# Patient Record
Sex: FEMALE | Race: WHITE | NOT HISPANIC OR LATINO | Employment: OTHER | URBAN - METROPOLITAN AREA
[De-identification: names, ages, dates, MRNs, and addresses within clinical notes are randomized per-mention and may not be internally consistent; named-entity substitution may affect disease eponyms.]

---

## 2017-02-06 ENCOUNTER — GENERIC CONVERSION - ENCOUNTER (OUTPATIENT)
Dept: OTHER | Facility: OTHER | Age: 72
End: 2017-02-06

## 2017-02-08 ENCOUNTER — GENERIC CONVERSION - ENCOUNTER (OUTPATIENT)
Dept: OTHER | Facility: OTHER | Age: 72
End: 2017-02-08

## 2017-02-09 ENCOUNTER — LAB CONVERSION - ENCOUNTER (OUTPATIENT)
Dept: OTHER | Facility: OTHER | Age: 72
End: 2017-02-09

## 2017-02-09 LAB
25(OH)D3 SERPL-MCNC: 38 NG/ML (ref 30–100)
CREATININE, RANDOM URINE (HISTORICAL): 117 MG/DL (ref 20–320)
HBA1C MFR BLD HPLC: 6.3 % OF TOTAL HGB
MAGNESIUM, UR (HISTORICAL): 0.5 MG/DL
MICROALBUMIN/CREATININE RATIO (HISTORICAL): 4 MCG/MG CREAT

## 2017-02-15 ENCOUNTER — ALLSCRIPTS OFFICE VISIT (OUTPATIENT)
Dept: OTHER | Facility: OTHER | Age: 72
End: 2017-02-15

## 2017-02-24 ENCOUNTER — LAB CONVERSION - ENCOUNTER (OUTPATIENT)
Dept: OTHER | Facility: OTHER | Age: 72
End: 2017-02-24

## 2017-02-24 LAB
ALKALINE PHOSPHATASE BONE FRACTION (HISTORICAL): 56 % (ref 28–66)
ALKALINE PHOSPHATASE INTESTINE FRACTION (HISTORICAL): 0 % (ref 1–24)
ALKALINE PHOSPHATASE LIVER FRACTION (HISTORICAL): 44 % (ref 25–69)
ALP SERPL-CCNC: 145 U/L (ref 33–130)
CK SERPL-CCNC: 45 U/L (ref 29–143)
ERYTHROCYTE SEDIMENTATION RATE (HISTORICAL): 14 MM/H
MACROHEPATIC ISOENZYME (HISTORICAL): 0 %
PLACENTAL ISOENZYME (HISTORICAL): 0 %

## 2017-02-28 ENCOUNTER — GENERIC CONVERSION - ENCOUNTER (OUTPATIENT)
Dept: OTHER | Facility: OTHER | Age: 72
End: 2017-02-28

## 2017-03-16 ENCOUNTER — GENERIC CONVERSION - ENCOUNTER (OUTPATIENT)
Dept: OTHER | Facility: OTHER | Age: 72
End: 2017-03-16

## 2017-04-11 ENCOUNTER — GENERIC CONVERSION - ENCOUNTER (OUTPATIENT)
Dept: OTHER | Facility: OTHER | Age: 72
End: 2017-04-11

## 2017-05-10 ENCOUNTER — ALLSCRIPTS OFFICE VISIT (OUTPATIENT)
Dept: OTHER | Facility: OTHER | Age: 72
End: 2017-05-10

## 2017-05-17 ENCOUNTER — ALLSCRIPTS OFFICE VISIT (OUTPATIENT)
Dept: OTHER | Facility: OTHER | Age: 72
End: 2017-05-17

## 2017-05-17 DIAGNOSIS — E11.9 TYPE 2 DIABETES MELLITUS WITHOUT COMPLICATIONS (HCC): ICD-10-CM

## 2017-05-17 DIAGNOSIS — E78.5 HYPERLIPIDEMIA: ICD-10-CM

## 2017-05-30 ENCOUNTER — GENERIC CONVERSION - ENCOUNTER (OUTPATIENT)
Dept: OTHER | Facility: OTHER | Age: 72
End: 2017-05-30

## 2017-06-07 ENCOUNTER — GENERIC CONVERSION - ENCOUNTER (OUTPATIENT)
Dept: OTHER | Facility: OTHER | Age: 72
End: 2017-06-07

## 2017-07-18 ENCOUNTER — GENERIC CONVERSION - ENCOUNTER (OUTPATIENT)
Dept: OTHER | Facility: OTHER | Age: 72
End: 2017-07-18

## 2017-08-02 ENCOUNTER — GENERIC CONVERSION - ENCOUNTER (OUTPATIENT)
Dept: OTHER | Facility: OTHER | Age: 72
End: 2017-08-02

## 2017-09-08 ENCOUNTER — GENERIC CONVERSION - ENCOUNTER (OUTPATIENT)
Dept: OTHER | Facility: OTHER | Age: 72
End: 2017-09-08

## 2017-09-19 ENCOUNTER — ALLSCRIPTS OFFICE VISIT (OUTPATIENT)
Dept: OTHER | Facility: OTHER | Age: 72
End: 2017-09-19

## 2017-09-19 ENCOUNTER — TRANSCRIBE ORDERS (OUTPATIENT)
Dept: ADMINISTRATIVE | Facility: HOSPITAL | Age: 72
End: 2017-09-19

## 2017-09-19 DIAGNOSIS — J44.9 OBSTRUCTIVE CHRONIC BRONCHITIS WITHOUT EXACERBATION (HCC): Primary | ICD-10-CM

## 2017-09-19 DIAGNOSIS — Z12.31 VISIT FOR SCREENING MAMMOGRAM: ICD-10-CM

## 2017-09-20 ENCOUNTER — LAB CONVERSION - ENCOUNTER (OUTPATIENT)
Dept: OTHER | Facility: OTHER | Age: 72
End: 2017-09-20

## 2017-09-20 LAB
CHOLEST SERPL-MCNC: 204 MG/DL
CHOLEST/HDLC SERPL: 4.3 (CALC)
HBA1C MFR BLD HPLC: 5.7 % OF TOTAL HGB
HDLC SERPL-MCNC: 48 MG/DL
LDL CHOLESTEROL (HISTORICAL): 130 MG/DL (CALC)
NON-HDL-CHOL (CHOL-HDL) (HISTORICAL): 156 MG/DL (CALC)
TRIGL SERPL-MCNC: 145 MG/DL
TSH SERPL DL<=0.05 MIU/L-ACNC: 1.95 MIU/L (ref 0.4–4.5)

## 2017-10-11 ENCOUNTER — GENERIC CONVERSION - ENCOUNTER (OUTPATIENT)
Dept: OTHER | Facility: OTHER | Age: 72
End: 2017-10-11

## 2017-10-17 ENCOUNTER — GENERIC CONVERSION - ENCOUNTER (OUTPATIENT)
Dept: OTHER | Facility: OTHER | Age: 72
End: 2017-10-17

## 2017-10-19 ENCOUNTER — HOSPITAL ENCOUNTER (OUTPATIENT)
Dept: NON INVASIVE DIAGNOSTICS | Facility: HOSPITAL | Age: 72
Discharge: HOME | End: 2017-10-19
Attending: INTERNAL MEDICINE
Payer: COMMERCIAL

## 2017-10-19 ENCOUNTER — GENERIC CONVERSION - ENCOUNTER (OUTPATIENT)
Dept: OTHER | Facility: OTHER | Age: 72
End: 2017-10-19

## 2017-10-19 VITALS
SYSTOLIC BLOOD PRESSURE: 133 MMHG | HEIGHT: 70 IN | DIASTOLIC BLOOD PRESSURE: 69 MMHG | BODY MASS INDEX: 23.91 KG/M2 | WEIGHT: 167 LBS

## 2017-10-19 DIAGNOSIS — I25.10 ATHEROSCLEROTIC HEART DISEASE OF NATIVE CORONARY ARTERY WITHOUT ANGINA PECTORIS: ICD-10-CM

## 2017-10-19 DIAGNOSIS — R06.00 DYSPNEA: ICD-10-CM

## 2017-10-19 LAB
CHEST PAIN STATEMENT: NORMAL
MAX DIASTOLIC BP: 81 MMHG
MAX HEART RATE: 121 BPM
MAX PREDICTED HEART RATE: 149 BPM
MAX. SYSTOLIC BP: 156 MMHG
PROTOCOL NAME: NORMAL
REASON FOR TERMINATION: NORMAL
TARGET HR FORMULA: NORMAL
TIME IN EXERCISE PHASE: 720 S

## 2017-10-19 PROCEDURE — 93350 STRESS TTE ONLY: CPT

## 2017-10-19 RX ORDER — DOBUTAMINE HYDROCHLORIDE 200 MG/100ML
10 INJECTION INTRAVENOUS CONTINUOUS
Status: DISCONTINUED | OUTPATIENT
Start: 2017-10-19 | End: 2017-10-20 | Stop reason: HOSPADM

## 2017-10-19 RX ADMIN — DOBUTAMINE HYDROCHLORIDE 10 MCG/KG/MIN: 200 INJECTION INTRAVENOUS at 13:29

## 2017-10-30 ENCOUNTER — HOSPITAL ENCOUNTER (OUTPATIENT)
Dept: MAMMOGRAPHY | Facility: HOSPITAL | Age: 72
Discharge: HOME | End: 2017-10-30
Payer: COMMERCIAL

## 2017-10-30 DIAGNOSIS — Z12.31 VISIT FOR SCREENING MAMMOGRAM: ICD-10-CM

## 2017-10-31 ENCOUNTER — HOSPITAL ENCOUNTER (OUTPATIENT)
Dept: MAMMOGRAPHY | Facility: MEDICAL CENTER | Age: 72
Discharge: HOME | End: 2017-10-31
Payer: COMMERCIAL

## 2017-10-31 ENCOUNTER — TRANSCRIBE ORDERS (OUTPATIENT)
Dept: ADMINISTRATIVE | Facility: HOSPITAL | Age: 72
End: 2017-10-31

## 2017-10-31 ENCOUNTER — GENERIC CONVERSION - ENCOUNTER (OUTPATIENT)
Dept: FAMILY MEDICINE CLINIC | Facility: CLINIC | Age: 72
End: 2017-10-31

## 2017-10-31 ENCOUNTER — GENERIC CONVERSION - ENCOUNTER (OUTPATIENT)
Dept: OTHER | Facility: OTHER | Age: 72
End: 2017-10-31

## 2017-10-31 DIAGNOSIS — N64.4 BREAST PAIN, RIGHT: Primary | ICD-10-CM

## 2017-10-31 DIAGNOSIS — Z12.31 VISIT FOR SCREENING MAMMOGRAM: ICD-10-CM

## 2017-10-31 DIAGNOSIS — Z00.00 ENCOUNTER FOR GENERAL ADULT MEDICAL EXAMINATION WITHOUT ABNORMAL FINDINGS: ICD-10-CM

## 2017-10-31 LAB
LEFT EYE DIABETIC RETINOPATHY: NORMAL
RIGHT EYE DIABETIC RETINOPATHY: NORMAL

## 2017-11-03 ENCOUNTER — GENERIC CONVERSION - ENCOUNTER (OUTPATIENT)
Dept: OTHER | Facility: OTHER | Age: 72
End: 2017-11-03

## 2017-11-03 ENCOUNTER — HOSPITAL ENCOUNTER (OUTPATIENT)
Dept: MAMMOGRAPHY | Facility: CLINIC | Age: 72
Discharge: HOME | End: 2017-11-03
Payer: COMMERCIAL

## 2017-11-03 ENCOUNTER — HOSPITAL ENCOUNTER (OUTPATIENT)
Dept: ULTRASOUND IMAGING | Facility: CLINIC | Age: 72
Discharge: HOME | End: 2017-11-03
Payer: COMMERCIAL

## 2017-11-03 DIAGNOSIS — N64.4 MASTODYNIA: ICD-10-CM

## 2017-11-03 PROCEDURE — G0204 DX MAMMO INCL CAD BI: HCPCS

## 2017-11-03 PROCEDURE — G0279 TOMOSYNTHESIS, MAMMO: HCPCS

## 2017-11-03 PROCEDURE — 76642 ULTRASOUND BREAST LIMITED: CPT

## 2017-11-07 ENCOUNTER — HOSPITAL ENCOUNTER (OUTPATIENT)
Dept: PULMONOLOGY | Facility: HOSPITAL | Age: 72
Discharge: HOME | End: 2017-11-07
Payer: COMMERCIAL

## 2017-11-07 ENCOUNTER — GENERIC CONVERSION - ENCOUNTER (OUTPATIENT)
Dept: OTHER | Facility: OTHER | Age: 72
End: 2017-11-07

## 2017-11-07 DIAGNOSIS — J44.9 OBSTRUCTIVE CHRONIC BRONCHITIS WITHOUT EXACERBATION (HCC): ICD-10-CM

## 2017-11-07 PROCEDURE — 94729 DIFFUSING CAPACITY: CPT

## 2017-11-07 PROCEDURE — 94726 PLETHYSMOGRAPHY LUNG VOLUMES: CPT

## 2017-11-07 PROCEDURE — 94760 N-INVAS EAR/PLS OXIMETRY 1: CPT

## 2017-11-07 PROCEDURE — 94060 EVALUATION OF WHEEZING: CPT

## 2017-11-07 RX ORDER — ALBUTEROL SULFATE 2.5 MG/3ML
2.5 SOLUTION RESPIRATORY (INHALATION) ONCE AS NEEDED
Status: COMPLETED | OUTPATIENT
Start: 2017-11-07 | End: 2017-11-07

## 2017-11-07 RX ADMIN — ALBUTEROL SULFATE 2.5 MG: 2.5 SOLUTION RESPIRATORY (INHALATION) at 08:30

## 2017-12-20 ENCOUNTER — GENERIC CONVERSION - ENCOUNTER (OUTPATIENT)
Dept: FAMILY MEDICINE CLINIC | Facility: CLINIC | Age: 72
End: 2017-12-20

## 2017-12-30 LAB
AST SERPL W P-5'-P-CCNC: 13 U/L (ref 10–35)
LDL CHOLESTEROL DIRECT (HISTORICAL): 128 MG/DL

## 2018-01-02 ENCOUNTER — GENERIC CONVERSION - ENCOUNTER (OUTPATIENT)
Dept: FAMILY MEDICINE CLINIC | Facility: CLINIC | Age: 73
End: 2018-01-02

## 2018-01-04 ENCOUNTER — GENERIC CONVERSION - ENCOUNTER (OUTPATIENT)
Dept: OTHER | Facility: OTHER | Age: 73
End: 2018-01-04

## 2018-01-11 NOTE — MISCELLANEOUS
Message  Patient states she is still having the episodes but they are much briefer  She states they have not occurred during "office hours" so she has not called to come in  At this point, I recommend we do a 48 hour Holter monitor        Plan  Fatigue, Palpitations    · HOLTER MONITOR - 48 HOUR; Status:Hold For - Scheduling; Requested  for:10Aug2016;     Signatures   Electronically signed by : DANNY Farrell ; Aug 10 2016  8:56AM EST                       (Author)

## 2018-01-11 NOTE — RESULT NOTES
Message   waiting for the western blot since positive IgG     Verified Results  (1) LYME ANTIBODY PROFILE W/REFLEX TO WESTERN BLOT [id number] 11:02AM Enos Gottron     Test Name Result Flag Reference   LYME IGG 1 99 H 0 00-0 79   POSITIVE(> or = 1 20)-Presence of Borrelia IgG antibodies  Current testing guidelines recommend that all positive samples be supplemented by further testing  Sample forwarded to reference lab for Western blot assay  LYME IGM 0 35  0 00-0 79   NEGATIVE (0 00-0 79)-Absence of detectable Borrelia IgM antibodies  A negative result does not exclude the possibility of Borrelia infection  If early lyme disease is suspected, a second sample should be collected & tested 4 weeks after initial testing

## 2018-01-11 NOTE — MISCELLANEOUS
Message   Recorded as Task   Date: 02/26/2017 07:25 PM, Created By: Rima Negrete   Task Name: Follow Up   Assigned To: [facility],Clinical Team   Regarding Patient: Maria Luz Trujillo, Status: In Progress   Comment:    Zayra Jessica - 26 Feb 2017 7:25 PM     TASK CREATED  Please notify patient that her labs are all fine  Her alkaline phosphatase remains elevated, but that may be from her hepatitis C  Please confirm that she is seeing GI in the next few months, and also send her a copy of ALL labs done in Feb 2017, and tell her to take these along with her to GI  (Alternately, we can send a copy to GI  Reyna Espinoza please find out who she will be seeing )    Thank you,    Carmen Hansen - 27 Feb 2017 10:08 AM     TASK REPLIED TO: Previously Assigned To [facility] and [address] [place] [address] with patient and gave her results  She asked for Sed rate results  I told her it was wnl  She said you ran that test due to her legs being achy and wanted to increase her Pravastatin  She prefers not to increase the med due to this  Please advise  Zayra Jessica - 27 Feb 2017 4:13 PM     TASK REPLIED TO: Previously Assigned To Zayra Jessica  Yes, thank you  SED rate is normal   She may continue on the same dose of Pravastatin at the present time  Thank you,    Radha Ramirez - 28 Feb 2017 8:14 AM     TASK IN PROGRESS   Jamie Lechuga - 28 Feb 2017 8:16 AM     TASK EDITED  Pt aware no change at this point        Active Problems    1  Allergic rhinitis (477 9) (J30 9)   2  Holly's esophagus (530 85) (K22 70)   3  Controlled diabetes mellitus type II without complication (098 94) (G19 5)   4  COPD (chronic obstructive pulmonary disease) (496) (J44 9)   5  Coronary artery calcification (414 00,414 4) (I25 10,I25 84)   6  Elevated alkaline phosphatase level (790 5) (R74 8)   7  Hyperlipidemia (272 4) (E78 5)   8  Myalgia (729 1) (M79 1)   9  Osteoporosis (733 00) (M81 0)   10  Palpitations (785 1) (R00 2)   11  Polymyalgia rheumatica (725) (M35 3)   12  Reactive airway disease (493 90) (J45 909)   13  Tobacco abuse (305 1) (Z72 0)   14  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 (070 70) (B19 20)   15  Vitamin D deficiency (268 9) (E55 9)    Current Meds   1  Azelastine HCl - 0 1 % Nasal Solution; USE 2 SPRAYS IN EACH       NOSTRIL TWICE   DAILY; Therapy: 83HBY8735 to (Last Rx:24Zwc8225)  Requested for: 57QAM5875 Ordered   2  Biotin 1000 MCG Oral Tablet; Therapy: 03RZV5901 to Recorded   3  Calcium 1250 MG TABS; Therapy: 11TDY5465 to Recorded   4  Digestive Enzyme Oral Capsule; Take as directed Recorded   5  Lansoprazole 30 MG Oral Capsule Delayed Release; Therapy: 72EYG6102 to Recorded   6  MetFORMIN HCl - 500 MG Oral Tablet; TAKE 1 TABLET ONCE DAILY WITH A MEAL; Therapy: 57RMY6666 to (Aurelio Avila)  Requested for: 04ZNE7710; Last   Rx:26Jan2017 Ordered   7  MiraLax Oral Powder (Polyethylene Glycol 3350) Recorded   8  Montelukast Sodium 10 MG Oral Tablet (Singulair); Take 1 tablet daily; Therapy: 24Ftq1536 to (Evaluate:25Jun2017)  Requested for: 02Mby2930; Last   Rx:02Bmh8247 Ordered   9  Pravastatin Sodium 10 MG Oral Tablet; TAKE 1 TABLET DAILY AS DIRECTED; Therapy: 82ZRS7670 to (Evaluate:62Pyd5337)  Requested for: 44PQI3015; Last   Rx:82Lie7899 Ordered   10  Ventolin  (90 Base) MCG/ACT Inhalation Aerosol Solution; INHALE 1 TO 2    PUFFS EVERY 4 TO 6 HOURS AS NEEDED; Therapy: 52QIF1964 to (Evaluate:07Oct2016); Last Rx:09Jun2016 Ordered   11  Vitamin D 2000 UNIT Oral Capsule; take 1 capsule daily; Therapy: 16BBJ8002 to Recorded    Allergies    1  Aspirin TABS   2   NSAIDs    Signatures   Electronically signed by : Yara Foley, ; Feb 28 2017  8:17AM EST                       (Author)

## 2018-01-12 NOTE — RESULT NOTES
Verified Results  * MAMMO SCREENING BILATERAL W CAD 42Efe38 11:08AM [facility] Order Number: [id number]    - Patient Instructions: To schedule this appointment, please contact Central Scheduling at [phone]  Do not wear any perfume, powder, lotion or deodorant on breast or underarm area  Please bring your doctors order, referral (if needed) and insurance information with you on the day of the test  Failure to bring this information may result in this test being rescheduled  Arrive 15 minutes prior to your appointment time to register  On the day of your test, please bring any prior mammogram or breast studies with you that were not performed at a [facility]  Failure to bring prior exams may result in your test needing to be rescheduled  TW Order Number: [id number]    - Patient Instructions: To schedule this appointment, please contact Central Scheduling at [phone]  Do not wear any perfume, powder, lotion or deodorant on breast or underarm area  Please bring your doctors order, referral (if needed) and insurance information with you on the day of the test  Failure to bring this information may result in this test being rescheduled  Arrive 15 minutes prior to your appointment time to register  On the day of your test, please bring any prior mammogram or breast studies with you that were not performed at a [facility]  Failure to bring prior exams may result in your test needing to be rescheduled  Test Name Result Flag Reference   MAMMO SCREENING BILATERAL W CAD (Report)     Patient History:   Patient is postmenopausal and is nulliparous  Family history of breast cancer in maternal aunt at age 48 or    over  Took estrogen for 10 years  Patient is an every day smoker  Patient's BMI is 32 4  Reason for exam: screening (asymptomatic)       Mammo Screening Bilateral W CAD: October 29, 2016 - Check In #:    [de-identified]   Bilateral CC and MLO view(s) were taken  Technologist: MELIDA Roche (R)(M)   Prior study comparison: July 30, 2015, bilateral digital    screening mammogram performed at [address]  May 7, 2014, bilateral digital screening    mammogram performed at [address]  November 18, 2011, bilateral digital screening mammogram   performed at [address]  August 23, 2010, bilateral digital screening mammogram performed at [address]  There are scattered fibroglandular densities  No new dominant    soft tissue mass, architectural distortion or suspicious    calcifications are noted  The skin and nipple structures are    within normal limits  Benign appearing calcifications are noted  No mammographic evidence of malignancy  No    significant changes when compared with prior studies  ASSESSMENT: BiRad:2 - Benign     Recommendation:   Routine screening mammogram of both breasts in 1 year  Analyzed by CAD     8-10% of cancers will be missed on mammography  Management of a    palpable abnormality must be based on clinical grounds  Patients   will be notified of their results via letter from our facility  Accredited by [employer] of [employer] and FDA  Transcription Location: [facility] 98: [id number]     Risk Value(s):   Tyrer-Cuzick 10 Year: 3 596%, Tyrer-Cuzick Lifetime: 5 678%,    Myriad Table: 1 5%, CARLOS 5 Year: 1 9%, NCI Lifetime: 5 6%   Signed by:   Yadi Castro MD   10/31/16       Discussion/Summary   Please notify that mammo is WNL  Repeat in 1 year     CB

## 2018-01-13 VITALS
SYSTOLIC BLOOD PRESSURE: 104 MMHG | HEART RATE: 80 BPM | TEMPERATURE: 98 F | BODY MASS INDEX: 33.84 KG/M2 | HEIGHT: 60 IN | WEIGHT: 172.38 LBS | RESPIRATION RATE: 16 BRPM | DIASTOLIC BLOOD PRESSURE: 66 MMHG

## 2018-01-13 VITALS
SYSTOLIC BLOOD PRESSURE: 130 MMHG | WEIGHT: 175 LBS | DIASTOLIC BLOOD PRESSURE: 72 MMHG | HEART RATE: 72 BPM | BODY MASS INDEX: 34.18 KG/M2

## 2018-01-14 VITALS
HEART RATE: 84 BPM | DIASTOLIC BLOOD PRESSURE: 70 MMHG | SYSTOLIC BLOOD PRESSURE: 110 MMHG | WEIGHT: 173 LBS | BODY MASS INDEX: 33.96 KG/M2 | HEIGHT: 60 IN

## 2018-01-15 VITALS
BODY MASS INDEX: 34.47 KG/M2 | HEART RATE: 76 BPM | HEIGHT: 60 IN | WEIGHT: 175.6 LBS | DIASTOLIC BLOOD PRESSURE: 80 MMHG | SYSTOLIC BLOOD PRESSURE: 130 MMHG

## 2018-01-15 NOTE — MISCELLANEOUS
Message   Recorded as Task   Date: 08/03/2016 04:54 PM, Created By: Racheal Rios   Task Name: Follow Up   Assigned To: Hilary Tucker   Regarding Patient: Jude Cash, Status: Active   CommentKay Jarvis - 03 Aug 2016 4:54 PM     TASK CREATED  pt called stating she had palpitation episode last night and also approx 2 hours prior to calling  pt also very distressed at the passing of a very close friend yesterday  declined appt to come in at time of call due to difficulty with construction taking place on her street  she will call back tomorrow if sx recur and ask for me - at which point i will try to arrange nursing visit for EKG  Zayra Jessica - 38 Aug 2016 10:34 PM     TASK REPLIED TO: Previously Assigned To Zayra Jessica  Thank you  CB        Active Problems    1  Allergic rhinitis (477 9) (J30 9)   2  Backache with radiation (724 5) (M54 9)   3  Holly's esophagus (530 85) (K22 70)   4  Colon cancer screening (V76 51) (Z12 11)   5  Controlled diabetes mellitus type II without complication (881 18) (X56 3)   6  COPD (chronic obstructive pulmonary disease) (496) (J44 9)   7  Encounter for screening colonoscopy (V76 51) (Z12 11)   8  Encounter for screening for malignant neoplasm of colon (V76 51) (Z12 11)   9  Encounter for screening mammogram for breast cancer (V76 12) (Z12 31)   10  Erythema migrans (Lyme disease) (088 81) (A69 20)   11  Fatigue (780 79) (R53 83)   12  Glaucoma screening (V80 1) (Z13 5)   13  Headache (784 0) (R51)   14  Hyperlipidemia (272 4) (E78 5)   15  Lumbosacral spondylosis without myelopathy (721 3) (M47 817)   16  Neck pain (723 1) (M54 2)   17  Need for immunization against influenza (V04 81) (Z23)   18  Neuralgia (729 2) (M79 2)   19  Osteoporosis (733 00) (M81 0)   20  Ovarian Failure (256 39)   21  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)   22  Palpitations (785 1) (R00 2)   23  Polymyalgia rheumatica (725) (M35 3)   24  Reactive airway disease (840 10) (J68 805)   25  Screening for diabetic retinopathy (V80 2) (Z13 5)   26  Skin rash (782 1) (R21)   27  Tobacco abuse (305 1) (Z72 0)   28  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 (070 70) (B19 20)   29  Vitamin D deficiency (268 9) (E55 9)   30  Wheezing (786 07) (R06 2)    Current Meds   1  Biotin 1000 MCG Oral Tablet; Therapy: 27HFR1672 to Recorded   2  Calcium 1250 MG TABS; Therapy: 62WEN4935 to Recorded   3  Lansoprazole 30 MG Oral Capsule Delayed Release; Therapy: 19SYU3035 to Recorded   4  MetFORMIN HCl - 500 MG Oral Tablet; TAKE 1 TABLET ONCE DAILY WITH A MEAL; Therapy: 46YDI2607 to (51-30-20-57)  Requested for: 444 14 907; Last   Rx:26Oct2015 Ordered   5  MiraLax Oral Powder (Polyethylene Glycol 3350) Recorded   6  Montelukast Sodium 10 MG Oral Tablet (Singulair); Take 1 tablet daily; Therapy: 29Apr2015 to (Evaluate:67Pmw4372)  Requested for: 20Jun2016; Last   Rx:20Jun2016 Ordered   7  Pravastatin Sodium 10 MG Oral Tablet; take 1 tablet daily at bedtime; Therapy: 94TAC9969 to (Evaluate:02Jan2017)  Requested for: 65OOV2887; Last   Rx:25Fuh3428 Ordered   8  Probiotic Oral Capsule; Therapy: 90SSG4724 to Recorded   9  Ventolin  (90 Base) MCG/ACT Inhalation Aerosol Solution; INHALE 1 TO 2   PUFFS EVERY 4 TO 6 HOURS AS NEEDED; Therapy: 16UIG8931 to (Evaluate:07Oct2016); Last Rx:09Jun2016 Ordered   10  Vitamin D 2000 UNIT Oral Capsule; take 1 capsule daily; Therapy: 56VFF5151 to Recorded    Allergies    1  Aspirin TABS   2   NSAIDs    Signatures   Electronically signed by : Thad Currie, ; Aug  4 2016  8:22AM EST                       (Author)

## 2018-01-15 NOTE — RESULT NOTES
Verified Results  ECHO STRESS TEST W CONTRAST IF INDICATED [id number] 12:48PM Justo Pendleton Order Number: [id number]   Performing Comments: Dobutamine   - Patient Instructions: To schedule this appointment, please contact Central Scheduling at [phone]   Order Number: [id number]   Performing Comments: Dobutamine   - Patient Instructions: To schedule this appointment, please contact Central Scheduling at [phone]  Test Name Result Flag Reference   ECHO STRESS TEST W CONTRAST IF INDICATED (Report)     [facility]  [place], [address]   [phone]     Dobutamine Stress Echocardiography     Study date: 19-Oct-2017     Patient: Mian Monte   MR number: [id number]   Account number: [de-identified]   : 1945   Age: 70 years   Gender: Female   Study date: 19-Oct-2017   Status: Outpatient   Location: Stress lab   Height: 70 in   Weight: 167 lb   BP: 133// 69 mmHg     Indications: Detection of coronary artery disease  Diagnosis: R00 2 - Palpitations, R06 00 - Dyspnea, unspecified     Sonographer: DANNI Camacho   Interpreting Physician: Oumar Gardiner MD   Primary Physician: Carine Gates MD   Referring Physician: Oumar Gardiner MD   Group: [facility]   RN: Cara Khan RN BSN   Report Prepared By: Cara Khan RN BSN   EKG Technician: Darius Miranda     IMPRESSIONS:   Normal study after pharmacologic stress  The patient had no ECG, symptomatic or echo evidence of ischemia to a HR of 125=84% MPHR Diagnostic sensitivity was limited by submaximal stress  SUMMARY     STRESS RESULTS:   Maximal heart rate during stress was 125 bpm ( 84 % of maximal predicted heart rate)  Target heart rate was not achieved  There was no chest pain during stress  BASELINE:   Estimated left ventricular ejection fraction was 60 %   HISTORY: The patient is a 70year old  female  Chest pain status: no chest pain  Other symptoms: dyspnea, decreased effort tolerance, and palpitations  Coronary artery disease risk factors: dyslipidemia, smoking, diabetes   mellitus, and post-menopausal state  Co-morbidity: history of lung disease  Medications: a lipid lowering agent and diabetic medications  PHYSICAL EXAM: Baseline physical exam screening: no wheezes audible  REST ECG: Normal sinus rhythm  Normal baseline ECG  PROCEDURE: The study was performed in the stress lab  A dobutamine infusion pharmacologic stress test was performed  Stress and rest echocardiographic evaluation was performed from multiple acoustic windows for evaluation of ventricular   function  DOBUTAMINE PROTOCOL:   HR bpm SBP mmHg DBP mmHg Symptoms   Baseline 68 133 69 none   10 mcg/kg/min 72 156 81 none   20 mcg/kg/min 100 116 67 none   30 mcg/kg/min 110 140 63 palpitations   40 mcg/kg/min 121 150 58 same as above   Recovery I 112 152 50 subsiding   Recovery  148 58 none   Recovery III 86 142 63 none     The patient also performed low level exercise with hand   MEDICATIONS GIVEN: IV normal saline ( 200 ml) was administered  STRESS RESULTS: 02 sat at rest 98% and at peak stress 98%  Duration of pharmacologic stress was 12 min  Maximal heart rate during stress was 125 bpm ( 84 % of maximal predicted heart rate)  Target heart rate was not achieved  The heart   rate response to stress was normal  Maximal systolic blood pressure during stress was 156 mmHg  There was normal resting blood pressure with an appropriate response to stress  The rate-pressure product for the peak heart rate and blood   pressure was 87206  There was no chest pain during stress  The stress test was terminated due to protocol completion  After the procedure, the patient was discharged to home  ECG CONCLUSIONS: Arrhythmia during stress: isolated premature ventricular beats  STRESS 2D ECHO RESULTS:     BASELINE: Left ventricular size was normal  Mild LVH  Mild LAE  AV sclerosis  Mild to moderate MAC Overall left ventricular systolic function was normal  Estimated left ventricular ejection fraction was 60 %   LOW STRESS: There was an appropriate reduction in left ventricular size  There was an appropriate augmentation in LV function  PEAK STRESS: There was an appropriate reduction in left ventricular size  There was an appropriate augmentation in LV function       Prepared and electronically signed by     Jacinto Weldon MD   Signed 19-Oct-2017 17:37:40

## 2018-01-15 NOTE — RESULT NOTES
Discussion/Summary   Ultrasound of breast   Mammo Nl  Repeat 1 year  Pleae notify patient mammo and US normal   Repeat 1 year  Verified Results  [address] & CAD [id number] 09:47AM [facility] Order Number: [id number]    - Patient Instructions: To schedule this appointment, please contact Central Scheduling at [phone]  Do not wear any perfume, powder, lotion or deodorant on breast or underarm area  Please bring your doctors order, referral (if needed) and insurance information with you on the day of the test  Failure to bring this information may result in this test being rescheduled  Arrive 15 minutes prior to your appointment time to register  On the day of your test, please bring any prior mammogram or breast studies with you that were not performed at a [facility]  Failure to bring prior exams may result in your test needing to be rescheduled  Test Name Result Flag Reference   [facility] DIAGNOSTIC BILATERAL W 3D & CAD (Report)     Patient History:   Patient is postmenopausal and is nulliparous  Family history of breast cancer at age 48 or over in maternal    aunt  Took estrogen for 10 years  Patient is an every day smoker  Patient's BMI is 32 4  Reason for exam: clinical finding  Mammo Diagnostic Bilateral W DBT and CAD: November 3, 2017 -    Check In #: [de-identified]   2D/3D Procedure   3D views: Bilateral MLO view(s) were taken  2D views: Bilateral CC view(s) were taken  Technologist: RT Elian(MELIDA)(M)   Prior study comparison: October 29, 2016, mammo screening    bilateral W CAD, performed at [place] [address]  July 30, 2015, bilateral digital screening mammogram,    performed at [address]  May 7,    2014, bilateral digital screening mammogram, performed at [address]         US Breast Right Limited: November 3, 2017 - Check In #: [id number]   Standard views  Technologist: Alysia Russ   Patient presents complaining of pain in the right    breast Bilateral mammogram was obtained in 20 and 3-D    acquisitions  The breast is composed of predominantly fatty    tissue  Benign appearing calcifications identified  There is no   dominant mass or suspicious microcalcifications in either    breast  Compared to multiple prior mammograms, there has been no   cystic unchanged  Targeted ultrasound right breast was performed with special    attention given to the area of pain  There is no sonographic    evidence of solid mass or any suspicious finding in the area of    concern  ACR BI-RADSï¾® Assessments: BiRad:1 - Negative (Overall)   Diag Mammo: Both breasts are BiRad:1 - negative  Right breast Right Brst US: Right breast is BiRad:1 - negative  Recommendation:   Routine screening mammogram of both breasts in 1 year  The patient is scheduled in a reminder system for screening    mammography  Transcription Location: [facility] 143: [id number]     Risk Value(s):   Tyrer-Cuzick 10 Year: 3 700%, Tyrer-Cuzick Lifetime: 5 400%,    Myriad Table: 1 5%, CARLOS 5 Year: 1 9%, NCI Lifetime: 5 4%     *US BREAST RIGHT LIMITED (DIAGNOSTIC) [id number] 09:47AM [facility] Order Number: [id number]    - Patient Instructions: To schedule this appointment, please contact Central Scheduling at [phone]  Test Name Result Flag Reference   US BREAST RIGHT LIMITED (Report)     Patient History:   Patient is postmenopausal and is nulliparous  Family history of breast cancer at age 48 or over in maternal    aunt  Took estrogen for 10 years  Patient is an every day smoker  Patient's BMI is 32 4  Reason for exam: clinical finding  Mammo Diagnostic Bilateral W DBT and CAD: November 3, 2017 -    Check In #: [de-identified]   2D/3D Procedure   3D views: Bilateral MLO view(s) were taken     2D views: Bilateral CC view(s) were taken  Technologist: Nicola Gutierrez, RT(R)(M)   Prior study comparison: October 29, 2016, mammo screening    bilateral W CAD, performed at [address]  July 30, 2015, bilateral digital screening mammogram,    performed at [address]  May 7,    2014, bilateral digital screening mammogram, performed at [address]  US Breast Right Limited: November 3, 2017 - Check In #: [de-identified]   Standard views  Technologist: Kelli Germain   Patient presents complaining of pain in the right    breast Bilateral mammogram was obtained in 20 and 3-D    acquisitions  The breast is composed of predominantly fatty    tissue  Benign appearing calcifications identified  There is no   dominant mass or suspicious microcalcifications in either    breast  Compared to multiple prior mammograms, there has been no   cystic unchanged  Targeted ultrasound right breast was performed with special    attention given to the area of pain  There is no sonographic    evidence of solid mass or any suspicious finding in the area of    concern  ACR BI-RADSï¾® Assessments: BiRad:1 - Negative (Overall)   Diag Mammo: Both breasts are BiRad:1 - negative  Right breast Right Brst US: Right breast is BiRad:1 - negative  Recommendation:   Routine screening mammogram of both breasts in 1 year  The patient is scheduled in a reminder system for screening    mammography       Transcription Location: [address] [place] [address] [place]: [id number]     Risk Value(s):   Tyrer-Cuzick 10 Year: 3 700%, Tyrer-Cuzick Lifetime: 5 400%,    Myriad Table: 1 5%, CARLOS 5 Year: 1 9%, NCI Lifetime: 5 4%

## 2018-01-16 NOTE — MISCELLANEOUS
We had the pleasure of doing a DSE on the patient today  She came up a bit short of 85% of MPHR  She had no ECG, symptomatic or echo evidence for ischemia to a fairly good HR  I discussed these favorable findings with her  I have  increased her pravastastin to 20 mg daily since her LDL was 130  Apparently Hepatitis C is not problematic and her SGOT/SGPT are normal   I will check a repeat LDL and SGOT in 2 months time  I would suggest repeat functional testing in the form of a  DSE again in 3 years time  She has curtailed smoking and cessation was urged once again  Electronically signed by:Paul Lazarus Pale M D    Oct 19 2017  1:50PM EST

## 2018-01-16 NOTE — RESULT NOTES
Message   please call pt with the result    she has lyme disease and she should continue doxycycline as prescribed         Verified Results  (1) LYME ANTIBODY PROFILE W/REFLEX TO WESTERN BLOT [id number] 11:02AM Tiara Moreno     Test Name Result Flag Reference   LYME 18 KD IGG Present A    LYME 23 KD IGG Absent     LYME 28 KD IGG Absent     LYME 30 KD IGG Absent     LYME 39 KD IGG Present A    LYME 39 KD IGM Absent     LYME 41 KD IGG Present A    LYME 45 KD IGG Present A    LYME 58 KD IGG Present A    LYME 66 KD IGG Absent     LYME 93 KD IGG Absent     LYME 23 KD IGM Absent     LYME 41 KD IGM Absent     LYME IGG WB INTERP  Positive A    Positive: 5 of the following                               Borrelia-specific bands:                               18,23,28,30,39,41,45,58,                               66, and 93  Negative: No bands or banding                               patterns which do not                               meet positive criteria  LYME IGM WB INTERP  Negative     Note: An equivocal or positive EIA result followed by a negativeWestern Blot result is considered NEGATIVE  An equivocal or positiveEIA result followed by a positive Western Blot is considered POSITIVEby the CDC  Positive: 2 of the following bands: 23,39 or 41Negative: No bands or banding patterns which do not meet positivecriteria  Criteria for positivity are those recommended by CDC/ASTPHLD p23=Osp C, p72=zbousicnrNsvb:Sera from individuals with the following may cross react in theLyme Western Blot assays: other spirochetal diseases (periodontaldisease, leptospirosis, relapsing fever, yaws, and pinta);connective autoimmune (Rheumatoid Arthritis and Systemic LupusErythematosus and also individuals with Antinuclear Antibody);other infections COFFEE [facility] Spotted Fever; Anisa-Barr Virus,and Cytomegalovirus)    Performed at:  [address] [place] [address]  [id number]  : Ponce Orr Tim Ching MD, Phone:  [phone]

## 2018-01-16 NOTE — PROCEDURES
Procedures signed  by Lacho Oneill DO at 8/30/2016 12:35 PM       Author:  Lacho Oneill DO Service:  Cardiology Author Type:  Physician     Filed:  8/30/2016 12:35 PM Date of Service:  8/25/2016  3:32 PM Status:  Signed     :  Lacho Oneill DO (Physician)              Florian Ohin  [id number]  Ean Mazariegos  08/18/2016    ORDERING 1700 Dilan Odell MD     INDICATION   Palpitations  REPORT   Holter monitor duration was 47 hours 59 minutes  Underlying rhythm is normal sinus rhythm with an average heart rate of   84 beats per minute ranging from 54 to 117 beats per minute  There   were 143 single premature ventricular contractions noted  There were   79 premature atrial contractions with 2 runs of nonsustained   supraventricular tachycardia, the longest consisting of 11 beats with   the fastest of 3 beats at 141 beats per minute  No other or   tachyarrhythmias or bradyarrhythmias were identified  There were no ST   segment changes noted  The patient activity diary was returned, which related several   episodes of mild chest fluttering  These symptoms primarily correlated   with normal sinus rhythm at average rates  No symptoms were reported   during the two episodes of supraventricular tachycardia or with the   premature ventricular contractions  No prior was available for   comparison  Nicol Gonsales  [id number]  D:  08/25/2016 08:24:00  Dictated by:  Maria Teresa Lyon DO  T:  08/25/2016 15:32:34           Received for:Grady Wade DO  Aug 30 2016 12:35PM Eastern Standard Time     Electronically signed by:Zayra VAZQUEZ    Sep  8 2016  8:48AM EST

## 2018-01-23 ENCOUNTER — ALLSCRIPTS OFFICE VISIT (OUTPATIENT)
Dept: OTHER | Facility: OTHER | Age: 73
End: 2018-01-23

## 2018-01-23 NOTE — RESULT NOTES
Verified Results  (1) AST (SGOT) 19KRS2964 12:07PM Eufemia Dumont   REPORT COMMENT:  FASTING:YES     Test Name Result Flag Reference   AST 13 U/L  10-35     (1) LDL,DIRECT 93MTW1870 12:07PM Eufemia Dumont     Test Name Result Flag Reference   DIRECT  mg/dL H <100   Greatly elevated Triglycerides values (>1200 mg/dL)  interfere with the dLDL assay  As no Triglycerides   testing was ordered, interpret results with caution  Desirable range <100 mg/dL for patients with CHD or  diabetes and <70 mg/dL for diabetic patients with  known heart disease

## 2018-01-24 NOTE — PROGRESS NOTES
Assessment    1  Hyperlipidemia (272 4) (E78 5)   2  COPD (chronic obstructive pulmonary disease) (496) (J44 9)   · seen on cxr 4/29/15, mild by PFT 5/15   3  Holly's esophagus (530 85) (K22 70)   4  Controlled diabetes mellitus type II without complication (599 56) (P46 1)   5  Tobacco abuse (305 1) (Z72 0)   6  Allergic rhinitis (477 9) (J30 9)    Plan  Holly's esophagus, Controlled diabetes mellitus type II without complication, COPD  (chronic obstructive pulmonary disease), Hyperlipidemia, Osteoporosis, Vitamin D  deficiency    · (1) CBC/PLT/DIFF; Status:Active; Requested for:82Aos5112;    · (1) COMPREHENSIVE METABOLIC PANEL; Status:Active; Requested for:09Atr0967;    · (1) HEMOGLOBIN A1C; Status:Active; Requested for:68Icg1053;    · (1) LIPID PANEL, FASTING; Status:Active; Requested for:39Tvd3576;    · (1) MICROALBUMIN CREATININE RATIO, RANDOM URINE; Status:Active; Requested  for:36Yxl0939;    · (1) TSH; Status:Active; Requested for:73Vcy5897;    · (1) VITAMIN D 25-HYDROXY; Status:Active; Requested for:79Fid3321;   COPD (chronic obstructive pulmonary disease)    · Spiriva Respimat 2 5 MCG/ACT Inhalation Aerosol Solution; TAKE 2 INHALATIONS  DAILY   · Symbicort 160-4 5 MCG/ACT Inhalation Aerosol; INHALE 2 PUFFS TWICE DAILY  RINSE MOUTH AFTER USE  Health Maintenance    · *VB - Urinary Incontinence Screen (Dx Z13 89 Screen for UI); Status:Complete;   Done:  72TYN5269 09:21AM    Discussion/Summary      Mrs Khris Jacob is a pleasant 31-year-old white female who presents today to follow-up on her chronic medical conditions  1  Hyperlipidemia -she recently had a stress echo and has seen Dr Higinio Arguello    Her stress echo was fine  He increased her pravastatin from 10-20 mg daily  She experienced some side effects and her LDL only dropped 2 points  Dr Higinio Arguello at suggested increasing the pravastatin but patient did not want to  For now, she remains on pravastatin 20 mg daily   Check full lipid panel in 3-4 months and follow up with me  2  COPD -long discussion with patient about risks of COPD and tobaccoism  She does not want to stop smoking and yet realizes the risks inherent to the process  Stopping smoking causes her to become very anxious which she is today  I suggested she try to decrease down to half a pack a day  Start Symbicort and Spiriva and follow-up in 3-4 months and evaluate how she feels at that point  We will also discuss a pulmonary consult at her next appointment  3  Holly's esophagus -she remains asymptomatic  Continue PPI  4  Diabetes mellitus type 2 -labs ordered  Continue metformin  Last A1c was 5 7   5  Allergic rhinitis -continue on Singulair  Labs and follow-up in 4 months  The patient was counseled regarding diagnostic results, instructions for management, risk factor reductions, prognosis, patient and family education, impressions, risks and benefits of treatment options, importance of compliance with treatment  Immunization Counseling The parent/guardian was counseled on the following vaccine components:  total time of encounter was 30 minutes and 25 minutes was spent counseling  Possible side effects of new medications were reviewed with the patient/guardian today  The treatment plan was reviewed with the patient/guardian  The patient/guardian understands and agrees with the treatment plan     Self Referrals: No      Chief Complaint  pt is here for follow up of hyperlipidemia and diabetes   cd   Patient is here today for follow up of chronic conditions described in HPI  History of Present Illness  Patient presents today to follow-up on her chronic medical conditions  Since her last appointment, she has had a complete set of pulmonary function tests revealing moderate obstructive disease improved with bronchodilator  She also had a normal colonoscopy in October  Continues to smoke half a pack to a pack a day    Anxiety recently  She had a stress echo ordered by Cardio and did well   Apparently her oxygen level dropped to the 80s during her colonoscopy  Dr Angely Metcalf wanted her to see a pulmonologist  The prospect of quitting smoking has put her in a very anxious state  In the last year she has lost 20 friends (to death )      Review of Systems    Constitutional: as noted in HPI  Eyes: No complaints of eye pain, no red eyes, no eyesight problems, no discharge, no dry eyes, no itching of eyes  ENT: no complaints of earache, no loss of hearing, no nose bleeds, no nasal discharge, no sore throat, no hoarseness  Cardiovascular: No complaints of slow heart rate, no fast heart rate, no chest pain, no palpitations, no leg claudication, no lower extremity edema  Respiratory: as noted in HPI  Gastrointestinal: as noted in HPI  Genitourinary: No complaints of dysuria, no incontinence, no pelvic pain, no dysmenorrhea, no vaginal discharge or bleeding  Musculoskeletal: No complaints of arthralgias, no myalgias, no joint swelling or stiffness, no limb pain or swelling  Integumentary: No complaints of skin rash or lesions, no itching, no skin wounds, no breast pain or lump  Neurological: No complaints of headache, no confusion, no convulsions, no numbness, no dizziness or fainting, no tingling, no limb weakness, no difficulty walking  Psychiatric: Not suicidal, no sleep disturbance, no anxiety or depression, no change in personality, no emotional problems  Endocrine: No complaints of proptosis, no hot flashes, no muscle weakness, no deepening of the voice, no feelings of weakness  Hematologic/Lymphatic: No complaints of swollen glands, no swollen glands in the neck, does not bleed easily, does not bruise easily  Active Problems    1  Allergic rhinitis (477 9) (J30 9)   2  Holly's esophagus (530 85) (K22 70)   3  Controlled diabetes mellitus type II without complication (103 13) (M39 1)   4  COPD (chronic obstructive pulmonary disease) (496) (J44 9)   5   Coronary artery calcification (414 00,414 4) (I25 10,I25 84)   6  Hyperlipidemia (272 4) (E78 5)   7  Lumbosacral pain, chronic (724 2,338 29) (M54 5,G89 29)   8  Osteoporosis (733 00) (M81 0)   9  Tobacco abuse (305 1) (Z72 0)   10  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 (070 70) (B19 20)   11  Vitamin D deficiency (268 9) (E55 9)    Past Medical History    1  History of acute bronchitis (V12 69) (Z87 09)   2  History of acute sinusitis (V12 69) (Z87 09)   3  History of breast pain   4  History of polymyalgia rheumatica (V13 59) (Z87 39)   5  History of reactive airway disease (V12 69) (Z87 09)   6  History of sciatica (V12 49) (Z86 69)   7  History of Lyme disease (088 81) (A69 20)    Surgical History    1  History of Hysterectomy    The surgical history was reviewed and updated today  Family History  Mother    1  Family history of Alzheimer Disease  Father    2  Family history of COPD, mild   3  Family history of Family Health Status Of Father -    4  Family history of Hypertension (V17 49)    The family history was reviewed and updated today  Social History    · Being A Social Drinker   · Current smoker (305 1) (F17 200)   · Denied: History of Drug Use   · Marital History - Single   · Occupation: Retired  The social history was reviewed and updated today  The social history was reviewed and is unchanged  Current Meds   1  Biotin 1000 MCG Oral Tablet; Therapy: 26CAL4622 to Recorded   2  Calcium 1250 MG TABS; Therapy: 45RCQ3875 to Recorded   3  Clotrimazole-Betamethasone 1-0 05 % External Cream; APPLY  AND RUB  IN A THIN   FILM TO AFFECTED AREAS TWICE DAILY  (AM AND PM); Therapy: 30PIS7564 to (Last Rx:87Tqu7484)  Requested for: 87NOU5042 Ordered   4  Digestive Enzyme Oral Capsule; Take as directed Recorded   5  HM Vitamin D3 4000 UNIT Oral Capsule; Therapy: 64GES5163 to Recorded   6  Lansoprazole 30 MG Oral Capsule Delayed Release; Therapy: 98VYW2495 to Recorded   7   MetFORMIN HCl - 500 MG Oral Tablet; TAKE 1 TABLET ONCE DAILY WITH A MEAL; Therapy: 26LJZ1130 to (Evaluate:10Nov2018)  Requested for: 06UEF9810; Last   Rx:62Ctl4910 Ordered   8  MiraLax Oral Powder Recorded   9  Montelukast Sodium 10 MG Oral Tablet; Take 1 tablet daily; Therapy: 55RFX5781 to (Marvin Osier)  Requested for: 13VDQ1140; Last   Rx:36Pgv2316 Ordered   10  Pravastatin Sodium 20 MG Oral Tablet; TAKE 1 TABLET AT BEDTIME; Therapy: 76FIH9727 to (Evaluate:14Oct2018)  Requested for: 19Oct2017; Last    Rx:46Kyt9967 Ordered   11  Probiotic Oral Capsule; Therapy: 60ZAQ8841 to Recorded   12  Ventolin  (90 Base) MCG/ACT Inhalation Aerosol Solution; INHALE 1 TO 2 PUFFS    EVERY 4 TO 6 HOURS AS NEEDED; Therapy: 80YZD1464 to (Evaluate:00Brp9292)  Requested for: 45LGC4230; Last    Rx:79Jdo6822 Ordered    The medication list was reviewed and updated today  Allergies    1  Aspirin TABS   2  NSAIDs    Vitals  Vital Signs    Recorded: 42CLH8003 09:22AM   Heart Rate 72, L Radial   Pulse Quality Regular, L Radial   Systolic 028, LUE, Sitting   Diastolic 74, LUE, Sitting   Height 5 ft    Weight 172 lb 6 4 oz   BMI Calculated 33 67   BSA Calculated 1 75     Physical Exam    Constitutional   General appearance: No acute distress, well appearing and well nourished  Eyes   Conjunctiva and lids: No swelling, erythema or discharge  Pupils and irises: Equal, round and reactive to light  Ears, Nose, Mouth, and Throat   External inspection of ears and nose: Normal     Oropharynx: Normal with no erythema, edema, exudate or lesions  Pulmonary   Respiratory effort: No increased work of breathing or signs of respiratory distress  Auscultation of lungs: Clear to auscultation  Cardiovascular   Auscultation of heart: Normal rate and rhythm, normal S1 and S2, without murmurs      Examination of extremities for edema and/or varicosities: Normal     Carotid pulses: Normal     Lymphatic   Palpation of lymph nodes in neck: No lymphadenopathy  Musculoskeletal   Gait and station: Normal     Psychiatric   Orientation to person, place, and time: Normal     Mood and affect: Normal          Results/Data  *VB - Urinary Incontinence Screen (Dx Z13 89 Screen for UI) 23Jan2018 09:21AM Adelita Daughters     Test Name Result Flag Reference   Urinary Incontinence Assessment 23Jan2018       Falls Risk Assessment (Dx Z13 89 Screen for Neurologic Disorder) 84GQP2333 09:19AM User, Ahs     Test Name Result Flag Reference   Falls Risk      No falls in the past year       Health Management  Controlled diabetes mellitus type II without complication   (1) HEMOGLOBIN A1C; every 6 months; Last 13UMM4916; Next Due: 39EAR5687; Active  (1) MICROALBUMIN CREATININE RATIO, RANDOM URINE; every 1 year; Last 28EVV7851; Next Due:  54Ynq3138; Near Due  (every) MICROALBUMIN, RANDOM URINE (W/CREATININE); every 1 year; Last 16DEJ0804; Next  Due: 91Pwn7480; Near Due  *VB - Foot Exam; every 1 year; Last 85UCG5992; Next Due: 26XFA4352; Near Due  History of Encounter for screening for malignant neoplasm of colon   COLONOSCOPY; every 3 years; Last 51ILM9537; Next Due: 83TCP4303; Active  History of Encounter for screening mammogram for breast cancer   Digital Bilateral Screening Mammogram With CAD; every 1 year; Last 76DOX1134; Next Due:  10ZSN7752; Overdue  History of Screening for diabetic retinopathy   *VB - Eye Exam; every 1 year; Last 31NGC3758; Next Due: 79BMH5405; Active  Health Maintenance   Medicare Annual Wellness Visit; every 1 year; Next Due: 41ZGZ0989;  Overdue    Signatures   Electronically signed by : DANNY Verma ; Jan 23 2018 11:20AM EST                       (Author)

## 2018-02-01 ENCOUNTER — TELEPHONE (OUTPATIENT)
Dept: FAMILY MEDICINE CLINIC | Facility: CLINIC | Age: 73
End: 2018-02-01

## 2018-02-01 NOTE — TELEPHONE ENCOUNTER
PT WAS IN TO SEE DR ERICKSON LAST Tuesday 1/23/18  SCRIPT WAS SENT TO [facility] FOR [place]  [facility] CONTACTED US ON THE 23RD OR 24TH REGARDING THIS  PT STATES THAT [facility] HAS NOT BEEN CONTACTED BY US YET  PLEASE CALL PT BACK ASAP  [address] WASN'T  SHOULD SHE START THE SYMBICORT WITHOUT THE [facility]? PT NEEDS TO KNOW ASAP PLEASE  THANK YOU

## 2018-02-05 DIAGNOSIS — J44.9 CHRONIC OBSTRUCTIVE PULMONARY DISEASE, UNSPECIFIED COPD TYPE (HCC): Primary | ICD-10-CM

## 2018-02-05 PROBLEM — N64.4 PAIN IN BREAST: Status: ACTIVE | Noted: 2017-10-31

## 2018-02-05 PROBLEM — M54.50 LUMBOSACRAL PAIN, CHRONIC: Status: ACTIVE | Noted: 2017-09-19

## 2018-02-05 PROBLEM — G89.29 LUMBOSACRAL PAIN, CHRONIC: Status: ACTIVE | Noted: 2017-09-19

## 2018-02-05 RX ORDER — LANSOPRAZOLE 30 MG/1
CAPSULE, DELAYED RELEASE ORAL
COMMUNITY
Start: 2015-10-26 | End: 2022-05-16

## 2018-02-05 RX ORDER — MONTELUKAST SODIUM 10 MG/1
1 TABLET ORAL DAILY
COMMUNITY
Start: 2015-04-29 | End: 2018-07-07 | Stop reason: SDUPTHER

## 2018-02-05 RX ORDER — CLOTRIMAZOLE AND BETAMETHASONE DIPROPIONATE 10; .64 MG/G; MG/G
CREAM TOPICAL 2 TIMES DAILY
COMMUNITY
Start: 2017-05-17 | End: 2018-10-09 | Stop reason: SDUPTHER

## 2018-02-05 RX ORDER — CHOLECALCIFEROL (VITAMIN D3) 10(400)/ML
DROPS ORAL
COMMUNITY

## 2018-02-05 RX ORDER — BUDESONIDE AND FORMOTEROL FUMARATE DIHYDRATE 160; 4.5 UG/1; UG/1
2 AEROSOL RESPIRATORY (INHALATION) 2 TIMES DAILY
COMMUNITY
Start: 2018-01-23 | End: 2018-04-09 | Stop reason: SDUPTHER

## 2018-02-05 RX ORDER — ALBUTEROL SULFATE 90 UG/1
1-2 AEROSOL, METERED RESPIRATORY (INHALATION) AS NEEDED
COMMUNITY
Start: 2016-06-09 | End: 2019-07-17 | Stop reason: SDUPTHER

## 2018-02-05 RX ORDER — PRAVASTATIN SODIUM 20 MG
1 TABLET ORAL
COMMUNITY
Start: 2015-10-26 | End: 2018-10-09 | Stop reason: SDUPTHER

## 2018-02-05 RX ORDER — BIOTIN 1 MG
10000 TABLET ORAL
COMMUNITY
Start: 2015-10-26 | End: 2018-06-04

## 2018-02-05 NOTE — TELEPHONE ENCOUNTER
Dr Emily Jarvis, patients has not been here since new system was implemented, therefore, meds are not reconciled   would you like to sent spiriva to her pharmacy?? cd

## 2018-02-13 NOTE — TELEPHONE ENCOUNTER
I called Pt back  She said that pharmacy had the Rx but "needed additional information"  She was unclear as to what was needed  I called the pharmacy and her Rx plan is requiring a prior auth for the [address] [facility] [address] [place] did not know what substitution would be covered, if any  They are faxing over the prior auth form now    Thank you,

## 2018-02-13 NOTE — TELEPHONE ENCOUNTER
Please notify patient that Spiriva was sent to [facility] on [address] on 2/8/17  It should be there  Please send to Nursing if there is a problem      Thank you,    CB

## 2018-02-14 ENCOUNTER — TELEPHONE (OUTPATIENT)
Dept: FAMILY MEDICINE CLINIC | Facility: CLINIC | Age: 73
End: 2018-02-14

## 2018-04-09 DIAGNOSIS — J44.9 CHRONIC OBSTRUCTIVE PULMONARY DISEASE, UNSPECIFIED COPD TYPE (HCC): Primary | ICD-10-CM

## 2018-04-09 RX ORDER — BUDESONIDE AND FORMOTEROL FUMARATE DIHYDRATE 160; 4.5 UG/1; UG/1
2 AEROSOL RESPIRATORY (INHALATION) 2 TIMES DAILY
Qty: 10.2 G | Refills: 1 | Status: SHIPPED | OUTPATIENT
Start: 2018-04-09 | End: 2018-06-18 | Stop reason: SDUPTHER

## 2018-05-06 ENCOUNTER — APPOINTMENT (EMERGENCY)
Dept: RADIOLOGY | Facility: HOSPITAL | Age: 73
End: 2018-05-06
Payer: COMMERCIAL

## 2018-05-06 ENCOUNTER — HOSPITAL ENCOUNTER (EMERGENCY)
Facility: HOSPITAL | Age: 73
Discharge: HOME | End: 2018-05-06
Attending: EMERGENCY MEDICINE
Payer: COMMERCIAL

## 2018-05-06 VITALS
SYSTOLIC BLOOD PRESSURE: 134 MMHG | TEMPERATURE: 98 F | WEIGHT: 174 LBS | OXYGEN SATURATION: 94 % | BODY MASS INDEX: 24.97 KG/M2 | RESPIRATION RATE: 18 BRPM | HEART RATE: 82 BPM | DIASTOLIC BLOOD PRESSURE: 62 MMHG

## 2018-05-06 DIAGNOSIS — S62.652A NONDISPLACED FRACTURE OF MIDDLE PHALANX OF RIGHT MIDDLE FINGER, INITIAL ENCOUNTER FOR CLOSED FRACTURE: Primary | ICD-10-CM

## 2018-05-06 PROCEDURE — 99283 EMERGENCY DEPT VISIT LOW MDM: CPT

## 2018-05-06 PROCEDURE — 73140 X-RAY EXAM OF FINGER(S): CPT

## 2018-05-06 RX ORDER — ACETAMINOPHEN 325 MG/1
650 TABLET ORAL EVERY 6 HOURS PRN
Qty: 30 TABLET | Refills: 0 | Status: SHIPPED | OUTPATIENT
Start: 2018-05-06

## 2018-05-06 NOTE — ED PROVIDER NOTES
History  Chief Complaint   Patient presents with    Finger Injury     Right middle finger injury  Bent back while lifting heavy bag      72 yr right hand dominant female with injury 2 hrs ago to fingers on right hand  Was lifting a 40 lb bag of salt pellets for water softener when she lost  of the bag and the weight bent her right fingers back (hyperextended) right middle finger hurts most and now is swollen and bruised  Normal sensation  Good range of motion otherwise but pain worse with bending the middle finger  Other fingers look and feel OK now  History provided by:  Patient      Prior to Admission Medications   Prescriptions Last Dose Informant Patient Reported? Taking?    Biotin 1000 MCG tablet   Yes Yes   Sig: Take by mouth   Cholecalciferol 4000 units CAPS   Yes Yes   Sig: Take by mouth   Digestive Enzyme CAPS   Yes Yes   Sig: Take by mouth   Probiotic Product (PROBIOTIC-10) CAPS   Yes Yes   Sig: Take by mouth   Tiotropium Bromide Monohydrate (SPIRIVA RESPIMAT) 2 5 MCG/ACT AERS   No Yes   Sig: Inhale 2 Act (5 mcg total) daily for 90 days   albuterol (VENTOLIN HFA) 90 mcg/act inhaler   Yes Yes   Sig: Inhale 1-2 puffs   budesonide-formoterol (SYMBICORT) 160-4 5 mcg/act inhaler   No Yes   Sig: Inhale 2 puffs 2 (two) times a day   calcium carbonate 1250 MG capsule   Yes Yes   Sig: Take by mouth   clotrimazole-betamethasone (LOTRISONE) 1-0 05 % cream   Yes Yes   Sig: Apply topically Twice daily   lansoprazole (PREVACID) 30 mg capsule   Yes Yes   Sig: Take by mouth   metFORMIN (GLUCOPHAGE) 500 mg tablet   Yes Yes   Sig: Take 1 tablet by mouth Daily   montelukast (SINGULAIR) 10 mg tablet   Yes Yes   Sig: Take 1 tablet by mouth daily   pravastatin (PRAVACHOL) 20 mg tablet   Yes Yes   Sig: Take 1 tablet by mouth      Facility-Administered Medications: None       Past Medical History:   Diagnosis Date    Lyme disease     Polymyalgia rheumatica (HCC)     last assessed 2/15/17    Reactive airway disease last assessed 6/9/16    Sciatica        Past Surgical History:   Procedure Laterality Date    HYSTERECTOMY         Family History   Problem Relation Age of Onset    Alzheimer's disease Mother     COPD Father      mild    Hypertension Father      I have reviewed and agree with the history as documented  Social History   Substance Use Topics    Smoking status: Smoker, Current Status Unknown    Smokeless tobacco: Never Used    Alcohol use Yes      Comment: social        Review of Systems   Constitutional: Negative for activity change, appetite change, chills, diaphoresis, fatigue, fever and unexpected weight change  HENT: Negative for congestion, ear pain, postnasal drip, rhinorrhea, sinus pressure and sore throat  Eyes: Negative for pain, discharge and redness  Respiratory: Negative for cough, chest tightness and shortness of breath  Cardiovascular: Negative for chest pain, palpitations and leg swelling  Gastrointestinal: Negative for abdominal pain, constipation, diarrhea, nausea and vomiting  Genitourinary: Negative for difficulty urinating, dysuria, flank pain, frequency, hematuria and urgency  Musculoskeletal: Negative for arthralgias, back pain and myalgias  Skin: Negative for color change, rash and wound  Allergic/Immunologic: Negative for immunocompromised state  Neurological: Negative for dizziness, tremors, syncope, weakness, numbness and headaches         Physical Exam  ED Triage Vitals   Temperature Pulse Respirations Blood Pressure SpO2   05/06/18 1058 05/06/18 1058 05/06/18 1058 05/06/18 1102 05/06/18 1058   98 °F (36 7 °C) 82 18 134/62 94 %      Temp Source Heart Rate Source Patient Position - Orthostatic VS BP Location FiO2 (%)   05/06/18 1058 -- -- -- --   Temporal          Pain Score       05/06/18 1058       7           Orthostatic Vital Signs  Vitals:    05/06/18 1058 05/06/18 1102   BP:  134/62   Pulse: 82        Physical Exam   Constitutional: She is oriented to person, place, and time  She appears well-developed and well-nourished  No distress  HENT:   Head: Normocephalic and atraumatic  Cardiovascular: Normal rate, regular rhythm, normal heart sounds and intact distal pulses  Pulmonary/Chest: Effort normal and breath sounds normal    Musculoskeletal: She exhibits tenderness  She exhibits no edema or deformity  Right hand: She exhibits decreased range of motion, tenderness, bony tenderness (right 3rd digit between pip to dip- swollen bruised tender  diminished flexion at dip) and swelling  She exhibits normal two-point discrimination, normal capillary refill, no deformity and no laceration  Normal sensation noted  Normal strength noted  Left hand: Normal         Hands:  Neurological: She is alert and oriented to person, place, and time  Skin: Skin is warm and dry  Capillary refill takes less than 2 seconds  She is not diaphoretic  Psychiatric: She has a normal mood and affect  Nursing note and vitals reviewed  ED Medications  Medications - No data to display    Diagnostic Studies  Results Reviewed     None                 XR finger right third digit-middle   ED Interpretation by Adrianna Sarmiento PA-C (05/06 1207)   Nondisplaced oblique fx middle phalanx third finger      Final Result by Buster Rebollar MD (05/06 1209)      Oblique nondisplaced fracture of the 3rd middle phalanx  Workstation performed: [id number]                    Procedures  Procedures   Splint applied by RN and/or ED Tech  CMS intact checked by me pre and post splint application    Metal finger splint in extension right 3rd digit    Phone Contacts  ED Phone Contact    ED Course                               MDM  Number of Diagnoses or Management Options  Nondisplaced fracture of middle phalanx of right middle finger, initial encounter for closed fracture: new and does not require workup     Amount and/or Complexity of Data Reviewed  Tests in the radiology section of CPT®: ordered and reviewed  Independent visualization of images, tracings, or specimens: yes    Patient Progress  Patient progress: stable    CritCare Time    Disposition  Final diagnoses:   Nondisplaced fracture of middle phalanx of right middle finger, initial encounter for closed fracture     Time reflects when diagnosis was documented in both MDM as applicable and the Disposition within this note     Time User Action Codes Description Comment    5/6/2018 12:09 PM Hellen Hutchison Add [[id number]] Nondisplaced fracture of middle phalanx of right middle finger, initial encounter for closed fracture       ED Disposition     ED Disposition Condition Comment    Discharge  Sheyla Medicus discharge to home/self care      Condition at discharge: Good        Follow-up Information     Follow up With Specialties Details Why [address] [facility] [place] [facility] Schedule an appointment as soon as possible for a visit Seen in ER need followup for injury- finger fracture [place] [id number]  [phone]        Discharge Medication List as of 5/6/2018 12:10 PM      START taking these medications    Details   acetaminophen (TYLENOL) 325 mg tablet Take 2 tablets (650 mg total) by mouth every 6 (six) hours as needed for mild pain, Starting Sun 5/6/2018, Normal         CONTINUE these medications which have NOT CHANGED    Details   albuterol (VENTOLIN HFA) 90 mcg/act inhaler Inhale 1-2 puffs, Starting Thu 6/9/2016, Historical Med      Biotin 1000 MCG tablet Take by mouth, Starting Mon 10/26/2015, Historical Med      budesonide-formoterol (SYMBICORT) 160-4 5 mcg/act inhaler Inhale 2 puffs 2 (two) times a day, Starting Mon 4/9/2018, Normal      calcium carbonate 1250 MG capsule Take by mouth, Starting Mon 10/26/2015, Historical Med      Cholecalciferol 4000 units CAPS Take by mouth, Starting Thu 5/7/2015, Historical Med      clotrimazole-betamethasone (LOTRISONE) 1-0 05 % cream Apply topically Twice daily, Starting Wed 5/17/2017, Historical Med      Digestive Enzyme CAPS Take by mouth, Historical Med      lansoprazole (PREVACID) 30 mg capsule Take by mouth, Starting Mon 10/26/2015, Historical Med      metFORMIN (GLUCOPHAGE) 500 mg tablet Take 1 tablet by mouth Daily, Starting Mon 10/3/2011, Historical Med      montelukast (SINGULAIR) 10 mg tablet Take 1 tablet by mouth daily, Starting Wed 4/29/2015, Historical Med      pravastatin (PRAVACHOL) 20 mg tablet Take 1 tablet by mouth, Starting Mon 10/26/2015, Historical Med      Probiotic Product (PROBIOTIC-10) CAPS Take by mouth, Starting Wed 5/10/2017, Historical Med      Tiotropium Bromide Monohydrate (SPIRIVA RESPIMAT) 2 5 MCG/ACT AERS Inhale 2 Act (5 mcg total) daily for 90 days, Starting Mon 2/5/2018, Until Sun 5/6/2018, Normal           No discharge procedures on file      ED Provider  Electronically Signed by           Royce Harkins PA-C  05/07/18 0354

## 2018-05-06 NOTE — DISCHARGE INSTRUCTIONS
Finger Fracture   WHAT YOU NEED TO KNOW:   A finger fracture is a break in 1 or more of the bones in your finger  DISCHARGE INSTRUCTIONS:   Return to the emergency department if:   · Your cast or splint gets wet, damaged, or comes off  · Your splint or cast feels too tight  · You have severe pain  · Your injured finger is numb, cold, or pale  Contact your healthcare provider or hand specialist if:   · Your pain or swelling gets worse, even after treatment  · You have questions or concerns about your condition or care  Medicines:   · NSAIDs , such as ibuprofen, help decrease swelling, pain, and fever  This medicine is available with or without a doctor's order  NSAIDs can cause stomach bleeding or kidney problems in certain people  If you take blood thinner medicine, always ask your healthcare provider if NSAIDs are safe for you  Always read the medicine label and follow directions  · Acetaminophen  decreases pain and fever  It is available without a doctor's order  Ask how much to take and how often to take it  Follow directions  Acetaminophen can cause liver damage if not taken correctly  · Prescription pain medicine  may be given  Ask your healthcare provider how to take this medicine safely  Some prescription pain medicines contain acetaminophen  Do not take other medicines that contain acetaminophen without talking to your healthcare provider  Too much acetaminophen may cause liver damage  Prescription pain medicine may cause constipation  Ask your healthcare provider how to prevent or treat constipation  · Take your medicine as directed  Contact your healthcare provider if you think your medicine is not helping or if you have side effects  Tell him or her if you are allergic to any medicine  Keep a list of the medicines, vitamins, and herbs you take  Include the amounts, and when and why you take them  Bring the list or the pill bottles to follow-up visits   Carry your medicine list with you in case of an emergency  Self-care:   · Wear your splint as directed  Do not remove your splint until you follow up with your healthcare provider or hand specialist      · Apply ice  on your finger for 15 to 20 minutes every hour or as directed  Use an ice pack, or put crushed ice in a plastic bag  Cover it with a towel before you apply it to your skin  Ice helps prevent tissue damage and decreases swelling and pain  · Elevate  your finger above the level of your heart as often as you can  This will help decrease swelling and pain  Prop your hand on pillows or blankets to keep it elevated comfortably  · Go to physical therapy as directed  A physical therapist teaches you exercises to help improve movement and strength, and to decrease pain  Follow up with your healthcare provider or hand specialist within 2 days:  Write down your questions so you remember to ask them during your visits  © 2017 [address]  Information is for End User's use only and may not be sold, redistributed or otherwise used for commercial purposes  All illustrations and images included in CareNotes® are the copyrighted property of [employer] , [employer]  or [employer]  The above information is an  only  It is not intended as medical advice for individual conditions or treatments  Talk to your doctor, nurse or pharmacist before following any medical regimen to see if it is safe and effective for you

## 2018-05-10 ENCOUNTER — OFFICE VISIT (OUTPATIENT)
Dept: OBGYN CLINIC | Facility: HOSPITAL | Age: 73
End: 2018-05-10
Payer: COMMERCIAL

## 2018-05-10 ENCOUNTER — HOSPITAL ENCOUNTER (OUTPATIENT)
Dept: RADIOLOGY | Facility: HOSPITAL | Age: 73
Discharge: HOME | End: 2018-05-10
Payer: COMMERCIAL

## 2018-05-10 VITALS
HEART RATE: 81 BPM | SYSTOLIC BLOOD PRESSURE: 137 MMHG | HEIGHT: 61 IN | BODY MASS INDEX: 32.66 KG/M2 | DIASTOLIC BLOOD PRESSURE: 78 MMHG | WEIGHT: 173 LBS

## 2018-05-10 DIAGNOSIS — M79.644 PAIN OF FINGER OF RIGHT HAND: ICD-10-CM

## 2018-05-10 DIAGNOSIS — S62.652A CLOSED NONDISPLACED FRACTURE OF MIDDLE PHALANX OF RIGHT MIDDLE FINGER, INITIAL ENCOUNTER: ICD-10-CM

## 2018-05-10 DIAGNOSIS — M79.644 PAIN OF FINGER OF RIGHT HAND: Primary | ICD-10-CM

## 2018-05-10 PROCEDURE — 73140 X-RAY EXAM OF FINGER(S): CPT

## 2018-05-10 PROCEDURE — 99203 OFFICE O/P NEW LOW 30 MIN: CPT | Performed by: PHYSICIAN ASSISTANT

## 2018-05-10 NOTE — PATIENT INSTRUCTIONS
Call or follow up with any new or worsening symptoms as discussed  Continue the splint for 2 weeks and then may change to yonas taping as demonstrated  Follow up in 2-3 weeks with Dr Elisabet Abraham in [facility]  Ice Pack Application   WHAT YOU NEED TO KNOW:   Ice can be used to decrease swelling and pain after an injury or surgery  Common injuries that may benefit from ice therapy are sprains, strains, and bruises  The use of ice is most effective in the first 1 to 3 days after an injury  DISCHARGE INSTRUCTIONS:   How to apply ice:   · Fill a bag with crushed ice about half full  Remove the air from the bag before you close it  You can also use a bag of frozen vegetables  · Wrap the ice pack in a cloth to protect your skin from frostbite or other injury  · Put the ice over the injured area for 20 to 30 minutes or as long as directed  · Check your skin after about 30 seconds for color changes or blistering  Remove the ice if you notice skin changes or you feel burning or numbness in the area  · Throw the ice pack away after use  · Apply ice to your injured area 4 times each day or as directed  Ask your healthcare provider how many days you should apply ice  Contact your healthcare provider if:   · You see blisters, whitening of your skin, or a bluish color to your skin after using ice  · You feel burning or numbness when using ice  · You have questions about the use of ice packs  © 2017 [address] Information is for End User's use only and may not be sold, redistributed or otherwise used for commercial purposes  All illustrations and images included in CareNotes® are the copyrighted property of [employer] , [employer]  or [employer]  The above information is an  only  It is not intended as medical advice for individual conditions or treatments   Talk to your doctor, nurse or pharmacist before following any medical regimen to see if it is safe and effective for you

## 2018-05-10 NOTE — PROGRESS NOTES
Assessment/Plan   Diagnoses and all orders for this visit:    Pain of finger of right hand  -     XR finger right third digit-middle; Future    Closed nondisplaced fracture of middle phalanx of right middle finger, initial encounter          Subjective   Patient ID: New Mcginnis is a 67 y o  female  Vitals:    05/10/18 0925   BP: 137/78   Pulse: 80     71yo female comes in for an evaluation of her right long finger  On 5/6/18, she picked up a 40lb bag of salt pellets to dump in the pool when he hand caught on it and her finger bent back  She was seen in the ER and xrays revealed a nondisplaced, oblique fracture of the middle phalanx of the long finger  She was treated with a splint and her pain has significantly improved  The dull, mild, pain does not radiate and is not associated with numbness  She is from [facility] and would like to follow up there  The following portions of the patient's history were reviewed and updated as appropriate: allergies, current medications, past family history, past medical history, past social history, past surgical history and problem list     Review of Systems  Ortho Exam  Past Medical History:   Diagnosis Date    Lyme disease     Polymyalgia rheumatica ([facility] [address] )     last assessed 2/15/17    Reactive airway disease     last assessed 6/9/16    Sciatica      Past Surgical History:   Procedure Laterality Date    HYSTERECTOMY       Family History   Problem Relation Age of Onset    Alzheimer's disease Mother     COPD Father      mild    Hypertension Father      Social History     Occupational History    retired      Social History Main Topics    Smoking status: Smoker, Current Status Unknown    Smokeless tobacco: Never Used    Alcohol use Yes      Comment: social    Drug use: No    Sexual activity: Not on file       Review of Systems   Constitutional: Negative  HENT: Negative  Eyes: Negative  Respiratory: Negative  Cardiovascular: Negative  Gastrointestinal: Negative  Endocrine: Negative  Genitourinary: Negative  Musculoskeletal: As below      Allergic/Immunologic: Negative  Neurological: Negative  Hematological: Negative  Psychiatric/Behavioral: Negative  Objective   Physical Exam    · Constitutional: Awake, Alert, Oriented  · Eyes: EOMI  · Psych: Mood and affect appropriate  · Heart: regular rate and rhythm  · Lungs: No audible wheezing  · Abdomen: soft  · Lymph: no lymphedema   right long finger:  - Appearance   Swelling: mild distal, ecchymosis: mild of the dorsal dip joint area and no deformity  - Palpation  o No tenderness to palpation of distal radius, distal ulna, scaphoid, lunate, hamate, ulnar wrist, dorsal wrist, palmar wrist, thenar eminence, hypothenar eminence, or hand   - ROM  o not examined due to recent injury  - Motor  o Not tested due to fracture status    - Special Tests  o normal sensation of hand and arm  - NVI distally    I have personally reviewed pertinent films in PACS and my interpretation is nondisplaced middle phalanx fracture, long finger, unchanged from previous

## 2018-05-22 DIAGNOSIS — E11.9 TYPE 2 DIABETES MELLITUS WITHOUT COMPLICATIONS (HCC): ICD-10-CM

## 2018-05-22 DIAGNOSIS — E55.9 VITAMIN D DEFICIENCY: ICD-10-CM

## 2018-05-22 DIAGNOSIS — E78.5 HYPERLIPIDEMIA: ICD-10-CM

## 2018-05-22 DIAGNOSIS — J44.9 CHRONIC OBSTRUCTIVE PULMONARY DISEASE (HCC): ICD-10-CM

## 2018-05-22 DIAGNOSIS — K22.70 BARRETT'S ESOPHAGUS WITHOUT DYSPLASIA: ICD-10-CM

## 2018-05-22 DIAGNOSIS — M81.0 AGE-RELATED OSTEOPOROSIS WITHOUT CURRENT PATHOLOGICAL FRACTURE: ICD-10-CM

## 2018-05-24 ENCOUNTER — OFFICE VISIT (OUTPATIENT)
Dept: OBGYN CLINIC | Facility: MEDICAL CENTER | Age: 73
End: 2018-05-24
Payer: COMMERCIAL

## 2018-05-24 ENCOUNTER — APPOINTMENT (OUTPATIENT)
Dept: RADIOLOGY | Facility: CLINIC | Age: 73
End: 2018-05-24
Payer: COMMERCIAL

## 2018-05-24 VITALS
BODY MASS INDEX: 31.91 KG/M2 | WEIGHT: 169 LBS | SYSTOLIC BLOOD PRESSURE: 126 MMHG | DIASTOLIC BLOOD PRESSURE: 77 MMHG | HEART RATE: 78 BPM | HEIGHT: 61 IN

## 2018-05-24 DIAGNOSIS — S62.652A NONDISPLACED FRACTURE OF MIDDLE PHALANX OF RIGHT MIDDLE FINGER, INITIAL ENCOUNTER FOR CLOSED FRACTURE: Primary | ICD-10-CM

## 2018-05-24 DIAGNOSIS — M79.641 RIGHT HAND PAIN: ICD-10-CM

## 2018-05-24 DIAGNOSIS — M79.645 PAIN OF LEFT MIDDLE FINGER: ICD-10-CM

## 2018-05-24 DIAGNOSIS — M25.531 PAIN IN RIGHT WRIST: ICD-10-CM

## 2018-05-24 LAB
25(OH)D3 SERPL-MCNC: 33 NG/ML (ref 30–100)
ALBUMIN SERPL-MCNC: 4.2 G/DL (ref 3.6–5.1)
ALBUMIN/CREAT UR: 6 MCG/MG CREAT
ALBUMIN/GLOB SERPL: 1.5 (CALC) (ref 1–2.5)
ALP SERPL-CCNC: 123 U/L (ref 33–130)
ALT SERPL-CCNC: 10 U/L (ref 6–29)
AST SERPL-CCNC: 11 U/L (ref 10–35)
BASOPHILS # BLD AUTO: 28 CELLS/UL (ref 0–200)
BASOPHILS NFR BLD AUTO: 0.4 %
BILIRUB SERPL-MCNC: 0.4 MG/DL (ref 0.2–1.2)
BUN SERPL-MCNC: 17 MG/DL (ref 7–25)
BUN/CREAT SERPL: NORMAL (CALC) (ref 6–22)
CALCIUM SERPL-MCNC: 9.4 MG/DL (ref 8.6–10.4)
CHLORIDE SERPL-SCNC: 105 MMOL/L (ref 98–110)
CHOLEST SERPL-MCNC: 161 MG/DL
CHOLEST/HDLC SERPL: 3.1 (CALC)
CO2 SERPL-SCNC: 26 MMOL/L (ref 20–31)
CREAT SERPL-MCNC: 0.7 MG/DL (ref 0.6–0.93)
CREAT UR-MCNC: 36 MG/DL (ref 20–320)
EOSINOPHIL # BLD AUTO: 133 CELLS/UL (ref 15–500)
EOSINOPHIL NFR BLD AUTO: 1.9 %
ERYTHROCYTE [DISTWIDTH] IN BLOOD BY AUTOMATED COUNT: 13.2 % (ref 11–15)
GLOBULIN SER CALC-MCNC: 2.8 G/DL (CALC) (ref 1.9–3.7)
GLUCOSE SERPL-MCNC: 88 MG/DL (ref 65–99)
HBA1C MFR BLD: 5.8 % OF TOTAL HGB
HCT VFR BLD AUTO: 39.7 % (ref 35–45)
HDLC SERPL-MCNC: 52 MG/DL
HGB BLD-MCNC: 13.1 G/DL (ref 11.7–15.5)
LDLC SERPL CALC-MCNC: 88 MG/DL (CALC)
LYMPHOCYTES # BLD AUTO: 1729 CELLS/UL (ref 850–3900)
LYMPHOCYTES NFR BLD AUTO: 24.7 %
MCH RBC QN AUTO: 28.1 PG (ref 27–33)
MCHC RBC AUTO-ENTMCNC: 33 G/DL (ref 32–36)
MCV RBC AUTO: 85.2 FL (ref 80–100)
MICROALBUMIN UR-MCNC: 0.2 MG/DL
MONOCYTES # BLD AUTO: 364 CELLS/UL (ref 200–950)
MONOCYTES NFR BLD AUTO: 5.2 %
NEUTROPHILS # BLD AUTO: 4746 CELLS/UL (ref 1500–7800)
NEUTROPHILS NFR BLD AUTO: 67.8 %
NONHDLC SERPL-MCNC: 109 MG/DL (CALC)
PLATELET # BLD AUTO: 233 THOUSAND/UL (ref 140–400)
PMV BLD REES-ECKER: 10.9 FL (ref 7.5–12.5)
POTASSIUM SERPL-SCNC: 4.3 MMOL/L (ref 3.5–5.3)
PROT SERPL-MCNC: 7 G/DL (ref 6.1–8.1)
RBC # BLD AUTO: 4.66 MILLION/UL (ref 3.8–5.1)
SL AMB EGFR AFRICAN AMERICAN: 100 ML/MIN/1.73M2
SL AMB EGFR NON AFRICAN AMERICAN: 87 ML/MIN/1.73M2
SODIUM SERPL-SCNC: 140 MMOL/L (ref 135–146)
TRIGL SERPL-MCNC: 112 MG/DL
TSH SERPL-ACNC: 0.97 MIU/L (ref 0.4–4.5)
WBC # BLD AUTO: 7 THOUSAND/UL (ref 3.8–10.8)

## 2018-05-24 PROCEDURE — 73130 X-RAY EXAM OF HAND: CPT

## 2018-05-24 PROCEDURE — 26720 TREAT FINGER FRACTURE EACH: CPT | Performed by: FAMILY MEDICINE

## 2018-05-24 PROCEDURE — 73110 X-RAY EXAM OF WRIST: CPT

## 2018-05-24 PROCEDURE — 73140 X-RAY EXAM OF FINGER(S): CPT

## 2018-05-24 PROCEDURE — 99214 OFFICE O/P EST MOD 30 MIN: CPT | Performed by: FAMILY MEDICINE

## 2018-05-24 RX ORDER — ZOSTER VACCINE LIVE 19400 [PFU]/.65ML
INJECTION, POWDER, LYOPHILIZED, FOR SUSPENSION SUBCUTANEOUS
COMMUNITY
Start: 2018-02-14 | End: 2018-05-24

## 2018-05-24 RX ORDER — NICOTINE POLACRILEX 2 MG
5 GUM BUCCAL
COMMUNITY
End: 2018-05-24

## 2018-05-24 RX ORDER — ACETAMINOPHEN 160 MG
2000 TABLET,DISINTEGRATING ORAL DAILY
COMMUNITY

## 2018-05-24 RX ORDER — TIOTROPIUM BROMIDE INHALATION SPRAY 3.12 UG/1
SPRAY, METERED RESPIRATORY (INHALATION)
Refills: 3 | COMMUNITY
Start: 2018-05-17 | End: 2018-08-23 | Stop reason: SDUPTHER

## 2018-05-24 NOTE — PATIENT INSTRUCTIONS
Continue splint immobilization for finger  Explained the patient that her obliques fracture does place her at high risk for displacement  We will repeat x-ray in approximately 1 week and consider transition to yonas taping at that time  I explained to patient risk of non-operative treatment for fracture including but not limited to slippage or movement of fracture and healing of fracture in wrong location that could result in need for surgery or development of arthritis and limited range of motion after healing is resolved  Patient expressed understanding and agreed with plan to pursue non-operative treatment for fracture

## 2018-05-24 NOTE — PROGRESS NOTES
1  Nondisplaced fracture of middle phalanx of right middle finger, initial encounter for closed fracture     2  Right hand pain  XR hand 3+ vw right   3  Pain in right wrist  XR wrist 3+ vw right   4  Pain of left middle finger  XR finger right third digit-middle     Orders Placed This Encounter   Procedures    XR hand 3+ vw right    XR wrist 3+ vw right    XR finger right third digit-middle        Imaging Studies (I personally reviewed results in PACS):  X-ray right hand 05/24/2018:  Oblique minimally displaced  Middle phalanx fracture of the right middle finger  X-ray right middle finger 05/24/2018:Oblique minimally displaced  Middle phalanx fracture of the right middle finger  X-ray right wrist 05/24/2018:  No acute osseous abnormality    IMPRESSION:  Oblique minimally displaced  Middle phalanx fracture of the right middle finger  Right hand dominant   Date of Injury:  05/06/2018   Follow-up interval:  2 weeks 4 days  New toad splint placed today      Return in about 1 week (around 5/31/2018)  Patient Instructions   Continue splint immobilization for finger  Explained the patient that her obliques fracture does place her at high risk for displacement  We will repeat x-ray in approximately 1 week and consider transition to yonas taping at that time  I explained to patient risk of non-operative treatment for fracture including but not limited to slippage or movement of fracture and healing of fracture in wrong location that could result in need for surgery or development of arthritis and limited range of motion after healing is resolved  Patient expressed understanding and agreed with plan to pursue non-operative treatment for fracture  CHIEF COMPLAINT:  Right middle finger fracture    HPI:  Frank Fuller is a 67 y o  female  who presents for  Evaluation of right middle finger fracture status post injury that occurred on 05/06/2018    Patient initial care at [facility] on 05/06/2018  Subsequently she did see nurse practitioner on 05/10/2018 in orthopedic urgent care clinic which point in time she was started on splint  Visit 05/24/2018: Today overall patient states she feels improved  She states that in her splint she has no pain  She has some stiffness of her finger  She does have some intermittent mild him pain  She points to her middle phalanx as source of greatest pain in her middle finger  She describes the pain as achy intermittent  Patient states that her swelling is significantly improved since her initial injury  She states that initially her whole finger was swollen and now it is only localized to her middle phalanx and PIP joint  Review of Systems   Constitutional: Negative for chills, fever and unexpected weight change  HENT: Negative for hearing loss, nosebleeds and sore throat  Eyes: Negative for pain, redness and visual disturbance  Respiratory: Negative for cough, shortness of breath and wheezing  Cardiovascular: Negative for chest pain, palpitations and leg swelling  Gastrointestinal: Negative for abdominal distention, nausea and vomiting  Endocrine: Negative for polydipsia and polyuria  Genitourinary: Negative for dysuria and hematuria  Skin: Negative for rash and wound  Neurological: Negative for dizziness, numbness and headaches  Psychiatric/Behavioral: Negative for decreased concentration and suicidal ideas           Following history reviewed and update:    Past Medical History:   Diagnosis Date    Lyme disease     [facility]ym[facility]algia rheumatica ([facility] [address] )     last assessed 2/15/17    Reactive airway disease     last assessed 6/9/16    Sciatica      Past Surgical History:   Procedure Laterality Date    HYSTERECTOMY       Social History   History   Alcohol Use    Yes     Comment: social     History   Drug Use No     History   Smoking Status    Smoker, Current Status Unknown   Smokeless Tobacco    Never Used     Family History   Problem Relation Age of Onset    Alzheimer's disease Mother     COPD Father      mild    Hypertension Father      Allergies   Allergen Reactions    Asa [Aspirin]     Ibuprofen GI Intolerance    Nsaids GI Intolerance    Percocet [Oxycodone-Acetaminophen] GI Intolerance          Physical Exam  Constitutional:  see vital signs  Gen: well-developed, normocephalic/atraumatic, well-groomed  Eyes: No inflammation or discharge of conjunctiva or lids; sclera clear   Pharynx: no inflammation, lesion, or mass of lips  Neck: supple, no masses, non-distended  MSK: no inflammation, lesion, mass, or clubbing of nails and digits except for other than mentioned below  SKIN: no visible rashes or skin lesions  Pulmonary/Chest: Effort normal  No respiratory distress  NEURO: cranial nerves grossly intact  PSYCH:  Alert and oriented to person, place, and time; recent and remote memory intact; mood normal, no depression, anxiety, or agitation, judgment and insight good and intact     Ortho Exam  Right Elbow:  no swelling, erythema, or increased warmth  rom full  nontender  no laxity of joint    Right Wrist  no swelling, erythema, or increased warmth  rom full  nontender  no laxity of joint; druj stable    Right Hand  no erythema  swelling:  Mild middle phalanx and PIP  tenderness:  Middle phalanx  No digital scissoring or deviation of fingers compared to contralateral fingers  Patient does have pre-existing ulnar deviation of distal phalanx and middle finger bilaterally and symmetric today  It does not appear to be any worsening of her ulnar deviation distal phalanx compared to contralateral finger  There is no digital scissoring or crossing  There is no pseudo clawing  no extensor lag  no rotation of fingers  no joint laxity  Patient of to flex all finger joints  There is no evidence of flexor tendon tear    sensation intact  capillary refill intact      Procedures

## 2018-05-29 PROBLEM — N64.4 PAIN IN BREAST: Status: RESOLVED | Noted: 2017-10-31 | Resolved: 2018-05-29

## 2018-05-29 PROBLEM — Z86.0100 HISTORY OF COLONIC POLYPS: Status: ACTIVE | Noted: 2017-08-10

## 2018-05-29 PROBLEM — Z86.010 HISTORY OF COLONIC POLYPS: Status: ACTIVE | Noted: 2017-08-10

## 2018-05-30 ENCOUNTER — OFFICE VISIT (OUTPATIENT)
Dept: FAMILY MEDICINE CLINIC | Facility: CLINIC | Age: 73
End: 2018-05-30
Payer: COMMERCIAL

## 2018-05-30 VITALS
BODY MASS INDEX: 32.66 KG/M2 | WEIGHT: 173 LBS | HEIGHT: 61 IN | HEART RATE: 64 BPM | SYSTOLIC BLOOD PRESSURE: 136 MMHG | DIASTOLIC BLOOD PRESSURE: 80 MMHG

## 2018-05-30 DIAGNOSIS — B19.20 HEPATITIS C VIRUS INFECTION WITHOUT HEPATIC COMA, UNSPECIFIED CHRONICITY: ICD-10-CM

## 2018-05-30 DIAGNOSIS — E78.00 PURE HYPERCHOLESTEROLEMIA: ICD-10-CM

## 2018-05-30 DIAGNOSIS — Z13.820 SCREENING FOR OSTEOPOROSIS: ICD-10-CM

## 2018-05-30 DIAGNOSIS — Z12.11 SCREEN FOR COLON CANCER: ICD-10-CM

## 2018-05-30 DIAGNOSIS — J30.9 ALLERGIC RHINITIS, UNSPECIFIED SEASONALITY, UNSPECIFIED TRIGGER: ICD-10-CM

## 2018-05-30 DIAGNOSIS — E11.9 CONTROLLED TYPE 2 DIABETES MELLITUS WITHOUT COMPLICATION, WITHOUT LONG-TERM CURRENT USE OF INSULIN (HCC): Primary | ICD-10-CM

## 2018-05-30 DIAGNOSIS — E55.9 VITAMIN D DEFICIENCY: ICD-10-CM

## 2018-05-30 DIAGNOSIS — K22.70 BARRETT'S ESOPHAGUS WITHOUT DYSPLASIA: ICD-10-CM

## 2018-05-30 PROCEDURE — 99214 OFFICE O/P EST MOD 30 MIN: CPT | Performed by: FAMILY MEDICINE

## 2018-05-30 NOTE — PROGRESS NOTES
Assessment/Plan:  Mrs Jairon Law presents today to follow-up on her chronic medical conditions and review blood work  Tick removed this morning from L thigh  1   Tick removed from thigh - has been attached less than 24 hours  Transmission of possible Lyme is unlikely  Patient instructed to watch for symptoms  2   Controlled diabetes mellitus type 2 -hemoglobin A1c is 5 8  Continue metformin  3   Hyperlipidemia -well controlled  LDL is 88  Continue pravastatin  LDL goal is 70 in a diabetic  She will try 20 mg alternating with 40mg, although she has had side effects with increased dose in the past   4   Allergic rhinitis -stable at the present time  Continue Symbicort, Spiriva and albuterol as needed  She remains on Singulair  5   Hepatitis-C -managed by GI, Dr Gabi Gerber; viral load undetectable  LFTs normal   6   Vitamin-D deficiency -well supplemented  Continue over-the-counter vitamin D3 supplementation  F/U 4 months  Subjective: pt is her for follow up of hyperlipidemia and to review blood work  She states she found a tic on her upper thigh this morning and did remove it, she believes it may have been there since yesterday because she was working in the woods~cd     Patient ID: Alonso Ortiz is a 67 y o  female  Review labs and meds  Removed a deer tick from her upper R thigh this morning  The following portions of the patient's history were reviewed and updated as appropriate: allergies, current medications, past family history, past medical history, past social history, past surgical history and problem list     Review of Systems   Constitutional:        See HPI   HENT: Negative for congestion, ear pain, mouth sores, sinus pressure and trouble swallowing  Eyes: Negative for discharge, redness and itching  Respiratory: Negative for apnea, cough, chest tightness, shortness of breath, wheezing and stridor      Cardiovascular: Negative for chest pain, palpitations and leg swelling  Gastrointestinal: Negative for abdominal distention, abdominal pain, blood in stool, constipation, diarrhea, nausea and vomiting  Endocrine: Negative for cold intolerance and heat intolerance  Genitourinary: Negative for difficulty urinating, dysuria, flank pain and urgency  Musculoskeletal: Negative for arthralgias and myalgias  Skin: Negative for rash  Recent tick removal    Neurological: Negative for dizziness, seizures, syncope, speech difficulty, weakness, light-headedness, numbness and headaches  Hematological: Negative for adenopathy  Psychiatric/Behavioral: Negative for agitation, behavioral problems, confusion and sleep disturbance  The patient is not nervous/anxious  Objective:  Vitals:    05/30/18 0936   BP: 136/80   BP Location: Left arm   Patient Position: Sitting   Cuff Size: Standard   Pulse: 64   Weight: 78 5 kg (173 lb)   Height: 5' 1" (1 549 m)                Physical Exam   Constitutional: She is oriented to person, place, and time  She appears well-developed and well-nourished  No distress  HENT:   Head: Normocephalic and atraumatic  Right Ear: External ear normal    Left Ear: External ear normal    Eyes: Conjunctivae and EOM are normal  Pupils are equal, round, and reactive to light  No scleral icterus  Neck: Normal range of motion  Neck supple  Cardiovascular: Normal rate, regular rhythm, normal heart sounds and intact distal pulses  Exam reveals no gallop and no friction rub  No murmur heard  Pulmonary/Chest: Effort normal  No respiratory distress  She has no wheezes  She has no rales  She exhibits no tenderness  Abdominal: Soft  Bowel sounds are normal  She exhibits no distension and no mass  There is no tenderness  There is no rebound and no guarding  Musculoskeletal: Normal range of motion  She exhibits no edema, tenderness or deformity  Lymphadenopathy:     She has no cervical adenopathy     Neurological: She is alert and oriented to person, place, and time  She has normal reflexes  Skin: Skin is warm and dry  She is not diaphoretic  Psychiatric: She has a normal mood and affect   Her behavior is normal  Judgment and thought content normal

## 2018-06-04 ENCOUNTER — OFFICE VISIT (OUTPATIENT)
Dept: OBGYN CLINIC | Facility: MEDICAL CENTER | Age: 73
End: 2018-06-04

## 2018-06-04 ENCOUNTER — APPOINTMENT (OUTPATIENT)
Dept: RADIOLOGY | Facility: CLINIC | Age: 73
End: 2018-06-04
Payer: COMMERCIAL

## 2018-06-04 VITALS
HEART RATE: 80 BPM | DIASTOLIC BLOOD PRESSURE: 80 MMHG | SYSTOLIC BLOOD PRESSURE: 127 MMHG | BODY MASS INDEX: 31.91 KG/M2 | HEIGHT: 61 IN | WEIGHT: 169 LBS

## 2018-06-04 DIAGNOSIS — S62.652A CLOSED NONDISPLACED FRACTURE OF MIDDLE PHALANX OF RIGHT MIDDLE FINGER, INITIAL ENCOUNTER: Primary | ICD-10-CM

## 2018-06-04 DIAGNOSIS — M79.644 PAIN OF RIGHT MIDDLE FINGER: ICD-10-CM

## 2018-06-04 PROCEDURE — 73140 X-RAY EXAM OF FINGER(S): CPT

## 2018-06-04 PROCEDURE — 99024 POSTOP FOLLOW-UP VISIT: CPT | Performed by: FAMILY MEDICINE

## 2018-06-04 RX ORDER — GLUCOSAMINE/D3/BOSWELLIA SERRA 1500MG-400
TABLET ORAL
COMMUNITY

## 2018-06-04 NOTE — PATIENT INSTRUCTIONS
Transition to yonas taping  Explained the patient that I want her to wear her yonas taping at all times including while sleeping  Patient return in approximately 2 weeks with repeat x-ray  If at that time her x-ray shows healing we will proceed with gentle range-of-motion exercises  Did explain the patient she will have stiffness that will require therapy after immobilization  Explained to her that she has a high risk fracture because of its obliquity that places her at risk for displacement angulation  Patient expressed understanding agreed to plan  I explained to patient risk of non-operative treatment for fracture including but not limited to slippage or movement of fracture and healing of fracture in wrong location that could result in need for surgery or development of arthritis and limited range of motion after healing is resolved  Patient expressed understanding and agreed with plan to pursue non-operative treatment for fracture

## 2018-06-04 NOTE — PROGRESS NOTES
1  Closed nondisplaced fracture of middle phalanx of right middle finger, initial encounter     2  Pain of right middle finger  XR finger right third digit-middle     Orders Placed This Encounter   Procedures    XR finger right third digit-middle        Imaging Studies (I personally reviewed results in PACS):  X-ray right middle finger 06/04/2018:  Maintenance of alignment of obliquely oriented middle phalanx fracture  There is minimal interval callus formation  Past diagnostics:  X-ray right hand 05/24/2018:  Oblique minimally displaced  Middle phalanx fracture of the right middle finger  X-ray right middle finger 05/24/2018:Oblique minimally displaced  Middle phalanx fracture of the right middle finger  X-ray right wrist 05/24/2018:  No acute osseous abnormality    IMPRESSION:  Oblique minimally displaced  Middle phalanx fracture of the right middle finger  Right hand dominant   Date of Injury:  05/06/2018   Follow-up interval:  4 weeks 1 day  New toad splint placed today       Return in about 2 weeks (around 6/18/2018)  Patient Instructions   Transition to yonas taping  Explained the patient that I want her to wear her yonas taping at all times including while sleeping  Patient return in approximately 2 weeks with repeat x-ray  If at that time her x-ray shows healing we will proceed with gentle range-of-motion exercises  Did explain the patient she will have stiffness that will require therapy after immobilization  Explained to her that she has a high risk fracture because of its obliquity that places her at risk for displacement angulation  Patient expressed understanding agreed to plan  I explained to patient risk of non-operative treatment for fracture including but not limited to slippage or movement of fracture and healing of fracture in wrong location that could result in need for surgery or development of arthritis and limited range of motion after healing is resolved   Patient expressed understanding and agreed with plan to pursue non-operative treatment for fracture  CHIEF COMPLAINT:  Right middle finger fracture    HPI:  Sheyla Kelly is a 67 y o  female  who presents for  Evaluation of right middle finger fracture status post injury that occurred on 05/06/2018  Patient initial care at [facility] Emergency Department on 05/06/2018  Subsequently she did see nurse practitioner on 05/10/2018 in orthopedic urgent care clinic which point in time she was started on splint  Visit 05/24/2018: Today overall patient states she feels improved  She states that in her splint she has no pain  She has some stiffness of her finger  She does have some intermittent mild him pain  She points to her middle phalanx as source of greatest pain in her middle finger  She describes the pain as achy intermittent  Patient states that her swelling is significantly improved since her initial injury  She states that initially her whole finger was swollen and now it is only localized to her middle phalanx and PIP joint  Visit 06/04/2018: Today patient states that overall she is significantly improved pain  She also has improvement of her range of motion  She states she however does feel some soreness in her middle phalanx of her right middle finger  She has been compliant with her splint throughout the day but has been taking off at nighttime while sleeping  Review of Systems   Constitutional: Negative for chills, fever and unexpected weight change  HENT: Negative for hearing loss, nosebleeds and sore throat  Eyes: Negative for pain, redness and visual disturbance  Respiratory: Negative for cough, shortness of breath and wheezing  Cardiovascular: Negative for chest pain, palpitations and leg swelling  Gastrointestinal: Negative for abdominal distention, nausea and vomiting  Endocrine: Negative for polydipsia and polyuria  Genitourinary: Negative for dysuria and hematuria  Skin: Negative for rash and wound  Neurological: Negative for dizziness, numbness and headaches  Psychiatric/Behavioral: Negative for decreased concentration and suicidal ideas  Following history reviewed and update:    Past Medical History:   Diagnosis Date    Lyme disease     Polymyalgia rheumatica ([facility] [address] )     last assessed 2/15/17    Reactive airway disease     last assessed 6/9/16    Sciatica      Past Surgical History:   Procedure Laterality Date    HYSTERECTOMY       Social History   History   Alcohol Use    Yes     Comment: social     History   Drug Use No     History   Smoking Status    Smoker, Current Status Unknown   Smokeless Tobacco    Never Used     Family History   Problem Relation Age of Onset    Alzheimer's disease Mother     COPD Father      mild    Hypertension Father      Allergies   Allergen Reactions    Asa [Aspirin]     Ibuprofen GI Intolerance    Nsaids GI Intolerance    Percocet [Oxycodone-Acetaminophen] GI Intolerance          Physical Exam    Constitutional:  see vital signs  Gen: well-developed, normocephalic/atraumatic, well-groomed  Eyes: No inflammation or discharge of conjunctiva or lids; sclera clear   Pharynx: no inflammation, lesion, or mass of lips  Neck: supple, no masses, non-distended  MSK: no inflammation, lesion, mass, or clubbing of nails and digits except for other than mentioned below  SKIN: no visible rashes or skin lesions  Pulmonary/Chest: Effort normal  No respiratory distress  NEURO: cranial nerves grossly intact  PSYCH:  Alert and oriented to person, place, and time; recent and remote memory intact; mood normal, no depression, anxiety, or agitation, judgment and insight good and intact        Ortho Exam  Procedures      Right Hand  no erythema  swelling:  Mild swelling PIP joint and diffuse middle finger  tenderness:  Middle phalanx middle finger  rom fingers mcp, pip, dip with mild diminished range of motion PIP joint   Patient able to almost touch distal phalanx to her palm with flexion    No digital scissoring or deviation of fingers  no extensor lag  no rotation of fingers  sensation intact  capillary refill intact

## 2018-06-07 ENCOUNTER — HOSPITAL ENCOUNTER (OUTPATIENT)
Dept: BONE DENSITY | Facility: MEDICAL CENTER | Age: 73
Discharge: HOME | End: 2018-06-07
Payer: COMMERCIAL

## 2018-06-07 DIAGNOSIS — Z13.820 SCREENING FOR OSTEOPOROSIS: ICD-10-CM

## 2018-06-07 PROCEDURE — 77080 DXA BONE DENSITY AXIAL: CPT

## 2018-06-13 DIAGNOSIS — J44.9 CHRONIC OBSTRUCTIVE PULMONARY DISEASE, UNSPECIFIED COPD TYPE (HCC): ICD-10-CM

## 2018-06-13 LAB — HEMOCCULT STL QL IA: NOT DETECTED

## 2018-06-13 RX ORDER — BUDESONIDE AND FORMOTEROL FUMARATE DIHYDRATE 160; 4.5 UG/1; UG/1
2 AEROSOL RESPIRATORY (INHALATION) 2 TIMES DAILY
Qty: 10.2 G | Refills: 3 | Status: CANCELLED | OUTPATIENT
Start: 2018-06-13

## 2018-06-18 ENCOUNTER — APPOINTMENT (OUTPATIENT)
Dept: RADIOLOGY | Facility: CLINIC | Age: 73
End: 2018-06-18
Payer: COMMERCIAL

## 2018-06-18 ENCOUNTER — OFFICE VISIT (OUTPATIENT)
Dept: OBGYN CLINIC | Facility: MEDICAL CENTER | Age: 73
End: 2018-06-18

## 2018-06-18 VITALS
BODY MASS INDEX: 32.7 KG/M2 | HEIGHT: 61 IN | WEIGHT: 173.2 LBS | DIASTOLIC BLOOD PRESSURE: 71 MMHG | SYSTOLIC BLOOD PRESSURE: 128 MMHG | HEART RATE: 87 BPM

## 2018-06-18 DIAGNOSIS — S62.652A CLOSED NONDISPLACED FRACTURE OF MIDDLE PHALANX OF RIGHT MIDDLE FINGER, INITIAL ENCOUNTER: ICD-10-CM

## 2018-06-18 DIAGNOSIS — S62.652A CLOSED NONDISPLACED FRACTURE OF MIDDLE PHALANX OF RIGHT MIDDLE FINGER, INITIAL ENCOUNTER: Primary | ICD-10-CM

## 2018-06-18 DIAGNOSIS — J44.9 CHRONIC OBSTRUCTIVE PULMONARY DISEASE, UNSPECIFIED COPD TYPE (HCC): ICD-10-CM

## 2018-06-18 DIAGNOSIS — M25.641 FINGER STIFFNESS, RIGHT: ICD-10-CM

## 2018-06-18 PROCEDURE — 99024 POSTOP FOLLOW-UP VISIT: CPT | Performed by: FAMILY MEDICINE

## 2018-06-18 PROCEDURE — 73140 X-RAY EXAM OF FINGER(S): CPT

## 2018-06-18 RX ORDER — BUDESONIDE AND FORMOTEROL FUMARATE DIHYDRATE 160; 4.5 UG/1; UG/1
2 AEROSOL RESPIRATORY (INHALATION) 2 TIMES DAILY
Qty: 10.2 G | Refills: 2 | Status: SHIPPED | OUTPATIENT
Start: 2018-06-18 | End: 2018-11-23 | Stop reason: SDUPTHER

## 2018-06-18 NOTE — PATIENT INSTRUCTIONS
I discussed with the patient her options for pursuing non operative treatment of her middle finger middle phalanx fracture of the right hand  Explained there is some deviation of her x-ray that could be related to the shortening of her fracture as well as arthritis  She does demonstrate in the examination room that she can write without any dysfunction however she is concerned about worsening of dysfunction in the future  As such she requested evaluation by expert hand surgeon to consider surgical options before continue to pursue non operative management any further  In the interim patient will start physical therapy for her hand for range-of-motion exercises  She also continue yonas taping and range of motion exercises at home  I explained to patient risk of non-operative treatment for fracture including but not limited to slippage or movement of fracture and healing of fracture in wrong location that could result in need for surgery or development of arthritis and limited range of motion after healing is resolved  Patient expressed understanding and agreed with plan to pursue non-operative treatment for fracture

## 2018-06-18 NOTE — PROGRESS NOTES
1  Closed nondisplaced fracture of middle phalanx of right middle finger, initial encounter  XR finger right third digit-middle    Ambulatory referral to Occupational Therapy   2  Finger stiffness, right  Ambulatory referral to Occupational Therapy    Ambulatory referral to Orthopedic Surgery     Orders Placed This Encounter   Procedures    XR finger right third digit-middle    Ambulatory referral to Occupational Therapy    Ambulatory referral to Orthopedic Surgery        Imaging Studies (I personally reviewed results in PACS):  X-ray right hand 06/18/2018:  Obliquely oriented  middle phalanx fracture with mild shortening with interval healing and with persistent fracture line  There is also noted to be ulnar deviation at PIP joint    IMPRESSION:  Oblique minimally displaced  Middle phalanx fracture of the right middle finger  10 degree ulnar deviation PIP joint middle finger  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  Right hand dominant   Date of Injury:  05/06/2018   Follow-up interval: 6 weeks 1 day        Return for Follow up with hand surgeon  Patient Instructions   I discussed with the patient her options for pursuing non operative treatment of her middle finger middle phalanx fracture of the right hand  Explained there is some deviation of her x-ray that could be related to the shortening of her fracture as well as arthritis  She does demonstrate in the examination room that she can write without any dysfunction however she is concerned about worsening of dysfunction in the future  As such she requested evaluation by expert hand surgeon to consider surgical options before continue to pursue non operative management any further  In the interim patient will start physical therapy for her hand for range-of-motion exercises  She also continue yonas taping and range of motion exercises at home      I explained to patient risk of non-operative treatment for fracture including but not limited to slippage or movement of fracture and healing of fracture in wrong location that could result in need for surgery or development of arthritis and limited range of motion after healing is resolved  Patient expressed understanding and agreed with plan to pursue non-operative treatment for fracture  CHIEF COMPLAINT:  Follow-up Right middle finger fracture    HPI:  Lakia Yoo is a 67 y o  female  who presents for  Evaluation of right middle finger fracture status post injury that occurred on 05/06/2018  Patient initial care at [facility] Department on 05/06/2018  Subsequently she did see nurse practitioner on 05/10/2018 in orthopedic urgent care clinic which point in time she was started on splint  Visit 05/24/2018: Today overall patient states she feels improved  She states that in her splint she has no pain  She has some stiffness of her finger  She does have some intermittent mild him pain  She points to her middle phalanx as source of greatest pain in her middle finger  She describes the pain as achy intermittent  Patient states that her swelling is significantly improved since her initial injury  She states that initially her whole finger was swollen and now it is only localized to her middle phalanx and PIP joint  Visit 06/04/2018: Today patient states that overall she is significantly improved pain  She also has improvement of her range of motion  She states she however does feel some soreness in her middle phalanx of her right middle finger  She has been compliant with her splint throughout the day but has been taking off at nighttime while sleeping  Visit 06/18/2018: Follow-up right middle finger obliquely oriented middle phalanx fracture  Since last visit and significant resolution of her swelling patient has noticed that she does have some angulation of her finger with ulnar deviation and her PIP joint   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 but states that she has no issues performing these activities currently even with this deviation of her finger  She denies any pain when attempting to right  She does have residual stiffness worst at the PIP joint but overall has almost gained full flexion of her finger again  She has been compliant with her yonas taping  Review of Systems   Constitutional: Negative for chills and fever  Neurological: Negative for weakness and numbness  Following history reviewed and update:    Past Medical History:   Diagnosis Date    Lyme disease     Polymyalgia rheumatica ([facility] [address] )     last assessed 2/15/17    Reactive airway disease     last assessed 6/9/16    Sciatica      Past Surgical History:   Procedure Laterality Date    HYSTERECTOMY       Social History   History   Alcohol Use    Yes     Comment: social     History   Drug Use No     History   Smoking Status    Smoker, Current Status Unknown   Smokeless Tobacco    Never Used     Family History   Problem Relation Age of Onset    Alzheimer's disease Mother     COPD Father         mild    Hypertension Father      Allergies   Allergen Reactions    Asa [Aspirin]     Ibuprofen GI Intolerance    Nsaids GI Intolerance    Percocet [Oxycodone-Acetaminophen] GI Intolerance          Physical Exam        Ortho Exam  Procedures      Right Hand  no erythema  swelling:  Mild PIP joint middle finger  tenderness:  None  rom fingers mcp, pip, dip intact without pain  + mild digital scissoring middle finger without overlap and approximately 10° deviation of PIP joint middle finger ulnarly    There is also concomitant similar deviation of PIP joint of index finger same hand  no extensor lag  no rotation of fingers  no joint laxity  strength flexion and extension mcp, pip, dip 5/5  sensation intact  capillary refill intact

## 2018-06-20 ENCOUNTER — OFFICE VISIT (OUTPATIENT)
Dept: FAMILY MEDICINE CLINIC | Facility: CLINIC | Age: 73
End: 2018-06-20
Payer: COMMERCIAL

## 2018-06-20 VITALS
HEART RATE: 78 BPM | BODY MASS INDEX: 33.23 KG/M2 | DIASTOLIC BLOOD PRESSURE: 70 MMHG | WEIGHT: 176 LBS | SYSTOLIC BLOOD PRESSURE: 110 MMHG | HEIGHT: 61 IN

## 2018-06-20 DIAGNOSIS — K22.70 BARRETT'S ESOPHAGUS WITHOUT DYSPLASIA: ICD-10-CM

## 2018-06-20 DIAGNOSIS — M81.0 AGE-RELATED OSTEOPOROSIS WITHOUT CURRENT PATHOLOGICAL FRACTURE: Primary | ICD-10-CM

## 2018-06-20 DIAGNOSIS — J44.9 CHRONIC OBSTRUCTIVE PULMONARY DISEASE, UNSPECIFIED COPD TYPE (HCC): ICD-10-CM

## 2018-06-20 PROCEDURE — 99214 OFFICE O/P EST MOD 30 MIN: CPT | Performed by: FAMILY MEDICINE

## 2018-06-20 NOTE — PROGRESS NOTES
Assessment/Plan:  Patient presents today to discuss several of her medical conditions  1   Osteoporosis -discussed results of DEXA scan at length with patient  Reviewed treatment options, risks and benefits  For now, she prefers not to take additional medications due to their possible side effects  She will focus on weight bearing exercise, Vitamin D and Ca supplements  She is encouraged to stop smoking as this is contributing, but she is not interested in doing so  She is aware that the use of a PPI may contribute to bone loss, as does the steroid in her Symbicort, but we agree that the use of these medications is necessary and at this time the benefits outweigh the risks  Check PTH to R/O primary hyperparathyroidism  2   Holly's esophagus -she remains on Prevacid  3   COPD - due to cost of medication, will change Symbicort to Atrovent  Patient will investigate the cost   She has a chronic cough and should remain on inhalers  PFTs annually  4   Tobacco abuse -encouraged to stop smoking as this may be contributing to osteoporosis  She does not appear interested at this time  Subjective: pt is here to review dexa scan results  She would also like to discuss the inhalers that she was put on and if she still needs to be using them~cd     Patient ID: Pita Wise is a 67 y o  female  She is on Symbicort and Spiriva  She feels good  But these meds are very expensive  She uses Spiriva at night bc it makes her feel dizzy  Could try atrovent but it is 2-4 times daily  Discuss DEXA  She is on PPI and Inhaled steroid  The following portions of the patient's history were reviewed and updated as appropriate: allergies, current medications, past family history, past medical history, past social history, past surgical history and problem list     Review of Systems   Constitutional:        See HPI   HENT: Negative for congestion, ear pain, mouth sores, sinus pressure and trouble swallowing  Eyes: Negative for discharge, redness and itching  Respiratory: Negative for apnea, cough, chest tightness, shortness of breath, wheezing and stridor  Asthma flared during the Spring season  She continues to smoke which she knows is an irritant  Cardiovascular: Negative for chest pain, palpitations and leg swelling  Gastrointestinal: Negative for abdominal distention, abdominal pain, blood in stool, constipation, diarrhea, nausea and vomiting  Endocrine: Negative for cold intolerance and heat intolerance  Review DEXA  Genitourinary: Negative for difficulty urinating, dysuria, flank pain and urgency  Musculoskeletal: Negative for arthralgias and myalgias  Skin: Negative for rash  Neurological: Negative for dizziness, seizures, syncope, speech difficulty, weakness, light-headedness, numbness and headaches  Hematological: Negative for adenopathy  Psychiatric/Behavioral: Negative for agitation, behavioral problems, confusion and sleep disturbance  The patient is not nervous/anxious  Objective:    Vitals:    06/20/18 0948   BP: 110/70   BP Location: Left arm   Patient Position: Sitting   Cuff Size: Large   Pulse: 78   Weight: 79 8 kg (176 lb)   Height: 5' 1" (1 549 m)              Physical Exam   Constitutional: She is oriented to person, place, and time  She appears well-developed and well-nourished  No distress  HENT:   Head: Normocephalic and atraumatic  Right Ear: External ear normal    Left Ear: External ear normal    Eyes: Conjunctivae and EOM are normal  Pupils are equal, round, and reactive to light  No scleral icterus  Neck: Normal range of motion  Neck supple  Cardiovascular: Normal rate, regular rhythm, normal heart sounds and intact distal pulses  Exam reveals no gallop and no friction rub  No murmur heard  Pulmonary/Chest: Effort normal  No respiratory distress  She has no wheezes  She has no rales  She exhibits no tenderness     Musculoskeletal: Normal range of motion  She exhibits no edema, tenderness or deformity  Lymphadenopathy:     She has no cervical adenopathy  Neurological: She is alert and oriented to person, place, and time  She has normal reflexes  Skin: Skin is warm and dry  She is not diaphoretic  Psychiatric: She has a normal mood and affect   Her behavior is normal  Judgment and thought content normal

## 2018-06-21 ENCOUNTER — TELEPHONE (OUTPATIENT)
Dept: OBGYN CLINIC | Facility: MEDICAL CENTER | Age: 73
End: 2018-06-21

## 2018-06-21 ENCOUNTER — TELEPHONE (OUTPATIENT)
Dept: FAMILY MEDICINE CLINIC | Facility: CLINIC | Age: 73
End: 2018-06-21

## 2018-06-21 LAB
CALCIUM SERPL-MCNC: 9.3 MG/DL (ref 8.6–10.4)
PTH-INTACT SERPL-MCNC: 62 PG/ML (ref 14–64)

## 2018-06-21 NOTE — TELEPHONE ENCOUNTER
Continue physical therapy  I did have sports medicine  review her xray and agreed that she should have minimal functional issues  Start PT  If satisfied with PT then may cancel 7/13/18 hand surgeon appointment  If not then can keep appointment

## 2018-06-21 NOTE — TELEPHONE ENCOUNTER
Patient called & wanted to tell you that the Atrovent is the same price as the Spiriva she is currently taking  She said that she did not get the Atrovent filled and she will continue taking the Spiriva  Thank you!

## 2018-06-21 NOTE — TELEPHONE ENCOUNTER
Please let patient know she should continue PT until she is seen on 7/13/18 by Doris Sweeney  She can cancel that appt if she feels better

## 2018-06-21 NOTE — TELEPHONE ENCOUNTER
Pt seen on this past Monday for Oblique minimally displaced  Middle phalanx fracture of the right middle finger  She had appt with Della Mann tomorrow but his office cancelled her appt due to Della Mann being out for emergency surgery  Rescheduled for 7/13/18 w/ Mabel  She would like call back from Dr Nikia Hagan w/ [facility] until she is seen by hand surgeon       **She also wanted to FYI that she is starting PT today**    # [phone]

## 2018-06-22 NOTE — TELEPHONE ENCOUNTER
Called and spoke to patient and told her to do PT until she sees Dr Piedad Castelan  If she is doing well in PT, then she can cancel  Patient asked if she should follow up with Dr Mercy Florian if she chooses not to get surgery  I told patient see Dr Piedad Castelan first and ask what he might suggest and go from there

## 2018-06-28 ENCOUNTER — EVALUATION (OUTPATIENT)
Dept: OCCUPATIONAL THERAPY | Facility: MEDICAL CENTER | Age: 73
End: 2018-06-28
Payer: COMMERCIAL

## 2018-06-28 DIAGNOSIS — M25.641 FINGER STIFFNESS, RIGHT: ICD-10-CM

## 2018-06-28 DIAGNOSIS — M25.641 STIFFNESS OF JOINT, HAND, RIGHT: Primary | ICD-10-CM

## 2018-06-28 DIAGNOSIS — S62.652A CLOSED NONDISPLACED FRACTURE OF MIDDLE PHALANX OF RIGHT MIDDLE FINGER, INITIAL ENCOUNTER: ICD-10-CM

## 2018-06-28 PROCEDURE — 97166 OT EVAL MOD COMPLEX 45 MIN: CPT | Performed by: OCCUPATIONAL THERAPIST

## 2018-06-28 PROCEDURE — G8991 OTHER PT/OT GOAL STATUS: HCPCS | Performed by: OCCUPATIONAL THERAPIST

## 2018-06-28 PROCEDURE — G8990 OTHER PT/OT CURRENT STATUS: HCPCS | Performed by: OCCUPATIONAL THERAPIST

## 2018-06-28 NOTE — PROGRESS NOTES
OT Evaluation     Today's date: 2018  Patient name: Seven Lechuga  : 1945  MRN: [id number]  Referring provider: Ara Davey*  Dx:   Encounter Diagnosis     ICD-10-CM    1  Stiffness of joint, hand, right M25 641    2  Closed nondisplaced fracture of middle phalanx of right middle finger, initial encounter V69 778F Ambulatory referral to Occupational Therapy   3  Finger stiffness, right M25 641 Ambulatory referral to Occupational Therapy                  Assessment  Impairments: abnormal or restricted ROM, impaired physical strength, lacks appropriate home exercise program and pain with function  Functional limitations: Pt  has pain with lifting and carrying bags, gripping a coffee cup  Assessment details: Pt  Presenting with joint stiffness and deformity of PIP joint middle finger s/p middle phalanx fx  Pt  To benefit from OT to improve overall hand function and HEP  Understanding of Dx/Px/POC: good   Prognosis: good    Goals  STG( 6 visits)  1  Increase MF ROM to WNLs  2  Decrease pain to 0/10 with gripping  3  Compliant with HEP/splinting  LTG(12visits)  1  Improve FOTO score by 20%  2  Increase R  strength by 10lbs    Plan  Patient would benefit from: skilled occupational therapy  Planned modality interventions: thermotherapy: paraffin bath, fluidotherapy and thermotherapy: hydrocollator packs  Planned therapy interventions: manual therapy, joint mobilization, patient education, orthotic management and training, therapeutic exercise, graded exercise, functional ROM exercises, fine motor coordination training and flexibility  Frequency: 2x week  Treatment plan discussed with: patient        Subjective Evaluation    History of Present Illness  Date of onset: 2018  Mechanism of injury: trauma  Mechanism of injury: Pt  Got her hand caught lifting a heavy bag sustaining a R middle finger non displaced fracture of middle phalanx    She has been referred to hand surgeon and to begin occupational therapy for evaluation and treatment  Not a recurrent problem   Quality of life: excellent    Pain  Current pain ratin  At best pain ratin  At worst pain ratin  Quality: discomfort  Aggravating factors: lifting  Progression: improved    Social Support    Employment status: not working  Hand dominance: right      Diagnostic Tests  X-ray: normal        Objective     Neurological Testing     Sensation     Wrist/Hand     Right   Intact: light touch    Active Range of Motion     Right Digits   Flexion   Middle     MCP: 90    PIP: 90    DIP: 42    Additional Active Range of Motion Details  Ulnar shift of PIP joint      Strength/Myotome Testing     Left Wrist/Hand      (2nd hand position)     Trial 1: 40    Right Wrist/Hand   Normal wrist strength     (2nd hand position)     Trial 1: 40    Thumb Strength   Key/Lateral Pinch     Trial 1: 11    Swelling     Right Wrist/Hand   Middle     Proximal: 5 3 cm    Middle: 6 1 cm    Distal: 5 cm      Flowsheet Rows      Most Recent Value   PT/OT G-Codes   Current Score  48   Projected Score  66   FOTO information reviewed  Yes   Assessment Type  Evaluation   G code set  Other PT/OT Primary   Other PT Primary Current Status ()  CK   Other PT Primary Goal Status ()  CJ          Precautions: Universal    Daily Treatment Diary     Manual              IASTM PIP MF               retrograde             Jt  mob             A/AA/PROM                              Exercise Diary              Hook fist             Digisqueeze             Putty press                                                                                                                                                                                                                             HEP- TGEs                 Modalities              MHP w/ pdip

## 2018-07-02 ENCOUNTER — OFFICE VISIT (OUTPATIENT)
Dept: OCCUPATIONAL THERAPY | Facility: MEDICAL CENTER | Age: 73
End: 2018-07-02
Payer: COMMERCIAL

## 2018-07-02 DIAGNOSIS — S62.652A CLOSED NONDISPLACED FRACTURE OF MIDDLE PHALANX OF RIGHT MIDDLE FINGER, INITIAL ENCOUNTER: Primary | ICD-10-CM

## 2018-07-02 PROCEDURE — 97110 THERAPEUTIC EXERCISES: CPT | Performed by: OCCUPATIONAL THERAPIST

## 2018-07-02 PROCEDURE — 97140 MANUAL THERAPY 1/> REGIONS: CPT | Performed by: OCCUPATIONAL THERAPIST

## 2018-07-02 PROCEDURE — 97018 PARAFFIN BATH THERAPY: CPT | Performed by: OCCUPATIONAL THERAPIST

## 2018-07-02 PROCEDURE — 97010 HOT OR COLD PACKS THERAPY: CPT | Performed by: OCCUPATIONAL THERAPIST

## 2018-07-02 NOTE — PROGRESS NOTES
Daily Note     Today's date: 2018  Patient name: Frank Fuller  : 1945  MRN: [id number]  Referring provider: Rocky Phipps  Dx:   Encounter Diagnosis     ICD-10-CM    1  Closed nondisplaced fracture of middle phalanx of right middle finger, initial encounter Z89 099G                   Subjective: I think its getting better  Objective: See treatment diary below    Manual             IASTM PIP MF    5min           retrograde  2min           Jt  mob  2min           A/AA/PROM  1min                            Exercise Diary             Hook fist  skinny pegs           Digisqueeze  Red x30           Putty press  red           EDC  redbands x2                                                                                                                                                                                                              HEP- TGEs                 Modalities             MHP w/ pdip  10 min                                         Assessment: Tolerated treatment well  Patient demonstrated fatigue post treatment      Plan: Continue per plan of care  Coban to  for edema control

## 2018-07-07 DIAGNOSIS — J30.1 ALLERGIC RHINITIS DUE TO POLLEN, UNSPECIFIED SEASONALITY: Primary | ICD-10-CM

## 2018-07-07 RX ORDER — MONTELUKAST SODIUM 10 MG/1
TABLET ORAL
Qty: 90 TABLET | Refills: 1 | Status: SHIPPED | OUTPATIENT
Start: 2018-07-07 | End: 2019-01-07 | Stop reason: SDUPTHER

## 2018-07-09 ENCOUNTER — OFFICE VISIT (OUTPATIENT)
Dept: OCCUPATIONAL THERAPY | Facility: MEDICAL CENTER | Age: 73
End: 2018-07-09
Payer: COMMERCIAL

## 2018-07-09 DIAGNOSIS — S62.652A CLOSED NONDISPLACED FRACTURE OF MIDDLE PHALANX OF RIGHT MIDDLE FINGER, INITIAL ENCOUNTER: Primary | ICD-10-CM

## 2018-07-09 PROCEDURE — 97140 MANUAL THERAPY 1/> REGIONS: CPT

## 2018-07-09 PROCEDURE — 97110 THERAPEUTIC EXERCISES: CPT

## 2018-07-09 PROCEDURE — 97010 HOT OR COLD PACKS THERAPY: CPT

## 2018-07-09 NOTE — PROGRESS NOTES
Daily Note     Today's date: 2018  Patient name: Babatunde Vázquez  : 1945  MRN: [id number]  Referring provider: Lindy Anne  Dx:   Encounter Diagnosis     ICD-10-CM    1  Closed nondisplaced fracture of middle phalanx of right middle finger, initial encounter S62 182A                   Subjective: I think its getting better  Objective: See treatment diary below    Manual            IASTM PIP MF    5min 5 min          retrograde  2min 2 min          Jt  mob  2min           A/AA/PROM  1min 2 min                           Exercise Diary            Hook fist  skinny pegs skinny pegs          Digisqueeze  Red x30 Red 30x          Putty press  red red          EDC  redbands x2 Red bands 2x                                                                                                                                                                                                             HEP- TGEs                 Modalities            MHP w/ pdip  10 min 10 min                                        Assessment: Tolerated treatment well  Patient demonstrated fatigue post treatment      Plan: Continue per plan of care  Coban to  for edema control

## 2018-07-11 ENCOUNTER — OFFICE VISIT (OUTPATIENT)
Dept: OCCUPATIONAL THERAPY | Facility: MEDICAL CENTER | Age: 73
End: 2018-07-11
Payer: COMMERCIAL

## 2018-07-11 DIAGNOSIS — S62.652A CLOSED NONDISPLACED FRACTURE OF MIDDLE PHALANX OF RIGHT MIDDLE FINGER, INITIAL ENCOUNTER: Primary | ICD-10-CM

## 2018-07-11 PROCEDURE — 97110 THERAPEUTIC EXERCISES: CPT | Performed by: OCCUPATIONAL THERAPIST

## 2018-07-11 PROCEDURE — 97010 HOT OR COLD PACKS THERAPY: CPT | Performed by: OCCUPATIONAL THERAPIST

## 2018-07-11 NOTE — PROGRESS NOTES
Daily Note     Today's date: 2018  Patient name: Nirav Abdi  : 1945  MRN: [id number]  Referring provider: Connie Ram*  Dx:   Encounter Diagnosis     ICD-10-CM    1  Closed nondisplaced fracture of middle phalanx of right middle finger, initial encounter S63 699D                   Subjective: I think its getting better, it is not overlapping as much      Objective: See treatment diary below    Manual           IASTM PIP MF    5min 5 min 5min         retrograde  2min 2 min 2min         Jt  mob  2min           A/AA/PROM  1min 2 min 2min                          Exercise Diary           Hook fist  skinny pegs skinny pegs skinny pegs         Digisqueeze  Red x30 Red 30x Red x30         Putty press  red red red         EDC  redbands x2 Red bands 2x Green bands x2                                                                                                                                                                                                            HEP- TGEs                 Modalities           MHP w/ pdip  10 min 10 min 10 min                                       Assessment: Tolerated treatment well  Patient demonstrated fatigue post treatment, continues with PIP joint pain with stretching  Plan: Continue per plan of care  Coban to  for edema control

## 2018-07-13 ENCOUNTER — HOSPITAL ENCOUNTER (OUTPATIENT)
Dept: RADIOLOGY | Facility: HOSPITAL | Age: 73
Discharge: HOME | End: 2018-07-13
Attending: ORTHOPAEDIC SURGERY
Payer: COMMERCIAL

## 2018-07-13 ENCOUNTER — TELEPHONE (OUTPATIENT)
Dept: OBGYN CLINIC | Facility: HOSPITAL | Age: 73
End: 2018-07-13

## 2018-07-13 ENCOUNTER — OFFICE VISIT (OUTPATIENT)
Dept: OBGYN CLINIC | Facility: HOSPITAL | Age: 73
End: 2018-07-13
Attending: FAMILY MEDICINE
Payer: COMMERCIAL

## 2018-07-13 VITALS
SYSTOLIC BLOOD PRESSURE: 126 MMHG | HEIGHT: 61 IN | WEIGHT: 175 LBS | HEART RATE: 76 BPM | DIASTOLIC BLOOD PRESSURE: 71 MMHG | BODY MASS INDEX: 33.04 KG/M2

## 2018-07-13 DIAGNOSIS — T14.8XXA FRACTURE: Primary | ICD-10-CM

## 2018-07-13 DIAGNOSIS — M25.641 FINGER STIFFNESS, RIGHT: ICD-10-CM

## 2018-07-13 DIAGNOSIS — T14.8XXA FRACTURE: ICD-10-CM

## 2018-07-13 PROCEDURE — 73140 X-RAY EXAM OF FINGER(S): CPT

## 2018-07-13 PROCEDURE — 99213 OFFICE O/P EST LOW 20 MIN: CPT | Performed by: ORTHOPAEDIC SURGERY

## 2018-07-13 NOTE — TELEPHONE ENCOUNTER
Doesn't sound like her motion is too bad per the note, but just wanted to double check with you   She's the finger fx from 2 months ago you saw today

## 2018-07-13 NOTE — PROGRESS NOTES
ASSESSMENT/PLAN:    Assessment:     72-year-old female with right long finger middle phalanx fracture,   Minimal displacement    Plan:    weight-bearing as tolerated   No restrictions  Follow-up p r n       _____________________________________________________  CHIEF COMPLAINT:  Chief Complaint   Patient presents with    Right Middle Finger - Fracture         SUBJECTIVE:  Kacy Chong is a 67y o  year old female who presents  Right long finger middle phalanx fracture 2 months ago  She has been treated non operatively for this fracture and is here for 2nd opinion regarding possible operative intervention, however she notes that the swelling and pain have decreased significantly over the last several weeks and she also feels like she is able to make a composite fist with normal alignment    She has a hobby of caligraphy     PAST MEDICAL HISTORY:  Past Medical History:   Diagnosis Date    Lyme disease     Polymyalgia rheumatica ([facility] [address] )     last assessed 2/15/17    Reactive airway disease     last assessed 6/9/16    Sciatica        PAST SURGICAL HISTORY:  Past Surgical History:   Procedure Laterality Date    HYSTERECTOMY         FAMILY HISTORY:  Family History   Problem Relation Age of Onset    Alzheimer's disease Mother     COPD Father         mild    Hypertension Father        SOCIAL HISTORY:  Social History   Substance Use Topics    Smoking status: Smoker, Current Status Unknown    Smokeless tobacco: Never Used    Alcohol use Yes      Comment: social       MEDICATIONS:    Current Outpatient Prescriptions:     acetaminophen (TYLENOL) 325 mg tablet, Take 2 tablets (650 mg total) by mouth every 6 (six) hours as needed for mild pain, Disp: 30 tablet, Rfl: 0    albuterol (VENTOLIN HFA) 90 mcg/act inhaler, Inhale 1-2 puffs as needed  , Disp: , Rfl:     Biotin 89876 MCG TABS, Take by mouth, Disp: , Rfl:     budesonide-formoterol (SYMBICORT) 160-4 5 mcg/act inhaler, Inhale 2 puffs 2 (two) times a day, Disp: 10 2 g, Rfl: 2    calcium carbonate 1250 MG capsule, Take by mouth, Disp: , Rfl:     Cholecalciferol (VITAMIN D3) 2000 units capsule, Take 2,000 Units by mouth daily, Disp: , Rfl:     clotrimazole-betamethasone (LOTRISONE) 1-0 05 % cream, Apply topically Twice daily, Disp: , Rfl:     Digestive Enzyme CAPS, Take by mouth, Disp: , Rfl:     lansoprazole (PREVACID) 30 mg capsule, Take by mouth, Disp: , Rfl:     metFORMIN (GLUCOPHAGE) 500 mg tablet, Take 1 tablet by mouth Daily, Disp: , Rfl:     montelukast (SINGULAIR) 10 mg tablet, TAKE 1 TABLET BY MOUTH EVERY DAY, Disp: 90 tablet, Rfl: 1    pravastatin (PRAVACHOL) 20 mg tablet, Take 1 tablet by mouth, Disp: , Rfl:     Probiotic Product (PROBIOTIC-10) CAPS, Take by mouth, Disp: , Rfl:     SPIRIVA RESPIMAT 2 5 MCG/ACT AERS, INHALE 2 ACT (5 MCG TOTAL) DAILY FOR 90 DAYS, Disp: , Rfl: 3    ALLERGIES:  Allergies   Allergen Reactions    Asa [Aspirin]     Ibuprofen GI Intolerance    Nsaids GI Intolerance    Percocet [Oxycodone-Acetaminophen] GI Intolerance       REVIEW OF SYSTEMS:  Pertinent items are noted in HPI  A comprehensive review of systems was negative      LABS:  HgA1c:   Lab Results   Component Value Date    HGBA1C 5 8 (H) 05/23/2018     BMP:   Lab Results   Component Value Date    CALCIUM 9 3 06/20/2018     02/08/2017    K 4 4 02/08/2017    CO2 27 02/08/2017     02/08/2017    BUN 17 05/23/2018    CREATININE 0 70 05/23/2018       _____________________________________________________  PHYSICAL EXAMINATION:  General: well developed and well nourished, alert, oriented times 3 and appears comfortable  Psychiatric: Normal  HEENT: Trachea Midline, No torticollis  Cardiovascular: No discernable arrhythmia  Pulmonary: No wheezing or stridor  Skin: No masses, erthema, lacerations, fluctation, ulcerations  Neurovascular: Sensation Intact to the Median, Ulnar, Radial Nerve, Motor Intact to the Median, Ulnar, Radial Nerve and Pulses Intact    MUSCULOSKELETAL EXAMINATION:  Extremities:   Right upper extremity:    Mild swelling right long finger, able make a composite fist with  Several degrees of rotational malalignment  Minimally tender       _____________________________________________________  STUDIES REVIEWED:  No Studies to review      PROCEDURES PERFORMED:  Procedures  No Procedures performed today     I interviewed, took the history and examined the patient  I discuss the case with the resident and reviewed the resident's note  I supervised the resident and I agree with the resident management plan as it was presented to me  I was present in the clinic and examined the patient

## 2018-07-13 NOTE — TELEPHONE ENCOUNTER
Caller: patient  Call back number: [phone]  Patient's doctor: Dr Sutton [facility]    Patient called stating she is doing PT on her finger  She is scheduled until 7/30  She is asking if she should continue after that   Please advise

## 2018-07-16 ENCOUNTER — OFFICE VISIT (OUTPATIENT)
Dept: OCCUPATIONAL THERAPY | Facility: MEDICAL CENTER | Age: 73
End: 2018-07-16
Payer: COMMERCIAL

## 2018-07-16 DIAGNOSIS — S62.652A CLOSED NONDISPLACED FRACTURE OF MIDDLE PHALANX OF RIGHT MIDDLE FINGER, INITIAL ENCOUNTER: Primary | ICD-10-CM

## 2018-07-16 PROCEDURE — 97110 THERAPEUTIC EXERCISES: CPT | Performed by: OCCUPATIONAL THERAPIST

## 2018-07-16 PROCEDURE — 97010 HOT OR COLD PACKS THERAPY: CPT | Performed by: OCCUPATIONAL THERAPIST

## 2018-07-16 PROCEDURE — 97140 MANUAL THERAPY 1/> REGIONS: CPT | Performed by: OCCUPATIONAL THERAPIST

## 2018-07-16 NOTE — PROGRESS NOTES
Daily Note     Today's date: 2018  Patient name: Blanca Valdez  : 1945  MRN: [id number]  Referring provider: Merlene Whittington  Dx:   Encounter Diagnosis     ICD-10-CM    1  Closed nondisplaced fracture of middle phalanx of right middle finger, initial encounter S67 466R                   Subjective: I feel better now that I talked to the surgeon  Objective: See treatment diary below    Manual          IASTM PIP MF    5min 5 min 5min 5min        retrograde  2min 2 min 2min 2min        Jt  mob  2min           A/AA/PROM  1min 2 min 2min 2min                         Exercise Diary          Hook fist  skinny pegs skinny pegs skinny pegs skiny pegs        Digisqueeze  Red x30 Red 30x Red x30 Green x30        Putty press  red red red red        EDC  redbands x2 Red bands 2x Green bands x2 Green bands x2                                                                                                                                                                                                           HEP- TGEs                 Modalities          MHP w/ pdip  10 min 10 min 10 min 10 min                                      Assessment: Tolerated treatment well  Patient demonstrated fatigue post treatment, Discussed modifications of pen  with  Calligraphy hobby due to pain in PIP joint with hand writing  Plan: Continue per plan of care  Coban to  for edema control

## 2018-07-18 ENCOUNTER — APPOINTMENT (OUTPATIENT)
Dept: OCCUPATIONAL THERAPY | Facility: MEDICAL CENTER | Age: 73
End: 2018-07-18
Payer: COMMERCIAL

## 2018-07-19 ENCOUNTER — OFFICE VISIT (OUTPATIENT)
Dept: OCCUPATIONAL THERAPY | Facility: MEDICAL CENTER | Age: 73
End: 2018-07-19
Payer: COMMERCIAL

## 2018-07-19 DIAGNOSIS — S62.652A CLOSED NONDISPLACED FRACTURE OF MIDDLE PHALANX OF RIGHT MIDDLE FINGER, INITIAL ENCOUNTER: Primary | ICD-10-CM

## 2018-07-19 PROCEDURE — 97010 HOT OR COLD PACKS THERAPY: CPT | Performed by: OCCUPATIONAL THERAPIST

## 2018-07-19 PROCEDURE — 97110 THERAPEUTIC EXERCISES: CPT | Performed by: OCCUPATIONAL THERAPIST

## 2018-07-19 PROCEDURE — G8991 OTHER PT/OT GOAL STATUS: HCPCS | Performed by: OCCUPATIONAL THERAPIST

## 2018-07-19 PROCEDURE — G8992 OTHER PT/OT  D/C STATUS: HCPCS | Performed by: OCCUPATIONAL THERAPIST

## 2018-07-19 PROCEDURE — 97140 MANUAL THERAPY 1/> REGIONS: CPT | Performed by: OCCUPATIONAL THERAPIST

## 2018-07-19 NOTE — PROGRESS NOTES
Daily Note     Today's date: 2018  Patient name: Alonso Ortiz  : 1945  MRN: [id number]  Referring provider: Chel Henry  Dx:   Encounter Diagnosis     ICD-10-CM    1  Closed nondisplaced fracture of middle phalanx of right middle finger, initial encounter N33 389N                   Subjective: The doctor called and said I don't need to continue with therapy      Objective: See treatment diary below    Manual         IASTM PIP MF    5min 5 min 5min 5min 5 min       retrograde  2min 2 min 2min 2min 2min       Jt  mob  2min           A/AA/PROM  1min 2 min 2min 2min 2 min                        Exercise Diary         Hook fist  skinny pegs skinny pegs skinny pegs skiny pegs skinny pegs       Digisqueeze  Red x30 Red 30x Red x30 Green x30 Green x30       Putty press  red red red red        EDC  redbands x2 Red bands 2x Green bands x2 Green bands x2 Green bands x2                                                                                                                                                                                                          HEP- TGEs                 Modalities         MHP w/ pdip  10 min 10 min 10 min 10 min 10 min                                     Assessment: Tolerated treatment well  Patient demonstrated fatigue post treatment, Discussed modifications of pen  with  Calligraphy hobby due to pain in PIP joint with hand writing  Plan: Continue per plan of care  Coban to  for edema control

## 2018-07-23 ENCOUNTER — APPOINTMENT (OUTPATIENT)
Dept: OCCUPATIONAL THERAPY | Facility: MEDICAL CENTER | Age: 73
End: 2018-07-23
Payer: COMMERCIAL

## 2018-07-25 ENCOUNTER — APPOINTMENT (OUTPATIENT)
Dept: OCCUPATIONAL THERAPY | Facility: MEDICAL CENTER | Age: 73
End: 2018-07-25
Payer: COMMERCIAL

## 2018-07-30 ENCOUNTER — APPOINTMENT (OUTPATIENT)
Dept: OCCUPATIONAL THERAPY | Facility: MEDICAL CENTER | Age: 73
End: 2018-07-30
Payer: COMMERCIAL

## 2018-08-23 DIAGNOSIS — J44.9 CHRONIC OBSTRUCTIVE PULMONARY DISEASE, UNSPECIFIED COPD TYPE (HCC): Primary | ICD-10-CM

## 2018-08-24 RX ORDER — TIOTROPIUM BROMIDE INHALATION SPRAY 3.12 UG/1
2 SPRAY, METERED RESPIRATORY (INHALATION) DAILY
Qty: 3 INHALER | Refills: 3 | Status: SHIPPED | OUTPATIENT
Start: 2018-08-24 | End: 2018-12-12 | Stop reason: SDUPTHER

## 2018-10-08 NOTE — ASSESSMENT & PLAN NOTE
PTH is normal  Vit D low normal at 33  Continue weight bearing exercise, Vit D and Ca supplements, as discussed at last visit

## 2018-10-08 NOTE — ASSESSMENT & PLAN NOTE
Lat A1C in May was 5 8  Repeat in office appt  Continue Metformin  Consider ACE or ARB for renal prophylaxis

## 2018-10-09 ENCOUNTER — OFFICE VISIT (OUTPATIENT)
Dept: FAMILY MEDICINE CLINIC | Facility: CLINIC | Age: 73
End: 2018-10-09
Payer: COMMERCIAL

## 2018-10-09 VITALS
WEIGHT: 178 LBS | HEART RATE: 68 BPM | BODY MASS INDEX: 33.61 KG/M2 | SYSTOLIC BLOOD PRESSURE: 110 MMHG | HEIGHT: 61 IN | DIASTOLIC BLOOD PRESSURE: 58 MMHG

## 2018-10-09 DIAGNOSIS — Z23 NEED FOR PNEUMOCOCCAL VACCINATION: Primary | ICD-10-CM

## 2018-10-09 DIAGNOSIS — K22.70 BARRETT'S ESOPHAGUS WITHOUT DYSPLASIA: ICD-10-CM

## 2018-10-09 DIAGNOSIS — J30.9 ALLERGIC RHINITIS, UNSPECIFIED SEASONALITY, UNSPECIFIED TRIGGER: ICD-10-CM

## 2018-10-09 DIAGNOSIS — Z72.0 TOBACCO ABUSE: ICD-10-CM

## 2018-10-09 DIAGNOSIS — L30.9 DERMATITIS: ICD-10-CM

## 2018-10-09 DIAGNOSIS — E78.00 PURE HYPERCHOLESTEROLEMIA: ICD-10-CM

## 2018-10-09 DIAGNOSIS — J44.9 CHRONIC OBSTRUCTIVE PULMONARY DISEASE, UNSPECIFIED COPD TYPE (HCC): ICD-10-CM

## 2018-10-09 DIAGNOSIS — E11.9 CONTROLLED TYPE 2 DIABETES MELLITUS WITHOUT COMPLICATION, WITHOUT LONG-TERM CURRENT USE OF INSULIN (HCC): ICD-10-CM

## 2018-10-09 DIAGNOSIS — M81.0 AGE-RELATED OSTEOPOROSIS WITHOUT CURRENT PATHOLOGICAL FRACTURE: ICD-10-CM

## 2018-10-09 DIAGNOSIS — Z86.010 HISTORY OF COLONIC POLYPS: ICD-10-CM

## 2018-10-09 DIAGNOSIS — Z23 NEED FOR INFLUENZA VACCINATION: ICD-10-CM

## 2018-10-09 DIAGNOSIS — K58.2 IRRITABLE BOWEL SYNDROME WITH BOTH CONSTIPATION AND DIARRHEA: ICD-10-CM

## 2018-10-09 LAB — SL AMB POCT HEMOGLOBIN AIC: 5.9

## 2018-10-09 PROCEDURE — G0009 ADMIN PNEUMOCOCCAL VACCINE: HCPCS | Performed by: FAMILY MEDICINE

## 2018-10-09 PROCEDURE — 90670 PCV13 VACCINE IM: CPT | Performed by: FAMILY MEDICINE

## 2018-10-09 PROCEDURE — 99215 OFFICE O/P EST HI 40 MIN: CPT | Performed by: FAMILY MEDICINE

## 2018-10-09 PROCEDURE — 90662 IIV NO PRSV INCREASED AG IM: CPT | Performed by: FAMILY MEDICINE

## 2018-10-09 PROCEDURE — G0008 ADMIN INFLUENZA VIRUS VAC: HCPCS | Performed by: FAMILY MEDICINE

## 2018-10-09 PROCEDURE — 1125F AMNT PAIN NOTED PAIN PRSNT: CPT | Performed by: FAMILY MEDICINE

## 2018-10-09 PROCEDURE — 1170F FXNL STATUS ASSESSED: CPT | Performed by: FAMILY MEDICINE

## 2018-10-09 PROCEDURE — 83036 HEMOGLOBIN GLYCOSYLATED A1C: CPT | Performed by: FAMILY MEDICINE

## 2018-10-09 PROCEDURE — G0439 PPPS, SUBSEQ VISIT: HCPCS | Performed by: FAMILY MEDICINE

## 2018-10-09 RX ORDER — HYOSCYAMINE SULFATE 0.125 MG
0.12 TABLET ORAL EVERY 4 HOURS PRN
Qty: 30 TABLET | Refills: 0 | Status: SHIPPED | OUTPATIENT
Start: 2018-10-09 | End: 2019-04-17

## 2018-10-09 RX ORDER — PRAVASTATIN SODIUM 20 MG
20 TABLET ORAL DAILY
Qty: 90 TABLET | Refills: 3 | Status: SHIPPED | OUTPATIENT
Start: 2018-10-09 | End: 2018-10-15 | Stop reason: SDUPTHER

## 2018-10-09 RX ORDER — CLOTRIMAZOLE AND BETAMETHASONE DIPROPIONATE 10; .64 MG/G; MG/G
CREAM TOPICAL 2 TIMES DAILY
Qty: 30 G | Refills: 0 | Status: SHIPPED | OUTPATIENT
Start: 2018-10-09 | End: 2019-11-20

## 2018-10-09 NOTE — PROGRESS NOTES
Assessment/Plan:    80-year-old pleasant white female presents today to follow-up on her chronic medical conditions and several new ones  Her blood work is up-to-date  She will require an A1c in the office today  Controlled diabetes mellitus type II without complication (HCC)  Last A1C in May was 5 8  Repeat in office today is 5 9  Continue Metformin  Consider ACE or ARB for renal prophylaxis  IBS  Long history of such and has tried several medications in the remote past without much relief  She complains of mostly constipation, but also diarrhea at times  She often feels bloated and this causes her to have back pain  She will try Levsin q i d  p r n  pain and constipation  If this does not help, she will contact her gastroenterologist, Dr Esau Ramos, to consider testing for bacterial overgrowth which she has had and been treated for in the past       Holly's esophagus  Follows up with Dr Esau Ramos at [facility]  Continue Prevacid  COPD (chronic obstructive pulmonary disease) (HCC)  Mild by PFTs  She remains of Spiriva and Symbicort  She resists smoking cessation  Allergic rhinitis  Stable on Singulair  Hyperlipidemia  Stable on Pravachol  Chol is 161, LDL is 88 and trigs are normal     Osteoporosis  PTH is normal  Vit D low normal at 33  Continue weight bearing exercise, Vit D and Ca supplements  Dermatitis, intermittent, of anal and vaginal regions  Uses Lotrisone sparingly and only as needed  Refill given today  Emphasize need to use only occasionally and not on a regular basis  Muscle cramps  Patient encouraged to increase her fluids to 64 oz daily  Tobaccoism  Patient is resistant to stopping  Immunizations  Review today  She will have her flu vaccine and Prevnar 13 today  She will receive her Shingrix through her [facility]      Colon Polyps  F/U colonoscopy q3 years with Dr Esau Ramos       45 minutes spent with patient today with greater than 50% spent on counseling and coordination of the above medical conditions  Return to office in 2 months to review the above  Subjective: pt is here for follow up of hyperlipidemia~cd     Patient ID: Blair Laureano is a 67 y o  female  1  Will get Shingrix for free at her [employer]  2  Flu and Prevnar 13 here today  - immunizations reviewed at length with patient  3  Dr Teri Espino for 3 yr colonoscopy - hx of polyps - she recently had a FIT test which was negative  4  Problems with IBS - constipation alternating with diarrhea  She tried many different meds in the past  She is on a FODMAP diet  Requests med  5  Osteoporosis - she remains on calcium and vitamin-D    6  Continues to smoke - she states I know I should stop but I do not want to    7  Cramps in legs - intermittent  Admits she does not drink much water during the day  8  Clotrimazole cream - vaginal and anal area - uses as needed  Requests a refill  9  Needs diabetic eye exam - was seeing [facility] but is not happy there  Requests an alternative  10   Diabetes mellitus - remains on metformin  Trying to lose weight  Does not check sugars on a regular basis  Last hemoglobin A1c was 5 8  The following portions of the patient's history were reviewed and updated as appropriate: allergies, current medications, past family history, past medical history, past social history, past surgical history and problem list     Review of Systems   Constitutional:        See HPI   HENT: Negative for congestion, ear pain, mouth sores, sinus pressure and trouble swallowing  Eyes: Negative for discharge, redness and itching  See HPI  Respiratory: Negative for apnea, cough, chest tightness, shortness of breath, wheezing and stridor  See HPI  Cardiovascular: Negative for chest pain, palpitations and leg swelling  Gastrointestinal: Positive for abdominal distention and abdominal pain  Negative for blood in stool, constipation, diarrhea, nausea and vomiting  See HPI  Endocrine: Negative for cold intolerance and heat intolerance  See HPI  Genitourinary: Negative for difficulty urinating, dysuria, flank pain and urgency  Musculoskeletal: Negative for arthralgias and myalgias  See HPI  Skin: Negative for rash  Neurological: Negative for dizziness, seizures, syncope, speech difficulty, weakness, light-headedness, numbness and headaches  Hematological: Negative for adenopathy  Psychiatric/Behavioral: Negative for agitation, behavioral problems, confusion and sleep disturbance  The patient is not nervous/anxious  Objective:    Vitals:    10/09/18 0910   BP: 110/58   BP Location: Left arm   Patient Position: Sitting   Cuff Size: Large   Pulse: 68   Weight: 80 7 kg (178 lb)   Height: 5' 1" (1 549 m)     /58 (BP Location: Left arm, Patient Position: Sitting, Cuff Size: Large)   Pulse 68   Ht 5' 1" (1 549 m)   Wt 80 7 kg (178 lb)   BMI 33 63 kg/m²          Physical Exam   Constitutional: She is oriented to person, place, and time  She appears well-developed and well-nourished  No distress  HENT:   Head: Normocephalic and atraumatic  Right Ear: External ear normal    Left Ear: External ear normal    Eyes: Pupils are equal, round, and reactive to light  Conjunctivae and EOM are normal  No scleral icterus  Neck: Normal range of motion  Neck supple  Cardiovascular: Normal rate, regular rhythm, normal heart sounds and intact distal pulses  Exam reveals no gallop and no friction rub  No murmur heard  Pulmonary/Chest: Effort normal  No respiratory distress  She has no wheezes  She has no rales  She exhibits no tenderness  Abdominal: Soft  Bowel sounds are normal  She exhibits no distension and no mass  There is no tenderness  There is no rebound and no guarding  Obese  Musculoskeletal: Normal range of motion  She exhibits no edema, tenderness or deformity  Lymphadenopathy:     She has no cervical adenopathy  Neurological: She is alert and oriented to person, place, and time  She has normal reflexes  Skin: Skin is warm and dry  She is not diaphoretic  Psychiatric: She has a normal mood and affect   Her behavior is normal  Judgment and thought content normal

## 2018-10-09 NOTE — PROGRESS NOTES
Assessment and Plan:    Patient presents today for her Medicare annual wellness questionnaire  Her advanced directives are up-to-date  Her immunizations are up-to-date  Flu vaccine and Prevnar 13 today  She continues to smoke but is resistant to stopping  For full evaluation of chronic medical conditions, please see today's note  Problem List Items Addressed This Visit     Osteoporosis     PTH is normal  Vit D low normal at 33  Continue weight bearing exercise, Vit D and Ca supplements, as discussed at last visit  Hyperlipidemia - Primary     Stable on Pravachol  Chol is 161, LDL is 88 and trigs are normal          COPD (chronic obstructive pulmonary disease) (HCC)     Mild by PFTs  She remains of Spiriva and Symbicort  She resists smoking cessation  Holly's esophagus     Follows up with Dr Maryanne Freitas at [facility]  Continue Prevacid  Allergic rhinitis     Stable on Singulair  Health Maintenance Due   Topic Date Due    SLP PLAN OF CARE  1945    CRC Screening: Colonoscopy  1945    Urinary Incontinence Screening  11/28/2010    Pneumococcal PPSV23/PCV13 65+ Years / Low and Medium Risk (2 of 2 - PCV13) 07/26/2013    Diabetic Foot Exam  01/26/2018    OT PLAN OF CARE  07/28/2018    INFLUENZA VACCINE  09/01/2018    MAMMOGRAM  11/03/2018         HPI:  Isaiah Easton is a 67 y o  female here for her Subsequent Wellness Visit      Patient Active Problem List   Diagnosis    Vitamin D deficiency    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    Tobacco abuse    Osteoporosis    Lumbosacral pain, chronic    Hyperlipidemia    Coronary artery calcification    COPD (chronic obstructive pulmonary disease) (HCC)    Controlled diabetes mellitus type II without complication (HCC)    Holly's esophagus    Allergic rhinitis    History of colonic polyps     Past Medical History:   Diagnosis Date    Lyme disease     Polymyalgia rheumatica ([facility] [address] )     last assessed 2/15/17  Reactive airway disease     last assessed 6/9/16    Sciatica      Past Surgical History:   Procedure Laterality Date    HYSTERECTOMY       Family History   Problem Relation Age of Onset    Alzheimer's disease Mother     COPD Father         mild    Hypertension Father      History   Smoking Status    Smoker, Current Status Unknown   Smokeless Tobacco    Never Used     History   Alcohol Use    Yes     Comment: social      History   Drug Use No       Current Outpatient Prescriptions   Medication Sig Dispense Refill    albuterol (VENTOLIN HFA) 90 mcg/act inhaler Inhale 1-2 puffs as needed        Biotin 22742 MCG TABS Take by mouth      budesonide-formoterol (SYMBICORT) 160-4 5 mcg/act inhaler Inhale 2 puffs 2 (two) times a day 10 2 g 2    calcium carbonate 1250 MG capsule Take by mouth      Cholecalciferol (VITAMIN D3) 2000 units capsule Take 4,000 Units by mouth daily        clotrimazole-betamethasone (LOTRISONE) 1-0 05 % cream Apply topically Twice daily      Digestive Enzyme CAPS Take by mouth      lansoprazole (PREVACID) 30 mg capsule Take by mouth      metFORMIN (GLUCOPHAGE) 500 mg tablet Take 1 tablet by mouth Daily      montelukast (SINGULAIR) 10 mg tablet TAKE 1 TABLET BY MOUTH EVERY DAY 90 tablet 1    pravastatin (PRAVACHOL) 20 mg tablet Take 1 tablet by mouth      Probiotic Product (PROBIOTIC-10) CAPS Take by mouth      SPIRIVA RESPIMAT 2 5 MCG/ACT AERS inhaler Inhale 2 puffs daily 3 Inhaler 3    acetaminophen (TYLENOL) 325 mg tablet Take 2 tablets (650 mg total) by mouth every 6 (six) hours as needed for mild pain (Patient not taking: Reported on 10/9/2018 ) 30 tablet 0     No current facility-administered medications for this visit        Allergies   Allergen Reactions    Asa [Aspirin]     Ibuprofen GI Intolerance    Nsaids GI Intolerance    Percocet [Oxycodone-Acetaminophen] GI Intolerance     Immunization History   Administered Date(s) Administered    Hep B, adult 01/24/2012, 02/23/2012, 07/24/2012    Influenza 10/08/2014, 10/26/2015, 10/18/2016    Influenza Quadrivalent Preservative Free 3 years and older IM 10/26/2015, 10/18/2016    Influenza Split High Dose Preservative Free IM 09/19/2017    Influenza TIV (IM) 10/11/2012, 10/08/2014    Pneumococcal Polysaccharide PPV23 07/26/2012, 07/26/2012    Zoster 02/14/2018       Patient Care Team:  Frida Gates MD as PCP - MD Linda Beltre Che, ULICES Alan, ULICES Velazquez, ULICES Galvez MD    Medicare Screening Tests and Risk Assessments:  Fernandez Green is here for her Initial Wellness visit  Health Risk Assessment:  Patient rates overall health as good  Patient feels that their physical health rating is Slightly better  Eyesight was rated as Same  Hearing was rated as Same  Patient feels that their emotional and mental health rating is Same  Pain experienced by patient in the last 7 days has been Some  Patient's pain rating has been 5/10  Patient states that she has experienced no weight loss or gain in last 6 months  Emotional/Mental Health:  Patient has been feeling nervous/anxious  PHQ-9 Depression Screening:    Frequency of the following problems over the past two weeks:      1  Little interest or pleasure in doing things: 0 - not at all      2  Feeling down, depressed, or hopeless: 0 - not at all  PHQ-2 Score: 0          Broken Bones/Falls: Fall Risk Assessment:    In the past year, patient has experienced: No history of falling in past year          Bladder/Bowel:  Patient has not leaked urine accidently in the last six months  Patient reports no loss of bowel control  Immunizations:  Patient has not had a flu vaccination within the last year  Patient has not received a pneumonia shot  Patient has received a shingles shot  Home Safety:  Patient does not have trouble with stairs inside or outside of their home     Patient currently reports that there are no safety hazards present in home, working smoke alarms, no working carbon monoxide detectors  Preventative Screenings:   Breast cancer screening performed, colon cancer screen completed, cholesterol screen completed, glaucoma eye exam completed,     Nutrition:  Current diet: Diabetic, Low Cholesterol, Low Saturated Fat and Limited junk food with servings of the following:    Medications:  Patient is currently taking over-the-counter supplements  Patient is able to manage medications  Lifestyle Choices:  Patient reports current tobacco use  Patient reports no alcohol use  Patient drives a vehicle  Patient wears seat belt  Activities of Daily Living:  Can get out of bed by his or her self, able to dress self, able to make own meals, able to do own shopping, able to bathe self, can do own laundry/housekeeping, can manage own money, pay bills and track expenses    Previous Hospitalizations:  Hospitalization or ED visit in past 12 months        Advanced Directives:  Patient has decided on a power of   Patient has spoken to designated power of   Patient has completed advanced directive  Preventative Screening/Counseling:      Cardiovascular:      General: Screening Current          Diabetes:      General: Screening Current          Colorectal Cancer:      General: Screening Current          Breast Cancer:      General: Screening Current          Cervical Cancer:      General: Screening Current          Osteoporosis:      General: Screening Current          AAA:      General: Screening Not Indicated          Glaucoma:      General: Screening Current          HIV:      General: Screening Not Indicated          Hepatitis C:      General: Screening Not Indicated        Advanced Directives:   Patient has living will for healthcare, has durable POA for healthcare, patient has an advanced directive  Information on ACP and/or AD not provided  No 5 wishes given   End of life assessment reviewed with patient  Provider agrees with end of life decisions        Immunizations:      Influenza: Influenza Due Today      Pneumococcal: Pneumococcal Due Today      Shingrix: Risks & Benefits Discussed      Hepatitis B (Low risk patients): Series Not Indicated      Zostavax: Zostavax Vaccine UTD      TD: Risks & Benefits Discussed      TDAP: Risks & Benefits Discussed

## 2018-10-15 DIAGNOSIS — E78.00 PURE HYPERCHOLESTEROLEMIA: ICD-10-CM

## 2018-10-15 RX ORDER — PRAVASTATIN SODIUM 20 MG
TABLET ORAL
Qty: 180 TABLET | Refills: 3 | Status: SHIPPED | OUTPATIENT
Start: 2018-10-15 | End: 2018-12-12

## 2018-10-15 NOTE — TELEPHONE ENCOUNTER
Pt called stating she can not get her rx refill of pravastatin due to being fill to early  Pt states she was told she can take extra does of medication during the week here and there per Dr Baron Larios  Now she is running out and pharmacy will not refill medication due to script states 20mg daily  Please clarify and if so pt would need a new script sent to pharmacy  [facility] pharmacy in [address] [facility] [phone]  Pt only has one pill left for tomorrow   Please advise

## 2018-10-17 ENCOUNTER — TELEPHONE (OUTPATIENT)
Dept: FAMILY MEDICINE CLINIC | Facility: CLINIC | Age: 73
End: 2018-10-17

## 2018-10-17 DIAGNOSIS — E78.00 PURE HYPERCHOLESTEROLEMIA: Primary | ICD-10-CM

## 2018-10-17 RX ORDER — PRAVASTATIN SODIUM 40 MG
40 TABLET ORAL DAILY
Qty: 90 TABLET | Refills: 1 | Status: SHIPPED | OUTPATIENT
Start: 2018-10-17 | End: 2018-12-12

## 2018-10-17 RX ORDER — ROSUVASTATIN CALCIUM 5 MG/1
5 TABLET, COATED ORAL DAILY
Qty: 30 TABLET | Refills: 1 | Status: SHIPPED | OUTPATIENT
Start: 2018-10-17 | End: 2018-12-12 | Stop reason: SDUPTHER

## 2018-10-17 NOTE — TELEPHONE ENCOUNTER
Spoke to patient on the phone regarding her Pravastatin  She has increased it to 40 mg 3 times a week and 20 mg on the other days  She notes some muscle aches with the increase  Her insurance would not fill 20 mg, 2 tablets every other day  Therefore, we will try Crestor 5 mg daily  That will be sent to her pharmacy  In addition I will send in a script for pravastatin 40 mg, in the event that Crestor is not covered  I also suggest she take Co Q10 along with it to help with myalgias  She will let me know what happens      Elva Dos Santos MD

## 2018-10-17 NOTE — TELEPHONE ENCOUNTER
Received alternative med request from [facility] regarding her pravastatin  It was prescribed 1-2 tablets daily, however apparently the patient's insurance will not cover more than 1 tab daily  Please call patient and notify her of this  I can increase her pravastatin to 40 mg and she may take this daily  Alternatively, I can prescribe a different statin such as Lipitor or simvastatin  If she prefers to changed meds, she should find out which is covered on her insurance plan  Please let me know which she would prefer      Thank you,  CB

## 2018-11-13 ENCOUNTER — TELEPHONE (OUTPATIENT)
Dept: FAMILY MEDICINE CLINIC | Facility: CLINIC | Age: 73
End: 2018-11-13

## 2018-11-13 DIAGNOSIS — Z12.39 BREAST CANCER SCREENING: Primary | ICD-10-CM

## 2018-11-13 NOTE — TELEPHONE ENCOUNTER
PT IS CALLING BECAUSE SHE NEEDS A MAMMOGRAM SCRIPT AND ALSO IF DR KONG WANTS HER TO GO [facility] OR IF SHE CAN GO TO [place] FOR THIS  PLEASE ADVISE  THANK YOU

## 2018-11-13 NOTE — TELEPHONE ENCOUNTER
Patient is due for a screening mammogram   She may go to [facility]                         Patient is due for screening mammogram   She may go to the [address] for this  Thank you,  CB                                          end for this

## 2018-11-14 DIAGNOSIS — E11.8 TYPE 2 DIABETES MELLITUS WITH COMPLICATION, WITH LONG-TERM CURRENT USE OF INSULIN (HCC): Primary | ICD-10-CM

## 2018-11-14 DIAGNOSIS — Z79.4 TYPE 2 DIABETES MELLITUS WITH COMPLICATION, WITH LONG-TERM CURRENT USE OF INSULIN (HCC): Primary | ICD-10-CM

## 2018-11-23 DIAGNOSIS — J44.9 CHRONIC OBSTRUCTIVE PULMONARY DISEASE, UNSPECIFIED COPD TYPE (HCC): ICD-10-CM

## 2018-11-24 RX ORDER — BUDESONIDE AND FORMOTEROL FUMARATE DIHYDRATE 160; 4.5 UG/1; UG/1
2 AEROSOL RESPIRATORY (INHALATION) 2 TIMES DAILY
Qty: 3 INHALER | Refills: 3 | Status: SHIPPED | OUTPATIENT
Start: 2018-11-24 | End: 2020-05-19 | Stop reason: SDUPTHER

## 2018-12-12 ENCOUNTER — OFFICE VISIT (OUTPATIENT)
Dept: FAMILY MEDICINE CLINIC | Facility: CLINIC | Age: 73
End: 2018-12-12
Payer: COMMERCIAL

## 2018-12-12 VITALS
HEIGHT: 61 IN | HEART RATE: 72 BPM | WEIGHT: 175.6 LBS | SYSTOLIC BLOOD PRESSURE: 110 MMHG | DIASTOLIC BLOOD PRESSURE: 68 MMHG | BODY MASS INDEX: 33.15 KG/M2

## 2018-12-12 DIAGNOSIS — B19.20 HEPATITIS C VIRUS INFECTION WITHOUT HEPATIC COMA, UNSPECIFIED CHRONICITY: ICD-10-CM

## 2018-12-12 DIAGNOSIS — E78.00 PURE HYPERCHOLESTEROLEMIA: ICD-10-CM

## 2018-12-12 DIAGNOSIS — Z72.0 TOBACCO ABUSE: ICD-10-CM

## 2018-12-12 DIAGNOSIS — K58.2 IRRITABLE BOWEL SYNDROME WITH BOTH CONSTIPATION AND DIARRHEA: ICD-10-CM

## 2018-12-12 DIAGNOSIS — J44.9 CHRONIC OBSTRUCTIVE PULMONARY DISEASE, UNSPECIFIED COPD TYPE (HCC): Primary | ICD-10-CM

## 2018-12-12 DIAGNOSIS — E11.9 CONTROLLED TYPE 2 DIABETES MELLITUS WITHOUT COMPLICATION, WITHOUT LONG-TERM CURRENT USE OF INSULIN (HCC): ICD-10-CM

## 2018-12-12 PROCEDURE — 3008F BODY MASS INDEX DOCD: CPT | Performed by: FAMILY MEDICINE

## 2018-12-12 PROCEDURE — 99214 OFFICE O/P EST MOD 30 MIN: CPT | Performed by: FAMILY MEDICINE

## 2018-12-12 PROCEDURE — 4040F PNEUMOC VAC/ADMIN/RCVD: CPT | Performed by: FAMILY MEDICINE

## 2018-12-12 PROCEDURE — 1160F RVW MEDS BY RX/DR IN RCRD: CPT | Performed by: FAMILY MEDICINE

## 2018-12-12 RX ORDER — ROSUVASTATIN CALCIUM 5 MG/1
5 TABLET, COATED ORAL DAILY
Qty: 90 TABLET | Refills: 3 | Status: SHIPPED | OUTPATIENT
Start: 2018-12-12 | End: 2019-12-16 | Stop reason: SDUPTHER

## 2018-12-12 RX ORDER — TIOTROPIUM BROMIDE INHALATION SPRAY 3.12 UG/1
2 SPRAY, METERED RESPIRATORY (INHALATION) DAILY
Qty: 3 INHALER | Refills: 0 | Status: SHIPPED | OUTPATIENT
Start: 2018-12-12 | End: 2019-11-09 | Stop reason: SDUPTHER

## 2018-12-12 NOTE — ASSESSMENT & PLAN NOTE
Lab Results   Component Value Date    HGBA1C 5 9% 10/09/2018       No results for input(s): POCGLU in the last 72 hours      Blood Sugar Average: Last 72 hrs:

## 2018-12-12 NOTE — PROGRESS NOTES
Assessment/Plan:  Patient presents today to follow-up on her chronic medical conditions  She is doing well with no active complaints  Her pulmonary symptoms are much improved on Spiriva and Symbicort  COPD (chronic obstructive pulmonary disease) (HCC)  Mild by PFTs  She remains of Spiriva and Symbicort  She resists smoking cessation  Controlled diabetes mellitus type II without complication ([facility] [address] )  Lab Results   Component Value Date    HGBA1C 5 9% 10/09/2018       No results for input(s): POCGLU in the last 72 hours  Blood Sugar Average: Last 72 hrs:      Hyperlipidemia  Stable on change to Crestor  Chol is 161, LDL is 88 and trigs are normal  Went to get CoQ 10  Irritable bowel syndrome with both constipation and diarrhea  Hyoscyamine    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  [facility], Dr Tran Police 4 months  Subjective: pt is here for follow up of hyperlipidemia~cd     Patient ID: Jose Roche is a 68 y o  female  Routine 4 month checkup  Review chronic medical conditions  No active complaints  The following portions of the patient's history were reviewed and updated as appropriate: allergies, current medications, past family history, past medical history, past social history, past surgical history and problem list      Review of Systems   Constitutional:        See HPI   HENT: Negative for congestion, ear pain, mouth sores, sinus pressure and trouble swallowing  Eyes: Negative for discharge, redness and itching  Respiratory: Negative for apnea, cough, chest tightness, shortness of breath, wheezing and stridor  Cardiovascular: Negative for chest pain, palpitations and leg swelling  Gastrointestinal: Negative for abdominal distention, abdominal pain, blood in stool, constipation, diarrhea, nausea and vomiting  Endocrine: Negative for cold intolerance and heat intolerance  Genitourinary: Negative for difficulty urinating, dysuria, flank pain and urgency  Musculoskeletal: Negative for arthralgias and myalgias  Skin: Negative for rash  Neurological: Negative for dizziness, seizures, syncope, speech difficulty, weakness, light-headedness, numbness and headaches  Hematological: Negative for adenopathy  Psychiatric/Behavioral: Negative for agitation, behavioral problems, confusion and sleep disturbance  The patient is not nervous/anxious  Objective:  Vitals:    12/12/18 1006 12/12/18 1024   BP: 140/70 110/68   BP Location: Left arm    Patient Position: Sitting    Cuff Size: Standard    Pulse: 72    Weight: 79 7 kg (175 lb 9 6 oz)    Height: 5' 1" (1 549 m)                 Physical Exam   Constitutional: She is oriented to person, place, and time  She appears well-developed and well-nourished  No distress  HENT:   Head: Normocephalic and atraumatic  Right Ear: External ear normal    Left Ear: External ear normal    Eyes: Pupils are equal, round, and reactive to light  Conjunctivae and EOM are normal  No scleral icterus  Neck: Normal range of motion  Neck supple  Cardiovascular: Normal rate, regular rhythm, normal heart sounds and intact distal pulses  Exam reveals no gallop and no friction rub  No murmur heard  No carotid bruits appreciated  Pulmonary/Chest: Effort normal  No respiratory distress  She has no wheezes  She has no rales  She exhibits no tenderness  Abdominal: Soft  Bowel sounds are normal  She exhibits no distension and no mass  There is no tenderness  There is no rebound and no guarding  Musculoskeletal: Normal range of motion  She exhibits no edema, tenderness or deformity  Lymphadenopathy:     She has no cervical adenopathy  Neurological: She is alert and oriented to person, place, and time  She has normal reflexes  Skin: Skin is warm and dry  She is not diaphoretic  Psychiatric: She has a normal mood and affect  Her behavior is normal  Judgment and thought content normal    Nursing note and vitals reviewed

## 2018-12-20 ENCOUNTER — HOSPITAL ENCOUNTER (OUTPATIENT)
Dept: MAMMOGRAPHY | Facility: MEDICAL CENTER | Age: 73
Discharge: HOME | End: 2018-12-20
Payer: COMMERCIAL

## 2018-12-20 VITALS — HEIGHT: 61 IN | WEIGHT: 175 LBS | BODY MASS INDEX: 33.04 KG/M2

## 2018-12-20 DIAGNOSIS — Z12.39 BREAST CANCER SCREENING: ICD-10-CM

## 2018-12-20 PROCEDURE — 77067 SCR MAMMO BI INCL CAD: CPT

## 2018-12-20 PROCEDURE — 77063 BREAST TOMOSYNTHESIS BI: CPT

## 2019-01-07 DIAGNOSIS — J30.1 ALLERGIC RHINITIS DUE TO POLLEN, UNSPECIFIED SEASONALITY: ICD-10-CM

## 2019-01-07 RX ORDER — MONTELUKAST SODIUM 10 MG/1
10 TABLET ORAL DAILY
Qty: 90 TABLET | Refills: 3 | Status: SHIPPED | OUTPATIENT
Start: 2019-01-07 | End: 2020-01-27 | Stop reason: SDUPTHER

## 2019-01-15 ENCOUNTER — HOSPITAL ENCOUNTER (OUTPATIENT)
Dept: CT IMAGING | Facility: HOSPITAL | Age: 74
Discharge: HOME | End: 2019-01-15
Payer: COMMERCIAL

## 2019-01-15 DIAGNOSIS — Z72.0 TOBACCO ABUSE: ICD-10-CM

## 2019-01-23 LAB
LEFT EYE DIABETIC RETINOPATHY: NORMAL
RIGHT EYE DIABETIC RETINOPATHY: NORMAL

## 2019-04-17 ENCOUNTER — OFFICE VISIT (OUTPATIENT)
Dept: FAMILY MEDICINE CLINIC | Facility: CLINIC | Age: 74
End: 2019-04-17
Payer: COMMERCIAL

## 2019-04-17 VITALS
DIASTOLIC BLOOD PRESSURE: 70 MMHG | WEIGHT: 176.8 LBS | SYSTOLIC BLOOD PRESSURE: 140 MMHG | HEIGHT: 61 IN | HEART RATE: 64 BPM | BODY MASS INDEX: 33.38 KG/M2

## 2019-04-17 DIAGNOSIS — K58.2 IRRITABLE BOWEL SYNDROME WITH BOTH CONSTIPATION AND DIARRHEA: ICD-10-CM

## 2019-04-17 DIAGNOSIS — J44.9 CHRONIC OBSTRUCTIVE PULMONARY DISEASE, UNSPECIFIED COPD TYPE (HCC): Primary | ICD-10-CM

## 2019-04-17 DIAGNOSIS — E11.9 CONTROLLED TYPE 2 DIABETES MELLITUS WITHOUT COMPLICATION, WITHOUT LONG-TERM CURRENT USE OF INSULIN (HCC): ICD-10-CM

## 2019-04-17 DIAGNOSIS — E55.9 VITAMIN D DEFICIENCY: ICD-10-CM

## 2019-04-17 DIAGNOSIS — E78.00 PURE HYPERCHOLESTEROLEMIA: ICD-10-CM

## 2019-04-17 PROCEDURE — 99214 OFFICE O/P EST MOD 30 MIN: CPT | Performed by: FAMILY MEDICINE

## 2019-05-20 DIAGNOSIS — E11.8 TYPE 2 DIABETES MELLITUS WITH COMPLICATION, WITH LONG-TERM CURRENT USE OF INSULIN (HCC): ICD-10-CM

## 2019-05-20 DIAGNOSIS — Z79.4 TYPE 2 DIABETES MELLITUS WITH COMPLICATION, WITH LONG-TERM CURRENT USE OF INSULIN (HCC): ICD-10-CM

## 2019-06-03 ENCOUNTER — OFFICE VISIT (OUTPATIENT)
Dept: FAMILY MEDICINE CLINIC | Facility: CLINIC | Age: 74
End: 2019-06-03
Payer: COMMERCIAL

## 2019-06-03 VITALS
DIASTOLIC BLOOD PRESSURE: 74 MMHG | BODY MASS INDEX: 33.04 KG/M2 | HEIGHT: 61 IN | RESPIRATION RATE: 16 BRPM | SYSTOLIC BLOOD PRESSURE: 132 MMHG | WEIGHT: 175 LBS | HEART RATE: 68 BPM

## 2019-06-03 DIAGNOSIS — M77.11 LATERAL EPICONDYLITIS OF RIGHT ELBOW: Primary | ICD-10-CM

## 2019-06-03 PROBLEM — M77.10 LATERAL EPICONDYLITIS: Status: ACTIVE | Noted: 2019-06-03

## 2019-06-03 PROCEDURE — 3008F BODY MASS INDEX DOCD: CPT | Performed by: FAMILY MEDICINE

## 2019-06-03 PROCEDURE — 1160F RVW MEDS BY RX/DR IN RCRD: CPT | Performed by: FAMILY MEDICINE

## 2019-06-03 PROCEDURE — 99213 OFFICE O/P EST LOW 20 MIN: CPT | Performed by: FAMILY MEDICINE

## 2019-07-10 LAB
25(OH)D3 SERPL-MCNC: 36 NG/ML (ref 30–100)
ALBUMIN SERPL-MCNC: 4.2 G/DL (ref 3.6–5.1)
ALBUMIN/GLOB SERPL: 1.7 (CALC) (ref 1–2.5)
ALP SERPL-CCNC: 111 U/L (ref 33–130)
ALT SERPL-CCNC: 9 U/L (ref 6–29)
AST SERPL-CCNC: 10 U/L (ref 10–35)
BASOPHILS # BLD AUTO: 37 CELLS/UL (ref 0–200)
BASOPHILS NFR BLD AUTO: 0.6 %
BILIRUB SERPL-MCNC: 0.4 MG/DL (ref 0.2–1.2)
BUN SERPL-MCNC: 18 MG/DL (ref 7–25)
BUN/CREAT SERPL: ABNORMAL (CALC) (ref 6–22)
CALCIUM SERPL-MCNC: 9 MG/DL (ref 8.6–10.4)
CHLORIDE SERPL-SCNC: 104 MMOL/L (ref 98–110)
CHOLEST SERPL-MCNC: 137 MG/DL
CHOLEST/HDLC SERPL: 3 (CALC)
CO2 SERPL-SCNC: 26 MMOL/L (ref 20–32)
CREAT SERPL-MCNC: 0.7 MG/DL (ref 0.6–0.93)
CRP SERPL HS-MCNC: 3.4 MG/L
EOSINOPHIL # BLD AUTO: 143 CELLS/UL (ref 15–500)
EOSINOPHIL NFR BLD AUTO: 2.3 %
ERYTHROCYTE [DISTWIDTH] IN BLOOD BY AUTOMATED COUNT: 13.1 % (ref 11–15)
FOLATE SERPL-MCNC: 16.7 NG/ML
GLOBULIN SER CALC-MCNC: 2.5 G/DL (CALC) (ref 1.9–3.7)
GLUCOSE SERPL-MCNC: 105 MG/DL (ref 65–99)
HBA1C MFR BLD: 6.2 % OF TOTAL HGB
HCT VFR BLD AUTO: 39.1 % (ref 35–45)
HDLC SERPL-MCNC: 46 MG/DL
HGB BLD-MCNC: 13 G/DL (ref 11.7–15.5)
LDLC SERPL CALC-MCNC: 70 MG/DL (CALC)
LYMPHOCYTES # BLD AUTO: 1327 CELLS/UL (ref 850–3900)
LYMPHOCYTES NFR BLD AUTO: 21.4 %
MCH RBC QN AUTO: 28.3 PG (ref 27–33)
MCHC RBC AUTO-ENTMCNC: 33.2 G/DL (ref 32–36)
MCV RBC AUTO: 85.2 FL (ref 80–100)
MONOCYTES # BLD AUTO: 415 CELLS/UL (ref 200–950)
MONOCYTES NFR BLD AUTO: 6.7 %
NEUTROPHILS # BLD AUTO: 4278 CELLS/UL (ref 1500–7800)
NEUTROPHILS NFR BLD AUTO: 69 %
NONHDLC SERPL-MCNC: 91 MG/DL (CALC)
PLATELET # BLD AUTO: 209 THOUSAND/UL (ref 140–400)
PMV BLD REES-ECKER: 10.8 FL (ref 7.5–12.5)
POTASSIUM SERPL-SCNC: 4.1 MMOL/L (ref 3.5–5.3)
PROT SERPL-MCNC: 6.7 G/DL (ref 6.1–8.1)
RBC # BLD AUTO: 4.59 MILLION/UL (ref 3.8–5.1)
SL AMB EGFR AFRICAN AMERICAN: 100 ML/MIN/1.73M2
SL AMB EGFR NON AFRICAN AMERICAN: 86 ML/MIN/1.73M2
SODIUM SERPL-SCNC: 138 MMOL/L (ref 135–146)
T4 FREE SERPL-MCNC: 0.8 NG/DL (ref 0.8–1.8)
TRIGL SERPL-MCNC: 120 MG/DL
TSH SERPL-ACNC: 1.38 MIU/L (ref 0.4–4.5)
VIT B12 SERPL-MCNC: 205 PG/ML (ref 200–1100)
WBC # BLD AUTO: 6.2 THOUSAND/UL (ref 3.8–10.8)

## 2019-07-16 PROBLEM — R79.89 LOW VITAMIN B12 LEVEL: Status: ACTIVE | Noted: 2019-07-16

## 2019-07-16 PROBLEM — E53.8 LOW VITAMIN B12 LEVEL: Status: ACTIVE | Noted: 2019-07-16

## 2019-07-17 ENCOUNTER — OFFICE VISIT (OUTPATIENT)
Dept: FAMILY MEDICINE CLINIC | Facility: CLINIC | Age: 74
End: 2019-07-17
Payer: COMMERCIAL

## 2019-07-17 VITALS
HEIGHT: 61 IN | WEIGHT: 173 LBS | BODY MASS INDEX: 32.66 KG/M2 | DIASTOLIC BLOOD PRESSURE: 80 MMHG | SYSTOLIC BLOOD PRESSURE: 130 MMHG | HEART RATE: 64 BPM

## 2019-07-17 DIAGNOSIS — J44.9 CHRONIC OBSTRUCTIVE PULMONARY DISEASE, UNSPECIFIED COPD TYPE (HCC): Primary | ICD-10-CM

## 2019-07-17 DIAGNOSIS — Z72.0 TOBACCO ABUSE: ICD-10-CM

## 2019-07-17 DIAGNOSIS — E78.00 PURE HYPERCHOLESTEROLEMIA: ICD-10-CM

## 2019-07-17 DIAGNOSIS — E11.9 CONTROLLED TYPE 2 DIABETES MELLITUS WITHOUT COMPLICATION, WITHOUT LONG-TERM CURRENT USE OF INSULIN (HCC): ICD-10-CM

## 2019-07-17 DIAGNOSIS — E53.8 LOW VITAMIN B12 LEVEL: ICD-10-CM

## 2019-07-17 DIAGNOSIS — K22.70 BARRETT'S ESOPHAGUS WITHOUT DYSPLASIA: ICD-10-CM

## 2019-07-17 PROCEDURE — 99214 OFFICE O/P EST MOD 30 MIN: CPT | Performed by: FAMILY MEDICINE

## 2019-07-17 PROCEDURE — 3008F BODY MASS INDEX DOCD: CPT | Performed by: FAMILY MEDICINE

## 2019-07-17 RX ORDER — ALBUTEROL SULFATE 90 UG/1
1-2 AEROSOL, METERED RESPIRATORY (INHALATION) AS NEEDED
Qty: 1 INHALER | Refills: 6 | Status: SHIPPED | OUTPATIENT
Start: 2019-07-17 | End: 2021-03-03 | Stop reason: SDUPTHER

## 2019-07-17 RX ORDER — GLUCOSA SU 2KCL/CHONDROITIN SU 500-400 MG
CAPSULE ORAL
COMMUNITY

## 2019-07-17 NOTE — PROGRESS NOTES
Assessment and Plan:    Mr  Andra Lal presents today to follow-up on her chronic medical conditions  She complains of head fullness in itching in her right ear since and insect fluent     She also has blood work to review  1  Eustachian dysfunction / temporal canal irritation - RTO for ear lavage  Also start nasal saline irrigation  2  Diabetes mellitus type 2 - hemoglobin A1c is slightly elevated from last time at 6 2  She remains on metformin  She will try a 15 hour fast period; she is more mindful of what she eats  She does take a probiotic  Add fiber  3  Holly's esophagus without dysplasia - symptoms remain stable on long-term use of Prevacid  4  Low vitamin B12 - questionable etiology  May be due to long-term use of PPI  Rule out intrinsic factor deficiency  Take sublingual methylcobalamin  5  COPD - she continues to smoke  She is resistant to stopping  Continue Spiriva and Symbicort  6  Hyperlipidemia - lipids are well controlled  Cholesterol is 137, HDL is 46, trigs are 120 and LDL is 70  Continue Crestor 5 mg daily  7  Tobacco use - patient is resistant to stopping  Labs reviewed with patient today  Return to office in 3-4 months  Subjective:      Patient ID: Bassam Elliott is a 68 y o  female  CC:    Chief Complaint   Patient presents with    Diabetes    Hyperlipidemia     pt also has blood work to review    Other     sinusu pressure       HPI:    Review chronic medical conditions  Blood work to review  Complains of pressure  In head and noises reverberating in her R ear  She got a bite from a gnat and symptoms began after that        The following portions of the patient's history were reviewed and updated as appropriate: allergies, current medications, past family history, past medical history, past social history, past surgical history and problem list       Review of Systems   Constitutional:        See HPI   HENT: Negative for congestion, ear pain, mouth sores, sinus pressure and trouble swallowing  See HPI  Eyes: Negative for discharge, redness and itching  Respiratory: Negative for apnea, cough, chest tightness, shortness of breath, wheezing and stridor  Cardiovascular: Negative for chest pain, palpitations and leg swelling  Gastrointestinal: Negative for abdominal distention, abdominal pain, blood in stool, constipation, diarrhea, nausea and vomiting  Endocrine: Negative for cold intolerance and heat intolerance  Genitourinary: Negative for difficulty urinating, dysuria, flank pain and urgency  Musculoskeletal: Negative for arthralgias and myalgias  Skin: Negative for rash  Neurological: Negative for dizziness, seizures, syncope, speech difficulty, weakness, light-headedness, numbness and headaches  Hematological: Negative for adenopathy  Psychiatric/Behavioral: Negative for agitation, behavioral problems, confusion and sleep disturbance  The patient is not nervous/anxious  Data to review:       Objective:    Vitals:    07/17/19 0859   BP: 130/80   BP Location: Left arm   Patient Position: Sitting   Cuff Size: Large   Pulse: 64   Weight: 78 5 kg (173 lb)   Height: 5' 1" (1 549 m)        Physical Exam   Constitutional: She is oriented to person, place, and time  She appears well-developed and well-nourished  No distress  HENT:   Head: Normocephalic and atraumatic  Right Ear: External ear normal    Left Ear: External ear normal    White fluff noted in both ears, R >L  ? Debris from bug   Mild canal erythema   Eyes: Pupils are equal, round, and reactive to light  Conjunctivae and EOM are normal  No scleral icterus  Neck: Normal range of motion  Neck supple  Cardiovascular: Normal rate, regular rhythm, normal heart sounds and intact distal pulses  Exam reveals no gallop and no friction rub  No murmur heard  No carotid bruits appreciated  Pulmonary/Chest: Effort normal  No respiratory distress  She has no wheezes  She has no rales  She exhibits no tenderness  Abdominal: Soft  Bowel sounds are normal  She exhibits no distension and no mass  There is no tenderness  There is no rebound and no guarding  Musculoskeletal: Normal range of motion  She exhibits no edema, tenderness or deformity  Lymphadenopathy:     She has no cervical adenopathy  Neurological: She is alert and oriented to person, place, and time  She has normal reflexes  Skin: Skin is warm and dry  She is not diaphoretic  Psychiatric: She has a normal mood and affect  Her behavior is normal  Judgment and thought content normal    Nursing note and vitals reviewed

## 2019-07-22 ENCOUNTER — OFFICE VISIT (OUTPATIENT)
Dept: FAMILY MEDICINE CLINIC | Facility: CLINIC | Age: 74
End: 2019-07-22
Payer: COMMERCIAL

## 2019-07-22 ENCOUNTER — TELEPHONE (OUTPATIENT)
Dept: FAMILY MEDICINE CLINIC | Facility: CLINIC | Age: 74
End: 2019-07-22

## 2019-07-22 DIAGNOSIS — H61.21 HEARING LOSS OF RIGHT EAR DUE TO CERUMEN IMPACTION: Primary | ICD-10-CM

## 2019-07-22 PROBLEM — H61.23 BILATERAL IMPACTED CERUMEN: Status: ACTIVE | Noted: 2019-07-22

## 2019-07-22 PROCEDURE — 69210 REMOVE IMPACTED EAR WAX UNI: CPT | Performed by: FAMILY MEDICINE

## 2019-07-22 NOTE — PROGRESS NOTES
Ear cerumen removal  Date/Time: 7/22/2019 12:31 PM  Performed by: Emeli Rubio PA-C  Authorized by: Emeli Rubio PA-C     Patient location:  Clinic  Other Assisting Provider: No    Consent:     Consent obtained:  Verbal    Consent given by:  Patient    Risks discussed:  Bleeding, dizziness, infection, incomplete removal, pain and TM perforation    Alternatives discussed:  No treatment, delayed treatment, alternative treatment, observation and referral  Universal protocol:     Procedure explained and questions answered to patient or proxy's satisfaction: yes      Patient identity confirmed:  Verbally with patient  Procedure details:     Location:  R ear    Procedure type: irrigation only      Approach:  External and natural orifice  Post-procedure details:     Complication:  None    Hearing quality:  Improved    Patient tolerance of procedure:  Procedure terminated at patient's request  Comments:      Patient will use Debrox and return in 3 days to try to get the wax out of the right ear  There was no cerumen in the left ear

## 2019-07-22 NOTE — ASSESSMENT & PLAN NOTE
Patient's right ear was irrigated with difficulty and she will return in 3 days after using Debrox to soften the wax

## 2019-07-22 NOTE — TELEPHONE ENCOUNTER
Pt would like to know what strength of the Methocobalamin b12, sublingual to take  Please call patient

## 2019-07-25 DIAGNOSIS — H61.21 HEARING LOSS DUE TO CERUMEN IMPACTION, RIGHT: Primary | ICD-10-CM

## 2019-11-09 DIAGNOSIS — J44.9 CHRONIC OBSTRUCTIVE PULMONARY DISEASE, UNSPECIFIED COPD TYPE (HCC): ICD-10-CM

## 2019-11-09 DIAGNOSIS — Z79.4 TYPE 2 DIABETES MELLITUS WITH COMPLICATION, WITH LONG-TERM CURRENT USE OF INSULIN (HCC): ICD-10-CM

## 2019-11-09 DIAGNOSIS — E11.8 TYPE 2 DIABETES MELLITUS WITH COMPLICATION, WITH LONG-TERM CURRENT USE OF INSULIN (HCC): ICD-10-CM

## 2019-11-09 RX ORDER — TIOTROPIUM BROMIDE INHALATION SPRAY 3.12 UG/1
2 SPRAY, METERED RESPIRATORY (INHALATION) DAILY
Qty: 1 INHALER | Refills: 3 | Status: SHIPPED | OUTPATIENT
Start: 2019-11-09 | End: 2020-03-13 | Stop reason: SDUPTHER

## 2019-11-15 LAB
APPEARANCE UR: CLEAR
BACTERIA UR CULT: NORMAL
BACTERIA UR QL AUTO: NORMAL /HPF
BILIRUB UR QL STRIP: NEGATIVE
COLOR UR: YELLOW
GLUCOSE UR QL STRIP: NEGATIVE
HGB UR QL STRIP: NEGATIVE
HYALINE CASTS #/AREA URNS LPF: NORMAL /LPF
IF BLOCK AB SER QL: NEGATIVE
KETONES UR QL STRIP: NEGATIVE
LEUKOCYTE ESTERASE UR QL STRIP: NEGATIVE
MAGNESIUM SERPL-MCNC: 2 MG/DL (ref 1.5–2.5)
NITRITE UR QL STRIP: NEGATIVE
PH UR STRIP: 5.5 [PH] (ref 5–8)
PROT UR QL STRIP: NEGATIVE
RBC #/AREA URNS HPF: NORMAL /HPF
SP GR UR STRIP: 1.02 (ref 1–1.03)
SQUAMOUS #/AREA URNS HPF: NORMAL /HPF
WBC #/AREA URNS HPF: NORMAL /HPF

## 2019-11-19 PROBLEM — H61.21 HEARING LOSS DUE TO CERUMEN IMPACTION, RIGHT: Status: RESOLVED | Noted: 2019-07-25 | Resolved: 2019-11-19

## 2019-11-19 PROBLEM — H61.23 BILATERAL IMPACTED CERUMEN: Status: RESOLVED | Noted: 2019-07-22 | Resolved: 2019-11-19

## 2019-11-19 PROBLEM — H61.21 HEARING LOSS OF RIGHT EAR DUE TO CERUMEN IMPACTION: Status: RESOLVED | Noted: 2019-07-22 | Resolved: 2019-11-19

## 2019-11-20 ENCOUNTER — OFFICE VISIT (OUTPATIENT)
Dept: FAMILY MEDICINE CLINIC | Facility: CLINIC | Age: 74
End: 2019-11-20
Payer: COMMERCIAL

## 2019-11-20 VITALS
SYSTOLIC BLOOD PRESSURE: 130 MMHG | HEIGHT: 61 IN | DIASTOLIC BLOOD PRESSURE: 78 MMHG | BODY MASS INDEX: 31.15 KG/M2 | HEART RATE: 80 BPM | WEIGHT: 165 LBS

## 2019-11-20 DIAGNOSIS — E78.00 PURE HYPERCHOLESTEROLEMIA: ICD-10-CM

## 2019-11-20 DIAGNOSIS — J44.9 CHRONIC OBSTRUCTIVE PULMONARY DISEASE, UNSPECIFIED COPD TYPE (HCC): ICD-10-CM

## 2019-11-20 DIAGNOSIS — E53.8 LOW VITAMIN B12 LEVEL: ICD-10-CM

## 2019-11-20 DIAGNOSIS — J30.9 ALLERGIC RHINITIS, UNSPECIFIED SEASONALITY, UNSPECIFIED TRIGGER: ICD-10-CM

## 2019-11-20 DIAGNOSIS — K22.70 BARRETT'S ESOPHAGUS WITHOUT DYSPLASIA: ICD-10-CM

## 2019-11-20 DIAGNOSIS — Z23 NEED FOR INFLUENZA VACCINATION: ICD-10-CM

## 2019-11-20 DIAGNOSIS — E11.9 CONTROLLED TYPE 2 DIABETES MELLITUS WITHOUT COMPLICATION, WITHOUT LONG-TERM CURRENT USE OF INSULIN (HCC): Primary | ICD-10-CM

## 2019-11-20 LAB — SL AMB POCT HEMOGLOBIN AIC: 5.7 (ref ?–6.5)

## 2019-11-20 PROCEDURE — 83036 HEMOGLOBIN GLYCOSYLATED A1C: CPT | Performed by: FAMILY MEDICINE

## 2019-11-20 PROCEDURE — 96372 THER/PROPH/DIAG INJ SC/IM: CPT | Performed by: FAMILY MEDICINE

## 2019-11-20 PROCEDURE — 90662 IIV NO PRSV INCREASED AG IM: CPT | Performed by: FAMILY MEDICINE

## 2019-11-20 PROCEDURE — 1160F RVW MEDS BY RX/DR IN RCRD: CPT | Performed by: FAMILY MEDICINE

## 2019-11-20 PROCEDURE — G0008 ADMIN INFLUENZA VIRUS VAC: HCPCS | Performed by: FAMILY MEDICINE

## 2019-11-20 PROCEDURE — 99214 OFFICE O/P EST MOD 30 MIN: CPT | Performed by: FAMILY MEDICINE

## 2019-11-20 PROCEDURE — 3044F HG A1C LEVEL LT 7.0%: CPT | Performed by: FAMILY MEDICINE

## 2019-11-20 RX ORDER — CYANOCOBALAMIN 1000 UG/ML
1000 INJECTION INTRAMUSCULAR; SUBCUTANEOUS
Status: SHIPPED | OUTPATIENT
Start: 2019-11-20

## 2019-11-20 RX ADMIN — CYANOCOBALAMIN 1000 MCG: 1000 INJECTION INTRAMUSCULAR; SUBCUTANEOUS at 09:35

## 2019-11-20 NOTE — PROGRESS NOTES
Assessment and Plan:    31-year-old white female presents today to follow-up on her chronic medical conditions  She also has blood work to review  She has lost 8 lb in the last 4 months  She had wanted her magnesium checked as per insurance as well as UA  1  Diabetes mellitus - patient remains on metformin  Hemoglobin A1c in the office today is 5 7 down from 6 2     2  Holly's esophagus without dysplasia - symptoms are stable  Continue Prevacid 30 mg daily  3  Low vitamin B12 - since last visit, patient has started using sublingual methylcobalamin  She will start IM cyanocobalamin injections on a monthly basis  4  Hyperlipidemia - patient remains on Crestor 5 mg daily  5  Allergic rhinitis and asthma - stable on Singulair and Symbicort  6  COPD - stable on Spiriva and Symbicort  She is resistant to smoking cessation  Labs reviewed with patient today  Return to office in 4 months  Subjective:      Patient ID: Brittanie Carney is a 68 y o  female  CC:    Chief Complaint   Patient presents with    Follow-up     4 month follow up to chronic conditions and review lab results  mjs       HPI:    Review chronic medical conditions and blood work  Seeing Dr Marilu Adorno for fungal infection of feet  Smoking 3/4 ppd - not interested in quitting  The following portions of the patient's history were reviewed and updated as appropriate: allergies, current medications, past family history, past medical history, past social history, past surgical history and problem list       Review of Systems   Constitutional:        See HPI   HENT: Negative for congestion, ear pain, mouth sores, sinus pressure and trouble swallowing  Eyes: Negative for discharge, redness and itching  Respiratory: Negative for apnea, cough, chest tightness, shortness of breath, wheezing and stridor  Cardiovascular: Negative for chest pain, palpitations and leg swelling     Gastrointestinal: Negative for abdominal distention, abdominal pain, blood in stool, constipation, diarrhea, nausea and vomiting  Endocrine: Negative for cold intolerance and heat intolerance  Genitourinary: Negative for difficulty urinating, dysuria, flank pain and urgency  Musculoskeletal: Negative for arthralgias and myalgias  Skin: Negative for rash  See HPI  Neurological: Negative for dizziness, seizures, syncope, speech difficulty, weakness, light-headedness, numbness and headaches  Hematological: Negative for adenopathy  Psychiatric/Behavioral: Negative for agitation, behavioral problems, confusion and sleep disturbance  The patient is not nervous/anxious  Data to review:       Objective:    Vitals:    11/20/19 0902   BP: 130/78   Pulse: 80   Weight: 74 8 kg (165 lb)   Height: 5' 1" (1 549 m)        Physical Exam   Constitutional: She is oriented to person, place, and time  She appears well-developed and well-nourished  No distress  HENT:   Head: Normocephalic and atraumatic  Right Ear: External ear normal    Left Ear: External ear normal    Eyes: Pupils are equal, round, and reactive to light  Conjunctivae and EOM are normal  No scleral icterus  Neck: Normal range of motion  Neck supple  Cardiovascular: Normal rate, regular rhythm, normal heart sounds and intact distal pulses  Exam reveals no gallop and no friction rub  No murmur heard  No carotid bruits appreciated  Pulmonary/Chest: Effort normal  No respiratory distress  She has no wheezes  She has no rales  She exhibits no tenderness  Abdominal: Soft  Bowel sounds are normal  She exhibits no distension and no mass  There is no tenderness  There is no rebound and no guarding  Musculoskeletal: Normal range of motion  She exhibits no edema, tenderness or deformity  Lymphadenopathy:     She has no cervical adenopathy  Neurological: She is alert and oriented to person, place, and time  She has normal reflexes  Skin: Skin is warm and dry   She is not diaphoretic  Psychiatric: She has a normal mood and affect  Her behavior is normal  Judgment and thought content normal    Nursing note and vitals reviewed

## 2019-12-16 DIAGNOSIS — E78.00 PURE HYPERCHOLESTEROLEMIA: ICD-10-CM

## 2019-12-16 RX ORDER — ROSUVASTATIN CALCIUM 5 MG/1
TABLET, COATED ORAL
Qty: 90 TABLET | Refills: 3 | Status: SHIPPED | OUTPATIENT
Start: 2019-12-16 | End: 2020-12-21 | Stop reason: SDUPTHER

## 2019-12-18 ENCOUNTER — TELEPHONE (OUTPATIENT)
Dept: FAMILY MEDICINE CLINIC | Facility: CLINIC | Age: 74
End: 2019-12-18

## 2019-12-19 ENCOUNTER — CLINICAL SUPPORT (OUTPATIENT)
Dept: FAMILY MEDICINE CLINIC | Facility: CLINIC | Age: 74
End: 2019-12-19
Payer: COMMERCIAL

## 2019-12-19 DIAGNOSIS — E53.8 LOW VITAMIN B12 LEVEL: ICD-10-CM

## 2019-12-19 PROCEDURE — 96372 THER/PROPH/DIAG INJ SC/IM: CPT

## 2019-12-19 RX ADMIN — CYANOCOBALAMIN 1000 MCG: 1000 INJECTION INTRAMUSCULAR; SUBCUTANEOUS at 14:24

## 2020-01-06 ENCOUNTER — TELEPHONE (OUTPATIENT)
Dept: FAMILY MEDICINE CLINIC | Facility: CLINIC | Age: 75
End: 2020-01-06

## 2020-01-06 NOTE — TELEPHONE ENCOUNTER
Dr Carolina Duncan, patient called asking for a Mammogram script (3D & CAD)  When the order is in she asked that we call  Her and let her know  ([phone])   Thanks  [place]

## 2020-01-07 DIAGNOSIS — Z12.39 SCREENING FOR BREAST CANCER: Primary | ICD-10-CM

## 2020-01-16 LAB
LEFT EYE DIABETIC RETINOPATHY: NORMAL
RIGHT EYE DIABETIC RETINOPATHY: NORMAL

## 2020-01-22 ENCOUNTER — CLINICAL SUPPORT (OUTPATIENT)
Dept: FAMILY MEDICINE CLINIC | Facility: CLINIC | Age: 75
End: 2020-01-22
Payer: COMMERCIAL

## 2020-01-22 DIAGNOSIS — E53.8 LOW VITAMIN B12 LEVEL: Primary | ICD-10-CM

## 2020-01-22 PROCEDURE — 96372 THER/PROPH/DIAG INJ SC/IM: CPT

## 2020-01-22 RX ADMIN — CYANOCOBALAMIN 1000 MCG: 1000 INJECTION INTRAMUSCULAR; SUBCUTANEOUS at 13:24

## 2020-01-22 NOTE — PROGRESS NOTES
Pt presents for monthly Vitamin B12 injection  Administered cyanocobalamin 1000mcg IM to (L) deltoid  Tolerated without complaint and left the office in no distress  --bb
English

## 2020-01-23 ENCOUNTER — TELEPHONE (OUTPATIENT)
Dept: FAMILY MEDICINE CLINIC | Facility: CLINIC | Age: 75
End: 2020-01-23

## 2020-01-27 DIAGNOSIS — J30.1 ALLERGIC RHINITIS DUE TO POLLEN, UNSPECIFIED SEASONALITY: ICD-10-CM

## 2020-01-27 RX ORDER — MONTELUKAST SODIUM 10 MG/1
10 TABLET ORAL DAILY
Qty: 90 TABLET | Refills: 3 | Status: SHIPPED | OUTPATIENT
Start: 2020-01-27 | End: 2021-01-21 | Stop reason: SDUPTHER

## 2020-02-18 ENCOUNTER — TRANSCRIBE ORDERS (OUTPATIENT)
Dept: ADMINISTRATIVE | Facility: HOSPITAL | Age: 75
End: 2020-02-18

## 2020-02-18 ENCOUNTER — HOSPITAL ENCOUNTER (OUTPATIENT)
Dept: RADIOLOGY | Facility: HOSPITAL | Age: 75
Discharge: HOME | End: 2020-02-18
Payer: COMMERCIAL

## 2020-02-18 DIAGNOSIS — K59.09 CONSTIPATION, CHRONIC: ICD-10-CM

## 2020-02-18 DIAGNOSIS — K59.09 OTHER CONSTIPATION: ICD-10-CM

## 2020-02-18 DIAGNOSIS — K59.09 CONSTIPATION, CHRONIC: Primary | ICD-10-CM

## 2020-02-18 PROCEDURE — 74022 RADEX COMPL AQT ABD SERIES: CPT

## 2020-02-20 ENCOUNTER — CLINICAL SUPPORT (OUTPATIENT)
Dept: FAMILY MEDICINE CLINIC | Facility: CLINIC | Age: 75
End: 2020-02-20
Payer: COMMERCIAL

## 2020-02-20 DIAGNOSIS — E53.8 LOW VITAMIN B12 LEVEL: Primary | ICD-10-CM

## 2020-02-20 RX ADMIN — CYANOCOBALAMIN 1000 MCG: 1000 INJECTION INTRAMUSCULAR; SUBCUTANEOUS at 13:25

## 2020-02-20 NOTE — PROGRESS NOTES
Pt presents for B12 injection  Administered cyanocobalamin 1000mcg IM to (R) deltoid  Tolerated without difficulty and pt left the office in no distress  --bb

## 2020-02-26 ENCOUNTER — HOSPITAL ENCOUNTER (OUTPATIENT)
Dept: RADIOLOGY | Facility: HOSPITAL | Age: 75
Discharge: HOME | End: 2020-02-26
Payer: COMMERCIAL

## 2020-02-26 DIAGNOSIS — K59.09 OTHER CONSTIPATION: ICD-10-CM

## 2020-02-26 DIAGNOSIS — K59.09 CONSTIPATION, CHRONIC: ICD-10-CM

## 2020-02-26 PROCEDURE — 74022 RADEX COMPL AQT ABD SERIES: CPT

## 2020-02-28 ENCOUNTER — HOSPITAL ENCOUNTER (OUTPATIENT)
Dept: MAMMOGRAPHY | Facility: MEDICAL CENTER | Age: 75
Discharge: HOME | End: 2020-02-28
Payer: COMMERCIAL

## 2020-02-28 VITALS — WEIGHT: 165 LBS | BODY MASS INDEX: 31.15 KG/M2 | HEIGHT: 61 IN

## 2020-02-28 DIAGNOSIS — Z12.39 SCREENING FOR BREAST CANCER: ICD-10-CM

## 2020-02-28 PROCEDURE — 77063 BREAST TOMOSYNTHESIS BI: CPT

## 2020-02-28 PROCEDURE — 77067 SCR MAMMO BI INCL CAD: CPT

## 2020-03-11 DIAGNOSIS — J44.9 CHRONIC OBSTRUCTIVE PULMONARY DISEASE, UNSPECIFIED COPD TYPE (HCC): ICD-10-CM

## 2020-03-11 RX ORDER — TIOTROPIUM BROMIDE INHALATION SPRAY 3.12 UG/1
2 SPRAY, METERED RESPIRATORY (INHALATION) DAILY
Qty: 3 INHALER | Refills: 1 | Status: CANCELLED | OUTPATIENT
Start: 2020-03-11

## 2020-03-13 ENCOUNTER — TELEPHONE (OUTPATIENT)
Dept: FAMILY MEDICINE CLINIC | Facility: CLINIC | Age: 75
End: 2020-03-13

## 2020-03-13 DIAGNOSIS — J44.9 CHRONIC OBSTRUCTIVE PULMONARY DISEASE, UNSPECIFIED COPD TYPE (HCC): ICD-10-CM

## 2020-03-13 RX ORDER — TIOTROPIUM BROMIDE INHALATION SPRAY 3.12 UG/1
2 SPRAY, METERED RESPIRATORY (INHALATION) DAILY
Qty: 3 INHALER | Refills: 1 | Status: SHIPPED | OUTPATIENT
Start: 2020-03-13 | End: 2020-10-05 | Stop reason: SDUPTHER

## 2020-03-13 NOTE — TELEPHONE ENCOUNTER
PATIENT CALLED IN STATES SHE WANTED TO KNOW STATUS OF REQUEST FOR INHALER STATES THAT [facility] CALLED IN THE REFILL AND SHE CALLED THE REFILL IN HERSELF ON 3/11/2020  ADVISED THAT MEDICATION IS STILL PENDING WITH PROVIDER WAITING TO BE SIGNED OFF ON  PATIENT DID NOT UNDERSTAND ADVISED WAITING TO BE SENT TO THE PHARMACY    PLEASE CALL PATIENT BACK TO REVIEW THIS -[phone]

## 2020-03-13 NOTE — TELEPHONE ENCOUNTER
Spoke to pt, aware that she still has a 30 day refill for the Spiriva that the pt can p/u until pharmacy states that her 90 day supply needs a P  A

## 2020-03-24 ENCOUNTER — TELEMEDICINE (OUTPATIENT)
Dept: FAMILY MEDICINE CLINIC | Facility: CLINIC | Age: 75
End: 2020-03-24
Payer: COMMERCIAL

## 2020-03-24 ENCOUNTER — TELEPHONE (OUTPATIENT)
Dept: FAMILY MEDICINE CLINIC | Facility: CLINIC | Age: 75
End: 2020-03-24

## 2020-03-24 DIAGNOSIS — E53.8 LOW VITAMIN B12 LEVEL: ICD-10-CM

## 2020-03-24 DIAGNOSIS — E11.9 CONTROLLED TYPE 2 DIABETES MELLITUS WITHOUT COMPLICATION, WITHOUT LONG-TERM CURRENT USE OF INSULIN (HCC): ICD-10-CM

## 2020-03-24 DIAGNOSIS — E78.00 PURE HYPERCHOLESTEROLEMIA: ICD-10-CM

## 2020-03-24 DIAGNOSIS — J44.9 CHRONIC OBSTRUCTIVE PULMONARY DISEASE, UNSPECIFIED COPD TYPE (HCC): ICD-10-CM

## 2020-03-24 DIAGNOSIS — K58.2 IRRITABLE BOWEL SYNDROME WITH BOTH CONSTIPATION AND DIARRHEA: Primary | ICD-10-CM

## 2020-03-24 DIAGNOSIS — E55.9 VITAMIN D DEFICIENCY: ICD-10-CM

## 2020-03-24 PROBLEM — M88.9 PAGET'S BONE DISEASE: Status: ACTIVE | Noted: 2020-03-24

## 2020-03-24 PROCEDURE — 99214 OFFICE O/P EST MOD 30 MIN: CPT | Performed by: FAMILY MEDICINE

## 2020-03-24 PROCEDURE — 1160F RVW MEDS BY RX/DR IN RCRD: CPT | Performed by: FAMILY MEDICINE

## 2020-03-24 PROCEDURE — 2022F DILAT RTA XM EVC RTNOPTHY: CPT | Performed by: FAMILY MEDICINE

## 2020-03-24 PROCEDURE — 4040F PNEUMOC VAC/ADMIN/RCVD: CPT | Performed by: FAMILY MEDICINE

## 2020-03-24 NOTE — PROGRESS NOTES
Virtual Regular Visit    Mrs Shelly Crisostomo is a pleasant 77-year-old white female who is having a tele medicine conference call today due to the Startupiport 19 virus  She has several issues to discuss today  1  IBS with constipation - patient has been following up with gastroenterology  She did see them on the 20th of February  She was told to take MiraLax twice a day; she notes that her stools have been small and pencil like  I have encouraged her to increase her water intake to 64 oz daily  Add Metamucil to bulk up the stool  Stop prune juice at this point, as it is likely elevating her sugars  She will follow-up with gastroenterology next month  Hold calcium supplementation for now, as it may be contributing to her recent constipation  2  Diabetes mellitus - last hemoglobin A1c was 5 7  Continue metformin  Blood work ordered  3  COPD - patient continues on Spiriva  She notes that her insurance will not give her a 3 month supply, which would be cheaper for her  Will request prior authorization for her to get a 3 month supply  She uses [facility]  4  Vitamin B12 deficiency - patient prefers not to come into the office now for her B12 injections  She was told to use oral methyl cobalamin 1000 mcg tabs sublingual, daily, for now  Check B12 level    5  Vitamin-D deficiency - continue over-the-counter vitamin D3 supplementation  Check vitamin-D level  6  Hyperlipidemia - patient to continue Crestor 5 mg daily  Check lipid level  7  Paget's disease of bone noted on abdominal x-ray - will discuss and follow up at next visit  8  GERD - patient concerned about using Prevacid  Told to stop Prevacid and start OTC Pepcid 1-2 times daily, to see if this controls her symptoms  9  Patient asks about Lifeline Screening - she is encouraged to do so  Labs ordered  Patient to make appointment to see me in 3 months      Encounter provider Natalio Morris MD    Provider located at [address] [place] [address] [facility]  [address] [facility] [address]      Recent Visits  No visits were found meeting these conditions  Showing recent visits within past 7 days and meeting all other requirements     Future Appointments  No visits were found meeting these conditions  Showing future appointments within next 150 days and meeting all other requirements        After connecting through [facility], the patient was identified by name and date of birth  Nelly Kern was informed that this is a telemedicine visit and that the visit is being conducted through telephone which may not be secure and therefore, might not be HIPAA-compliant  My office door was closed  No one else was in the room  She acknowledged consent and understanding of privacy and security of the video platform  The patient has agreed to participate and understands they can discontinue the visit at any time      Subjective        Past Medical History:   Diagnosis Date    Lyme disease     Polymyalgia rheumatica ([facility] [address] )     last assessed 2/15/17    Reactive airway disease     last assessed 6/9/16    Sciatica        Past Surgical History:   Procedure Laterality Date    HYSTERECTOMY         Current Outpatient Medications   Medication Sig Dispense Refill    acetaminophen (TYLENOL) 325 mg tablet Take 2 tablets (650 mg total) by mouth every 6 (six) hours as needed for mild pain 30 tablet 0    albuterol (VENTOLIN HFA) 90 mcg/act inhaler Inhale 1-2 puffs as needed for wheezing 1 Inhaler 6    Biotin 83692 MCG TABS Take by mouth      budesonide-formoterol (SYMBICORT) 160-4 5 mcg/act inhaler Inhale 2 puffs 2 (two) times a day 3 Inhaler 3    calcium carbonate 1250 MG capsule Take by mouth      Cholecalciferol (VITAMIN D3) 2000 units capsule Take 4,000 Units by mouth daily        Coenzyme Q10 (CO Q10) 100 MG CAPS Take by mouth      Digestive Enzyme CAPS Take by mouth      lansoprazole (PREVACID) 30 mg capsule Take by mouth      metFORMIN (GLUCOPHAGE) 500 mg tablet TAKE 1 TABLET BY MOUTH EVERY DAY WITH BREAKFAST 90 tablet 1    montelukast (SINGULAIR) 10 mg tablet Take 1 tablet (10 mg total) by mouth daily 90 tablet 3    Probiotic Product (PROBIOTIC-10) CAPS Take by mouth      rosuvastatin (CRESTOR) 5 mg tablet TAKE 1 TABLET BY MOUTH EVERY DAY 90 tablet 3    SPIRIVA RESPIMAT 2 5 MCG/ACT AERS inhaler Inhale 2 puffs daily 3 Inhaler 1     Current Facility-Administered Medications   Medication Dose Route Frequency Provider Last Rate Last Dose    cyanocobalamin injection 1,000 mcg  1,000 mcg Intramuscular Q30 Days Dominga Whiteside MD   1,000 mcg at 02/20/20 1325        Allergies   Allergen Reactions    Asa [Aspirin]     Celebrex [Celecoxib] Other (See Comments)     Flushing      Ibuprofen GI Intolerance    Nsaids GI Intolerance    Percocet [Oxycodone-Acetaminophen] GI Intolerance       Review of Systems   Constitutional:        See HPI   HENT: Negative for congestion, ear pain, mouth sores, sinus pressure and trouble swallowing  Eyes: Negative for discharge, redness and itching  Respiratory: Negative for apnea, cough, chest tightness, shortness of breath, wheezing and stridor  Cardiovascular: Negative for chest pain, palpitations and leg swelling  Gastrointestinal: Positive for constipation  Negative for abdominal distention, abdominal pain, blood in stool, diarrhea, nausea and vomiting  Endocrine: Negative for cold intolerance and heat intolerance  Genitourinary: Negative for difficulty urinating, dysuria, flank pain and urgency  Musculoskeletal: Negative for arthralgias and myalgias  Skin: Negative for rash  Neurological: Negative for dizziness, seizures, syncope, speech difficulty, weakness, light-headedness, numbness and headaches  Hematological: Negative for adenopathy  Psychiatric/Behavioral: Negative for agitation, behavioral problems, confusion and sleep disturbance  The patient is not nervous/anxious            I spent 25 minutes with the patient during this visit  COVID 19 Instructions    Ara Simss was advised to limit contact with others to essential tasks such as getting food, medications, and medical care  Proper handwashing reviewed, and Hand sanitzer when washing is not available  If the patient develops symptoms of COVID 19, the patient should call the office as soon as possible        Virtual Visit expected code: [id number]

## 2020-03-24 NOTE — TELEPHONE ENCOUNTER
----- Message from Victoriano Moreno MD sent at 3/24/2020 11:25 AM EDT -----  Regarding: Sebas Zapien,    This is the patient we spoke about  Can you please call her local pharmacy to find out about what prior auth is needed to get her a 3 month supply of Spiriva?     Thank you,    CB

## 2020-03-24 NOTE — TELEPHONE ENCOUNTER
Pt called in and stated she had more questions about the pagets disease please give pt a call back thank you!!

## 2020-04-20 ENCOUNTER — APPOINTMENT (OUTPATIENT)
Dept: RADIOLOGY | Facility: MEDICAL CENTER | Age: 75
End: 2020-04-20
Payer: COMMERCIAL

## 2020-04-20 ENCOUNTER — OFFICE VISIT (OUTPATIENT)
Dept: FAMILY MEDICINE CLINIC | Facility: CLINIC | Age: 75
End: 2020-04-20
Payer: COMMERCIAL

## 2020-04-20 VITALS
HEART RATE: 84 BPM | BODY MASS INDEX: 30.78 KG/M2 | HEIGHT: 61 IN | WEIGHT: 163 LBS | SYSTOLIC BLOOD PRESSURE: 98 MMHG | DIASTOLIC BLOOD PRESSURE: 64 MMHG

## 2020-04-20 DIAGNOSIS — M70.51 PATELLAR BURSITIS OF RIGHT KNEE: ICD-10-CM

## 2020-04-20 DIAGNOSIS — M17.11 PRIMARY OSTEOARTHRITIS OF RIGHT KNEE: Primary | ICD-10-CM

## 2020-04-20 DIAGNOSIS — Z72.0 TOBACCO ABUSE: ICD-10-CM

## 2020-04-20 DIAGNOSIS — M17.11 PRIMARY OSTEOARTHRITIS OF RIGHT KNEE: ICD-10-CM

## 2020-04-20 PROBLEM — M19.90 OSTEOARTHRITIS: Status: ACTIVE | Noted: 2020-04-20

## 2020-04-20 PROBLEM — M70.50 PATELLAR BURSITIS: Status: ACTIVE | Noted: 2020-04-20

## 2020-04-20 PROCEDURE — 4004F PT TOBACCO SCREEN RCVD TLK: CPT | Performed by: FAMILY MEDICINE

## 2020-04-20 PROCEDURE — 73564 X-RAY EXAM KNEE 4 OR MORE: CPT

## 2020-04-20 PROCEDURE — 1101F PT FALLS ASSESS-DOCD LE1/YR: CPT | Performed by: FAMILY MEDICINE

## 2020-04-20 PROCEDURE — 3008F BODY MASS INDEX DOCD: CPT | Performed by: FAMILY MEDICINE

## 2020-04-20 PROCEDURE — 4040F PNEUMOC VAC/ADMIN/RCVD: CPT | Performed by: FAMILY MEDICINE

## 2020-04-20 PROCEDURE — 2022F DILAT RTA XM EVC RTNOPTHY: CPT | Performed by: FAMILY MEDICINE

## 2020-04-20 PROCEDURE — 99213 OFFICE O/P EST LOW 20 MIN: CPT | Performed by: FAMILY MEDICINE

## 2020-04-20 PROCEDURE — 3288F FALL RISK ASSESSMENT DOCD: CPT | Performed by: FAMILY MEDICINE

## 2020-04-20 PROCEDURE — 1160F RVW MEDS BY RX/DR IN RCRD: CPT | Performed by: FAMILY MEDICINE

## 2020-05-19 DIAGNOSIS — J44.9 CHRONIC OBSTRUCTIVE PULMONARY DISEASE, UNSPECIFIED COPD TYPE (HCC): ICD-10-CM

## 2020-05-19 RX ORDER — BUDESONIDE AND FORMOTEROL FUMARATE DIHYDRATE 160; 4.5 UG/1; UG/1
2 AEROSOL RESPIRATORY (INHALATION) 2 TIMES DAILY
Qty: 3 INHALER | Refills: 3 | Status: SHIPPED | OUTPATIENT
Start: 2020-05-19 | End: 2021-08-02 | Stop reason: SDUPTHER

## 2020-05-19 RX ORDER — BUDESONIDE AND FORMOTEROL FUMARATE DIHYDRATE 160; 4.5 UG/1; UG/1
2 AEROSOL RESPIRATORY (INHALATION) 2 TIMES DAILY
Qty: 3 INHALER | Refills: 3 | Status: SHIPPED | OUTPATIENT
Start: 2020-05-19 | End: 2020-05-19 | Stop reason: SDUPTHER

## 2020-05-27 DIAGNOSIS — Z79.4 TYPE 2 DIABETES MELLITUS WITH COMPLICATION, WITH LONG-TERM CURRENT USE OF INSULIN (HCC): ICD-10-CM

## 2020-05-27 DIAGNOSIS — E11.8 TYPE 2 DIABETES MELLITUS WITH COMPLICATION, WITH LONG-TERM CURRENT USE OF INSULIN (HCC): ICD-10-CM

## 2020-06-21 LAB
25(OH)D3 SERPL-MCNC: 35 NG/ML (ref 30–100)
ALBUMIN SERPL-MCNC: 4.4 G/DL (ref 3.6–5.1)
ALBUMIN/GLOB SERPL: 1.6 (CALC) (ref 1–2.5)
ALP SERPL-CCNC: 117 U/L (ref 37–153)
ALT SERPL-CCNC: 9 U/L (ref 6–29)
AST SERPL-CCNC: 10 U/L (ref 10–35)
BASOPHILS # BLD AUTO: 33 CELLS/UL (ref 0–200)
BASOPHILS NFR BLD AUTO: 0.5 %
BILIRUB SERPL-MCNC: 0.4 MG/DL (ref 0.2–1.2)
BUN SERPL-MCNC: 21 MG/DL (ref 7–25)
BUN/CREAT SERPL: ABNORMAL (CALC) (ref 6–22)
CALCIUM SERPL-MCNC: 9.4 MG/DL (ref 8.6–10.4)
CHLORIDE SERPL-SCNC: 105 MMOL/L (ref 98–110)
CHOLEST SERPL-MCNC: 149 MG/DL
CHOLEST/HDLC SERPL: 2.6 (CALC)
CO2 SERPL-SCNC: 26 MMOL/L (ref 20–32)
CREAT SERPL-MCNC: 0.77 MG/DL (ref 0.6–0.93)
EOSINOPHIL # BLD AUTO: 111 CELLS/UL (ref 15–500)
EOSINOPHIL NFR BLD AUTO: 1.7 %
ERYTHROCYTE [DISTWIDTH] IN BLOOD BY AUTOMATED COUNT: 13.1 % (ref 11–15)
GLOBULIN SER CALC-MCNC: 2.7 G/DL (CALC) (ref 1.9–3.7)
GLUCOSE SERPL-MCNC: 111 MG/DL (ref 65–99)
HBA1C MFR BLD: 5.8 % OF TOTAL HGB
HCT VFR BLD AUTO: 42 % (ref 35–45)
HDLC SERPL-MCNC: 57 MG/DL
HGB BLD-MCNC: 13.9 G/DL (ref 11.7–15.5)
LDLC SERPL CALC-MCNC: 71 MG/DL (CALC)
LYMPHOCYTES # BLD AUTO: 1801 CELLS/UL (ref 850–3900)
LYMPHOCYTES NFR BLD AUTO: 27.7 %
MCH RBC QN AUTO: 28.5 PG (ref 27–33)
MCHC RBC AUTO-ENTMCNC: 33.1 G/DL (ref 32–36)
MCV RBC AUTO: 86.2 FL (ref 80–100)
METHYLMALONATE SERPL-SCNC: 100 NMOL/L (ref 87–318)
MONOCYTES # BLD AUTO: 371 CELLS/UL (ref 200–950)
MONOCYTES NFR BLD AUTO: 5.7 %
NEUTROPHILS # BLD AUTO: 4186 CELLS/UL (ref 1500–7800)
NEUTROPHILS NFR BLD AUTO: 64.4 %
NONHDLC SERPL-MCNC: 92 MG/DL (CALC)
PLATELET # BLD AUTO: 212 THOUSAND/UL (ref 140–400)
PMV BLD REES-ECKER: 11.1 FL (ref 7.5–12.5)
POTASSIUM SERPL-SCNC: 4.1 MMOL/L (ref 3.5–5.3)
PROT SERPL-MCNC: 7.1 G/DL (ref 6.1–8.1)
RBC # BLD AUTO: 4.87 MILLION/UL (ref 3.8–5.1)
SL AMB EGFR AFRICAN AMERICAN: 88 ML/MIN/1.73M2
SL AMB EGFR NON AFRICAN AMERICAN: 76 ML/MIN/1.73M2
SODIUM SERPL-SCNC: 141 MMOL/L (ref 135–146)
T4 SERPL-MCNC: 5.8 MCG/DL (ref 5.1–11.9)
TRIGL SERPL-MCNC: 126 MG/DL
TSH SERPL-ACNC: 1.57 MIU/L (ref 0.4–4.5)
VIT B12 SERPL-MCNC: 228 PG/ML (ref 200–1100)
WBC # BLD AUTO: 6.5 THOUSAND/UL (ref 3.8–10.8)

## 2020-06-21 PROCEDURE — 3044F HG A1C LEVEL LT 7.0%: CPT | Performed by: FAMILY MEDICINE

## 2020-06-22 ENCOUNTER — TELEPHONE (OUTPATIENT)
Dept: FAMILY MEDICINE CLINIC | Facility: CLINIC | Age: 75
End: 2020-06-22

## 2020-06-22 DIAGNOSIS — M54.50 LUMBOSACRAL PAIN, CHRONIC: ICD-10-CM

## 2020-06-22 DIAGNOSIS — Z72.0 TOBACCO ABUSE: Primary | ICD-10-CM

## 2020-06-22 DIAGNOSIS — G89.29 LUMBOSACRAL PAIN, CHRONIC: ICD-10-CM

## 2020-06-22 DIAGNOSIS — M25.552 BILATERAL HIP PAIN: ICD-10-CM

## 2020-06-22 DIAGNOSIS — M25.551 BILATERAL HIP PAIN: ICD-10-CM

## 2020-06-22 NOTE — TELEPHONE ENCOUNTER
Script for physical therapy has been ordered in chart  Can you please fax to Aks[facility] Hartley as requested    Thank you,  CB

## 2020-06-22 NOTE — TELEPHONE ENCOUNTER
PONCE, FROM [facility], [facility] [phone], IS ASKING IF A SCRIPT CAN BE FAXED TO HER AT [facility] [phone]  SHE NEEDS A SCRIPT FOR PHYSICAL THERAPY, FOR BACK AND BILATERAL HIP PAIN    ANY QUESTIONS CALL PONCE -[phone]

## 2020-06-24 ENCOUNTER — OFFICE VISIT (OUTPATIENT)
Dept: FAMILY MEDICINE CLINIC | Facility: CLINIC | Age: 75
End: 2020-06-24
Payer: COMMERCIAL

## 2020-06-24 VITALS
SYSTOLIC BLOOD PRESSURE: 100 MMHG | BODY MASS INDEX: 31.45 KG/M2 | WEIGHT: 166.6 LBS | RESPIRATION RATE: 16 BRPM | DIASTOLIC BLOOD PRESSURE: 64 MMHG | HEART RATE: 72 BPM | TEMPERATURE: 97.8 F | HEIGHT: 61 IN

## 2020-06-24 DIAGNOSIS — Z72.0 TOBACCO ABUSE: ICD-10-CM

## 2020-06-24 DIAGNOSIS — K22.70 BARRETT'S ESOPHAGUS WITHOUT DYSPLASIA: ICD-10-CM

## 2020-06-24 DIAGNOSIS — G89.29 CHRONIC BILATERAL LOW BACK PAIN WITHOUT SCIATICA: ICD-10-CM

## 2020-06-24 DIAGNOSIS — M54.50 CHRONIC BILATERAL LOW BACK PAIN WITHOUT SCIATICA: ICD-10-CM

## 2020-06-24 DIAGNOSIS — E53.8 LOW VITAMIN B12 LEVEL: ICD-10-CM

## 2020-06-24 DIAGNOSIS — J44.9 CHRONIC OBSTRUCTIVE PULMONARY DISEASE, UNSPECIFIED COPD TYPE (HCC): ICD-10-CM

## 2020-06-24 DIAGNOSIS — Z00.00 MEDICARE ANNUAL WELLNESS VISIT, SUBSEQUENT: ICD-10-CM

## 2020-06-24 DIAGNOSIS — M81.0 AGE-RELATED OSTEOPOROSIS WITHOUT CURRENT PATHOLOGICAL FRACTURE: ICD-10-CM

## 2020-06-24 DIAGNOSIS — E11.9 CONTROLLED TYPE 2 DIABETES MELLITUS WITHOUT COMPLICATION, WITHOUT LONG-TERM CURRENT USE OF INSULIN (HCC): Primary | ICD-10-CM

## 2020-06-24 DIAGNOSIS — K59.09 CHRONIC CONSTIPATION: ICD-10-CM

## 2020-06-24 DIAGNOSIS — E78.00 PURE HYPERCHOLESTEROLEMIA: ICD-10-CM

## 2020-06-24 PROCEDURE — 3008F BODY MASS INDEX DOCD: CPT | Performed by: FAMILY MEDICINE

## 2020-06-24 PROCEDURE — 1125F AMNT PAIN NOTED PAIN PRSNT: CPT | Performed by: FAMILY MEDICINE

## 2020-06-24 PROCEDURE — 4040F PNEUMOC VAC/ADMIN/RCVD: CPT | Performed by: FAMILY MEDICINE

## 2020-06-24 PROCEDURE — 99215 OFFICE O/P EST HI 40 MIN: CPT | Performed by: FAMILY MEDICINE

## 2020-06-24 PROCEDURE — 1160F RVW MEDS BY RX/DR IN RCRD: CPT | Performed by: FAMILY MEDICINE

## 2020-06-24 PROCEDURE — 4004F PT TOBACCO SCREEN RCVD TLK: CPT | Performed by: FAMILY MEDICINE

## 2020-06-24 PROCEDURE — G0439 PPPS, SUBSEQ VISIT: HCPCS | Performed by: FAMILY MEDICINE

## 2020-06-24 PROCEDURE — 2022F DILAT RTA XM EVC RTNOPTHY: CPT | Performed by: FAMILY MEDICINE

## 2020-06-24 PROCEDURE — 1170F FXNL STATUS ASSESSED: CPT | Performed by: FAMILY MEDICINE

## 2020-06-28 PROBLEM — K59.09 CHRONIC CONSTIPATION: Status: ACTIVE | Noted: 2020-06-28

## 2020-06-30 ENCOUNTER — CLINICAL SUPPORT (OUTPATIENT)
Dept: FAMILY MEDICINE CLINIC | Facility: CLINIC | Age: 75
End: 2020-06-30
Payer: COMMERCIAL

## 2020-06-30 DIAGNOSIS — E53.8 LOW VITAMIN B12 LEVEL: Primary | ICD-10-CM

## 2020-06-30 RX ORDER — CYANOCOBALAMIN 1000 UG/ML
1000 INJECTION INTRAMUSCULAR; SUBCUTANEOUS ONCE
Status: COMPLETED | OUTPATIENT
Start: 2020-06-30 | End: 2020-06-30

## 2020-06-30 RX ADMIN — CYANOCOBALAMIN 1000 MCG: 1000 INJECTION INTRAMUSCULAR; SUBCUTANEOUS at 14:06

## 2020-07-30 ENCOUNTER — CLINICAL SUPPORT (OUTPATIENT)
Dept: FAMILY MEDICINE CLINIC | Facility: CLINIC | Age: 75
End: 2020-07-30
Payer: COMMERCIAL

## 2020-07-30 DIAGNOSIS — E53.8 LOW VITAMIN B12 LEVEL: Primary | ICD-10-CM

## 2020-07-30 RX ADMIN — CYANOCOBALAMIN 30000 MCG: 1000 INJECTION INTRAMUSCULAR; SUBCUTANEOUS at 14:10

## 2020-09-01 ENCOUNTER — CLINICAL SUPPORT (OUTPATIENT)
Dept: FAMILY MEDICINE CLINIC | Facility: CLINIC | Age: 75
End: 2020-09-01
Payer: COMMERCIAL

## 2020-09-01 DIAGNOSIS — E53.8 LOW VITAMIN B12 LEVEL: Primary | ICD-10-CM

## 2020-09-01 PROCEDURE — 96372 THER/PROPH/DIAG INJ SC/IM: CPT

## 2020-09-01 RX ADMIN — CYANOCOBALAMIN 1000 MCG: 1000 INJECTION INTRAMUSCULAR; SUBCUTANEOUS at 13:40

## 2020-09-01 NOTE — PROGRESS NOTES
Presents for monthly B12 injection  Administered cyanocobalamin 1000mcg IM to (L) deltoid  Tolerated without difficulty and left the office in no distress  --bb

## 2020-10-02 ENCOUNTER — HOSPITAL ENCOUNTER (OUTPATIENT)
Dept: BONE DENSITY | Facility: MEDICAL CENTER | Age: 75
Discharge: HOME | End: 2020-10-02
Payer: COMMERCIAL

## 2020-10-02 DIAGNOSIS — M81.0 AGE-RELATED OSTEOPOROSIS WITHOUT CURRENT PATHOLOGICAL FRACTURE: ICD-10-CM

## 2020-10-02 PROCEDURE — 77080 DXA BONE DENSITY AXIAL: CPT

## 2020-10-05 ENCOUNTER — HOSPITAL ENCOUNTER (OUTPATIENT)
Dept: CT IMAGING | Facility: HOSPITAL | Age: 75
Discharge: HOME | End: 2020-10-05
Payer: COMMERCIAL

## 2020-10-05 DIAGNOSIS — Z72.0 TOBACCO ABUSE: ICD-10-CM

## 2020-10-05 DIAGNOSIS — J44.9 CHRONIC OBSTRUCTIVE PULMONARY DISEASE, UNSPECIFIED COPD TYPE (HCC): ICD-10-CM

## 2020-10-05 PROCEDURE — G0297 LDCT FOR LUNG CA SCREEN: HCPCS

## 2020-10-05 RX ORDER — TIOTROPIUM BROMIDE INHALATION SPRAY 3.12 UG/1
2 SPRAY, METERED RESPIRATORY (INHALATION) DAILY
Qty: 3 INHALER | Refills: 1 | Status: SHIPPED | OUTPATIENT
Start: 2020-10-05 | End: 2021-03-28

## 2020-10-28 ENCOUNTER — OFFICE VISIT (OUTPATIENT)
Dept: FAMILY MEDICINE CLINIC | Facility: CLINIC | Age: 75
End: 2020-10-28
Payer: COMMERCIAL

## 2020-10-28 VITALS
SYSTOLIC BLOOD PRESSURE: 120 MMHG | WEIGHT: 164 LBS | TEMPERATURE: 97.5 F | BODY MASS INDEX: 30.96 KG/M2 | DIASTOLIC BLOOD PRESSURE: 64 MMHG | HEART RATE: 72 BPM | HEIGHT: 61 IN

## 2020-10-28 DIAGNOSIS — Z72.0 TOBACCO ABUSE: ICD-10-CM

## 2020-10-28 DIAGNOSIS — I25.84 CORONARY ARTERY CALCIFICATION: ICD-10-CM

## 2020-10-28 DIAGNOSIS — E53.8 LOW VITAMIN B12 LEVEL: ICD-10-CM

## 2020-10-28 DIAGNOSIS — E11.9 CONTROLLED TYPE 2 DIABETES MELLITUS WITHOUT COMPLICATION, WITHOUT LONG-TERM CURRENT USE OF INSULIN (HCC): ICD-10-CM

## 2020-10-28 DIAGNOSIS — I25.10 CORONARY ARTERY CALCIFICATION: ICD-10-CM

## 2020-10-28 DIAGNOSIS — E78.00 PURE HYPERCHOLESTEROLEMIA: ICD-10-CM

## 2020-10-28 DIAGNOSIS — Z23 NEED FOR INFLUENZA VACCINATION: ICD-10-CM

## 2020-10-28 DIAGNOSIS — K22.70 BARRETT'S ESOPHAGUS WITHOUT DYSPLASIA: ICD-10-CM

## 2020-10-28 DIAGNOSIS — J44.9 CHRONIC OBSTRUCTIVE PULMONARY DISEASE, UNSPECIFIED COPD TYPE (HCC): Primary | ICD-10-CM

## 2020-10-28 PROBLEM — M70.50 PATELLAR BURSITIS: Status: RESOLVED | Noted: 2020-04-20 | Resolved: 2020-10-28

## 2020-10-28 PROCEDURE — 83036 HEMOGLOBIN GLYCOSYLATED A1C: CPT | Performed by: FAMILY MEDICINE

## 2020-10-28 PROCEDURE — 1160F RVW MEDS BY RX/DR IN RCRD: CPT

## 2020-10-28 PROCEDURE — 3008F BODY MASS INDEX DOCD: CPT

## 2020-10-28 PROCEDURE — G0008 ADMIN INFLUENZA VIRUS VAC: HCPCS

## 2020-10-28 PROCEDURE — 96372 THER/PROPH/DIAG INJ SC/IM: CPT | Performed by: FAMILY MEDICINE

## 2020-10-28 PROCEDURE — 90662 IIV NO PRSV INCREASED AG IM: CPT

## 2020-10-28 PROCEDURE — 99215 OFFICE O/P EST HI 40 MIN: CPT

## 2020-10-28 RX ADMIN — CYANOCOBALAMIN 1000 MCG: 1000 INJECTION INTRAMUSCULAR; SUBCUTANEOUS at 10:14

## 2020-10-29 LAB
ALBUMIN/CREAT UR: 9 MCG/MG CREAT
CREAT UR-MCNC: 143 MG/DL (ref 20–275)
MICROALBUMIN UR-MCNC: 1.3 MG/DL

## 2020-10-29 PROCEDURE — 3061F NEG MICROALBUMINURIA REV: CPT

## 2020-11-16 ENCOUNTER — TELEPHONE (OUTPATIENT)
Dept: FAMILY MEDICINE CLINIC | Facility: CLINIC | Age: 75
End: 2020-11-16

## 2020-11-20 ENCOUNTER — VBI (OUTPATIENT)
Dept: ADMINISTRATIVE | Facility: OTHER | Age: 75
End: 2020-11-20

## 2020-11-25 ENCOUNTER — CLINICAL SUPPORT (OUTPATIENT)
Dept: FAMILY MEDICINE CLINIC | Facility: CLINIC | Age: 75
End: 2020-11-25
Payer: COMMERCIAL

## 2020-11-25 DIAGNOSIS — E53.8 LOW VITAMIN B12 LEVEL: Primary | ICD-10-CM

## 2020-11-25 RX ORDER — CYANOCOBALAMIN 1000 UG/ML
1000 INJECTION INTRAMUSCULAR; SUBCUTANEOUS
Status: SHIPPED | OUTPATIENT
Start: 2020-11-25

## 2020-11-25 RX ADMIN — CYANOCOBALAMIN 1000 MCG: 1000 INJECTION INTRAMUSCULAR; SUBCUTANEOUS at 13:43

## 2020-11-30 DIAGNOSIS — Z79.4 TYPE 2 DIABETES MELLITUS WITH COMPLICATION, WITH LONG-TERM CURRENT USE OF INSULIN (HCC): ICD-10-CM

## 2020-11-30 DIAGNOSIS — E11.8 TYPE 2 DIABETES MELLITUS WITH COMPLICATION, WITH LONG-TERM CURRENT USE OF INSULIN (HCC): ICD-10-CM

## 2020-12-21 DIAGNOSIS — E78.00 PURE HYPERCHOLESTEROLEMIA: ICD-10-CM

## 2020-12-21 RX ORDER — ROSUVASTATIN CALCIUM 5 MG/1
5 TABLET, COATED ORAL DAILY
Qty: 90 TABLET | Refills: 3 | Status: SHIPPED | OUTPATIENT
Start: 2020-12-21 | End: 2022-01-04 | Stop reason: SDUPTHER

## 2020-12-23 ENCOUNTER — VBI (OUTPATIENT)
Dept: ADMINISTRATIVE | Facility: OTHER | Age: 75
End: 2020-12-23

## 2020-12-29 ENCOUNTER — CLINICAL SUPPORT (OUTPATIENT)
Dept: FAMILY MEDICINE CLINIC | Facility: CLINIC | Age: 75
End: 2020-12-29
Payer: COMMERCIAL

## 2020-12-29 DIAGNOSIS — E53.8 LOW VITAMIN B12 LEVEL: Primary | ICD-10-CM

## 2020-12-29 RX ADMIN — CYANOCOBALAMIN 1000 MCG: 1000 INJECTION INTRAMUSCULAR; SUBCUTANEOUS at 13:53

## 2021-01-12 ENCOUNTER — TELEPHONE (OUTPATIENT)
Dept: FAMILY MEDICINE CLINIC | Facility: CLINIC | Age: 76
End: 2021-01-12

## 2021-01-12 NOTE — TELEPHONE ENCOUNTER
Good question  Since this is a new situation, I suggest she hold her B12 vaccine this month, just to be sure  This will not harm her in any way    Thank you,  CB

## 2021-01-12 NOTE — TELEPHONE ENCOUNTER
She just got her Covid vaccine and her 2nd shot is scheduled and she is due for a b12 shot  She wanted to know if the B12 shot would interfere  With the Covid shot   Please call her at [phone]

## 2021-01-21 DIAGNOSIS — J30.1 ALLERGIC RHINITIS DUE TO POLLEN, UNSPECIFIED SEASONALITY: ICD-10-CM

## 2021-01-21 RX ORDER — MONTELUKAST SODIUM 10 MG/1
10 TABLET ORAL DAILY
Qty: 90 TABLET | Refills: 3 | Status: SHIPPED | OUTPATIENT
Start: 2021-01-21 | End: 2022-02-02 | Stop reason: SDUPTHER

## 2021-03-03 ENCOUNTER — OFFICE VISIT (OUTPATIENT)
Dept: FAMILY MEDICINE CLINIC | Facility: CLINIC | Age: 76
End: 2021-03-03
Payer: COMMERCIAL

## 2021-03-03 VITALS
TEMPERATURE: 99.2 F | WEIGHT: 166 LBS | HEART RATE: 72 BPM | SYSTOLIC BLOOD PRESSURE: 104 MMHG | DIASTOLIC BLOOD PRESSURE: 60 MMHG | BODY MASS INDEX: 31.34 KG/M2 | HEIGHT: 61 IN

## 2021-03-03 DIAGNOSIS — M81.0 AGE-RELATED OSTEOPOROSIS WITHOUT CURRENT PATHOLOGICAL FRACTURE: ICD-10-CM

## 2021-03-03 DIAGNOSIS — E55.9 VITAMIN D DEFICIENCY: ICD-10-CM

## 2021-03-03 DIAGNOSIS — E78.00 PURE HYPERCHOLESTEROLEMIA: ICD-10-CM

## 2021-03-03 DIAGNOSIS — J44.9 CHRONIC OBSTRUCTIVE PULMONARY DISEASE, UNSPECIFIED COPD TYPE (HCC): Primary | ICD-10-CM

## 2021-03-03 DIAGNOSIS — Z12.39 ENCOUNTER FOR SCREENING FOR MALIGNANT NEOPLASM OF BREAST, UNSPECIFIED SCREENING MODALITY: ICD-10-CM

## 2021-03-03 DIAGNOSIS — E53.8 LOW VITAMIN B12 LEVEL: ICD-10-CM

## 2021-03-03 DIAGNOSIS — E11.9 CONTROLLED TYPE 2 DIABETES MELLITUS WITHOUT COMPLICATION, WITHOUT LONG-TERM CURRENT USE OF INSULIN (HCC): ICD-10-CM

## 2021-03-03 DIAGNOSIS — K22.70 BARRETT'S ESOPHAGUS WITHOUT DYSPLASIA: ICD-10-CM

## 2021-03-03 DIAGNOSIS — Z72.0 TOBACCO ABUSE: ICD-10-CM

## 2021-03-03 LAB — SL AMB POCT HEMOGLOBIN AIC: 5.9 (ref ?–6.5)

## 2021-03-03 PROCEDURE — 3044F HG A1C LEVEL LT 7.0%: CPT | Performed by: FAMILY MEDICINE

## 2021-03-03 PROCEDURE — 4004F PT TOBACCO SCREEN RCVD TLK: CPT | Performed by: FAMILY MEDICINE

## 2021-03-03 PROCEDURE — 99214 OFFICE O/P EST MOD 30 MIN: CPT | Performed by: FAMILY MEDICINE

## 2021-03-03 PROCEDURE — 1160F RVW MEDS BY RX/DR IN RCRD: CPT | Performed by: FAMILY MEDICINE

## 2021-03-03 PROCEDURE — 96372 THER/PROPH/DIAG INJ SC/IM: CPT | Performed by: FAMILY MEDICINE

## 2021-03-03 PROCEDURE — 83036 HEMOGLOBIN GLYCOSYLATED A1C: CPT | Performed by: FAMILY MEDICINE

## 2021-03-03 RX ORDER — CYANOCOBALAMIN 1000 UG/ML
1000 INJECTION INTRAMUSCULAR; SUBCUTANEOUS
Status: SHIPPED | OUTPATIENT
Start: 2021-03-03

## 2021-03-03 RX ORDER — ALBUTEROL SULFATE 90 UG/1
1-2 AEROSOL, METERED RESPIRATORY (INHALATION) AS NEEDED
Qty: 3 INHALER | Refills: 3 | Status: SHIPPED | OUTPATIENT
Start: 2021-03-03 | End: 2021-11-11

## 2021-03-03 RX ADMIN — CYANOCOBALAMIN 1000 MCG: 1000 INJECTION INTRAMUSCULAR; SUBCUTANEOUS at 10:23

## 2021-03-03 NOTE — PROGRESS NOTES
Assessment and Plan:    Rita 77-year-old white female presents today to follow-up on chronic medical conditions  She is due for blood work  She missed her B12 injection last month and is feeling draggy    She also had her COVID vaccination as well as zostrix (at the pharmacy )    1  COPD - patient remains on Symbicort and Spiriva    2  Diabetes mellitus - hemoglobin A1c in the office today is 5 9   Last was 5 8  She remains on metformin 500 mg daily  Urine microalbumin is negative  We discussed being mindful of food choices and portion control  She will try to substitute bananas for other fruit in her smoothies  3  Holly's esophagus - surveillance is up-to-date  Patient remains asymptomatic  Continue Prevacid 30 mg daily    4  B12 deficiency - continue vitamin B12 IM injection monthly; to get one today  5  Hyperlipidemia - she remains on Crestor 5 mg daily  Check lipid level    6  Osteoporosis - recheck vitamin-D level  Increase weight-bearing exercise such as walking  Eat calcium rich diet  7  Tobacco abuse - resistant to stopping at this time  Labs and f/u 3-4 months  Notify me if sx of fatigue persist     Subjective:      Patient ID: Ria Silva is a 76 y o  female  CC:    Chief Complaint   Patient presents with    Follow-up     patient is here for a 4 month f/u re: chronic medical conditions  ak       HPI:      Rita 77-year-old white female presents today to follow-up on chronic medical conditions  She is due for blood work  She missed her B12 injection last month and is feeling "draggy "  She also had her COVID vaccination as well as zostrix (at the pharmacy )    Pt had lifeline screening  Tested tibia low probability        The following portions of the patient's history were reviewed and updated as appropriate: allergies, current medications, past family history, past medical history, past social history, past surgical history and problem list       Review of Systems   Constitutional:        Complains of nonspecific fatigue since getting her immunizations recently  She also notes that she missed her B12 injection  HENT: Negative for congestion, ear pain, mouth sores, sinus pressure and trouble swallowing  Eyes: Negative for discharge, redness and itching  Respiratory: Negative for apnea, cough, chest tightness, shortness of breath, wheezing and stridor  Cardiovascular: Negative for chest pain, palpitations and leg swelling  Gastrointestinal: Negative for abdominal distention, abdominal pain, blood in stool, constipation, diarrhea, nausea and vomiting  Endocrine: Negative for cold intolerance and heat intolerance  Genitourinary: Negative for difficulty urinating, dysuria, flank pain and urgency  Musculoskeletal: Negative for arthralgias and myalgias  Skin: Negative for rash  Neurological: Negative for dizziness, seizures, syncope, speech difficulty, weakness, light-headedness, numbness and headaches  Hematological: Negative for adenopathy  Psychiatric/Behavioral: Negative for agitation, behavioral problems, confusion and sleep disturbance  The patient is not nervous/anxious  Data to review:       Objective:    Vitals:    03/03/21 0939   BP: 104/60   Pulse: 72   Temp: 99 2 °F (37 3 °C)   Weight: 75 3 kg (166 lb)   Height: 5' 1" (1 549 m)        Physical Exam  Constitutional:       General: She is not in acute distress  Appearance: She is well-developed  HENT:      Head: Normocephalic and atraumatic  Eyes:      General: No scleral icterus  Conjunctiva/sclera: Conjunctivae normal       Pupils: Pupils are equal, round, and reactive to light  Neck:      Musculoskeletal: Normal range of motion and neck supple  Vascular: No carotid bruit  Cardiovascular:      Rate and Rhythm: Normal rate and regular rhythm  Heart sounds: Normal heart sounds  No murmur  No friction rub  No gallop      Pulmonary:      Effort: Pulmonary effort is normal  No respiratory distress  Breath sounds: Normal breath sounds  No wheezing or rales  Abdominal:      General: Bowel sounds are normal  There is no distension  Palpations: Abdomen is soft  Tenderness: There is no abdominal tenderness  There is no guarding  Hernia: No hernia is present  Musculoskeletal: Normal range of motion  General: No tenderness  Lymphadenopathy:      Cervical: No cervical adenopathy  Skin:     General: Skin is warm and dry  Neurological:      Mental Status: She is alert and oriented to person, place, and time  Psychiatric:         Behavior: Behavior normal          Thought Content: Thought content normal          Judgment: Judgment normal            BMI Counseling: Body mass index is 31 37 kg/m²  The BMI is above normal  Nutrition recommendations include decreasing portion sizes, encouraging healthy choices of fruits and vegetables and decreasing fast food intake  Exercise recommendations include exercising 3-5 times per week

## 2021-03-26 DIAGNOSIS — J44.9 CHRONIC OBSTRUCTIVE PULMONARY DISEASE, UNSPECIFIED COPD TYPE (HCC): ICD-10-CM

## 2021-03-28 RX ORDER — TIOTROPIUM BROMIDE INHALATION SPRAY 3.12 UG/1
SPRAY, METERED RESPIRATORY (INHALATION)
Qty: 1 INHALER | Refills: 3 | Status: SHIPPED | OUTPATIENT
Start: 2021-03-28 | End: 2021-05-04 | Stop reason: SDUPTHER

## 2021-04-06 ENCOUNTER — CLINICAL SUPPORT (OUTPATIENT)
Dept: FAMILY MEDICINE CLINIC | Facility: CLINIC | Age: 76
End: 2021-04-06
Payer: COMMERCIAL

## 2021-04-06 ENCOUNTER — TELEPHONE (OUTPATIENT)
Dept: FAMILY MEDICINE CLINIC | Facility: CLINIC | Age: 76
End: 2021-04-06

## 2021-04-06 DIAGNOSIS — E53.8 LOW VITAMIN B12 LEVEL: Primary | ICD-10-CM

## 2021-04-06 RX ADMIN — CYANOCOBALAMIN 1000 MCG: 1000 INJECTION INTRAMUSCULAR; SUBCUTANEOUS at 13:38

## 2021-04-06 NOTE — TELEPHONE ENCOUNTER
PT IS ASKING DR KONG WILL WRITE THE LETTER THAT SHE WRITES EVERY YEAR FOR HER TO GET HER SPIRIVA RESPIMA 2 5 MCG FOR 3 MONTHS  SHE SAID THAT HER [employer] INSUR ONLY ALLOWS HER TO HAVE 1 MONTH AT A TIME AND THAT COST SO MUCH MORE THAT GETTING A 3 MONTH SUPPLY    ANY QUESTIONS CALL PT [phone]

## 2021-05-04 DIAGNOSIS — J44.9 CHRONIC OBSTRUCTIVE PULMONARY DISEASE, UNSPECIFIED COPD TYPE (HCC): ICD-10-CM

## 2021-05-04 RX ORDER — TIOTROPIUM BROMIDE INHALATION SPRAY 3.12 UG/1
2 SPRAY, METERED RESPIRATORY (INHALATION) DAILY
Qty: 16 G | Refills: 3 | Status: SHIPPED | OUTPATIENT
Start: 2021-05-04 | End: 2022-07-19 | Stop reason: SDUPTHER

## 2021-05-06 ENCOUNTER — CLINICAL SUPPORT (OUTPATIENT)
Dept: FAMILY MEDICINE CLINIC | Facility: CLINIC | Age: 76
End: 2021-05-06
Payer: COMMERCIAL

## 2021-05-06 DIAGNOSIS — E53.8 LOW VITAMIN B12 LEVEL: Primary | ICD-10-CM

## 2021-05-06 RX ADMIN — CYANOCOBALAMIN 1000 MCG: 1000 INJECTION INTRAMUSCULAR; SUBCUTANEOUS at 15:09

## 2021-06-03 ENCOUNTER — CLINICAL SUPPORT (OUTPATIENT)
Dept: FAMILY MEDICINE CLINIC | Facility: CLINIC | Age: 76
End: 2021-06-03
Payer: COMMERCIAL

## 2021-06-03 DIAGNOSIS — Z79.4 TYPE 2 DIABETES MELLITUS WITH COMPLICATION, WITH LONG-TERM CURRENT USE OF INSULIN (HCC): ICD-10-CM

## 2021-06-03 DIAGNOSIS — E11.8 TYPE 2 DIABETES MELLITUS WITH COMPLICATION, WITH LONG-TERM CURRENT USE OF INSULIN (HCC): ICD-10-CM

## 2021-06-03 DIAGNOSIS — E53.8 B12 DEFICIENCY: Primary | ICD-10-CM

## 2021-06-03 RX ADMIN — CYANOCOBALAMIN 1000 MCG: 1000 INJECTION INTRAMUSCULAR; SUBCUTANEOUS at 13:25

## 2021-06-21 ENCOUNTER — OFFICE VISIT (OUTPATIENT)
Dept: FAMILY MEDICINE CLINIC | Facility: CLINIC | Age: 76
End: 2021-06-21
Payer: COMMERCIAL

## 2021-06-21 ENCOUNTER — HOSPITAL ENCOUNTER (OUTPATIENT)
Dept: RADIOLOGY | Facility: HOSPITAL | Age: 76
Discharge: HOME | End: 2021-06-21
Payer: COMMERCIAL

## 2021-06-21 VITALS
DIASTOLIC BLOOD PRESSURE: 80 MMHG | RESPIRATION RATE: 16 BRPM | SYSTOLIC BLOOD PRESSURE: 100 MMHG | TEMPERATURE: 96.9 F | HEART RATE: 77 BPM | BODY MASS INDEX: 32.28 KG/M2 | WEIGHT: 171 LBS | HEIGHT: 61 IN

## 2021-06-21 DIAGNOSIS — M88.9 PAGET'S BONE DISEASE: ICD-10-CM

## 2021-06-21 DIAGNOSIS — M17.11 PRIMARY OSTEOARTHRITIS OF RIGHT KNEE: ICD-10-CM

## 2021-06-21 DIAGNOSIS — R26.2 IMPAIRED AMBULATION: ICD-10-CM

## 2021-06-21 DIAGNOSIS — M25.561 ACUTE PAIN OF RIGHT KNEE: ICD-10-CM

## 2021-06-21 DIAGNOSIS — Z12.11 SCREENING FOR COLON CANCER: ICD-10-CM

## 2021-06-21 DIAGNOSIS — T50.905S ADVERSE EFFECT OF DRUG, SEQUELA: ICD-10-CM

## 2021-06-21 DIAGNOSIS — M25.561 ACUTE PAIN OF RIGHT KNEE: Primary | ICD-10-CM

## 2021-06-21 PROCEDURE — 3725F SCREEN DEPRESSION PERFORMED: CPT | Performed by: FAMILY MEDICINE

## 2021-06-21 PROCEDURE — 3288F FALL RISK ASSESSMENT DOCD: CPT | Performed by: FAMILY MEDICINE

## 2021-06-21 PROCEDURE — 1101F PT FALLS ASSESS-DOCD LE1/YR: CPT | Performed by: FAMILY MEDICINE

## 2021-06-21 PROCEDURE — 73562 X-RAY EXAM OF KNEE 3: CPT

## 2021-06-21 PROCEDURE — 99214 OFFICE O/P EST MOD 30 MIN: CPT | Performed by: FAMILY MEDICINE

## 2021-06-21 NOTE — PROGRESS NOTES
Assessment and Plan:  1  Acute right knee pain  2  Impaired ambulation uses cane  3  DJD right knee  4  Paget's disease of bone  X-rays ordered  Topical Voltaren gel is ordered secondary to below  Patient use Tylenol  Refer to physical therapy  Refer to orthopedics  5  Adverse drug reaction patient is intolerant NSAIDs and Celebrex and GI upset will use topical  6  Return after above if needed    Problem List Items Addressed This Visit        Musculoskeletal and Integument    Paget's bone disease      X-rays ordered         Relevant Orders    XR knee 3 vw right non injury    Ambulatory referral to Physical Therapy    Ambulatory referral to Orthopedic Surgery    Osteoarthritis      X-ray is ordered         Relevant Medications    Diclofenac Sodium (VOLTAREN) 1 %    Other Relevant Orders    XR knee 3 vw right non injury    Ambulatory referral to Physical Therapy    Ambulatory referral to Orthopedic Surgery      Other Visit Diagnoses     Acute pain of right knee    -  Primary    Relevant Medications    Diclofenac Sodium (VOLTAREN) 1 %    Other Relevant Orders    XR knee 3 vw right non injury    Ambulatory referral to Physical Therapy    Ambulatory referral to Orthopedic Surgery    Screening for colon cancer        Relevant Orders    Cologuard    Impaired ambulation        Relevant Orders    XR knee 3 vw right non injury    Ambulatory referral to Physical Therapy    Ambulatory referral to Orthopedic Surgery    Adverse effect of drug, sequela                     Diagnoses and all orders for this visit:    Acute pain of right knee  -     Diclofenac Sodium (VOLTAREN) 1 %; Apply 4 g topically 4 (four) times a day  -     XR knee 3 vw right non injury; Future  -     Ambulatory referral to Physical Therapy; Future  -     Ambulatory referral to Orthopedic Surgery; Future    Screening for colon cancer  -     Cologuard; Future    Impaired ambulation  -     XR knee 3 vw right non injury;  Future  -     Ambulatory referral to Physical Therapy; Future  -     Ambulatory referral to Orthopedic Surgery; Future    Primary osteoarthritis of right knee  -     Diclofenac Sodium (VOLTAREN) 1 %; Apply 4 g topically 4 (four) times a day  -     XR knee 3 vw right non injury; Future  -     Ambulatory referral to Physical Therapy; Future  -     Ambulatory referral to Orthopedic Surgery; Future    Paget's bone disease  -     XR knee 3 vw right non injury; Future  -     Ambulatory referral to Physical Therapy; Future  -     Ambulatory referral to Orthopedic Surgery; Future    Adverse effect of drug, sequela              Subjective:      Patient ID: Dasha Bowers is a 76 y o  female  CC:    Chief Complaint   Patient presents with    Knee Pain     Pt here for right knee pain onset wed sxs include swelling       HPI:     Patient states last Wednesday,  06/16/2021  Patient started with right knee pain  Patient denies any direct trauma however the day before she was walking around [place]  Patient denies twisting turning or   Falling  Patient using Tylenol with limited relief  Patient is unable to use NSAIDs secondary to GI upset      The following portions of the patient's history were reviewed and updated as appropriate: allergies, current medications, past family history, past medical history, past social history, past surgical history and problem list       Review of Systems   Constitutional: Negative  HENT: Negative  Eyes: Negative  Respiratory: Negative  Cardiovascular: Negative  Gastrointestinal:        HPI   Endocrine: Negative  Genitourinary: Negative  Musculoskeletal:        HPI   Skin: Negative  Allergic/Immunologic: Negative  Neurological:        Ambulating with cane   Hematological: Negative  Psychiatric/Behavioral: Negative            Data to review:       Objective:    Vitals:    06/21/21 0946   BP: 100/80   BP Location: Left arm   Patient Position: Sitting   Cuff Size: Adult   Pulse: 77 Resp: 16   Temp: (!) 96 9 °F (36 1 °C)   TempSrc: Tympanic   Weight: 77 6 kg (171 lb)   Height: 5' 1" (1 549 m)        Physical Exam  Vitals and nursing note reviewed  Constitutional:       Appearance: Normal appearance  HENT:      Head: Normocephalic and atraumatic  Eyes:      General: No scleral icterus  Cardiovascular:      Rate and Rhythm: Normal rate and regular rhythm  Pulses: Normal pulses  Heart sounds: Normal heart sounds  Pulmonary:      Effort: Pulmonary effort is normal       Breath sounds: Normal breath sounds  Musculoskeletal:         General: Tenderness present  Cervical back: Neck supple  No rigidity  Comments: Right knee with superior  Patellar tendon/quadriceps tenderness mild medial tenderness negative gross deformity full range of motion but pain greater than 45° extension negative effusion ligaments appear intact   Skin:     General: Skin is warm and dry  Neurological:      Mental Status: She is alert and oriented to person, place, and time        Gait: Gait abnormal       Comments: Ambulating with cane   Psychiatric:         Mood and Affect: Mood normal              Diabetic Foot Exam    Diabetic Foot Exam

## 2021-06-21 NOTE — PATIENT INSTRUCTIONS
Complete x-ray of right knee  Follow-up with orthopedics and physical therapy   Patient use topical Voltaren gel and oral Tylenol   Return after above evaluation if needed

## 2021-06-25 ENCOUNTER — HOSPITAL ENCOUNTER (OUTPATIENT)
Dept: MAMMOGRAPHY | Facility: MEDICAL CENTER | Age: 76
Discharge: HOME | End: 2021-06-25
Payer: COMMERCIAL

## 2021-06-25 ENCOUNTER — OFFICE VISIT (OUTPATIENT)
Dept: OBGYN CLINIC | Facility: MEDICAL CENTER | Age: 76
End: 2021-06-25
Payer: COMMERCIAL

## 2021-06-25 VITALS
HEIGHT: 61 IN | HEART RATE: 72 BPM | BODY MASS INDEX: 32.25 KG/M2 | WEIGHT: 170.8 LBS | SYSTOLIC BLOOD PRESSURE: 130 MMHG | DIASTOLIC BLOOD PRESSURE: 71 MMHG

## 2021-06-25 VITALS — WEIGHT: 171 LBS | BODY MASS INDEX: 32.28 KG/M2 | HEIGHT: 61 IN

## 2021-06-25 DIAGNOSIS — M17.11 PATELLOFEMORAL ARTHRITIS OF RIGHT KNEE: ICD-10-CM

## 2021-06-25 DIAGNOSIS — R26.2 IMPAIRED AMBULATION: ICD-10-CM

## 2021-06-25 DIAGNOSIS — M17.11 PRIMARY OSTEOARTHRITIS OF RIGHT KNEE: ICD-10-CM

## 2021-06-25 DIAGNOSIS — E55.9 VITAMIN D DEFICIENCY: ICD-10-CM

## 2021-06-25 DIAGNOSIS — Z12.39 ENCOUNTER FOR SCREENING FOR MALIGNANT NEOPLASM OF BREAST, UNSPECIFIED SCREENING MODALITY: ICD-10-CM

## 2021-06-25 DIAGNOSIS — M88.9 PAGET'S BONE DISEASE: ICD-10-CM

## 2021-06-25 DIAGNOSIS — M46.1 SACROILIITIS (HCC): Primary | ICD-10-CM

## 2021-06-25 DIAGNOSIS — Z12.31 ENCOUNTER FOR SCREENING MAMMOGRAM FOR MALIGNANT NEOPLASM OF BREAST: ICD-10-CM

## 2021-06-25 DIAGNOSIS — M25.561 ACUTE PAIN OF RIGHT KNEE: ICD-10-CM

## 2021-06-25 PROCEDURE — 20610 DRAIN/INJ JOINT/BURSA W/O US: CPT | Performed by: ORTHOPAEDIC SURGERY

## 2021-06-25 PROCEDURE — 77063 BREAST TOMOSYNTHESIS BI: CPT

## 2021-06-25 PROCEDURE — 77067 SCR MAMMO BI INCL CAD: CPT

## 2021-06-25 PROCEDURE — 99214 OFFICE O/P EST MOD 30 MIN: CPT | Performed by: ORTHOPAEDIC SURGERY

## 2021-06-25 RX ORDER — LIDOCAINE HYDROCHLORIDE 10 MG/ML
3 INJECTION, SOLUTION INFILTRATION; PERINEURAL
Status: COMPLETED | OUTPATIENT
Start: 2021-06-25 | End: 2021-06-25

## 2021-06-25 RX ORDER — METHYLPREDNISOLONE ACETATE 40 MG/ML
2 INJECTION, SUSPENSION INTRA-ARTICULAR; INTRALESIONAL; INTRAMUSCULAR; SOFT TISSUE
Status: COMPLETED | OUTPATIENT
Start: 2021-06-25 | End: 2021-06-25

## 2021-06-25 RX ADMIN — METHYLPREDNISOLONE ACETATE 2 ML: 40 INJECTION, SUSPENSION INTRA-ARTICULAR; INTRALESIONAL; INTRAMUSCULAR; SOFT TISSUE at 10:54

## 2021-06-25 RX ADMIN — LIDOCAINE HYDROCHLORIDE 3 ML: 10 INJECTION, SOLUTION INFILTRATION; PERINEURAL at 10:54

## 2021-06-25 NOTE — PROGRESS NOTES
Assessment/Plan     1  Sacroiliitis ([address] )    2  Acute pain of right knee    3  Impaired ambulation    4  Primary osteoarthritis of right knee    5  Paget's bone disease    6  Patellofemoral arthritis of right knee      Orders Placed This Encounter   Procedures    Large joint arthrocentesis: R knee    XR knee 1 or 2 vw right    Ambulatory referral to Pain Management    Ambulatory referral to Physical Therapy     · Patient has mild right knee medial and lateral compartment and moderate patellofemoral osteoarthritis   · Discussed with patient conservative treatments: steroid injections, physical therapy, medications, bracing, visco supplementation injections, modifying activities and weight loss  · Received right knee  steroid injection today  Patient knows to ice and avoid strenuous activity for 1-2 days if needed  Patient is a diabetic and she is aware to monitor her blood glucose levels for the next few days  Her fasting blood glucose level yesterday morning was 118  · Provided patient with a short hinged knee brace  · Continue taking Tylenol and Diclofenac gel as needed for pain  She can not take NSAIDS due to GI upset    · Physical therapy was ordering for the right knee   · Will be referring patient to Pain management for bilateral SI joint   · May repeat steroid injection every 3 months   · If pain persists, consider ordering MRI of the right knee at next office visit  Return in about 6 weeks (around 8/6/2021) for Recheck right knee   I answered all of the patient's questions during the visit and provided education of the patient's condition during the visit  The patient verbalized understanding of the information given and agrees with the plan  This note was dictated using [employer] software  It may contain errors including improperly dictated words  Please contact physician directly for any questions      History of Present Illness   Chief complaint:   Chief Complaint   Patient presents with   [facility] Right Knee - Pain       HPI: Betsy Gabriel is a 76 y o  female that c/o right knee pain  She was referred by her PCP Dr Rhona Zapien  Patient states on 06/14/2021 she was doing increase walking that day  Denies any falls or trauma  She states she felt the pain the next day got worse over the weekend  She was seen by her PCP and had x-rays taken of the right knee which show no fractures and was prescribed diclofenac gel as needed for pain  She states she is having pain over the  Anterior, anterior medial anterior lateral aspect of the right knee  She does states soreness in the posterior aspect of the knee and cramping in the calf  She does feel her knee is unstable  Pain is worse with with transitional positions, walking  She is taking Tylenol and using Diclofenac gel as needed for pain with relief  She can not take NSAIDS due to causing GI upset  She has no history of having physical therapy, injections or surgeries on the right knee  She is a diabetic and her fasting blood glucose level yesterday morning was 118  She has been fully vaccinated for COVID  She has a hx of lipoma excision bilateral proximal lower legs medially  She states she has SI joint pain for 15 years and has been treating with a chiropractor and also had injections in the past      ROS:    See HPI for musculoskeletal review     All other systems reviewed are negative     Historical Information   Past Medical History:   Diagnosis Date    Lyme disease     Polymyalgia rheumatica ([facility])     last assessed 2/15/17    Reactive airway disease     last assessed 6/9/16    Sciatica      Past Surgical History:   Procedure Laterality Date    BREAST BIOPSY Left     HYSTERECTOMY      1987, total     Social History   Social History     Substance and Sexual Activity   Alcohol Use Yes    Comment: social     Social History     Substance and Sexual Activity   Drug Use No     Social History     Tobacco Use   Smoking Status Current Every Day Smoker Smokeless Tobacco Never Used     Family History:   Family History   Problem Relation Age of Onset    Thyroid cancer Mother 48    COPD Father         mild    Hypertension Father     Breast cancer Maternal Aunt 54    No Known Problems Maternal Grandmother     Colon cancer Maternal Grandfather     No Known Problems Paternal Grandmother     No Known Problems Paternal Grandfather     No Known Problems Maternal Aunt     No Known Problems Paternal Aunt        Current Outpatient Medications on File Prior to Visit   Medication Sig Dispense Refill    acetaminophen (TYLENOL) 325 mg tablet Take 2 tablets (650 mg total) by mouth every 6 (six) hours as needed for mild pain 30 tablet 0    albuterol (Ventolin HFA) 90 mcg/act inhaler Inhale 1-2 puffs as needed for wheezing 3 Inhaler 3    Biotin 07458 MCG TABS Take by mouth      budesonide-formoterol (Symbicort) 160-4 5 mcg/act inhaler Inhale 2 puffs 2 (two) times a day 3 Inhaler 3    calcium carbonate 1250 MG capsule Take by mouth      Cholecalciferol (VITAMIN D3) 2000 units capsule Take 4,000 Units by mouth daily        Coenzyme Q10 (CO Q10) 100 MG CAPS Take by mouth      Diclofenac Sodium (VOLTAREN) 1 % Apply 4 g topically 4 (four) times a day 50 g 2    Digestive Enzyme CAPS Take by mouth      lansoprazole (PREVACID) 30 mg capsule Take by mouth      metFORMIN (GLUCOPHAGE) 500 mg tablet Take 1 tablet (500 mg total) by mouth daily with breakfast 90 tablet 1    montelukast (SINGULAIR) 10 mg tablet Take 1 tablet (10 mg total) by mouth daily 90 tablet 3    Probiotic Product (PROBIOTIC-10) CAPS Take by mouth      rosuvastatin (CRESTOR) 5 mg tablet Take 1 tablet (5 mg total) by mouth daily 90 tablet 3    Spiriva Respimat 2 5 MCG/ACT AERS inhaler Inhale 2 puffs daily 3 inhalers 16 g 3     Current Facility-Administered Medications on File Prior to Visit   Medication Dose Route Frequency Provider Last Rate Last Admin    cyanocobalamin injection 1,000 mcg  1,000 mcg Intramuscular Q30 Days Baljinder Amos MD   1,000 mcg at 05/06/21 1509    cyanocobalamin injection 1,000 mcg  1,000 mcg Intramuscular Q30 Days Baljinder Amos MD   1,000 mcg at 12/29/20 1353    cyanocobalamin injection 1,000 mcg  1,000 mcg Intramuscular Q30 Days Baljinder Amos MD   1,000 mcg at 06/03/21 1325     Allergies   Allergen Reactions    Asa [Aspirin]     Celebrex [Celecoxib] Other (See Comments)     Flushing      Ibuprofen GI Intolerance    Nsaids GI Intolerance    Percocet [Oxycodone-Acetaminophen] GI Intolerance       Current Outpatient Medications on File Prior to Visit   Medication Sig Dispense Refill    acetaminophen (TYLENOL) 325 mg tablet Take 2 tablets (650 mg total) by mouth every 6 (six) hours as needed for mild pain 30 tablet 0    albuterol (Ventolin HFA) 90 mcg/act inhaler Inhale 1-2 puffs as needed for wheezing 3 Inhaler 3    Biotin 01715 MCG TABS Take by mouth      budesonide-formoterol (Symbicort) 160-4 5 mcg/act inhaler Inhale 2 puffs 2 (two) times a day 3 Inhaler 3    calcium carbonate 1250 MG capsule Take by mouth      Cholecalciferol (VITAMIN D3) 2000 units capsule Take 4,000 Units by mouth daily        Coenzyme Q10 (CO Q10) 100 MG CAPS Take by mouth      Diclofenac Sodium (VOLTAREN) 1 % Apply 4 g topically 4 (four) times a day 50 g 2    Digestive Enzyme CAPS Take by mouth      lansoprazole (PREVACID) 30 mg capsule Take by mouth      metFORMIN (GLUCOPHAGE) 500 mg tablet Take 1 tablet (500 mg total) by mouth daily with breakfast 90 tablet 1    montelukast (SINGULAIR) 10 mg tablet Take 1 tablet (10 mg total) by mouth daily 90 tablet 3    Probiotic Product (PROBIOTIC-10) CAPS Take by mouth      rosuvastatin (CRESTOR) 5 mg tablet Take 1 tablet (5 mg total) by mouth daily 90 tablet 3    Spiriva Respimat 2 5 MCG/ACT AERS inhaler Inhale 2 puffs daily 3 inhalers 16 g 3     Current Facility-Administered Medications on File Prior to Visit   Medication Dose Route Frequency Provider Last Rate Last Admin    cyanocobalamin injection 1,000 mcg  1,000 mcg Intramuscular Q30 Days Filomena Hernandez MD   1,000 mcg at 05/06/21 1509    cyanocobalamin injection 1,000 mcg  1,000 mcg Intramuscular Q30 Days Filomena Hernandez MD   1,000 mcg at 12/29/20 1353    cyanocobalamin injection 1,000 mcg  1,000 mcg Intramuscular Q30 Days Filomena Hernandez MD   1,000 mcg at 06/03/21 1325       Objective   Vitals: Blood pressure 130/71, pulse 72, height 5' 1" (1 549 m), weight 77 5 kg (170 lb 12 8 oz), not currently breastfeeding  ,Body mass index is 32 27 kg/m²  PE:  AAOx 3  WDWN  Hearing intact, no drainage from eyes  Regular rate  no audible wheezing  no abdominal distension  LE compartments soft, skin intact    rightknee:    Appearance:  no swelling   No ecchymosis  no obvious joint deformity   No effusion  Scar over proximal lower leg medially   Palpation/Tenderness:  No TTP over medial joint line  No TTP over lateral joint line   No TTP over patella  No TTP over patellar tendon  No TTP over pes anserine bursa  Active Range of Motion:  AROM: 0-115   PROM: Full   Pain with extreme of motion   Special Tests:  Medial Pradip's Test:  Positive  Lateral Pradip's Test:  Negative  Apley's compression test:  Negative  Lachman's Test:  negative  Anterior and Posterior  Drawer Test:  Negative  Patellar grind:  +  Valgus Stress Test:  negative  Varus Stress Test:  negative   No ipsilateral hip pain with ROM    +TTP over bilateral SI joint     rightLE:    Sensation grossly intact L4, S1   Palpable  Posterior tibial pulse  AT/GS  intact    Imaging Studies: I have personally reviewed pertinent films in PACS  rightknee:  Mild DJD , patellofemoral moderate arthritis       Large joint arthrocentesis: R knee  Universal Protocol:  Consent: Verbal consent obtained    Risks and benefits: risks, benefits and alternatives were discussed  Consent given by: patient  Time out: Immediately prior to procedure a "time out" was called to verify the correct patient, procedure, equipment, support staff and site/side marked as required    Timeout called at: 6/25/2021 10:53 AM   Patient understanding: patient states understanding of the procedure being performed  Site marked: the operative site was marked  Supporting Documentation  Indications: pain   Procedure Details  Location: knee - R knee  Preparation: Patient was prepped and draped in the usual sterile fashion  Needle size: 22 G  Ultrasound guidance: no  Approach: anterolateral  Medications administered: 3 mL lidocaine 1 %; 2 mL methylPREDNISolone acetate 40 mg/mL    Patient tolerance: patient tolerated the procedure well with no immediate complications  Dressing:  Sterile dressing applied              Scribe Attestation    I,:  Wally Hogue am acting as a scribe while in the presence of the attending physician :       I,:  Vladislav Batista DO personally performed the services described in this documentation    as scribed in my presence :

## 2021-06-25 NOTE — LETTER
June 25, 2021     Lali Eubanks DO  [address] [address] [address]    Patient: Pool Kidd   YOB: 1945   Date of Visit: 6/25/2021       Dear Dr Leonard Little: Thank you for referring Yanet Reno to me for evaluation  Below are my notes for this consultation  If you have questions, please do not hesitate to call me  I look forward to following your patient along with you  Sincerely,        Agustina Jules DO        CC: No Recipients  Agustina Jules DO  6/25/2021 11:12 AM  Sign when Signing Visit   Assessment/Plan     1  Sacroiliitis ([address] )    2  Acute pain of right knee    3  Impaired ambulation    4  Primary osteoarthritis of right knee    5  Paget's bone disease    6  Patellofemoral arthritis of right knee      Orders Placed This Encounter   Procedures    Large joint arthrocentesis: R knee    XR knee 1 or 2 vw right    Ambulatory referral to Pain Management    Ambulatory referral to Physical Therapy     · Patient has mild right knee medial and lateral compartment and moderate patellofemoral osteoarthritis   · Discussed with patient conservative treatments: steroid injections, physical therapy, medications, bracing, visco supplementation injections, modifying activities and weight loss  · Received right knee  steroid injection today  Patient knows to ice and avoid strenuous activity for 1-2 days if needed  Patient is a diabetic and she is aware to monitor her blood glucose levels for the next few days  Her fasting blood glucose level yesterday morning was 118  · Provided patient with a short hinged knee brace  · Continue taking Tylenol and Diclofenac gel as needed for pain   She can not take NSAIDS due to GI upset    · Physical therapy was ordering for the right knee   · Will be referring patient to Pain management for bilateral SI joint   · May repeat steroid injection every 3 months   · If pain persists, consider ordering MRI of the right knee at next office visit  Return in about 6 weeks (around 8/6/2021) for Recheck right knee   I answered all of the patient's questions during the visit and provided education of the patient's condition during the visit  The patient verbalized understanding of the information given and agrees with the plan  This note was dictated using [employer] software  It may contain errors including improperly dictated words  Please contact physician directly for any questions  History of Present Illness   Chief complaint:   Chief Complaint   Patient presents with    Right Knee - Pain       HPI: Pool Kidd is a 76 y o  female that c/o right knee pain  She was referred by her PCP Dr Leonard Little  Patient states on 06/14/2021 she was doing increase walking that day  Denies any falls or trauma  She states she felt the pain the next day got worse over the weekend  She was seen by her PCP and had x-rays taken of the right knee which show no fractures and was prescribed diclofenac gel as needed for pain  She states she is having pain over the  Anterior, anterior medial anterior lateral aspect of the right knee  She does states soreness in the posterior aspect of the knee and cramping in the calf  She does feel her knee is unstable  Pain is worse with with transitional positions, walking  She is taking Tylenol and using Diclofenac gel as needed for pain with relief  She can not take NSAIDS due to causing GI upset  She has no history of having physical therapy, injections or surgeries on the right knee  She is a diabetic and her fasting blood glucose level yesterday morning was 118  She has been fully vaccinated for COVID  She has a hx of lipoma excision bilateral proximal lower legs medially  She states she has SI joint pain for 15 years and has been treating with a chiropractor and also had injections in the past      ROS:    See HPI for musculoskeletal review     All other systems reviewed are negative     Historical Information   Past Medical History:   Diagnosis Date    Lyme disease     Polymyalgia rheumatica ([facility])     last assessed 2/15/17    Reactive airway disease     last assessed 6/9/16    Sciatica      Past Surgical History:   Procedure Laterality Date    BREAST BIOPSY Left     HYSTERECTOMY      1987, total     Social History   Social History     Substance and Sexual Activity   Alcohol Use Yes    Comment: social     Social History     Substance and Sexual Activity   Drug Use No     Social History     Tobacco Use   Smoking Status Current Every Day Smoker   Smokeless Tobacco Never Used     Family History:   Family History   Problem Relation Age of Onset    Thyroid cancer Mother 48    COPD Father         mild    Hypertension Father     Breast cancer Maternal Aunt 54    No Known Problems Maternal Grandmother     Colon cancer Maternal Grandfather     No Known Problems Paternal Grandmother     No Known Problems Paternal Grandfather     No Known Problems Maternal Aunt     No Known Problems Paternal Aunt        Current Outpatient Medications on File Prior to Visit   Medication Sig Dispense Refill    acetaminophen (TYLENOL) 325 mg tablet Take 2 tablets (650 mg total) by mouth every 6 (six) hours as needed for mild pain 30 tablet 0    albuterol (Ventolin HFA) 90 mcg/act inhaler Inhale 1-2 puffs as needed for wheezing 3 Inhaler 3    Biotin 34907 MCG TABS Take by mouth      budesonide-formoterol (Symbicort) 160-4 5 mcg/act inhaler Inhale 2 puffs 2 (two) times a day 3 Inhaler 3    calcium carbonate 1250 MG capsule Take by mouth      Cholecalciferol (VITAMIN D3) 2000 units capsule Take 4,000 Units by mouth daily        Coenzyme Q10 (CO Q10) 100 MG CAPS Take by mouth      Diclofenac Sodium (VOLTAREN) 1 % Apply 4 g topically 4 (four) times a day 50 g 2    Digestive Enzyme CAPS Take by mouth      lansoprazole (PREVACID) 30 mg capsule Take by mouth      metFORMIN (GLUCOPHAGE) 500 mg tablet Take 1 tablet (500 mg total) by mouth daily with breakfast 90 tablet 1    montelukast (SINGULAIR) 10 mg tablet Take 1 tablet (10 mg total) by mouth daily 90 tablet 3    Probiotic Product (PROBIOTIC-10) CAPS Take by mouth      rosuvastatin (CRESTOR) 5 mg tablet Take 1 tablet (5 mg total) by mouth daily 90 tablet 3    Spiriva Respimat 2 5 MCG/ACT AERS inhaler Inhale 2 puffs daily 3 inhalers 16 g 3     Current Facility-Administered Medications on File Prior to Visit   Medication Dose Route Frequency Provider Last Rate Last Admin    cyanocobalamin injection 1,000 mcg  1,000 mcg Intramuscular Q30 Days Parag Fiore MD   1,000 mcg at 05/06/21 1509    cyanocobalamin injection 1,000 mcg  1,000 mcg Intramuscular Q30 Days Parag Fiore MD   1,000 mcg at 12/29/20 1353    cyanocobalamin injection 1,000 mcg  1,000 mcg Intramuscular Q30 Days Parag Fiore MD   1,000 mcg at 06/03/21 1325     Allergies   Allergen Reactions    Asa [Aspirin]     Celebrex [Celecoxib] Other (See Comments)     Flushing      Ibuprofen GI Intolerance    Nsaids GI Intolerance    Percocet [Oxycodone-Acetaminophen] GI Intolerance       Current Outpatient Medications on File Prior to Visit   Medication Sig Dispense Refill    acetaminophen (TYLENOL) 325 mg tablet Take 2 tablets (650 mg total) by mouth every 6 (six) hours as needed for mild pain 30 tablet 0    albuterol (Ventolin HFA) 90 mcg/act inhaler Inhale 1-2 puffs as needed for wheezing 3 Inhaler 3    Biotin 14083 MCG TABS Take by mouth      budesonide-formoterol (Symbicort) 160-4 5 mcg/act inhaler Inhale 2 puffs 2 (two) times a day 3 Inhaler 3    calcium carbonate 1250 MG capsule Take by mouth      Cholecalciferol (VITAMIN D3) 2000 units capsule Take 4,000 Units by mouth daily        Coenzyme Q10 (CO Q10) 100 MG CAPS Take by mouth      Diclofenac Sodium (VOLTAREN) 1 % Apply 4 g topically 4 (four) times a day 50 g 2    Digestive Enzyme CAPS Take by mouth      lansoprazole (PREVACID) 30 mg capsule Take by mouth  metFORMIN (GLUCOPHAGE) 500 mg tablet Take 1 tablet (500 mg total) by mouth daily with breakfast 90 tablet 1    montelukast (SINGULAIR) 10 mg tablet Take 1 tablet (10 mg total) by mouth daily 90 tablet 3    Probiotic Product (PROBIOTIC-10) CAPS Take by mouth      rosuvastatin (CRESTOR) 5 mg tablet Take 1 tablet (5 mg total) by mouth daily 90 tablet 3    Spiriva Respimat 2 5 MCG/ACT AERS inhaler Inhale 2 puffs daily 3 inhalers 16 g 3     Current Facility-Administered Medications on File Prior to Visit   Medication Dose Route Frequency Provider Last Rate Last Admin    cyanocobalamin injection 1,000 mcg  1,000 mcg Intramuscular Q30 Days Jermaine Lamb MD   1,000 mcg at 05/06/21 1509    cyanocobalamin injection 1,000 mcg  1,000 mcg Intramuscular Q30 Days Jermaine Lamb MD   1,000 mcg at 12/29/20 1353    cyanocobalamin injection 1,000 mcg  1,000 mcg Intramuscular Q30 Days Jermaine Lamb MD   1,000 mcg at 06/03/21 1325       Objective   Vitals: Blood pressure 130/71, pulse 72, height 5' 1" (1 549 m), weight 77 5 kg (170 lb 12 8 oz), not currently breastfeeding  ,Body mass index is 32 27 kg/m²      PE:  AAOx 3  WDWN  Hearing intact, no drainage from eyes  Regular rate  no audible wheezing  no abdominal distension  LE compartments soft, skin intact    rightknee:    Appearance:  no swelling   No ecchymosis  no obvious joint deformity   No effusion  Scar over proximal lower leg medially   Palpation/Tenderness:  No TTP over medial joint line  No TTP over lateral joint line   No TTP over patella  No TTP over patellar tendon  No TTP over pes anserine bursa  Active Range of Motion:  AROM: 0-115   PROM: Full   Pain with extreme of motion   Special Tests:  Medial Pradip's Test:  Positive  Lateral Pradip's Test:  Negative  Apley's compression test:  Negative  Lachman's Test:  negative  Anterior and Posterior  Drawer Test:  Negative  Patellar grind:  +  Valgus Stress Test:  negative  Varus Stress Test:  negative   No ipsilateral hip pain with ROM    +TTP over bilateral SI joint     rightLE:    Sensation grossly intact L4, S1   Palpable  Posterior tibial pulse  AT/GS  intact    Imaging Studies: I have personally reviewed pertinent films in PACS  rightknee:  Mild DJD , patellofemoral moderate arthritis       Large joint arthrocentesis: R knee  Universal Protocol:  Consent: Verbal consent obtained  Risks and benefits: risks, benefits and alternatives were discussed  Consent given by: patient  Time out: Immediately prior to procedure a "time out" was called to verify the correct patient, procedure, equipment, support staff and site/side marked as required    Timeout called at: 6/25/2021 10:53 AM   Patient understanding: patient states understanding of the procedure being performed  Site marked: the operative site was marked  Supporting Documentation  Indications: pain   Procedure Details  Location: knee - R knee  Preparation: Patient was prepped and draped in the usual sterile fashion  Needle size: 22 G  Ultrasound guidance: no  Approach: anterolateral  Medications administered: 3 mL lidocaine 1 %; 2 mL methylPREDNISolone acetate 40 mg/mL    Patient tolerance: patient tolerated the procedure well with no immediate complications  Dressing:  Sterile dressing applied              Scribe Attestation    I,:  Keane Olszewski Kantzaridis am acting as a scribe while in the presence of the attending physician :       I,:  Effie Bell DO personally performed the services described in this documentation    as scribed in my presence :

## 2021-06-26 LAB
25(OH)D3 SERPL-MCNC: 40 NG/ML (ref 30–100)
ALBUMIN SERPL-MCNC: 4.5 G/DL (ref 3.6–5.1)
ALBUMIN/GLOB SERPL: 1.6 (CALC) (ref 1–2.5)
ALP SERPL-CCNC: 115 U/L (ref 37–153)
ALT SERPL-CCNC: 10 U/L (ref 6–29)
AST SERPL-CCNC: 12 U/L (ref 10–35)
BASOPHILS # BLD AUTO: 29 CELLS/UL (ref 0–200)
BASOPHILS NFR BLD AUTO: 0.4 %
BILIRUB SERPL-MCNC: 0.4 MG/DL (ref 0.2–1.2)
BUN SERPL-MCNC: 24 MG/DL (ref 7–25)
BUN/CREAT SERPL: ABNORMAL (CALC) (ref 6–22)
CALCIUM SERPL-MCNC: 9.6 MG/DL (ref 8.6–10.4)
CALCIUM SERPL-MCNC: 9.6 MG/DL (ref 8.6–10.4)
CHLORIDE SERPL-SCNC: 106 MMOL/L (ref 98–110)
CHOLEST SERPL-MCNC: 152 MG/DL
CHOLEST/HDLC SERPL: 3.1 (CALC)
CO2 SERPL-SCNC: 26 MMOL/L (ref 20–32)
CREAT SERPL-MCNC: 0.66 MG/DL (ref 0.6–0.93)
EOSINOPHIL # BLD AUTO: 79 CELLS/UL (ref 15–500)
EOSINOPHIL NFR BLD AUTO: 1.1 %
ERYTHROCYTE [DISTWIDTH] IN BLOOD BY AUTOMATED COUNT: 13 % (ref 11–15)
GLOBULIN SER CALC-MCNC: 2.8 G/DL (CALC) (ref 1.9–3.7)
GLUCOSE SERPL-MCNC: 117 MG/DL (ref 65–99)
HBA1C MFR BLD: 6 % OF TOTAL HGB
HCT VFR BLD AUTO: 40.6 % (ref 35–45)
HDLC SERPL-MCNC: 49 MG/DL
HGB BLD-MCNC: 13.7 G/DL (ref 11.7–15.5)
LDLC SERPL CALC-MCNC: 80 MG/DL (CALC)
LYMPHOCYTES # BLD AUTO: 1361 CELLS/UL (ref 850–3900)
LYMPHOCYTES NFR BLD AUTO: 18.9 %
MCH RBC QN AUTO: 28.6 PG (ref 27–33)
MCHC RBC AUTO-ENTMCNC: 33.7 G/DL (ref 32–36)
MCV RBC AUTO: 84.8 FL (ref 80–100)
METHYLMALONATE SERPL-SCNC: 91 NMOL/L (ref 87–318)
MONOCYTES # BLD AUTO: 418 CELLS/UL (ref 200–950)
MONOCYTES NFR BLD AUTO: 5.8 %
NEUTROPHILS # BLD AUTO: 5314 CELLS/UL (ref 1500–7800)
NEUTROPHILS NFR BLD AUTO: 73.8 %
NONHDLC SERPL-MCNC: 103 MG/DL (CALC)
PLATELET # BLD AUTO: 205 THOUSAND/UL (ref 140–400)
PMV BLD REES-ECKER: 10.7 FL (ref 7.5–12.5)
POTASSIUM SERPL-SCNC: 4.3 MMOL/L (ref 3.5–5.3)
PROT SERPL-MCNC: 7.3 G/DL (ref 6.1–8.1)
PTH-INTACT SERPL-MCNC: 46 PG/ML (ref 14–64)
RBC # BLD AUTO: 4.79 MILLION/UL (ref 3.8–5.1)
SL AMB EGFR AFRICAN AMERICAN: 100 ML/MIN/1.73M2
SL AMB EGFR NON AFRICAN AMERICAN: 86 ML/MIN/1.73M2
SODIUM SERPL-SCNC: 141 MMOL/L (ref 135–146)
T4 SERPL-MCNC: 7.1 MCG/DL (ref 5.1–11.9)
TRIGL SERPL-MCNC: 134 MG/DL
TSH SERPL-ACNC: 1.77 MIU/L (ref 0.4–4.5)
VIT B12 SERPL-MCNC: 410 PG/ML (ref 200–1100)
WBC # BLD AUTO: 7.2 THOUSAND/UL (ref 3.8–10.8)

## 2021-06-26 PROCEDURE — 3044F HG A1C LEVEL LT 7.0%: CPT | Performed by: FAMILY MEDICINE

## 2021-07-01 ENCOUNTER — RA CDI HCC (OUTPATIENT)
Dept: OTHER | Facility: HOSPITAL | Age: 76
End: 2021-07-01

## 2021-07-01 NOTE — PROGRESS NOTES
[facility]  coding opportunities          Chart reviewed, no opportunity found: CHART REVIEWED, NO OPPORTUNITY FOUND                     Patients insurance company: [facility] [address]  (Medicare Advantage and Commercial)

## 2021-07-07 ENCOUNTER — OFFICE VISIT (OUTPATIENT)
Dept: FAMILY MEDICINE CLINIC | Facility: CLINIC | Age: 76
End: 2021-07-07
Payer: COMMERCIAL

## 2021-07-07 VITALS
HEART RATE: 72 BPM | BODY MASS INDEX: 31.83 KG/M2 | HEIGHT: 61 IN | SYSTOLIC BLOOD PRESSURE: 128 MMHG | WEIGHT: 168.6 LBS | RESPIRATION RATE: 16 BRPM | DIASTOLIC BLOOD PRESSURE: 74 MMHG

## 2021-07-07 DIAGNOSIS — E78.00 PURE HYPERCHOLESTEROLEMIA: ICD-10-CM

## 2021-07-07 DIAGNOSIS — Z72.0 TOBACCO ABUSE: ICD-10-CM

## 2021-07-07 DIAGNOSIS — Z00.00 MEDICARE ANNUAL WELLNESS VISIT, SUBSEQUENT: ICD-10-CM

## 2021-07-07 DIAGNOSIS — J44.9 CHRONIC OBSTRUCTIVE PULMONARY DISEASE, UNSPECIFIED COPD TYPE (HCC): ICD-10-CM

## 2021-07-07 DIAGNOSIS — J30.9 ALLERGIC RHINITIS, UNSPECIFIED SEASONALITY, UNSPECIFIED TRIGGER: ICD-10-CM

## 2021-07-07 DIAGNOSIS — E11.9 CONTROLLED TYPE 2 DIABETES MELLITUS WITHOUT COMPLICATION, WITHOUT LONG-TERM CURRENT USE OF INSULIN (HCC): Primary | ICD-10-CM

## 2021-07-07 PROCEDURE — 1160F RVW MEDS BY RX/DR IN RCRD: CPT | Performed by: FAMILY MEDICINE

## 2021-07-07 PROCEDURE — 3288F FALL RISK ASSESSMENT DOCD: CPT | Performed by: FAMILY MEDICINE

## 2021-07-07 PROCEDURE — 99214 OFFICE O/P EST MOD 30 MIN: CPT | Performed by: FAMILY MEDICINE

## 2021-07-07 PROCEDURE — G0439 PPPS, SUBSEQ VISIT: HCPCS | Performed by: FAMILY MEDICINE

## 2021-07-07 PROCEDURE — 1125F AMNT PAIN NOTED PAIN PRSNT: CPT | Performed by: FAMILY MEDICINE

## 2021-07-07 PROCEDURE — 4004F PT TOBACCO SCREEN RCVD TLK: CPT | Performed by: FAMILY MEDICINE

## 2021-07-07 PROCEDURE — 1170F FXNL STATUS ASSESSED: CPT | Performed by: FAMILY MEDICINE

## 2021-07-07 RX ADMIN — CYANOCOBALAMIN 1000 MCG: 1000 INJECTION INTRAMUSCULAR; SUBCUTANEOUS at 09:58

## 2021-07-07 NOTE — PROGRESS NOTES
Assessment and Plan:       Pt presents for Medicare annual wellness questionnaire  1  Immun reviewed  2  Labs UTD and reviewed  3  Advanced directives complete  4  For full evaluation of chronic medical conditons, please see today's office note  Problem List Items Addressed This Visit        Endocrine    Controlled diabetes mellitus type II without complication ([facility] ) - Primary       Respiratory    COPD (chronic obstructive pulmonary disease) ([place] [address] )    Allergic rhinitis       Other    Hyperlipidemia           Preventive health issues were discussed with patient, and age appropriate screening tests were ordered as noted in patient's After Visit Summary  Personalized health advice and appropriate referrals for health education or preventive services given if needed, as noted in patient's After Visit Summary       History of Present Illness:     Patient presents for Medicare Annual Wellness visit    Patient Care Team:  Josiah Michele MD as PCP - MD Michael Caruso, ULICES Barrera, ULICES Aldana MD     Problem List:     Patient Active Problem List   Diagnosis    Vitamin D deficiency    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    Tobacco abuse    Osteoporosis    Lumbosacral pain, chronic    Hyperlipidemia    Coronary artery calcification    COPD (chronic obstructive pulmonary disease) ([place] [address] )    Controlled diabetes mellitus type II without complication ([place] [address] )    Holly's esophagus    Allergic rhinitis    History of colonic polyps    Irritable bowel syndrome with both constipation and diarrhea    Lateral epicondylitis    Low vitamin B12 level    Paget's bone disease    Osteoarthritis    Chronic constipation      Past Medical and Surgical History:     Past Medical History:   Diagnosis Date    Lyme disease     Polymyalgia rheumatica ([facility] )     last assessed 2/15/17    Reactive airway disease     last assessed 6/9/16    Sciatica      Past Surgical History:   Procedure Laterality Date    BREAST BIOPSY Left     HYSTERECTOMY      1987, total      Family History:     Family History   Problem Relation Age of Onset    Thyroid cancer Mother 48    COPD Father         mild    Hypertension Father     Breast cancer Maternal Aunt 54    No Known Problems Maternal Grandmother     Colon cancer Maternal Grandfather     No Known Problems Paternal Grandmother     No Known Problems Paternal Grandfather     No Known Problems Maternal Aunt     No Known Problems Paternal Aunt       Social History:     Social History     Socioeconomic History    Marital status: Single     Spouse name: None    Number of children: None    Years of education: None    Highest education level: None   Occupational History    Occupation: retired   Tobacco Use    Smoking status: Current Every Day Smoker    Smokeless tobacco: Never Used   Substance and Sexual Activity    Alcohol use: Yes     Comment: social    Drug use: No    Sexual activity: None   Other Topics Concern    None   Social History Narrative    None     Social Determinants of Health     Financial Resource Strain:     Difficulty of Paying Living Expenses:    Food Insecurity:     Worried About Running Out of Food in the Last Year:     Ran Out of Food in the Last Year:    Transportation Needs:     Lack of Transportation (Medical):      Lack of Transportation (Non-Medical):    Physical Activity:     Days of Exercise per Week:     Minutes of Exercise per Session:    Stress:     Feeling of Stress :    Social Connections:     Frequency of Communication with Friends and Family:     Frequency of Social Gatherings with Friends and Family:     Attends Denominational Services:     Active Member of Clubs or Organizations:     Attends Club or Organization Meetings:     Marital Status:    Intimate Partner Violence:     Fear of Current or Ex-Partner:     Emotionally Abused:     Physically Abused:     Sexually Abused:       Medications and Allergies:     Current Outpatient Medications   Medication Sig Dispense Refill    acetaminophen (TYLENOL) 325 mg tablet Take 2 tablets (650 mg total) by mouth every 6 (six) hours as needed for mild pain 30 tablet 0    albuterol (Ventolin HFA) 90 mcg/act inhaler Inhale 1-2 puffs as needed for wheezing 3 Inhaler 3    Biotin 13953 MCG TABS Take by mouth      budesonide-formoterol (Symbicort) 160-4 5 mcg/act inhaler Inhale 2 puffs 2 (two) times a day 3 Inhaler 3    calcium carbonate 1250 MG capsule Take by mouth      Cholecalciferol (VITAMIN D3) 2000 units capsule Take 4,000 Units by mouth daily        Coenzyme Q10 (CO Q10) 100 MG CAPS Take by mouth      Diclofenac Sodium (VOLTAREN) 1 % Apply 4 g topically 4 (four) times a day 50 g 2    Digestive Enzyme CAPS Take by mouth      lansoprazole (PREVACID) 30 mg capsule Take by mouth      metFORMIN (GLUCOPHAGE) 500 mg tablet Take 1 tablet (500 mg total) by mouth daily with breakfast 90 tablet 1    montelukast (SINGULAIR) 10 mg tablet Take 1 tablet (10 mg total) by mouth daily 90 tablet 3    Probiotic Product (PROBIOTIC-10) CAPS Take by mouth      rosuvastatin (CRESTOR) 5 mg tablet Take 1 tablet (5 mg total) by mouth daily 90 tablet 3    Spiriva Respimat 2 5 MCG/ACT AERS inhaler Inhale 2 puffs daily 3 inhalers 16 g 3     Current Facility-Administered Medications   Medication Dose Route Frequency Provider Last Rate Last Admin    cyanocobalamin injection 1,000 mcg  1,000 mcg Intramuscular Q30 Days Richar Santana MD   1,000 mcg at 05/06/21 1509    cyanocobalamin injection 1,000 mcg  1,000 mcg Intramuscular Q30 Days Richar Santana MD   1,000 mcg at 12/29/20 1353    cyanocobalamin injection 1,000 mcg  1,000 mcg Intramuscular Q30 Days Richar Santana MD   1,000 mcg at 06/03/21 1325     Allergies   Allergen Reactions    Asa [Aspirin]     Celebrex [Celecoxib] Other (See Comments)     Flushing      Ibuprofen GI Intolerance    Nsaids GI Intolerance  Percocet [Oxycodone-Acetaminophen] GI Intolerance      Immunizations:     Immunization History   Administered Date(s) Administered    Hep B, adult 01/24/2012, 02/23/2012, 07/24/2012    INFLUENZA 10/08/2014, 10/26/2015, 10/18/2016    Influenza Quadrivalent Preservative Free 3 years and older IM 10/26/2015, 10/18/2016    Influenza Split High Dose Preservative Free IM 09/19/2017    Influenza, high dose seasonal 0 7 mL 10/09/2018, 11/20/2019, 10/28/2020    Influenza, seasonal, injectable 10/11/2012, 10/08/2014    Pneumococcal Conjugate 13-Valent 10/09/2018    Pneumococcal Polysaccharide PPV23 07/26/2012, 07/26/2012    SARS-CoV-2 / COVID-19 mRNA IM (Moderna) 01/11/2021, 02/08/2021    Zoster 02/14/2018    Zoster Vaccine Recombinant 03/08/2020, 09/28/2020      Health Maintenance:         Topic Date Due    Colorectal Cancer Screening  10/11/2020    Hepatitis C Screening  Discontinued         Topic Date Due    Influenza Vaccine (1) 09/01/2021      Medicare Health Risk Assessment:     LMP  (LMP Unknown)      Addison Form is here for her Subsequent Wellness visit  Health Risk Assessment:   Patient rates overall health as good  Patient feels that their physical health rating is same  Patient is satisfied with their life  Eyesight was rated as same  Hearing was rated as same  Patient feels that their emotional and mental health rating is same  Patients states they are never, rarely angry  Patient states they are never, rarely unusually tired/fatigued  Pain experienced in the last 7 days has been none  Patient states that she has experienced no weight loss or gain in last 6 months  Fall Risk Screening: In the past year, patient has experienced: no history of falling in past year      Urinary Incontinence Screening:   Patient has not leaked urine accidently in the last six months  Home Safety:  Patient has trouble with stairs inside or outside of their home   Patient has working smoke alarms and has working carbon monoxide detector  Home safety hazards include: none  Nutrition:   Current diet is Diabetic and Regular  Medications:   Patient is currently taking over-the-counter supplements  OTC medications include: see medication list  Patient is able to manage medications  Activities of Daily Living (ADLs)/Instrumental Activities of Daily Living (IADLs):   Walk and transfer into and out of bed and chair?: Yes  Dress and groom yourself?: Yes    Bathe or shower yourself?: Yes    Feed yourself? Yes  Do your laundry/housekeeping?: Yes  Manage your money, pay your bills and track your expenses?: Yes  Make your own meals?: Yes    Do your own shopping?: Yes    Previous Hospitalizations:   Any hospitalizations or ED visits within the last 12 months?: No      Advance Care Planning:   Living will: Yes    Durable POA for healthcare: Yes    Advanced directive: Yes    Advanced directive counseling given: Yes    Five wishes given: Yes    End of Life Decisions reviewed with patient: Yes      Cognitive Screening:   Provider or family/friend/caregiver concerned regarding cognition?: No    PREVENTIVE SCREENINGS      Cardiovascular Screening:    General: Screening Not Indicated and History Lipid Disorder      Diabetes Screening:     General: Screening Not Indicated and History Diabetes      Breast Cancer Screening:     General: Screening Current      Cervical Cancer Screening:    General: Screening Not Indicated      Osteoporosis Screening:    General: Screening Not Indicated and History Osteoporosis      Lung Cancer Screening:     General: Screening Not Indicated      Hepatitis C Screening:    General: Screening Not Indicated and History Hepatitis C    Screening, Brief Intervention, and Referral to Treatment (SBIRT)    Screening  Typical number of drinks in a day: 0  Typical number of drinks in a week: 0  Interpretation: Low risk drinking behavior      Single Item Drug Screening:  How often have you used an illegal drug (including marijuana) or a prescription medication for non-medical reasons in the past year? never    Single Item Drug Screen Score: 0  Interpretation: Negative screen for possible drug use disorder      Luis Haney MD

## 2021-07-07 NOTE — PROGRESS NOTES
Assessment and Plan:    1  Diabetes mellitus -   Hemoglobin A1c is 6 0  This is slightly elevated from previous, but patient states she has been noncompliant with diet  Continue metformin  Follow-up 3-4 months  Recheck hemoglobin A1c at that appointment  2  COPD -   Symptoms are stable  Continue Symbicort and Spiriva  3  Hyperlipidemia -   Lipids are well controlled  Continue Crestor  4  Allergic rhinitis -   Continue Singulair and over-the-counter antihistamines p r n  5  Osteoarthritis right knee -  Status post injection with Orthopedics  Patient to start physical therapy  She is feeling better after the injection  6  Tobaccoism - resistant to stopping or smoking cessation techniques  Labs reviewed at length with patient today  Return to office in 3-4 months  Subjective:      Patient ID: Tato Nelson is a 76 y o  female  CC:    Chief Complaint   Patient presents with    Follow-up     pt here for a follow up  R Cruz  Medicare Wellness Visit       HPI:     1  Review chronic medical conditions   2  Patient saw Dr Chadwick Boucher for right knee pain  Received a cortisone injection and is feeling improved  3  Patient is due for Medicare annual wellness questionnaire today  4  Patient has blood work to review today      The following portions of the patient's history were reviewed and updated as appropriate: allergies, current medications, past family history, past medical history, past social history, past surgical history and problem list       Review of Systems   Constitutional:        See HPI   HENT: Negative for congestion, ear pain, mouth sores, sinus pressure and trouble swallowing  Eyes: Negative for discharge, redness and itching  Respiratory: Negative for apnea, cough, chest tightness, shortness of breath, wheezing and stridor  Cardiovascular: Negative for chest pain, palpitations and leg swelling     Gastrointestinal: Negative for abdominal distention, abdominal pain, blood in stool, constipation, diarrhea, nausea and vomiting  Endocrine: Negative for cold intolerance and heat intolerance  Genitourinary: Negative for difficulty urinating, dysuria, flank pain and urgency  Musculoskeletal: Negative for arthralgias and myalgias  See HPI   Skin: Negative for rash  Neurological: Negative for dizziness, seizures, syncope, speech difficulty, weakness, light-headedness, numbness and headaches  Hematological: Negative for adenopathy  Psychiatric/Behavioral: Negative for agitation, behavioral problems, confusion and sleep disturbance  The patient is not nervous/anxious  Data to review:       Objective:    Vitals:    07/07/21 0948   BP: 128/74   BP Location: Left arm   Patient Position: Sitting   Cuff Size: Large   Pulse: 72   Resp: 16   Weight: 76 5 kg (168 lb 9 6 oz)   Height: 5' 1" (1 549 m)        Physical Exam  Constitutional:       General: She is not in acute distress  Appearance: Normal appearance  She is well-developed and normal weight  She is not ill-appearing, toxic-appearing or diaphoretic  HENT:      Head: Normocephalic and atraumatic  Eyes:      General: No scleral icterus  Conjunctiva/sclera: Conjunctivae normal    Neck:      Vascular: No carotid bruit  Cardiovascular:      Rate and Rhythm: Normal rate and regular rhythm  Heart sounds: Normal heart sounds  No murmur heard  No friction rub  No gallop  Pulmonary:      Effort: Pulmonary effort is normal  No respiratory distress  Breath sounds: Normal breath sounds  No wheezing or rales  Abdominal:      General: Bowel sounds are normal  There is no distension  Palpations: Abdomen is soft  Tenderness: There is no abdominal tenderness  There is no right CVA tenderness, left CVA tenderness or guarding  Hernia: No hernia is present  Musculoskeletal:         General: Normal range of motion        Cervical back: Normal range of motion and neck supple  Right lower leg: No edema  Left lower leg: No edema  Comments: Tender to palpation over right knee, joint space  Lymphadenopathy:      Cervical: No cervical adenopathy  Skin:     General: Skin is warm and dry  Coloration: Skin is not jaundiced  Neurological:      General: No focal deficit present  Mental Status: She is alert and oriented to person, place, and time  Psychiatric:         Mood and Affect: Mood normal          Behavior: Behavior normal          Thought Content:  Thought content normal          Judgment: Judgment normal

## 2021-08-02 ENCOUNTER — APPOINTMENT (OUTPATIENT)
Dept: RADIOLOGY | Facility: MEDICAL CENTER | Age: 76
End: 2021-08-02
Payer: COMMERCIAL

## 2021-08-02 ENCOUNTER — CONSULT (OUTPATIENT)
Dept: PAIN MEDICINE | Facility: MEDICAL CENTER | Age: 76
End: 2021-08-02
Payer: COMMERCIAL

## 2021-08-02 VITALS
HEIGHT: 61 IN | DIASTOLIC BLOOD PRESSURE: 80 MMHG | BODY MASS INDEX: 32.1 KG/M2 | WEIGHT: 170 LBS | SYSTOLIC BLOOD PRESSURE: 128 MMHG | TEMPERATURE: 97.6 F | HEART RATE: 67 BPM

## 2021-08-02 DIAGNOSIS — M46.1 SACROILIITIS (HCC): Primary | ICD-10-CM

## 2021-08-02 DIAGNOSIS — M54.50 CHRONIC BILATERAL LOW BACK PAIN WITHOUT SCIATICA: ICD-10-CM

## 2021-08-02 DIAGNOSIS — G89.29 CHRONIC BILATERAL LOW BACK PAIN WITHOUT SCIATICA: ICD-10-CM

## 2021-08-02 DIAGNOSIS — M47.816 LUMBAR SPONDYLOSIS: ICD-10-CM

## 2021-08-02 DIAGNOSIS — J44.9 CHRONIC OBSTRUCTIVE PULMONARY DISEASE, UNSPECIFIED COPD TYPE (HCC): ICD-10-CM

## 2021-08-02 PROCEDURE — 72110 X-RAY EXAM L-2 SPINE 4/>VWS: CPT

## 2021-08-02 PROCEDURE — 4004F PT TOBACCO SCREEN RCVD TLK: CPT | Performed by: PHYSICAL MEDICINE & REHABILITATION

## 2021-08-02 PROCEDURE — 99204 OFFICE O/P NEW MOD 45 MIN: CPT | Performed by: PHYSICAL MEDICINE & REHABILITATION

## 2021-08-02 RX ORDER — POLYETHYLENE GLYCOL 3350 17 G/17G
17 POWDER, FOR SOLUTION ORAL DAILY
COMMUNITY

## 2021-08-02 RX ORDER — BUDESONIDE AND FORMOTEROL FUMARATE DIHYDRATE 160; 4.5 UG/1; UG/1
2 AEROSOL RESPIRATORY (INHALATION) 2 TIMES DAILY
Qty: 10.2 G | Refills: 5 | Status: SHIPPED | OUTPATIENT
Start: 2021-08-02 | End: 2021-08-03 | Stop reason: SDUPTHER

## 2021-08-02 NOTE — PATIENT INSTRUCTIONS
Sacroiliitis   WHAT YOU NEED TO KNOW:   Sacroiliitis is a painful swelling of one or both of your sacroiliac joints that lasts at least 3 months  The sacroiliac joint connects your pelvis to the base of your spine  DISCHARGE INSTRUCTIONS:   Medicines:  Ask for more information about these and other medicines you may need to treat sacroiliitis:  · Pain medicine: You may be given medicine to take away or decrease pain  Do not wait until the pain is severe before you take your medicine  You may be given the medicine as a pill to swallow or as a lotion that you put on the painful area  · NSAIDs  help decrease swelling and pain or fever  This medicine is available with or without a doctor's order  NSAIDs can cause stomach bleeding or kidney problems in certain people  If you take blood thinner medicine, always ask your healthcare provider if NSAIDs are safe for you  Always read the medicine label and follow directions  · Muscle relaxers  help decrease pain and muscle spasms  · Take your medicine as directed  Contact your healthcare provider if you think your medicine is not helping or if you have side effects  Tell him of her if you are allergic to any medicine  Keep a list of the medicines, vitamins, and herbs you take  Include the amounts, and when and why you take them  Bring the list or the pill bottles to follow-up visits  Carry your medicine list with you in case of an emergency  Physical therapy:  Your healthcare provider may suggest physical therapy  Your physical therapist may teach you exercises to improve posture (the way you stand and sit), flexibility, and strength in your lower back  He may also teach you how to remain safely active and avoid further injury  Follow the exercise plan given to you by your physical therapist   Rest:  Follow your healthcare provider's instructions about how much rest you should get  Avoid activity that worsens your pain    Ice or heat packs:  Use ice or heat packs on the sore area of your body to decrease the pain and swelling  Put ice in a plastic bag covered with a towel on your low back  Cover heated items with a towel to avoid burns  Use ice and heat as directed  Follow up with your healthcare provider or spine specialist within 1 to 2 weeks:  Write down your questions so you remember to ask them during your visits  Contact your healthcare provider or spine specialist if:   · Your pain makes it hard for you to do your daily activities, such as work or school  · Your pain does not go away after treatment  · You feel depressed or anxious  · You have questions about your condition or care  Return to the emergency department if:   · You have a fever  · Your pain is worse than before  · Your pain prevents you from sleeping  © Copyright [employer] 2021 Information is for End User's use only and may not be sold, redistributed or otherwise used for commercial purposes  All illustrations and images included in CareNotes® are the copyrighted property of A [employer] A M [employer]  or [facility] Renard Renner   The above information is an  only  It is not intended as medical advice for individual conditions or treatments  Talk to your doctor, nurse or pharmacist before following any medical regimen to see if it is safe and effective for you

## 2021-08-02 NOTE — PROGRESS NOTES
Assessment  1  Sacroiliitis ([facility] [place] [address] ) - Bilateral    2  Chronic bilateral low back pain without sciatica    3  Lumbar spondylosis        Plan  1  At this time we will obtain lumbar spine x-rays  2  I did have an in depth conversation with the patient today  Clearly, the patient's pain has persisted despite time, relative rest, activity modification as well as therapy  The patient's examination and symptoms would suggest an underlying sacroiliac joint etiology  I would recommend proceeding with bilateral intra-articular sacroiliac joint injection under fluoroscopic guidance  The injection will serve both diagnostic and hopefully therapeutic roles for the patient  In the office today, we reviewed the nature of sacroiliac joint pathology in depth using a spine model  We discussed the approach we would use for the sacroiliac joint injection and provided literature for home review  Complete risks and benefits including bleeding, infection, tissue reaction, allergic reaction were reviewed and verbal and written consent was obtained  3  If this fails to provide relief would recommend trialing bilateral L3-5 medial branch nerve blocks  4  If she continues to have significant pain without any significant improvement to interventional approaches would obtain lumbar spine MRI  My impressions and treatment recommendations were discussed in detail with the patient who verbalized understanding and had no further questions  Discharge instructions were provided  I personally saw and examined the patient and I agree with the above discussed plan of care  Orders Placed This Encounter   Procedures    FL spine and pain procedure     Standing Status:   Future     Standing Expiration Date:   8/2/2022     Order Specific Question:   Reason for Exam:     Answer:   (B) SIJ injection     Order Specific Question:   Anticoagulant hold needed?      Answer:   no    X-ray lumbar spine complete 4+ views     Standing Status:   Future     Standing Expiration Date:   8/2/2025     Scheduling Instructions:      Bring along any outside films relating to this procedure  New Medications Ordered This Visit   Medications    polyethylene glycol (MiraLax) 17 GM/SCOOP powder     Sig: Take 17 g by mouth daily    Multiple Vitamins-Minerals (Hair Skin and Nails Formula) TABS     Sig: Take by mouth       History of Present Illness    Carl Bright is a 76 y o  female seen in consultation at the request of Dr Laura Rubio regarding chronic lower back and buttock pain consistent with sacroiliac mediated pain  The patient has been experiencing symptoms since a roller skating injury in 1983 where she fell and had significant trauma requiring surgery as well as multiple fractures of her arm  She is describing moderate to severe intensity pain currently rated as a 7/10 which is constant without any typical pattern characterized as cramping, sharp, pressure-like, dull, aching  She does have some weakness in the lower extremities as well and occasionally uses a cane for ambulation  Aggravating factors include standing, bending, walking, bowel movements  Alleviating factors include lying down, sitting, exercise, relaxation  No recent diagnostic imaging is available for review  She has had chronic continuous treatment with physical therapy and chiropractic care  She has had injections in the past which provided some moderate relief  She has tried traction, physical therapy, exercise, osteopathic manipulation, heat or ice application, acupuncture, 10s unit, and chiropractic manipulation all with moderate relief  She does smoke tobacco but denies marijuana or alcohol use  Currently using acetaminophen for pain relief  She has tried oral steroids in the past which also provided some benefit  Also using topical Biofreeze currently with some mild improvement as well      I have personally reviewed and/or updated the patient's past medical history, past surgical history, family history, social history, current medications, allergies, and vital signs today  Review of Systems   Constitutional: Negative for fever and unexpected weight change  HENT: Negative for trouble swallowing  Eyes: Negative for visual disturbance  Respiratory: Positive for wheezing  Negative for shortness of breath  Cardiovascular: Negative for chest pain and palpitations  Gastrointestinal: Negative for constipation, diarrhea, nausea and vomiting  Endocrine: Negative for cold intolerance, heat intolerance and polydipsia  Genitourinary: Negative for difficulty urinating and frequency  Musculoskeletal: Positive for back pain, gait problem, joint swelling and myalgias  Negative for arthralgias  Skin: Negative for rash  Neurological: Negative for dizziness, seizures, syncope, weakness and headaches  Hematological: Does not bruise/bleed easily  Psychiatric/Behavioral: Negative for dysphoric mood  All other systems reviewed and are negative        Patient Active Problem List   Diagnosis    Vitamin D deficiency    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    Tobacco abuse    Osteoporosis    Lumbosacral pain, chronic    Hyperlipidemia    Coronary artery calcification    COPD (chronic obstructive pulmonary disease) (HCC)    Controlled diabetes mellitus type II without complication (HCC)    Holly's esophagus    Allergic rhinitis    History of colonic polyps    Irritable bowel syndrome with both constipation and diarrhea    Lateral epicondylitis    Low vitamin B12 level    Paget's bone disease    Osteoarthritis    Chronic constipation       Past Medical History:   Diagnosis Date    Arthritis     Diabetes mellitus ([facility] [address] )     Emphysema of lung (HCC)     GERD (gastroesophageal reflux disease)     Lyme disease     Osteoporosis     Polymyalgia rheumatica ([facility] [address] )     last assessed 2/15/17    Reactive airway disease     last assessed 6/9/16    Sciatica        Past Surgical History:   Procedure Laterality Date    BREAST BIOPSY Left     HYSTERECTOMY      1987, total    ORTHOPEDIC SURGERY         Family History   Problem Relation Age of Onset    Thyroid cancer Mother 48    COPD Father         mild    Hypertension Father     Breast cancer Maternal Aunt 54    No Known Problems Maternal Grandmother     Colon cancer Maternal Grandfather     No Known Problems Paternal Grandmother     No Known Problems Paternal Grandfather     No Known Problems Maternal Aunt     No Known Problems Paternal Aunt        Social History     Occupational History    Occupation: retired   Tobacco Use    Smoking status: Current Every Day Smoker     Packs/day: 0 50     Types: Cigarettes    Smokeless tobacco: Current User   Vaping Use    Vaping Use: Never used   Substance and Sexual Activity    Alcohol use: Never    Drug use: No    Sexual activity: Yes       Current Outpatient Medications on File Prior to Visit   Medication Sig    acetaminophen (TYLENOL) 325 mg tablet Take 2 tablets (650 mg total) by mouth every 6 (six) hours as needed for mild pain    Biotin 82773 MCG TABS Take by mouth    Cholecalciferol (VITAMIN D3) 2000 units capsule Take 4,000 Units by mouth daily      Coenzyme Q10 (CO Q10) 100 MG CAPS Take by mouth    Diclofenac Sodium (VOLTAREN) 1 % Apply 4 g topically 4 (four) times a day    Digestive Enzyme CAPS Take by mouth    lansoprazole (PREVACID) 30 mg capsule Take by mouth    metFORMIN (GLUCOPHAGE) 500 mg tablet Take 1 tablet (500 mg total) by mouth daily with breakfast    montelukast (SINGULAIR) 10 mg tablet Take 1 tablet (10 mg total) by mouth daily    Multiple Vitamins-Minerals (Hair Skin and Nails Formula) TABS Take by mouth    polyethylene glycol (MiraLax) 17 GM/SCOOP powder Take 17 g by mouth daily    Probiotic Product (PROBIOTIC-10) CAPS Take by mouth    rosuvastatin (CRESTOR) 5 mg tablet Take 1 tablet (5 mg total) by mouth daily    Spiriva Respimat 2 5 MCG/ACT AERS inhaler Inhale 2 puffs daily 3 inhalers    albuterol (Ventolin HFA) 90 mcg/act inhaler Inhale 1-2 puffs as needed for wheezing (Patient not taking: Reported on 8/2/2021)    calcium carbonate 1250 MG capsule Take by mouth (Patient not taking: Reported on 8/2/2021)    [DISCONTINUED] budesonide-formoterol (Symbicort) 160-4 5 mcg/act inhaler Inhale 2 puffs 2 (two) times a day     Current Facility-Administered Medications on File Prior to Visit   Medication    cyanocobalamin injection 1,000 mcg    cyanocobalamin injection 1,000 mcg    cyanocobalamin injection 1,000 mcg       Allergies   Allergen Reactions    Asa [Aspirin]     Celebrex [Celecoxib] Other (See Comments)     Flushing      Ibuprofen GI Intolerance    Nsaids GI Intolerance    Percocet [Oxycodone-Acetaminophen] GI Intolerance       Physical Exam    /80   Pulse 67   Temp 97 6 °F (36 4 °C)   Ht 5' 1" (1 549 m)   Wt 77 1 kg (170 lb)   LMP  (LMP Unknown)   BMI 32 12 kg/m²     LUMBAR  General: Well-developed, well-nourished individual in mild to moderate acute distress  Mental: Appropriate mood and affect  Grossly oriented with coherent speech and thought processing   Neuro:  Cranial nerves: Cranial nerve function is grossly intact bilaterally   Strength: Bilateral lower extremity strength is normal and symmetric   No atrophy or tone abnormalities noted   Reflexes: Bilateral lower extremity muscle stretch reflexes are physiologic and symmetric   No ankle clonus is noted   Sensation: No loss of sensation is noted   SLR/Foraminal Compression Maneuvers: Straight leg raising in the sitting position is negative for radicular pain       Gait:  Gait/gross motor: Gait is normal  Station is normal  Toe walking, heel walking  are normal     Musculoskeletal:  Palpation: Inspection and palpation of the spine and extremities are unremarkable except for tenderness to palpation over the sacroiliac joints bilaterally reproducing her pain complaint  Spine: Normal pain-free range of motion except for lumbar extension which is slightly limited in reproduces some axial lower back pain  No gross axial skeletal deformities   Sacroiliac joint testing is positive bilaterally for the following tests:   + Don finger sign   + Posterior pelvic pain provocation   + Sacroiliac joint compression test    Skin: Skin inspection grossly negative for erythema, breakdown, or concerning lesions in affected area   Lymph: No lymphadenopathy is appreciated in the involved extremity   Vessels: No lower extremity edema   Lungs: Breathing is comfortable and regular  No dyspnea noted during examination   Eyes: Visual field grossly intact to confrontation  No redness appreciated  ENT: No craniofacial deformities or asymmetry  No neck masses appreciated           Imaging

## 2021-08-03 DIAGNOSIS — J44.9 CHRONIC OBSTRUCTIVE PULMONARY DISEASE, UNSPECIFIED COPD TYPE (HCC): ICD-10-CM

## 2021-08-03 RX ORDER — BUDESONIDE AND FORMOTEROL FUMARATE DIHYDRATE 160; 4.5 UG/1; UG/1
2 AEROSOL RESPIRATORY (INHALATION) 2 TIMES DAILY
Qty: 30.6 G | Refills: 3 | Status: SHIPPED | OUTPATIENT
Start: 2021-08-03

## 2021-08-04 ENCOUNTER — CLINICAL SUPPORT (OUTPATIENT)
Dept: FAMILY MEDICINE CLINIC | Facility: CLINIC | Age: 76
End: 2021-08-04
Payer: COMMERCIAL

## 2021-08-04 DIAGNOSIS — E55.9 VITAMIN D DEFICIENCY: Primary | ICD-10-CM

## 2021-08-04 RX ADMIN — CYANOCOBALAMIN 1000 MCG: 1000 INJECTION INTRAMUSCULAR; SUBCUTANEOUS at 13:55

## 2021-08-04 NOTE — PROGRESS NOTES
Yaya Jimenez is in the office today to receive B12 injection/vaccine  Pt handled the procedure well with no complaints and was able to leave the office in no distress

## 2021-08-09 ENCOUNTER — TELEPHONE (OUTPATIENT)
Dept: PAIN MEDICINE | Facility: MEDICAL CENTER | Age: 76
End: 2021-08-09

## 2021-08-09 NOTE — TELEPHONE ENCOUNTER
----- Message from Francis Ramey DO sent at 8/9/2021  7:56 AM EDT -----  Mild facet arthropathy is present L4-5 and L5-S1

## 2021-08-09 NOTE — TELEPHONE ENCOUNTER
Left a detailed message as per CARMEL advising pt the same  Confirmed next appt  C/B # provided for any questions

## 2021-09-01 ENCOUNTER — CLINICAL SUPPORT (OUTPATIENT)
Dept: FAMILY MEDICINE CLINIC | Facility: CLINIC | Age: 76
End: 2021-09-01
Payer: COMMERCIAL

## 2021-09-01 DIAGNOSIS — E53.8 LOW VITAMIN B12 LEVEL: Primary | ICD-10-CM

## 2021-09-01 RX ORDER — CYANOCOBALAMIN 1000 UG/ML
1000 INJECTION INTRAMUSCULAR; SUBCUTANEOUS
Status: SHIPPED | OUTPATIENT
Start: 2021-09-01

## 2021-09-01 RX ADMIN — CYANOCOBALAMIN 1000 MCG: 1000 INJECTION INTRAMUSCULAR; SUBCUTANEOUS at 13:37

## 2021-09-07 ENCOUNTER — HOSPITAL ENCOUNTER (OUTPATIENT)
Dept: RADIOLOGY | Facility: MEDICAL CENTER | Age: 76
Discharge: HOME | End: 2021-09-07
Attending: PHYSICAL MEDICINE & REHABILITATION
Payer: COMMERCIAL

## 2021-09-07 VITALS
TEMPERATURE: 97.5 F | RESPIRATION RATE: 18 BRPM | DIASTOLIC BLOOD PRESSURE: 73 MMHG | HEART RATE: 71 BPM | SYSTOLIC BLOOD PRESSURE: 126 MMHG | OXYGEN SATURATION: 92 %

## 2021-09-07 DIAGNOSIS — M46.1 SACROILIITIS (HCC): ICD-10-CM

## 2021-09-07 DIAGNOSIS — M54.50 CHRONIC BILATERAL LOW BACK PAIN WITHOUT SCIATICA: ICD-10-CM

## 2021-09-07 DIAGNOSIS — G89.29 CHRONIC BILATERAL LOW BACK PAIN WITHOUT SCIATICA: ICD-10-CM

## 2021-09-07 PROCEDURE — 27096 INJECT SACROILIAC JOINT: CPT | Performed by: PHYSICAL MEDICINE & REHABILITATION

## 2021-09-07 RX ORDER — BUPIVACAINE HCL/PF 2.5 MG/ML
10 VIAL (ML) INJECTION ONCE
Status: COMPLETED | OUTPATIENT
Start: 2021-09-07 | End: 2021-09-07

## 2021-09-07 RX ORDER — METHYLPREDNISOLONE ACETATE 40 MG/ML
80 INJECTION, SUSPENSION INTRA-ARTICULAR; INTRALESIONAL; INTRAMUSCULAR; PARENTERAL; SOFT TISSUE ONCE
Status: COMPLETED | OUTPATIENT
Start: 2021-09-07 | End: 2021-09-07

## 2021-09-07 RX ADMIN — IOHEXOL 1 ML: 300 INJECTION, SOLUTION INTRAVENOUS at 10:42

## 2021-09-07 RX ADMIN — METHYLPREDNISOLONE ACETATE 80 MG: 40 INJECTION, SUSPENSION INTRA-ARTICULAR; INTRALESIONAL; INTRAMUSCULAR; SOFT TISSUE at 10:43

## 2021-09-07 RX ADMIN — Medication 3 ML: at 10:43

## 2021-09-07 NOTE — DISCHARGE INSTRUCTIONS

## 2021-09-14 ENCOUNTER — TELEPHONE (OUTPATIENT)
Dept: PAIN MEDICINE | Facility: CLINIC | Age: 76
End: 2021-09-14

## 2021-09-14 NOTE — TELEPHONE ENCOUNTER
1st attempt  Lm for patient to call back with % improvement and pain level.    Unable to reach pt.

## 2021-09-29 ENCOUNTER — CLINICAL SUPPORT (OUTPATIENT)
Dept: FAMILY MEDICINE CLINIC | Facility: CLINIC | Age: 76
End: 2021-09-29
Payer: COMMERCIAL

## 2021-09-29 DIAGNOSIS — E53.8 B12 DEFICIENCY: Primary | ICD-10-CM

## 2021-09-29 RX ADMIN — CYANOCOBALAMIN 1000 MCG: 1000 INJECTION INTRAMUSCULAR; SUBCUTANEOUS at 13:33

## 2021-10-08 ENCOUNTER — OFFICE VISIT (OUTPATIENT)
Dept: PAIN MEDICINE | Facility: MEDICAL CENTER | Age: 76
End: 2021-10-08
Payer: COMMERCIAL

## 2021-10-08 VITALS
HEART RATE: 76 BPM | WEIGHT: 176 LBS | BODY MASS INDEX: 33.23 KG/M2 | HEIGHT: 61 IN | TEMPERATURE: 98.2 F | DIASTOLIC BLOOD PRESSURE: 70 MMHG | SYSTOLIC BLOOD PRESSURE: 122 MMHG

## 2021-10-08 DIAGNOSIS — G89.4 CHRONIC PAIN SYNDROME: Primary | ICD-10-CM

## 2021-10-08 DIAGNOSIS — M47.816 LUMBAR FACET ARTHROPATHY: ICD-10-CM

## 2021-10-08 DIAGNOSIS — M54.50 LUMBOSACRAL PAIN, CHRONIC: ICD-10-CM

## 2021-10-08 DIAGNOSIS — M46.1 SACROILIITIS (HCC): ICD-10-CM

## 2021-10-08 DIAGNOSIS — G89.29 LUMBOSACRAL PAIN, CHRONIC: ICD-10-CM

## 2021-10-08 PROCEDURE — 1160F RVW MEDS BY RX/DR IN RCRD: CPT | Performed by: NURSE PRACTITIONER

## 2021-10-08 PROCEDURE — 4004F PT TOBACCO SCREEN RCVD TLK: CPT | Performed by: NURSE PRACTITIONER

## 2021-10-08 PROCEDURE — 99213 OFFICE O/P EST LOW 20 MIN: CPT | Performed by: NURSE PRACTITIONER

## 2021-10-11 ENCOUNTER — TELEPHONE (OUTPATIENT)
Dept: PAIN MEDICINE | Facility: MEDICAL CENTER | Age: 76
End: 2021-10-11

## 2021-10-28 ENCOUNTER — CLINICAL SUPPORT (OUTPATIENT)
Dept: FAMILY MEDICINE CLINIC | Facility: CLINIC | Age: 76
End: 2021-10-28
Payer: COMMERCIAL

## 2021-10-28 DIAGNOSIS — E53.8 VITAMIN B12 DEFICIENCY (NON ANEMIC): Primary | ICD-10-CM

## 2021-10-28 PROCEDURE — 96372 THER/PROPH/DIAG INJ SC/IM: CPT

## 2021-10-28 RX ADMIN — CYANOCOBALAMIN 1000 MCG: 1000 INJECTION INTRAMUSCULAR; SUBCUTANEOUS at 13:32

## 2021-11-04 ENCOUNTER — RA CDI HCC (OUTPATIENT)
Dept: OTHER | Facility: HOSPITAL | Age: 76
End: 2021-11-04

## 2021-11-10 ENCOUNTER — OFFICE VISIT (OUTPATIENT)
Dept: FAMILY MEDICINE CLINIC | Facility: CLINIC | Age: 76
End: 2021-11-10
Payer: COMMERCIAL

## 2021-11-10 VITALS
BODY MASS INDEX: 33.11 KG/M2 | DIASTOLIC BLOOD PRESSURE: 68 MMHG | TEMPERATURE: 96.5 F | SYSTOLIC BLOOD PRESSURE: 128 MMHG | WEIGHT: 175.38 LBS | HEIGHT: 61 IN

## 2021-11-10 DIAGNOSIS — E11.9 CONTROLLED TYPE 2 DIABETES MELLITUS WITHOUT COMPLICATION, WITHOUT LONG-TERM CURRENT USE OF INSULIN (HCC): ICD-10-CM

## 2021-11-10 DIAGNOSIS — M54.40 BACK PAIN OF LUMBOSACRAL REGION WITH SCIATICA: Primary | ICD-10-CM

## 2021-11-10 DIAGNOSIS — J44.9 CHRONIC OBSTRUCTIVE PULMONARY DISEASE, UNSPECIFIED COPD TYPE (HCC): ICD-10-CM

## 2021-11-10 DIAGNOSIS — Z23 ENCOUNTER FOR IMMUNIZATION: ICD-10-CM

## 2021-11-10 DIAGNOSIS — K22.70 BARRETT'S ESOPHAGUS WITHOUT DYSPLASIA: ICD-10-CM

## 2021-11-10 DIAGNOSIS — K59.09 CHRONIC CONSTIPATION: ICD-10-CM

## 2021-11-10 PROCEDURE — 4004F PT TOBACCO SCREEN RCVD TLK: CPT | Performed by: FAMILY MEDICINE

## 2021-11-10 PROCEDURE — G0008 ADMIN INFLUENZA VIRUS VAC: HCPCS | Performed by: FAMILY MEDICINE

## 2021-11-10 PROCEDURE — 90662 IIV NO PRSV INCREASED AG IM: CPT | Performed by: FAMILY MEDICINE

## 2021-11-10 PROCEDURE — 99215 OFFICE O/P EST HI 40 MIN: CPT | Performed by: FAMILY MEDICINE

## 2021-11-10 PROCEDURE — 1160F RVW MEDS BY RX/DR IN RCRD: CPT | Performed by: FAMILY MEDICINE

## 2021-11-30 ENCOUNTER — CLINICAL SUPPORT (OUTPATIENT)
Dept: FAMILY MEDICINE CLINIC | Facility: CLINIC | Age: 76
End: 2021-11-30
Payer: COMMERCIAL

## 2021-11-30 DIAGNOSIS — E11.8 TYPE 2 DIABETES MELLITUS WITH COMPLICATION, WITH LONG-TERM CURRENT USE OF INSULIN (HCC): ICD-10-CM

## 2021-11-30 DIAGNOSIS — E53.8 LOW VITAMIN B12 LEVEL: Primary | ICD-10-CM

## 2021-11-30 DIAGNOSIS — Z79.4 TYPE 2 DIABETES MELLITUS WITH COMPLICATION, WITH LONG-TERM CURRENT USE OF INSULIN (HCC): ICD-10-CM

## 2021-11-30 RX ADMIN — CYANOCOBALAMIN 1000 MCG: 1000 INJECTION INTRAMUSCULAR; SUBCUTANEOUS at 14:25

## 2021-12-16 ENCOUNTER — TELEPHONE (OUTPATIENT)
Dept: FAMILY MEDICINE CLINIC | Facility: CLINIC | Age: 76
End: 2021-12-16

## 2021-12-16 DIAGNOSIS — M47.816 LUMBAR FACET ARTHROPATHY: ICD-10-CM

## 2021-12-16 DIAGNOSIS — M54.32 SCIATICA OF LEFT SIDE: ICD-10-CM

## 2021-12-16 DIAGNOSIS — G89.29 LUMBOSACRAL PAIN, CHRONIC: ICD-10-CM

## 2021-12-16 DIAGNOSIS — M54.50 LUMBOSACRAL PAIN, CHRONIC: ICD-10-CM

## 2021-12-16 DIAGNOSIS — M46.1 SACROILIITIS (HCC): Primary | ICD-10-CM

## 2022-01-04 ENCOUNTER — CLINICAL SUPPORT (OUTPATIENT)
Dept: FAMILY MEDICINE CLINIC | Facility: CLINIC | Age: 77
End: 2022-01-04
Payer: COMMERCIAL

## 2022-01-04 DIAGNOSIS — E78.00 PURE HYPERCHOLESTEROLEMIA: ICD-10-CM

## 2022-01-04 DIAGNOSIS — E53.8 LOW VITAMIN B12 LEVEL: Primary | ICD-10-CM

## 2022-01-04 RX ORDER — ROSUVASTATIN CALCIUM 5 MG/1
5 TABLET, COATED ORAL DAILY
Qty: 90 TABLET | Refills: 3 | Status: SHIPPED | OUTPATIENT
Start: 2022-01-04

## 2022-01-04 RX ADMIN — CYANOCOBALAMIN 1000 MCG: 1000 INJECTION INTRAMUSCULAR; SUBCUTANEOUS at 09:00

## 2022-01-04 NOTE — PROGRESS NOTES
Pt presents in office for b12 1,000 mcg given every 30 days  Pt will scheduled next b12 at checkout, pt tolerated well

## 2022-01-05 ENCOUNTER — RA CDI HCC (OUTPATIENT)
Dept: OTHER | Facility: HOSPITAL | Age: 77
End: 2022-01-05

## 2022-01-05 NOTE — PROGRESS NOTES
[facility]  coding opportunities          Number of diagnosis code(s) already on the problem list added to FYI flag: 3               Number of suggestions used: 2      Number of suggestions NOT actually used: 1     Patients insurance company: [address]  (Medicare Advantage and Commercial)     Visit status: Patient arrived for their scheduled appointment        [facility]  coding opportunities          Number of diagnosis code(s) already on the problem list added to FYI flag: 3     Based on clinical documentation indicated in your record, it appears that the patient may have the following conditions     E11 9  R9NY without complication ([facility] [address] )  X57 1 COPD ([facility] [address] )  M46 1 Sacroiliitis ([facility] [address] )      If this is correct, please document and assess at your next visit   1/11/22                      Patients insurance company: [address]  (Medicare Advantage and Commercial)

## 2022-01-11 ENCOUNTER — TELEPHONE (OUTPATIENT)
Dept: ADMINISTRATIVE | Facility: OTHER | Age: 77
End: 2022-01-11

## 2022-01-11 ENCOUNTER — OFFICE VISIT (OUTPATIENT)
Dept: FAMILY MEDICINE CLINIC | Facility: CLINIC | Age: 77
End: 2022-01-11
Payer: COMMERCIAL

## 2022-01-11 VITALS
HEART RATE: 74 BPM | DIASTOLIC BLOOD PRESSURE: 74 MMHG | WEIGHT: 171 LBS | BODY MASS INDEX: 32.28 KG/M2 | SYSTOLIC BLOOD PRESSURE: 110 MMHG | OXYGEN SATURATION: 96 % | HEIGHT: 61 IN

## 2022-01-11 DIAGNOSIS — M81.0 AGE-RELATED OSTEOPOROSIS WITHOUT CURRENT PATHOLOGICAL FRACTURE: ICD-10-CM

## 2022-01-11 DIAGNOSIS — S00.83XS: ICD-10-CM

## 2022-01-11 DIAGNOSIS — E11.8 TYPE 2 DIABETES MELLITUS WITH COMPLICATION, WITH LONG-TERM CURRENT USE OF INSULIN (HCC): ICD-10-CM

## 2022-01-11 DIAGNOSIS — M46.1 SACROILIITIS (HCC): ICD-10-CM

## 2022-01-11 DIAGNOSIS — M67.959 TENDINOPATHY OF GLUTEUS MEDIUS: ICD-10-CM

## 2022-01-11 DIAGNOSIS — G57.03 PIRIFORMIS SYNDROME OF BOTH SIDES: Primary | ICD-10-CM

## 2022-01-11 DIAGNOSIS — Z79.4 TYPE 2 DIABETES MELLITUS WITH COMPLICATION, WITH LONG-TERM CURRENT USE OF INSULIN (HCC): ICD-10-CM

## 2022-01-11 DIAGNOSIS — E11.9 CONTROLLED TYPE 2 DIABETES MELLITUS WITHOUT COMPLICATION, WITHOUT LONG-TERM CURRENT USE OF INSULIN (HCC): ICD-10-CM

## 2022-01-11 LAB
CREAT UR-MCNC: 124 MG/DL
MICROALBUMIN UR-MCNC: 14.3 MG/L (ref 0–20)
MICROALBUMIN/CREAT 24H UR: 12 MG/G CREATININE (ref 0–30)
SL AMB POCT HEMOGLOBIN AIC: 6 (ref ?–6.5)

## 2022-01-11 PROCEDURE — 83036 HEMOGLOBIN GLYCOSYLATED A1C: CPT | Performed by: FAMILY MEDICINE

## 2022-01-11 PROCEDURE — 1003F LEVEL OF ACTIVITY ASSESS: CPT | Performed by: FAMILY MEDICINE

## 2022-01-11 PROCEDURE — 82043 UR ALBUMIN QUANTITATIVE: CPT | Performed by: FAMILY MEDICINE

## 2022-01-11 PROCEDURE — 4004F PT TOBACCO SCREEN RCVD TLK: CPT | Performed by: FAMILY MEDICINE

## 2022-01-11 PROCEDURE — 99214 OFFICE O/P EST MOD 30 MIN: CPT | Performed by: FAMILY MEDICINE

## 2022-01-11 PROCEDURE — 82570 ASSAY OF URINE CREATININE: CPT | Performed by: FAMILY MEDICINE

## 2022-01-11 PROCEDURE — 1160F RVW MEDS BY RX/DR IN RCRD: CPT | Performed by: FAMILY MEDICINE

## 2022-01-11 NOTE — PROGRESS NOTES
Assessment and Plan:      1  Piriformis syndrome and gluteus medius tendinitis - review patient's MRI and symptoms  I suspect her symptoms are muscular in nature and she will benefit from directed PT to address her tight musculature and relieve impingement on the sciatic nerves, bilaterally  She was given a few exercise in the office to try for her piriformis  1  Piriformis syndrome / gluteus medius tendinopathy - clinical exam consistent with overuse syndrome and chronic tight musculature bilaterally  Refer for physical therapy  Encouraged patient to stay on a good therapy program as this will take a while to resolve  2  Sacroiliitis - related to #1  Tight muscles are pulling on sacroiliac joint  Physical therapy as above  3  Trauma to R cheek - patient sustained an injury when a bottle of sample fell and hit her on her right cheek several weeks ago  She is referred to ENT for full eval and treatment  4   Osteoporosis - discussed possibly starting Prolia  Patient prefers to wait until her back issues are starting to improve  5  Diabetes mellitus - hemoglobin A1c remains stable at 6 0  Continue metformin and increase to b i d ; send urine for microalbumin    F/U 3-4 months  Subjective:      Patient ID: Yessy Haley is a 68 y o  female  CC:    Chief Complaint   Patient presents with    Follow-up     Routine Follow up  kw    Diabetes       HPI:    Patient presents today to follow-up on chronic medical conditions  She would like to review her MRI  She is due for hemoglobin A1c and urine microalbumin  She notes continued pain in lower back, buttocks and posterior thighs  She is currently in physical therapy but would like to continue        The following portions of the patient's history were reviewed and updated as appropriate: allergies, current medications, past family history, past medical history, past social history, past surgical history and problem list       Review of Systems   Constitutional:        See HPI   HENT: Negative for congestion, ear pain, mouth sores, sinus pressure and trouble swallowing  Eyes: Negative for discharge, redness and itching  Respiratory: Negative for apnea, cough, chest tightness, shortness of breath, wheezing and stridor  Cardiovascular: Negative for chest pain, palpitations and leg swelling  Gastrointestinal: Negative for abdominal distention, abdominal pain, blood in stool, constipation, diarrhea, nausea and vomiting  Endocrine: Negative for cold intolerance and heat intolerance  Genitourinary: Negative for difficulty urinating, dysuria, flank pain and urgency  Musculoskeletal: Positive for back pain and myalgias  Negative for arthralgias  Skin: Negative for rash  Neurological: Negative for dizziness, seizures, syncope, speech difficulty, weakness, light-headedness, numbness and headaches  Hematological: Negative for adenopathy  Psychiatric/Behavioral: Negative for agitation, behavioral problems, confusion and sleep disturbance  The patient is not nervous/anxious  Data to review:       Objective:    Vitals:    01/11/22 0924   BP: 110/74   BP Location: Right arm   Patient Position: Sitting   Pulse: 74   SpO2: 96%   Weight: 77 6 kg (171 lb)   Height: 5' 1" (1 549 m)        Physical Exam  Vitals and nursing note reviewed  Constitutional:       General: She is not in acute distress  Appearance: She is well-developed  She is not ill-appearing, toxic-appearing or diaphoretic  Eyes:      General: No scleral icterus  Conjunctiva/sclera: Conjunctivae normal    Neck:      Vascular: No carotid bruit  Cardiovascular:      Rate and Rhythm: Normal rate and regular rhythm  Heart sounds: Normal heart sounds  No murmur heard  No friction rub  No gallop  Pulmonary:      Effort: Pulmonary effort is normal  No respiratory distress  Breath sounds: Normal breath sounds  No wheezing or rales     Musculoskeletal: General: Normal range of motion  Cervical back: Normal range of motion and neck supple  Right lower leg: No edema  Left lower leg: No edema  Comments: Extreme tenderness over piriformis bilaterally  Also tender over SI joint  Range of motion is stable  Sensory intact bilaterally  Gait is within normal limits once she gets started  Lymphadenopathy:      Cervical: No cervical adenopathy  Skin:     General: Skin is warm and dry  Coloration: Skin is not jaundiced  Neurological:      Mental Status: She is alert and oriented to person, place, and time  Psychiatric:         Mood and Affect: Mood normal          Behavior: Behavior normal          Thought Content: Thought content normal          Judgment: Judgment normal            BMI Counseling: Body mass index is 32 31 kg/m²  The BMI is above normal  Nutrition recommendations include decreasing portion sizes, encouraging healthy choices of fruits and vegetables and decreasing fast food intake  Exercise recommendations include exercising 3-5 times per week  Rationale for BMI follow-up plan is due to patient being overweight or obese

## 2022-02-01 ENCOUNTER — CLINICAL SUPPORT (OUTPATIENT)
Dept: FAMILY MEDICINE CLINIC | Facility: CLINIC | Age: 77
End: 2022-02-01
Payer: COMMERCIAL

## 2022-02-01 DIAGNOSIS — E53.8 LOW VITAMIN B12 LEVEL: Primary | ICD-10-CM

## 2022-02-01 RX ADMIN — CYANOCOBALAMIN 1000 MCG: 1000 INJECTION INTRAMUSCULAR; SUBCUTANEOUS at 16:29

## 2022-02-02 DIAGNOSIS — J30.1 ALLERGIC RHINITIS DUE TO POLLEN, UNSPECIFIED SEASONALITY: ICD-10-CM

## 2022-02-02 RX ORDER — MONTELUKAST SODIUM 10 MG/1
10 TABLET ORAL DAILY
Qty: 90 TABLET | Refills: 3 | Status: SHIPPED | OUTPATIENT
Start: 2022-02-02

## 2022-02-09 ENCOUNTER — TELEPHONE (OUTPATIENT)
Dept: FAMILY MEDICINE CLINIC | Facility: CLINIC | Age: 77
End: 2022-02-09

## 2022-02-09 DIAGNOSIS — G57.02: ICD-10-CM

## 2022-02-09 DIAGNOSIS — M62.81 MUSCLE WEAKNESS (GENERALIZED): ICD-10-CM

## 2022-02-09 DIAGNOSIS — M54.10 RADICULITIS: ICD-10-CM

## 2022-02-09 DIAGNOSIS — G57.01 PIRIFORMIS SYNDROME OF RIGHT SIDE: Primary | ICD-10-CM

## 2022-02-09 NOTE — TELEPHONE ENCOUNTER
ERICA FROM [facility] IS ASKING FOR A SCRIPT FOR PHYSICAL THERAPY WITH DX:  G57 01 / G57 02 / Orval Brought, M62 81 / M54 0  SHE NEEDS THAT SCRIPT FAXED BACK TO HER ALONG WITH THE PLAN OF CARE THAT WAS FAXED TO OUR OFFICE FOR SIGNATURE  PLEASE REACH OUT TO ERICA FOR MORE DETAILS  SHE CAN BE REACHED [phone]  I ASKED  ERICA TO REFAX THE FOR FORMS BECAUSE I DID NOT SEE ANYTHING IN MEDIA  SHE WILL REFAX THE PLAN OF CARE FORM FOR SIGNATURE

## 2022-02-10 NOTE — TELEPHONE ENCOUNTER
Rx for PT ordered in chart  Please fax  Form signed this week and should have been faxed back  Please check with Maria Luz    Thank you,  CB

## 2022-03-01 ENCOUNTER — CLINICAL SUPPORT (OUTPATIENT)
Dept: FAMILY MEDICINE CLINIC | Facility: CLINIC | Age: 77
End: 2022-03-01
Payer: COMMERCIAL

## 2022-03-01 DIAGNOSIS — E53.8 LOW VITAMIN B12 LEVEL: Primary | ICD-10-CM

## 2022-03-01 PROCEDURE — 96372 THER/PROPH/DIAG INJ SC/IM: CPT

## 2022-03-01 RX ORDER — CYANOCOBALAMIN 1000 UG/ML
1000 INJECTION INTRAMUSCULAR; SUBCUTANEOUS
Status: SHIPPED | OUTPATIENT
Start: 2022-03-01

## 2022-03-01 RX ADMIN — CYANOCOBALAMIN 1000 MCG: 1000 INJECTION INTRAMUSCULAR; SUBCUTANEOUS at 14:10

## 2022-03-17 LAB
LEFT EYE DIABETIC RETINOPATHY: NORMAL
RIGHT EYE DIABETIC RETINOPATHY: NORMAL
SEVERITY (EYE EXAM): NORMAL

## 2022-03-29 ENCOUNTER — CLINICAL SUPPORT (OUTPATIENT)
Dept: FAMILY MEDICINE CLINIC | Facility: CLINIC | Age: 77
End: 2022-03-29

## 2022-03-29 DIAGNOSIS — E53.8 LOW VITAMIN B12 LEVEL: Primary | ICD-10-CM

## 2022-03-29 NOTE — PROGRESS NOTES
Patient was here today and received her B12 injection  Patient tolerated it well with no complaints or questions

## 2022-05-02 ENCOUNTER — TELEPHONE (OUTPATIENT)
Dept: FAMILY MEDICINE CLINIC | Facility: CLINIC | Age: 77
End: 2022-05-02

## 2022-05-02 DIAGNOSIS — M67.959 TENDINOPATHY OF GLUTEUS MEDIUS: ICD-10-CM

## 2022-05-02 DIAGNOSIS — G57.01 PIRIFORMIS SYNDROME OF RIGHT SIDE: Primary | ICD-10-CM

## 2022-05-02 DIAGNOSIS — G57.02: ICD-10-CM

## 2022-05-02 NOTE — TELEPHONE ENCOUNTER
Shawna from [facility] called in and would like a new script to continue treating the patient's hip and buttock   Shawna can be reached via phone at [phone], and the order can be faxed to [phone]

## 2022-05-10 ENCOUNTER — CLINICAL SUPPORT (OUTPATIENT)
Dept: FAMILY MEDICINE CLINIC | Facility: CLINIC | Age: 77
End: 2022-05-10
Payer: COMMERCIAL

## 2022-05-10 DIAGNOSIS — E53.8 LOW VITAMIN B12 LEVEL: Primary | ICD-10-CM

## 2022-05-10 PROCEDURE — 96372 THER/PROPH/DIAG INJ SC/IM: CPT

## 2022-05-10 RX ADMIN — CYANOCOBALAMIN 1000 MCG: 1000 INJECTION INTRAMUSCULAR; SUBCUTANEOUS at 13:28

## 2022-05-10 NOTE — PROGRESS NOTES
Radha Chavez is in the office today to receive B12 injection/vaccine  Pt handled the procedure well with no complaints and was able to leave the office in no distress

## 2022-05-11 ENCOUNTER — OFFICE VISIT (OUTPATIENT)
Dept: FAMILY MEDICINE CLINIC | Facility: CLINIC | Age: 77
End: 2022-05-11
Payer: COMMERCIAL

## 2022-05-11 VITALS
SYSTOLIC BLOOD PRESSURE: 114 MMHG | WEIGHT: 166 LBS | HEART RATE: 72 BPM | RESPIRATION RATE: 16 BRPM | DIASTOLIC BLOOD PRESSURE: 72 MMHG | HEIGHT: 61 IN | BODY MASS INDEX: 31.34 KG/M2

## 2022-05-11 DIAGNOSIS — M62.81 MUSCLE WEAKNESS: ICD-10-CM

## 2022-05-11 DIAGNOSIS — R11.0 NAUSEA: ICD-10-CM

## 2022-05-11 DIAGNOSIS — E53.8 LOW VITAMIN B12 LEVEL: ICD-10-CM

## 2022-05-11 DIAGNOSIS — G89.29 LUMBOSACRAL PAIN, CHRONIC: ICD-10-CM

## 2022-05-11 DIAGNOSIS — B19.20 HEPATITIS C VIRUS INFECTION WITHOUT HEPATIC COMA, UNSPECIFIED CHRONICITY: ICD-10-CM

## 2022-05-11 DIAGNOSIS — E11.9 CONTROLLED TYPE 2 DIABETES MELLITUS WITHOUT COMPLICATION, WITHOUT LONG-TERM CURRENT USE OF INSULIN (HCC): ICD-10-CM

## 2022-05-11 DIAGNOSIS — J44.9 CHRONIC OBSTRUCTIVE PULMONARY DISEASE, UNSPECIFIED COPD TYPE (HCC): ICD-10-CM

## 2022-05-11 DIAGNOSIS — R53.82 CHRONIC FATIGUE: Primary | ICD-10-CM

## 2022-05-11 DIAGNOSIS — E55.9 VITAMIN D DEFICIENCY: ICD-10-CM

## 2022-05-11 DIAGNOSIS — Z12.31 ENCOUNTER FOR SCREENING MAMMOGRAM FOR BREAST CANCER: ICD-10-CM

## 2022-05-11 DIAGNOSIS — M54.50 LUMBOSACRAL PAIN, CHRONIC: ICD-10-CM

## 2022-05-11 DIAGNOSIS — M81.0 AGE-RELATED OSTEOPOROSIS WITHOUT CURRENT PATHOLOGICAL FRACTURE: ICD-10-CM

## 2022-05-11 DIAGNOSIS — K22.70 BARRETT'S ESOPHAGUS WITHOUT DYSPLASIA: ICD-10-CM

## 2022-05-11 PROCEDURE — 3725F SCREEN DEPRESSION PERFORMED: CPT | Performed by: FAMILY MEDICINE

## 2022-05-11 PROCEDURE — 1160F RVW MEDS BY RX/DR IN RCRD: CPT | Performed by: FAMILY MEDICINE

## 2022-05-11 PROCEDURE — 4004F PT TOBACCO SCREEN RCVD TLK: CPT | Performed by: FAMILY MEDICINE

## 2022-05-11 PROCEDURE — 99215 OFFICE O/P EST HI 40 MIN: CPT | Performed by: FAMILY MEDICINE

## 2022-05-11 NOTE — PROGRESS NOTES
Assessment and Plan:    Problem List Items Addressed This Visit     Vitamin D deficiency    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    Osteoporosis    COPD (chronic obstructive pulmonary disease) ([facility] [address] )    Controlled diabetes mellitus type II without complication ([facility] [address] ) - Primary    Holly's esophagus    Low vitamin B12 level      Other Visit Diagnoses     Encounter for screening mammogram for breast cancer                     {Assess/PlanSmartLinks:46512}          Subjective:      Patient ID: Callum Giles is a 68 y o  female  CC:    Chief Complaint   Patient presents with    Follow-up     Pt here for a follow up   Rcruz       HPI:    HPI    {Common ambulatory SmartLinks:81392}      Review of Systems      Data to review:       Objective:    Vitals:    05/11/22 0926   BP: 114/72   BP Location: Left arm   Patient Position: Sitting   Cuff Size: Large   Pulse: 72   Resp: 16   Weight: 75 3 kg (166 lb)   Height: 5' 1" (1 549 m)        Physical Exam

## 2022-05-11 NOTE — PROGRESS NOTES
Assessment and Plan:    Pleasant 59-year-old female presents today to follow-up on chronic medical conditions  Today she has several new medical concerns  1  L/S pain, chronic - PT did not help; would like hands on deep therapeutic massage  She would like to see [facility], but will await blood work results before starting  She notes that recent physical therapy was making her feel worse  See below  2  Fatigue, malaise - ? Etiology  Check complete blood work including CBC CMP, TFTs, B12, hep C PCR, Lyme titer and EBV titer  Meds reviewed at length with the patient today  Follow up 2-4 weeks to review  Hold off on further PT for now  3  Muscle weakness / aching - check labs  Check hep C RNA  4  Nausea - etiology unclear;  increase prevacid to 1 BID; labs as above  5  Diabetes mellitus - continue metformin  Order hemoglobin A1c  Pt had podiatry exam with [facility]  Request visit report for diabetic foot exam     6  COPD - stable on Spiriva  Albuterol p r n  continue current regimen    7  Hep C - patient is status post treatment with undetectable levels  Recheck PCI in light of recent symptoms, see above  Patient sees [facility] Hebert on an annual basis  8  Osteoporosis - continue calcium in diet, vitamin D3 supplementation and weight-bearing exercise as tolerated    9  Vitamin-D deficiency - continue over-the-counter vitamin D3 supplementation  Check vitamin-D    10  Vitamin B12 deficiency - continue B12 injections monthly  Check vitamin B12 and methylmalonic acid  11  Holly's esophagus - continue Prevacid but increase to BID for 4 weeks, due to nausea, see above  12  Healthcare main - mammogram ordered     13 the  Hyperlipidemia - continue rosuvastatin  Check lipid levels  Labs and follow-up in 2-4 weeks  Subjective:      Patient ID: Juvenal Jacobs is a 68 y o  female      CC:    Chief Complaint   Patient presents with    Follow-up     Pt here for a follow up  Pt will not have iris done today since she had eye exam done about a month ago [facility]  Will obtain records through the [facility]       HPI:    Pt has had PT for several months, but feels that back pain remains unchanged  Continues with lower back and leg pain  Feels that she needs more deep muscle intervention  She would like to hold off,though, at present, due to not feeling well, see below  Pt reports that has not been feeling well  In February she had covid booster and then shingles vaccine  Since then, she feels an achy weariness in her arms and legs  Happening now with B12 shots which never used to happen  Symptoms last for 3 days after a shot  But the last time 3 weeks  She is starting to feel better, but wonders what may be causing this fatigue and lethargy  Hasn't had BW for Hep C since she passed her 5 year carlie  - sees GI annually    Nausea present for 3 weeks; starting to improve - ? Etiology  No vomiting; no diarrhea or constipation; no abdominal pain  Nausea seems present all the time  Has lost 3-4 pounds  Appetite is fair to good  Seeing Deb Herring for foot exams and was there recently  Denies any new supplements or medication changes  The following portions of the patient's history were reviewed and updated as appropriate: allergies, current medications, past family history, past medical history, past social history, past surgical history and problem list       Review of Systems   Constitutional: Positive for fatigue  See HPI   HENT: Negative for congestion, ear pain, mouth sores, sinus pressure and trouble swallowing  Eyes: Negative for discharge, redness and itching  Respiratory: Negative for apnea, cough, chest tightness, shortness of breath, wheezing and stridor  Cardiovascular: Negative for chest pain, palpitations and leg swelling  Gastrointestinal: Positive for nausea   Negative for abdominal distention, abdominal pain, blood in stool, constipation, diarrhea and vomiting  Endocrine: Negative for cold intolerance and heat intolerance  Genitourinary: Negative for difficulty urinating, dysuria, flank pain and urgency  Musculoskeletal: Positive for arthralgias, back pain and myalgias  Negative for gait problem, joint swelling and neck stiffness  Skin: Negative for rash  Neurological: Positive for weakness  Negative for dizziness, seizures, syncope, speech difficulty, light-headedness, numbness and headaches  Vague complaints of feeling weak in her arms and legs that appears to be getting better  See HPI  Hematological: Negative for adenopathy  Psychiatric/Behavioral: Negative for agitation, behavioral problems, confusion and sleep disturbance  The patient is not nervous/anxious  Data to review:       Objective:    Vitals:    05/11/22 0926   BP: 114/72   BP Location: Left arm   Patient Position: Sitting   Cuff Size: Large   Pulse: 72   Resp: 16   Weight: 75 3 kg (166 lb)   Height: 5' 1" (1 549 m)         Diabetic Foot Exam    Diabetic Foot Exam     Physical Exam  Vitals and nursing note reviewed  Constitutional:       General: She is not in acute distress  Appearance: Normal appearance  She is well-developed  She is not ill-appearing, toxic-appearing or diaphoretic  Comments: Patient looks well  In no apparent distress  Well groomed  Excellent historian  HENT:      Head: Normocephalic and atraumatic  Left Ear: External ear normal       Mouth/Throat:      Pharynx: No oropharyngeal exudate or posterior oropharyngeal erythema  Eyes:      General: No scleral icterus  Conjunctiva/sclera: Conjunctivae normal       Pupils: Pupils are equal, round, and reactive to light  Neck:      Vascular: No carotid bruit  Cardiovascular:      Rate and Rhythm: Normal rate and regular rhythm  Heart sounds: Normal heart sounds  No murmur heard  No friction rub  No gallop     Pulmonary: Effort: Pulmonary effort is normal  No respiratory distress  Breath sounds: Normal breath sounds  No wheezing or rales  Abdominal:      General: Abdomen is flat  Bowel sounds are normal  There is no distension  Palpations: There is no mass  Tenderness: There is no abdominal tenderness  There is no right CVA tenderness, left CVA tenderness, guarding or rebound  Hernia: No hernia is present  Musculoskeletal:         General: No swelling or deformity  Normal range of motion  Cervical back: Normal range of motion and neck supple  No rigidity  Right lower leg: No edema  Left lower leg: No edema  Lymphadenopathy:      Cervical: No cervical adenopathy  Skin:     General: Skin is warm and dry  Coloration: Skin is not jaundiced  Findings: No bruising  Neurological:      General: No focal deficit present  Mental Status: She is alert and oriented to person, place, and time  Gait: Gait normal    Psychiatric:         Mood and Affect: Mood normal          Behavior: Behavior normal          Thought Content: Thought content normal          Judgment: Judgment normal              Depression Screening and Follow-up Plan: Patient was screened for depression during today's encounter  They screened negative with a PHQ-2 score of 0  Tobacco Cessation Counseling: Tobacco cessation counseling was not provided   The patient is sincerely urged to quit consumption of tobacco  She is not ready to quit tobacco

## 2022-05-12 ENCOUNTER — TELEPHONE (OUTPATIENT)
Dept: ADMINISTRATIVE | Facility: OTHER | Age: 77
End: 2022-05-12

## 2022-05-12 NOTE — TELEPHONE ENCOUNTER
----- Message from Africa Joseph sent at 5/11/2022  2:04 PM EDT -----  Regarding: care gap request  05/11/22 2:04 PM    Hello, our patient attached above has had Diabetic Eye Exam completed/performed  Please assist in updating the patient chart by making an External outreach to [facility]  facility located in [address] [phone]  The date of service is 1/2022-5/2022    Thank you,  Derrick Morales MA   [facility]

## 2022-05-12 NOTE — TELEPHONE ENCOUNTER
----- Message from Africa Meredith sent at 5/11/2022  2:02 PM EDT -----  Regarding: care gap request  05/11/22 2:02 PM    Hello, our patient attached above has had Diabetic Foot Exam completed/performed  Please assist in updating the patient chart by making an External outreach to [address] [place] [address] located in [place], [place]   The date of service is 5/10, or 5/11/22 there number is [phone]    Thank you,  Gregory Raines MA   [facility]

## 2022-05-12 NOTE — LETTER
Diabetic Eye Exam Form    Date Requested: 22  Patient: Jose Carbajal  Patient : 1945   Referring Provider: Amparo Coppola MD    DIABETIC Eye Exam Date _______________________________    Type of Exam MUST be documented for Diabetic Eye Exams  Please CHECK ONE  Retinal Exam       Dilated Retinal Exam       OCT       Optomap-Iris Exam      Fundus Photography     Left Eye - Please check Retinopathy AND Type or No Retinopathy      Exam did show retinopathy    Exam did not show retinopathy         Mild     Proliferative           Moderate    Severe            None         Right Eye - Please check Retinopathy AND Type or No Retinopathy     Exam did show retinopathy    Exam did not show retinopathy         Mild     Proliferative        Moderate    Severe        None       Comments __________________________________________________________    Practice Providing Exam ______________________________________________    Exam Performed By (print name) _______________________________________      Provider Signature ___________________________________________________    These reports are needed for  compliance  Please fax this completed form and a copy of the Diabetic Eye Exam report to our office located at [place] [address] as soon as possible via [phone] attention Viktoria: Phone [phone]  We thank you for your assistance in treating our mutual patient

## 2022-05-12 NOTE — TELEPHONE ENCOUNTER
Upon review of the In Basket request and the patient's chart, initial outreach has been made via fax, please see Contacts section for details       Thank you  Blake Phillips MA

## 2022-05-12 NOTE — TELEPHONE ENCOUNTER
Upon review of the In Basket request and the patient's chart, initial outreach has been made via fax, please see Contacts section for details       Thank you  Mabel Fong MA

## 2022-05-12 NOTE — LETTER
Diabetic Foot Exam Form    Date Requested: 22  Patient: Radha Chavez  Patient : 1945   Referring Provider: Victoriano Moreno MD    Diabetic Foot Exam Performed with shoes and socks removed        Yes         No     Date of Diabetic Foot Exam ______________________________  Risk Score ____________________________________________    Left Foot       Visual Inspection         Monofilament Testing Sensory Exam        Pedal Pulses         Additional Comments         Right Foot      Visual Inspection         Monofilament Testing Sensory Exam       Pedal Pulses         Additional Comments         Comments __________________________________________________________    Practice Providing Exam ______________________________________________    Exam Performed By (print name) _______________________________________      Provider Signature ___________________________________________________      These reports are needed for  compliance  Please fax this completed form and a copy of the Diabetic Foot Exam report to our office located at [place] [address] as soon as possible via [phone] attention Viktoria: Phone [phone]    We thank you for your assistance in treating our mutual patient

## 2022-05-12 NOTE — TELEPHONE ENCOUNTER
----- Message from Justin Bynum, [address] [place] sent at 5/11/2022  2:02 PM EDT -----  Regarding: care gap request  05/11/22 2:02 PM    Hello, our patient attached above has had Diabetic Foot Exam completed/performed  Please assist in updating the patient chart by making an External outreach to [facility] located in [place], [place]   The date of service is 5/10, or 5/11/22 there number is [phone]    Thank you,  Jutsin Bynum MA   [facility]

## 2022-05-16 ENCOUNTER — TELEPHONE (OUTPATIENT)
Dept: FAMILY MEDICINE CLINIC | Facility: CLINIC | Age: 77
End: 2022-05-16

## 2022-05-16 DIAGNOSIS — K22.70 BARRETT'S ESOPHAGUS WITHOUT DYSPLASIA: Primary | ICD-10-CM

## 2022-05-16 DIAGNOSIS — R11.0 NAUSEA: ICD-10-CM

## 2022-05-16 DIAGNOSIS — K22.70 BARRETT'S ESOPHAGUS WITHOUT DYSPLASIA: ICD-10-CM

## 2022-05-16 RX ORDER — LANSOPRAZOLE 30 MG/1
CAPSULE, DELAYED RELEASE ORAL
Qty: 180 CAPSULE | Refills: 0 | Status: SHIPPED | OUTPATIENT
Start: 2022-05-16 | End: 2022-05-20

## 2022-05-16 NOTE — TELEPHONE ENCOUNTER
Please notify patient that increased dosage with refill has been sent to local pharmacy    Thank you,  CB

## 2022-05-16 NOTE — TELEPHONE ENCOUNTER
PATIENT STATES DR KONG INCREASED HER LANSOPRAZOLE DR 30 mg FROM ONCE DAILY TO TWICE DAILY  PATIENT WILL BE OUT OF HER MEDICATION DUE TO THE INCREASE  DR Haywood Cremarie ORIGINALLY PRESCRIBED THE MEDICATION  DOES DR KONG WANT TO SEND OVER A NEW RX OR SHOULD SHE CALL DR HOUSE's OFFICE?  PLEASE CALL PATIENT TO ADVISE     PATIENT STATES INCREASE MAY BE TEMPORARY SO SHE IS NOT SURE WHAT DR KONG WOULD WANT TO DO

## 2022-05-20 ENCOUNTER — TELEPHONE (OUTPATIENT)
Dept: FAMILY MEDICINE CLINIC | Facility: CLINIC | Age: 77
End: 2022-05-20

## 2022-05-20 DIAGNOSIS — R11.0 NAUSEA: ICD-10-CM

## 2022-05-20 DIAGNOSIS — K22.70 BARRETT'S ESOPHAGUS WITHOUT DYSPLASIA: ICD-10-CM

## 2022-05-20 RX ORDER — FAMOTIDINE 20 MG/1
TABLET, FILM COATED ORAL
Qty: 30 TABLET | Refills: 1 | Status: SHIPPED | OUTPATIENT
Start: 2022-05-20 | End: 2022-06-16

## 2022-05-20 RX ORDER — LANSOPRAZOLE 30 MG/1
CAPSULE, DELAYED RELEASE ORAL
Qty: 180 CAPSULE | Refills: 0 | Status: SHIPPED | OUTPATIENT
Start: 2022-05-20 | End: 2022-05-20

## 2022-05-20 RX ORDER — LANSOPRAZOLE 30 MG/1
CAPSULE, DELAYED RELEASE ORAL
Qty: 90 CAPSULE | Refills: 2 | Status: SHIPPED | OUTPATIENT
Start: 2022-05-20

## 2022-05-20 RX ORDER — OMEPRAZOLE 20 MG/1
CAPSULE, DELAYED RELEASE ORAL
Qty: 60 CAPSULE | Refills: 2 | Status: SHIPPED | OUTPATIENT
Start: 2022-05-20 | End: 2022-05-20

## 2022-05-20 NOTE — TELEPHONE ENCOUNTER
[phone]  PT CALLED IN - SHE WAS IN TO SEE DR KONG  SHE WANTED TO INCREASE ONE OF HER MEDS FROM ONCE A DAY TO TWICE A DAY  SHE WENT TO PICK IT UP AND MED NEEDED A PRE [address] 1268  SHE WILL RUN OUT OF PILLS BY Tuesday  HAS PRE AUTH BEEN DONE? PLEASE ADVISE ASAP  THANK YOU 
As per hSawn Nava she has forwarded message to [facility]
Statement Selected

## 2022-05-27 DIAGNOSIS — A69.20 LYME DISEASE: Primary | ICD-10-CM

## 2022-05-31 LAB
25(OH)D3 SERPL-MCNC: 41 NG/ML (ref 30–100)
ALBUMIN SERPL-MCNC: 4.3 G/DL (ref 3.6–5.1)
ALBUMIN/GLOB SERPL: 1.5 (CALC) (ref 1–2.5)
ALP SERPL-CCNC: 98 U/L (ref 37–153)
ALT SERPL-CCNC: 10 U/L (ref 6–29)
AST SERPL-CCNC: 13 U/L (ref 10–35)
B BURGDOR AB SER QL IA: 8.09 INDEX
B BURGDOR IGG SER QL IB: POSITIVE
B BURGDOR IGM SER QL IB: NEGATIVE
B BURGDOR18KD IGG SER QL IB: REACTIVE
B BURGDOR23KD IGG SER QL IB: REACTIVE
B BURGDOR23KD IGM SER QL IB: REACTIVE
B BURGDOR28KD IGG SER QL IB: REACTIVE
B BURGDOR30KD IGG SER QL IB: REACTIVE
B BURGDOR39KD IGG SER QL IB: REACTIVE
B BURGDOR39KD IGM SER QL IB: ABNORMAL
B BURGDOR41KD IGG SER QL IB: REACTIVE
B BURGDOR41KD IGM SER QL IB: ABNORMAL
B BURGDOR45KD IGG SER QL IB: REACTIVE
B BURGDOR58KD IGG SER QL IB: REACTIVE
B BURGDOR66KD IGG SER QL IB: REACTIVE
B BURGDOR93KD IGG SER QL IB: REACTIVE
BASOPHILS # BLD AUTO: 31 CELLS/UL (ref 0–200)
BASOPHILS NFR BLD AUTO: 0.5 %
BILIRUB SERPL-MCNC: 0.4 MG/DL (ref 0.2–1.2)
BUN SERPL-MCNC: 19 MG/DL (ref 7–25)
BUN/CREAT SERPL: ABNORMAL (CALC) (ref 6–22)
CALCIUM SERPL-MCNC: 9.7 MG/DL (ref 8.6–10.4)
CHLORIDE SERPL-SCNC: 106 MMOL/L (ref 98–110)
CHOLEST SERPL-MCNC: 149 MG/DL
CHOLEST/HDLC SERPL: 2.7 (CALC)
CO2 SERPL-SCNC: 24 MMOL/L (ref 20–32)
CREAT SERPL-MCNC: 0.63 MG/DL (ref 0.6–0.93)
EBV NA IGG SER IA-ACNC: 141 U/ML
EBV VCA IGG SER IA-ACNC: 112 U/ML
EBV VCA IGM SER IA-ACNC: <36 U/ML
EOSINOPHIL # BLD AUTO: 174 CELLS/UL (ref 15–500)
EOSINOPHIL NFR BLD AUTO: 2.8 %
ERYTHROCYTE [DISTWIDTH] IN BLOOD BY AUTOMATED COUNT: 12.9 % (ref 11–15)
GLOBULIN SER CALC-MCNC: 2.8 G/DL (CALC) (ref 1.9–3.7)
GLUCOSE SERPL-MCNC: 104 MG/DL (ref 65–99)
HBA1C MFR BLD: 5.8 % OF TOTAL HGB
HCT VFR BLD AUTO: 38 % (ref 35–45)
HCV RNA SERPL NAA+PROBE-ACNC: NORMAL IU/ML
HCV RNA SERPL NAA+PROBE-LOG IU: NORMAL LOG IU/ML
HDLC SERPL-MCNC: 55 MG/DL
HGB BLD-MCNC: 13 G/DL (ref 11.7–15.5)
LDLC SERPL CALC-MCNC: 75 MG/DL (CALC)
LYMPHOCYTES # BLD AUTO: 1327 CELLS/UL (ref 850–3900)
LYMPHOCYTES NFR BLD AUTO: 21.4 %
MCH RBC QN AUTO: 28.3 PG (ref 27–33)
MCHC RBC AUTO-ENTMCNC: 34.2 G/DL (ref 32–36)
MCV RBC AUTO: 82.8 FL (ref 80–100)
METHYLMALONATE SERPL-SCNC: 122 NMOL/L (ref 87–318)
MONOCYTES # BLD AUTO: 403 CELLS/UL (ref 200–950)
MONOCYTES NFR BLD AUTO: 6.5 %
NEUTROPHILS # BLD AUTO: 4266 CELLS/UL (ref 1500–7800)
NEUTROPHILS NFR BLD AUTO: 68.8 %
NONHDLC SERPL-MCNC: 94 MG/DL (CALC)
PLATELET # BLD AUTO: 216 THOUSAND/UL (ref 140–400)
PMV BLD REES-ECKER: 10.7 FL (ref 7.5–12.5)
POTASSIUM SERPL-SCNC: 4.2 MMOL/L (ref 3.5–5.3)
PROT SERPL-MCNC: 7.1 G/DL (ref 6.1–8.1)
RBC # BLD AUTO: 4.59 MILLION/UL (ref 3.8–5.1)
SL AMB EGFR AFRICAN AMERICAN: 101 ML/MIN/1.73M2
SL AMB EGFR NON AFRICAN AMERICAN: 87 ML/MIN/1.73M2
SODIUM SERPL-SCNC: 139 MMOL/L (ref 135–146)
T4 SERPL-MCNC: 6 MCG/DL (ref 5.1–11.9)
TRIGL SERPL-MCNC: 109 MG/DL
TSH SERPL-ACNC: 1.55 MIU/L (ref 0.4–4.5)
VIT B12 SERPL-MCNC: 303 PG/ML (ref 200–1100)
WBC # BLD AUTO: 6.2 THOUSAND/UL (ref 3.8–10.8)

## 2022-05-31 RX ORDER — DOXYCYCLINE HYCLATE 100 MG
100 TABLET ORAL 2 TIMES DAILY
Qty: 28 TABLET | Refills: 0 | Status: SHIPPED | OUTPATIENT
Start: 2022-05-31 | End: 2022-06-14

## 2022-06-15 DIAGNOSIS — K22.70 BARRETT'S ESOPHAGUS WITHOUT DYSPLASIA: ICD-10-CM

## 2022-06-15 DIAGNOSIS — R11.0 NAUSEA: ICD-10-CM

## 2022-06-16 RX ORDER — FAMOTIDINE 20 MG/1
TABLET, FILM COATED ORAL
Qty: 90 TABLET | Refills: 1 | Status: SHIPPED | OUTPATIENT
Start: 2022-06-16 | End: 2022-06-20

## 2022-06-22 ENCOUNTER — OFFICE VISIT (OUTPATIENT)
Dept: FAMILY MEDICINE CLINIC | Facility: CLINIC | Age: 77
End: 2022-06-22
Payer: COMMERCIAL

## 2022-06-22 ENCOUNTER — TELEPHONE (OUTPATIENT)
Dept: ADMINISTRATIVE | Facility: OTHER | Age: 77
End: 2022-06-22

## 2022-06-22 ENCOUNTER — APPOINTMENT (OUTPATIENT)
Dept: LAB | Facility: CLINIC | Age: 77
End: 2022-06-22
Payer: COMMERCIAL

## 2022-06-22 VITALS
SYSTOLIC BLOOD PRESSURE: 118 MMHG | HEART RATE: 68 BPM | BODY MASS INDEX: 31.15 KG/M2 | HEIGHT: 61 IN | WEIGHT: 165 LBS | DIASTOLIC BLOOD PRESSURE: 60 MMHG

## 2022-06-22 DIAGNOSIS — M62.81 MUSCLE WEAKNESS: ICD-10-CM

## 2022-06-22 DIAGNOSIS — E11.9 CONTROLLED TYPE 2 DIABETES MELLITUS WITHOUT COMPLICATION, WITHOUT LONG-TERM CURRENT USE OF INSULIN (HCC): Primary | ICD-10-CM

## 2022-06-22 DIAGNOSIS — G89.29 LUMBOSACRAL PAIN, CHRONIC: ICD-10-CM

## 2022-06-22 DIAGNOSIS — R11.0 NAUSEA: ICD-10-CM

## 2022-06-22 DIAGNOSIS — Z12.11 SCREENING FOR COLON CANCER: ICD-10-CM

## 2022-06-22 DIAGNOSIS — M54.50 LUMBOSACRAL PAIN, CHRONIC: ICD-10-CM

## 2022-06-22 DIAGNOSIS — R53.82 CHRONIC FATIGUE: ICD-10-CM

## 2022-06-22 DIAGNOSIS — A69.20 LYME DISEASE: ICD-10-CM

## 2022-06-22 LAB
AMYLASE SERPL-CCNC: 70 IU/L (ref 25–115)
ERYTHROCYTE [SEDIMENTATION RATE] IN BLOOD: 45 MM/HOUR (ref 0–29)
LIPASE SERPL-CCNC: 137 U/L (ref 73–393)

## 2022-06-22 PROCEDURE — 4004F PT TOBACCO SCREEN RCVD TLK: CPT | Performed by: FAMILY MEDICINE

## 2022-06-22 PROCEDURE — 82150 ASSAY OF AMYLASE: CPT

## 2022-06-22 PROCEDURE — 96372 THER/PROPH/DIAG INJ SC/IM: CPT | Performed by: FAMILY MEDICINE

## 2022-06-22 PROCEDURE — 1160F RVW MEDS BY RX/DR IN RCRD: CPT | Performed by: FAMILY MEDICINE

## 2022-06-22 PROCEDURE — 99215 OFFICE O/P EST HI 40 MIN: CPT | Performed by: FAMILY MEDICINE

## 2022-06-22 PROCEDURE — 36415 COLL VENOUS BLD VENIPUNCTURE: CPT

## 2022-06-22 PROCEDURE — 85652 RBC SED RATE AUTOMATED: CPT

## 2022-06-22 PROCEDURE — 83690 ASSAY OF LIPASE: CPT

## 2022-06-22 RX ADMIN — CYANOCOBALAMIN 1000 MCG: 1000 INJECTION INTRAMUSCULAR; SUBCUTANEOUS at 12:01

## 2022-06-22 NOTE — PROGRESS NOTES
Assessment and Plan:    Mrs Manuel Castañeda presents today to follow-up from her recent visit  Since then, she had blood work which revealed a positive Lyme titer  She has been taking doxycycline and feels somewhat improved  She feels that the weakness of her arms and legs are slightly better  The nausea is not as bad, but still intermittent  She still complains of proximal muscle weakness and today notes some difficulty getting out of a chair  She still has easy fatigability  She remains on probiotics and digestive enzymes  She is still not feeling quite right        1  DM II - labs reviewed  Hemoglobin A1c has decreased from 6-5 8  Continue metformin  I do not believe this medication is contributing to her symptoms  2  Lyme Disease, new onset - finish DOXY; explained that this might be contributing to her nausea and taste/appetite  3  Nausea - some improvement but taste and food cravings are "off"  May also be exacerbated by recent treatment with Doxy  Check amylase and lipase  Consider U/S abdomen or referral to GI for possible PUD  Continue PPI for now  4  Proximal muscle weakness, R/O PMR - check sed rate; pt reports that she did have this in the remote past and was treated successfully with steroids  5  Fatigue - likely multifactorial   Related to Lyme  Rule out PMR  EBV titers were reviewed and consistent with past infection  TFTs are within normal limits  Hepatitis C RNA negative  Labs reviewed at length with patient today  6  Past history of hepatitis C - recent hepatitis C PCR negative for viral load  7  Vitamin B12 deficiency - vitamin B12 level is low normal   Methylmalonic acid is within normal range  Continue with IM injections  Recent labs reviewed at length with patient today  40 minute spent with patient today with greater than 50% of time spent on counseling, review of blood work results, treatment options and assessment and plan  Additional labs ordered    Call with results  Follow-up in 2 weeks  Subjective:      Patient ID: Judith Cerna is a 68 y o  female  CC:    Chief Complaint   Patient presents with    Follow-up     Patient is here for a 1 month f/u re: fatigue, L/S pain  Patient needs to review blood work results  Patient would like to discuss her Lyme  ak       HPI:    Mrs Kodi Cooley presents today to follow-up from her recent visit  Since then, she had blood work which revealed a positive Lyme titer  She has been taking doxycycline and feels somewhat improved  She feels that the weakness of her arms and legs are slightly better  The nausea is not as bad, but still intermittent  She still complains of proximal muscle weakness and today notes some difficulty getting out of a chair  She still has easy fatigability  She remains on probiotics and digestive enzymes  She is still not feeling quite right              The following portions of the patient's history were reviewed and updated as appropriate: allergies, current medications, past family history, past medical history, past social history, past surgical history and problem list       Review of Systems   Constitutional: Positive for fatigue  See HPI   HENT: Negative for congestion, ear pain, mouth sores, sinus pressure and trouble swallowing  Eyes: Negative for discharge, redness and itching  Respiratory: Negative for apnea, cough, chest tightness, shortness of breath, wheezing and stridor  Cardiovascular: Negative for chest pain, palpitations and leg swelling  Gastrointestinal: Positive for nausea  Negative for abdominal distention, abdominal pain, blood in stool, constipation, diarrhea and vomiting  Endocrine: Negative for cold intolerance and heat intolerance  Genitourinary: Negative for difficulty urinating, dysuria, flank pain and urgency  Musculoskeletal: Positive for myalgias  Negative for arthralgias  Skin: Negative for rash     Neurological: Negative for dizziness, seizures, syncope, speech difficulty, weakness, light-headedness, numbness and headaches  Hematological: Negative for adenopathy  Psychiatric/Behavioral: Negative for agitation, behavioral problems, confusion and sleep disturbance  The patient is not nervous/anxious  Data to review:       Objective:    Vitals:    06/22/22 1052   BP: 118/60   Pulse: 68   Weight: 74 8 kg (165 lb)   Height: 5' 1" (1 549 m)        Physical Exam  Vitals and nursing note reviewed  Constitutional:       General: She is not in acute distress  Appearance: Normal appearance  She is well-developed  She is not ill-appearing, toxic-appearing or diaphoretic  Comments: Well groomed  Non-toxic  Eyes:      General: No scleral icterus  Conjunctiva/sclera: Conjunctivae normal       Pupils: Pupils are equal, round, and reactive to light  Neck:      Vascular: No carotid bruit  Cardiovascular:      Rate and Rhythm: Normal rate and regular rhythm  Heart sounds: Normal heart sounds  No murmur heard  No friction rub  No gallop  Pulmonary:      Effort: Pulmonary effort is normal  No respiratory distress  Breath sounds: Normal breath sounds  No wheezing or rales  Abdominal:      General: Abdomen is flat  Bowel sounds are normal  There is no distension  Palpations: Abdomen is soft  There is no mass  Tenderness: There is no abdominal tenderness  There is no guarding or rebound  Hernia: No hernia is present  Comments: Negative [place] sign  No tenderness to palpation, guarding or rigidity  Musculoskeletal:         General: Normal range of motion  Cervical back: Normal range of motion and neck supple  Right lower leg: No edema  Left lower leg: No edema  Comments: Mild proximal muscle weakness noted  Lymphadenopathy:      Cervical: No cervical adenopathy  Skin:     General: Skin is warm and dry  Coloration: Skin is not jaundiced     Neurological: Mental Status: She is alert and oriented to person, place, and time  Psychiatric:         Mood and Affect: Mood normal          Behavior: Behavior normal          Thought Content:  Thought content normal          Judgment: Judgment normal

## 2022-06-22 NOTE — TELEPHONE ENCOUNTER
----- Message from Jaswant Winters sent at 6/22/2022 11:01 AM EDT -----  Regarding: colonoscopy  06/22/22 11:02 AM    Hello, our patient Ara Faye has had CRC: Colonoscopy completed/performed  Please assist in updating the patient chart by pulling the Care Everywhere (CE) document  The date of service is March 2021       Thank you,  Virginia Lopes  [facility]

## 2022-06-22 NOTE — TELEPHONE ENCOUNTER
Upon review of the In Basket request we were able to locate, review, and update the patient chart as requested for CRC: Colonoscopy  Any additional questions or concerns should be emailed to the Practice Liaisons via [email or web address] email, please do not reply via In Basket      Thank you  Fiona Alvarado

## 2022-06-23 ENCOUNTER — TELEPHONE (OUTPATIENT)
Dept: ADMINISTRATIVE | Facility: OTHER | Age: 77
End: 2022-06-23

## 2022-06-23 ENCOUNTER — TELEPHONE (OUTPATIENT)
Dept: FAMILY MEDICINE CLINIC | Facility: CLINIC | Age: 77
End: 2022-06-23

## 2022-06-23 DIAGNOSIS — M35.3 PMR (POLYMYALGIA RHEUMATICA) (HCC): Primary | ICD-10-CM

## 2022-06-23 RX ORDER — PREDNISONE 10 MG/1
TABLET ORAL
Qty: 34 TABLET | Refills: 0 | Status: SHIPPED | OUTPATIENT
Start: 2022-06-23

## 2022-06-23 NOTE — TELEPHONE ENCOUNTER
----- Message from [employer] sent at 6/22/2022 12:02 PM EDT -----  Regarding: colonoscopy  06/22/22 12:03 PM    Hello, our patient Missy King has had CRC: Colonoscopycompleted/performed  Please assist in updating the patient chart by pulling a previous Electronic Medical Record (EMR) document  The previous EMR is [facility], Dr Holden Flores  The date of service is March 2021      Thank you,  Virginia Jacques  [facility]

## 2022-06-23 NOTE — TELEPHONE ENCOUNTER
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

## 2022-06-23 NOTE — TELEPHONE ENCOUNTER
Upon review of the In Basket request we have found this is a duplicate request and no further action is needed  This request was completed upon initial request, the patient chart is up to date, and this message will now be closed  Any additional questions or concerns should be emailed to the Practice Liaisons via [email or web address] email, please do not reply via In Basket      Thank you  Forest Shaffer

## 2022-07-12 ENCOUNTER — OFFICE VISIT (OUTPATIENT)
Dept: FAMILY MEDICINE CLINIC | Facility: CLINIC | Age: 77
End: 2022-07-12
Payer: COMMERCIAL

## 2022-07-12 VITALS
DIASTOLIC BLOOD PRESSURE: 60 MMHG | SYSTOLIC BLOOD PRESSURE: 120 MMHG | WEIGHT: 165 LBS | BODY MASS INDEX: 31.15 KG/M2 | HEART RATE: 80 BPM | HEIGHT: 61 IN

## 2022-07-12 DIAGNOSIS — Z00.00 MEDICARE ANNUAL WELLNESS VISIT, SUBSEQUENT: ICD-10-CM

## 2022-07-12 DIAGNOSIS — R70.0 ELEVATED SED RATE: Primary | ICD-10-CM

## 2022-07-12 DIAGNOSIS — A69.20 LYME DISEASE: ICD-10-CM

## 2022-07-12 DIAGNOSIS — M35.3 PMR (POLYMYALGIA RHEUMATICA) (HCC): ICD-10-CM

## 2022-07-12 DIAGNOSIS — R11.0 NAUSEA: ICD-10-CM

## 2022-07-12 PROCEDURE — 3288F FALL RISK ASSESSMENT DOCD: CPT | Performed by: FAMILY MEDICINE

## 2022-07-12 PROCEDURE — 1125F AMNT PAIN NOTED PAIN PRSNT: CPT | Performed by: FAMILY MEDICINE

## 2022-07-12 PROCEDURE — 1160F RVW MEDS BY RX/DR IN RCRD: CPT | Performed by: FAMILY MEDICINE

## 2022-07-12 PROCEDURE — 1090F PRES/ABSN URINE INCON ASSESS: CPT | Performed by: FAMILY MEDICINE

## 2022-07-12 PROCEDURE — G0439 PPPS, SUBSEQ VISIT: HCPCS | Performed by: FAMILY MEDICINE

## 2022-07-12 PROCEDURE — 99214 OFFICE O/P EST MOD 30 MIN: CPT | Performed by: FAMILY MEDICINE

## 2022-07-12 PROCEDURE — 3725F SCREEN DEPRESSION PERFORMED: CPT | Performed by: FAMILY MEDICINE

## 2022-07-12 PROCEDURE — 1170F FXNL STATUS ASSESSED: CPT | Performed by: FAMILY MEDICINE

## 2022-07-12 NOTE — PROGRESS NOTES
Assessment and Plan:         Mrs Zulema Jenkins is a 51-year-old female who presents today to follow-up on several medical conditions  More recently, her symptoms and elevated sed rate suggested PMR and she was started on a prednisone taper - she did not take it yet  She also had a recent Lyme titer and has completed a course of doxycycline  She felt poorly from the Doxy with GI discomfort, diarrhea, costipation  She is finally starting to feel better with a nornal BM yesterday  The nausea has resolved  And the painful weakness in her arms has resolved  She still reports easy fatiguability and feeling very tired  When she gets tired, the nausea and muscle weakness comes back  She held off on the prednisone due to her GI distress  Today she is also due for a Medicare annual wellness questionnaire  1  Elevated sed rate - suggestive of PMR; may also be related to Lyme  2  PMR - pt now is willing to start the prednisone  She will start with 60mg and let me know if she feels improvement  If so, it is likely that she has PMR and she should continue the slow taper    If she feels no different, then perhaps symptoms are mostly related to Lyme, and would hold off on prednisone  3  Nausea - improved mostly; still occurs when she is fatigued or with increased activity  4  Lyme disease - she has completed a course of Doxy; had a lot of GI distress but starting to feel better  6  Annual Medicare wellness questionnaire - immun reviewed; 5 Wishes; screenings reviewed  F/U scheduled for 3 months, but pt will be in to touch by phone with update  Problem List Items Addressed This Visit    None     Visit Diagnoses     Elevated sed rate    -  Primary    PMR (polymyalgia rheumatica) (HCC)        Nausea        Lyme disease              Depression Screening and Follow-up Plan: Patient was screened for depression during today's encounter  They screened negative with a PHQ-2 score of 0        Preventive health issues were discussed with patient, and age appropriate screening tests were ordered as noted in patient's After Visit Summary  Personalized health advice and appropriate referrals for health education or preventive services given if needed, as noted in patient's After Visit Summary  History of Present Illness:     Patient presents for a Medicare Wellness Visit    Hsnt started pred yet  She is reluctant to start a new med due to GI effects of doxy  She has had PMR in the past and used pred with good resposnse  Was doing well with MIralax 1/2 cap every other day     Patient Care Team:  Monae Barnard MD as PCP - General (Family Medicine)  Mariel Alvarez MD     Review of Systems:     Review of Systems   Constitutional: Positive for fatigue  See HPI   HENT: Negative for congestion, ear pain, mouth sores, sinus pressure and trouble swallowing  Eyes: Negative for discharge, redness and itching  Respiratory: Negative for apnea, cough, chest tightness, shortness of breath, wheezing and stridor  Cardiovascular: Negative for chest pain, palpitations and leg swelling  Gastrointestinal: Positive for nausea  Negative for abdominal distention, abdominal pain, blood in stool, constipation, diarrhea and vomiting  Endocrine: Negative for cold intolerance and heat intolerance  Genitourinary: Negative for difficulty urinating, dysuria, flank pain and urgency  Musculoskeletal: Positive for myalgias  Negative for arthralgias  Skin: Negative for rash  Neurological: Negative for dizziness, seizures, syncope, speech difficulty, weakness, light-headedness, numbness and headaches  Hematological: Negative for adenopathy  Psychiatric/Behavioral: Negative for agitation, behavioral problems, confusion and sleep disturbance  The patient is not nervous/anxious           Problem List:     Patient Active Problem List   Diagnosis    Vitamin D deficiency    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    Tobacco abuse    Osteoporosis    Lumbosacral pain, chronic    Hyperlipidemia    Coronary artery calcification    COPD [facility]chronic obstructive pulmonary disease) ([facility]    Controlled diabetes mellitus type II without complication ([facility]    Holly's esophagus    Allergic rhinitis    History of colonic polyps    Irritable bowel syndrome with both constipation and diarrhea    Lateral epicondylitis    Low vitamin B12 level    Paget's bone disease    Osteoarthritis    Chronic constipation    Sacroiliitis ([facility]    Chronic pain syndrome    Lumbar facet arthropathy      Past Medical and Surgical History:     Past Medical History:   Diagnosis Date    Arthritis     Back pain     Diabetes mellitus ([address] )     Emphysema of lung ([facility]     GERD (gastroesophageal reflux disease)     Lyme disease     Osteoporosis     Polymyalgia rheumatica ([facility])     last assessed 2/15/17    Reactive airway disease     last assessed 6/9/16    Sciatica      Past Surgical History:   Procedure Laterality Date    BREAST BIOPSY Left     HYSTERECTOMY      1987, total    ORTHOPEDIC SURGERY        Family History:     Family History   Problem Relation Age of Onset    Thyroid cancer Mother 48    COPD Father         mild    Hypertension Father     Breast cancer Maternal Aunt 54    No Known Problems Maternal Grandmother     Colon cancer Maternal Grandfather     No Known Problems Paternal Grandmother     No Known Problems Paternal Grandfather     No Known Problems Maternal Aunt     No Known Problems Paternal Aunt       Social History:     Social History     Socioeconomic History    Marital status: Single     Spouse name: None    Number of children: None    Years of education: None    Highest education level: None   Occupational History    Occupation: retired   Tobacco Use    Smoking status: Current Every Day Smoker     Packs/day: 0 50     Types: Cigarettes    Smokeless tobacco: Never Used   Vaping Use    Vaping Use: Never used   Substance and Sexual Activity    Alcohol use: Never    Drug use: No    Sexual activity: Yes   Other Topics Concern    None   Social History Narrative    None     Social Determinants of Health     Financial Resource Strain: Not on file   Food Insecurity: Not on file   Transportation Needs: Not on file   Physical Activity: Not on file   Stress: Not on file   Social Connections: Not on file   Intimate Partner Violence: Not on file   Housing Stability: Not on file      Medications and Allergies:     Current Outpatient Medications   Medication Sig Dispense Refill    acetaminophen (TYLENOL) 325 mg tablet Take 2 tablets (650 mg total) by mouth every 6 (six) hours as needed for mild pain 30 tablet 0    albuterol (Ventolin HFA) 90 mcg/act inhaler Inhale 1-2 puffs as needed for wheezing 8 g 3    Biotin 73691 MCG TABS Take by mouth      budesonide-formoterol (Symbicort) 160-4 5 mcg/act inhaler Inhale 2 puffs 2 (two) times a day 30 6 g 3    Cholecalciferol (VITAMIN D3) 2000 units capsule Take 2,000 Units by mouth daily        Coenzyme Q10 (CO Q10) 100 MG CAPS Take by mouth      Digestive Enzyme CAPS Take by mouth      lansoprazole (PREVACID) 30 mg capsule 1 tab po BID 90 capsule 2    metFORMIN (GLUCOPHAGE) 500 mg tablet Take 1 tablet (500 mg total) by mouth 2 (two) times a day with meals 180 tablet 2    montelukast (SINGULAIR) 10 mg tablet Take 1 tablet (10 mg total) by mouth daily 90 tablet 3    Multiple Vitamins-Minerals (Hair Skin and Nails Formula) TABS Take by mouth      polyethylene glycol (GLYCOLAX) 17 GM/SCOOP powder Take 17 g by mouth daily      Probiotic Product (PROBIOTIC-10) CAPS Take by mouth      rosuvastatin (CRESTOR) 5 mg tablet Take 1 tablet (5 mg total) by mouth daily 90 tablet 3    Spiriva Respimat 2 5 MCG/ACT AERS inhaler Inhale 2 puffs daily 3 inhalers 16 g 3    predniSONE 10 mg tablet Take 6 tabs po for 1 day, then take 5 tabs po for 1 day, then take 4 tabs po for 2 days, then take 3 tabs po for 2 days then take 2 tabs po for 2 days then take 1 tab po for 5 days   (Patient not taking: Reported on 7/12/2022) 34 tablet 0     Current Facility-Administered Medications   Medication Dose Route Frequency Provider Last Rate Last Admin    cyanocobalamin injection 1,000 mcg  1,000 mcg Intramuscular Q30 Days Maisha Sheldon MD   1,000 mcg at 08/04/21 1355    cyanocobalamin injection 1,000 mcg  1,000 mcg Intramuscular Q30 Days Maisha Sheldon MD   1,000 mcg at 05/10/22 1328    cyanocobalamin injection 1,000 mcg  1,000 mcg Intramuscular Q30 Days Maisha Sheldon MD   1,000 mcg at 11/30/21 1425    cyanocobalamin injection 1,000 mcg  1,000 mcg Intramuscular Q30 Days Maisha Sheldon MD   1,000 mcg at 02/01/22 1629    cyanocobalamin injection 1,000 mcg  1,000 mcg Intramuscular Q30 Days Maisha Sheldon MD   1,000 mcg at 06/22/22 1201     Allergies   Allergen Reactions    Asa [Aspirin]     Celebrex [Celecoxib] Other (See Comments)     Flushing      Ibuprofen GI Intolerance    Nsaids GI Intolerance    Percocet [Oxycodone-Acetaminophen] GI Intolerance      Immunizations:     Immunization History   Administered Date(s) Administered    COVID-19 MODERNA VACC 0 5 ML IM 01/11/2021, 02/08/2021, 12/16/2021    Hep B, adult 01/24/2012, 02/23/2012, 07/24/2012    INFLUENZA 10/08/2014, 10/26/2015, 10/18/2016    Influenza Quadrivalent Preservative Free 3 years and older IM 10/26/2015, 10/18/2016    Influenza Split High Dose Preservative Free IM 09/19/2017    Influenza, high dose seasonal 0 7 mL 10/09/2018, 11/20/2019, 10/28/2020, 11/10/2021    Influenza, seasonal, injectable 10/11/2012, 10/08/2014    Pneumococcal Conjugate 13-Valent 10/09/2018    Pneumococcal Polysaccharide PPV23 07/26/2012, 07/26/2012    Zoster 02/14/2018    Zoster Vaccine Recombinant 03/08/2020, 09/28/2020      Health Maintenance:         Topic Date Due    Breast Cancer Screening: Mammogram  06/25/2022    Colorectal Cancer Screening  03/30/2026    Hepatitis C Screening Discontinued         Topic Date Due    COVID-19 Vaccine (4 - Booster for Moderna series) 04/16/2022    Influenza Vaccine (1) 09/01/2022      Medicare Screening Tests and Risk Assessments:     Jay Cerna is here for her Subsequent Wellness visit  Health Risk Assessment:   Patient rates overall health as good  Patient feels that their physical health rating is slightly worse  Patient is very satisfied with their life  Eyesight was rated as slightly worse  Hearing was rated as same  Patient feels that their emotional and mental health rating is same  Patients states they are never, rarely angry  Patient states they are often unusually tired/fatigued  Pain experienced in the last 7 days has been some  Patient's pain rating has been 6/10  Patient states that she has experienced no weight loss or gain in last 6 months  Fatigue due to Lyme's Disease    Depression Screening:   PHQ-2 Score: 0      Fall Risk Screening: In the past year, patient has experienced: no history of falling in past year      Urinary Incontinence Screening:   Patient has not leaked urine accidently in the last six months  Home Safety:  Patient has trouble with stairs inside or outside of their home  Patient has working smoke alarms and has no working carbon monoxide detector  Home safety hazards include: none  Nutrition:   Current diet is Regular and Limited junk food  Medications:   Patient is currently taking over-the-counter supplements  OTC medications include: see medication list  Patient is able to manage medications  Activities of Daily Living (ADLs)/Instrumental Activities of Daily Living (IADLs):   Walk and transfer into and out of bed and chair?: Yes  Dress and groom yourself?: Yes    Bathe or shower yourself?: Yes    Feed yourself?  Yes  Do your laundry/housekeeping?: Yes  Manage your money, pay your bills and track your expenses?: Yes  Make your own meals?: Yes    Do your own shopping?: Yes    Previous Hospitalizations: Any hospitalizations or ED visits within the last 12 months?: No      Advance Care Planning:   Living will: Yes    Durable POA for healthcare: Yes    Advanced directive: Yes    Advanced directive counseling given: Yes    Five wishes given: Yes      Cognitive Screening:   Provider or family/friend/caregiver concerned regarding cognition?: No    PREVENTIVE SCREENINGS      Cardiovascular Screening:    General: Screening Not Indicated and History Lipid Disorder      Diabetes Screening:     General: Screening Not Indicated and History Diabetes      Colorectal Cancer Screening:     General: Screening Current      Breast Cancer Screening:     General: Screening Current      Cervical Cancer Screening:    General: Screening Not Indicated      Osteoporosis Screening:    General: Screening Not Indicated and History Osteoporosis      Abdominal Aortic Aneurysm (AAA) Screening:        General: Screening Not Indicated      Lung Cancer Screening:     General: Screening Not Indicated      Hepatitis C Screening:    General: Screening Not Indicated and History Hepatitis C    Screening, Brief Intervention, and Referral to Treatment (SBIRT)    Screening  Typical number of drinks in a day: 0  Typical number of drinks in a week: 0  Interpretation: Low risk drinking behavior  Single Item Drug Screening:  How often have you used an illegal drug (including marijuana) or a prescription medication for non-medical reasons in the past year? never    Single Item Drug Screen Score: 0  Interpretation: Negative screen for possible drug use disorder    No exam data present     Physical Exam:     /60   Pulse 80   Ht 5' 1" (1 549 m)   Wt 74 8 kg (165 lb)   LMP  (LMP Unknown)   BMI 31 18 kg/m²     Physical Exam  Vitals and nursing note reviewed  Constitutional:       General: She is not in acute distress  Appearance: She is well-developed  She is not ill-appearing, toxic-appearing or diaphoretic     HENT:      Left Ear: External ear normal    Eyes:      General: No scleral icterus  Conjunctiva/sclera: Conjunctivae normal       Pupils: Pupils are equal, round, and reactive to light  Neck:      Vascular: No carotid bruit  Cardiovascular:      Rate and Rhythm: Normal rate and regular rhythm  Heart sounds: Normal heart sounds  No murmur heard  No friction rub  No gallop  Pulmonary:      Effort: Pulmonary effort is normal  No respiratory distress  Breath sounds: Normal breath sounds  No wheezing or rales  Musculoskeletal:         General: Normal range of motion  Cervical back: Normal range of motion and neck supple  Right lower leg: No edema  Left lower leg: No edema  Lymphadenopathy:      Cervical: No cervical adenopathy  Skin:     General: Skin is warm and dry  Coloration: Skin is not jaundiced  Neurological:      Mental Status: She is alert and oriented to person, place, and time  Psychiatric:         Mood and Affect: Mood normal          Behavior: Behavior normal          Thought Content:  Thought content normal          Judgment: Judgment normal           Fadia MD Eliu

## 2022-07-19 DIAGNOSIS — J44.9 CHRONIC OBSTRUCTIVE PULMONARY DISEASE, UNSPECIFIED COPD TYPE (HCC): ICD-10-CM

## 2022-07-19 RX ORDER — TIOTROPIUM BROMIDE INHALATION SPRAY 3.12 UG/1
2 SPRAY, METERED RESPIRATORY (INHALATION) DAILY
Qty: 16 G | Refills: 3 | Status: SHIPPED | OUTPATIENT
Start: 2022-07-19

## 2022-07-20 ENCOUNTER — CLINICAL SUPPORT (OUTPATIENT)
Dept: FAMILY MEDICINE CLINIC | Facility: CLINIC | Age: 77
End: 2022-07-20

## 2022-07-20 ENCOUNTER — TELEPHONE (OUTPATIENT)
Dept: FAMILY MEDICINE CLINIC | Facility: CLINIC | Age: 77
End: 2022-07-20

## 2022-07-20 DIAGNOSIS — E53.8 LOW VITAMIN B12 LEVEL: Primary | ICD-10-CM

## 2022-07-20 NOTE — PROGRESS NOTES
Patient present today for her routine B12 shot  Patient tolerated procedure and left in no distress

## 2022-07-20 NOTE — TELEPHONE ENCOUNTER
Patient called in she said Dr Kye Corley wanted her to let her know if the prednisone was working for her, patient stated she doesn't feel like its working for her, her symptoms are worse  She is scheduled to have a B12 shot today at 1:45 and she would like to know if she should still have that done? Please give patient a call back thank you!

## 2022-07-20 NOTE — TELEPHONE ENCOUNTER
Pt notified  This is the 4th day of starting Prednisone  She feels jittery, bloated and slight SOB  No edema  No cough  Could it be too much? Should she go to a lower dose? Dr Kye Corley does not have any appointments next week

## 2022-07-20 NOTE — TELEPHONE ENCOUNTER
Patient may get her B12 injection and I recommend follow-up with Dr Vik Miranda secondary to prednisone not working

## 2022-08-02 ENCOUNTER — OFFICE VISIT (OUTPATIENT)
Dept: FAMILY MEDICINE CLINIC | Facility: CLINIC | Age: 77
End: 2022-08-02
Payer: COMMERCIAL

## 2022-08-02 VITALS
DIASTOLIC BLOOD PRESSURE: 72 MMHG | OXYGEN SATURATION: 93 % | HEIGHT: 61 IN | HEART RATE: 80 BPM | SYSTOLIC BLOOD PRESSURE: 120 MMHG | BODY MASS INDEX: 31 KG/M2 | WEIGHT: 164.2 LBS

## 2022-08-02 DIAGNOSIS — K04.7 DENTAL ABSCESS: ICD-10-CM

## 2022-08-02 DIAGNOSIS — M25.50 ARTHRALGIA, UNSPECIFIED JOINT: ICD-10-CM

## 2022-08-02 DIAGNOSIS — R53.83 FATIGUE, UNSPECIFIED TYPE: Primary | ICD-10-CM

## 2022-08-02 DIAGNOSIS — A69.23 LYME ARTHRITIS (HCC): ICD-10-CM

## 2022-08-02 DIAGNOSIS — J44.9 CHRONIC OBSTRUCTIVE PULMONARY DISEASE, UNSPECIFIED COPD TYPE (HCC): ICD-10-CM

## 2022-08-02 DIAGNOSIS — R70.0 ELEVATED SED RATE (ELEV SR): ICD-10-CM

## 2022-08-02 DIAGNOSIS — E55.9 VITAMIN D DEFICIENCY: ICD-10-CM

## 2022-08-02 DIAGNOSIS — E53.8 LOW VITAMIN B12 LEVEL: ICD-10-CM

## 2022-08-02 DIAGNOSIS — T50.905A ADVERSE EFFECT OF DRUG, INITIAL ENCOUNTER: ICD-10-CM

## 2022-08-02 DIAGNOSIS — R00.2 PALPITATION: ICD-10-CM

## 2022-08-02 DIAGNOSIS — M79.10 MYALGIA: ICD-10-CM

## 2022-08-02 DIAGNOSIS — R06.02 SHORTNESS OF BREATH: ICD-10-CM

## 2022-08-02 DIAGNOSIS — M35.3 PMR (POLYMYALGIA RHEUMATICA) (HCC): ICD-10-CM

## 2022-08-02 DIAGNOSIS — E11.9 CONTROLLED TYPE 2 DIABETES MELLITUS WITHOUT COMPLICATION, WITHOUT LONG-TERM CURRENT USE OF INSULIN (HCC): ICD-10-CM

## 2022-08-02 PROCEDURE — 99215 OFFICE O/P EST HI 40 MIN: CPT | Performed by: FAMILY MEDICINE

## 2022-08-02 PROCEDURE — 93000 ELECTROCARDIOGRAM COMPLETE: CPT | Performed by: FAMILY MEDICINE

## 2022-08-02 PROCEDURE — 1160F RVW MEDS BY RX/DR IN RCRD: CPT | Performed by: FAMILY MEDICINE

## 2022-08-02 RX ORDER — AMOXICILLIN 500 MG/1
CAPSULE ORAL
COMMUNITY
Start: 2022-07-30 | End: 2022-08-31

## 2022-08-02 NOTE — PATIENT INSTRUCTIONS
Complete chest x-ray  Complete blood work  Follow up with Rheumatology for further evaluation treatment  Follow-up with Dr Ball Estimable end of month as planned

## 2022-08-02 NOTE — PROGRESS NOTES
Assessment and Plan:  1  Fatigue  2  Palpitations  3  Shortness of breath  4  Adverse effects of high dose prednisone   EKG completed in office EKG is normal  Chest x-ray is ordered  5  Elevated sed rate repeat now that Lyme was treated  6  Vitamin-D deficiency, 5/22 level normal  7  B12 deficiency, 5/22 level normal  8  COPD, lungs are clear chest x-ray has been ordered as above  O2 sat 93%  9  Myalgia  10  Arthralgia  11  Lyme arthritis status post doxycycline treatment  12  PMR, treated years ago by Dr Deb Abel, with chronic prednisone therapy  Will repeat blood work to include sed rate/rheumatoid factor/CRISTHIAN/CK level  Refer to rheumatology for further evaluation treatment  13  Dental abscess, patient is on amoxicillin by her dentist  14  Type 2 diabetes, well controlled with hemoglobin A1c 5/2205 8  15   Patient follow-up end of month with Dr Raylene Cabot     Problem List Items Addressed This Visit        Endocrine    Controlled diabetes mellitus type II without complication [facility])       Lab Results   Component Value Date    HGBA1C 5 8 (H) 05/27/2022   Well controlled hemoglobin A1c 5/2205 8            Respiratory    COPD (chronic obstructive pulmonary disease) (HCC)     Lungs clear check chest x-ray         Relevant Orders    Sedimentation rate, automated    RF Screen w/ Reflex to Titer    CRISTHIAN Screen w/ Reflex to Titer/Pattern    CK       Other    Vitamin D deficiency     Level normal 5/22         Relevant Orders    Sedimentation rate, automated    RF Screen w/ Reflex to Titer    CRISTHIAN Screen w/ Reflex to Titer/Pattern    CK    Low vitamin B12 level     Level normal 5/22         Relevant Orders    Sedimentation rate, automated    RF Screen w/ Reflex to Titer    CRISTHIAN Screen w/ Reflex to Titer/Pattern    CK      Other Visit Diagnoses     Fatigue, unspecified type    -  Primary    Relevant Orders    XR chest pa & lateral    Sedimentation rate, automated    RF Screen w/ Reflex to Titer    CRISTHIAN Screen w/ Reflex to Titer/Pattern    CK    Palpitation        Relevant Orders    XR chest pa & lateral    Sedimentation rate, automated    RF Screen w/ Reflex to Titer    CRISTHIAN Screen w/ Reflex to Titer/Pattern    CK    Shortness of breath        Relevant Orders    XR chest pa & lateral    Sedimentation rate, automated    RF Screen w/ Reflex to Titer    CRISTHIAN Screen w/ Reflex to Titer/Pattern    CK    Adverse effect of drug, initial encounter        Relevant Orders    Sedimentation rate, automated    RF Screen w/ Reflex to Titer    CRISTHIAN Screen w/ Reflex to Titer/Pattern    CK    Elevated sed rate (elev SR)        Relevant Orders    Sedimentation rate, automated    RF Screen w/ Reflex to Titer    CRISTHIAN Screen w/ Reflex to Titer/Pattern    CK    Ambulatory Referral to Rheumatology    Myalgia        Relevant Orders    Sedimentation rate, automated    RF Screen w/ Reflex to Titer    CRISTHIAN Screen w/ Reflex to Titer/Pattern    CK    Ambulatory Referral to Rheumatology    Arthralgia, unspecified joint        Relevant Orders    Sedimentation rate, automated    RF Screen w/ Reflex to Titer    CRISTHIAN Screen w/ Reflex to Titer/Pattern    CK    Ambulatory Referral to Rheumatology    Dental abscess        Relevant Orders    Sedimentation rate, automated    RF Screen w/ Reflex to Titer    CRISTHIAN Screen w/ Reflex to Titer/Pattern    CK    Lyme arthritis (HCC)        Relevant Medications    amoxicillin (AMOXIL) 500 mg capsule    Other Relevant Orders    Sedimentation rate, automated    RF Screen w/ Reflex to Titer    CRISTHIAN Screen w/ Reflex to Titer/Pattern    CK    Ambulatory Referral to Rheumatology    [facility]        Relevant Orders    Ambulatory Referral to Rheumatology                 Diagnoses and all orders for this visit:    Fatigue, unspecified type  -     XR chest pa & lateral; Future  -     Sedimentation rate, automated; Future  -     RF Screen w/ Reflex to Titer; Future  -     CRISTHIAN Screen w/ Reflex to Titer/Pattern;  Future  - CK; Future    Palpitation  -     XR chest pa & lateral; Future  -     Sedimentation rate, automated; Future  -     RF Screen w/ Reflex to Titer; Future  -     CRISTHIAN Screen w/ Reflex to Titer/Pattern; Future  -     CK; Future    Shortness of breath  -     XR chest pa & lateral; Future  -     Sedimentation rate, automated; Future  -     RF Screen w/ Reflex to Titer; Future  -     CRISTHIAN Screen w/ Reflex to Titer/Pattern; Future  -     CK; Future    Adverse effect of drug, initial encounter  -     Sedimentation rate, automated; Future  -     RF Screen w/ Reflex to Titer; Future  -     CRISTHIAN Screen w/ Reflex to Titer/Pattern; Future  -     CK; Future    Elevated sed rate (elev SR)  -     Sedimentation rate, automated; Future  -     RF Screen w/ Reflex to Titer; Future  -     CRISTHIAN Screen w/ Reflex to Titer/Pattern; Future  -     CK; Future  -     Cancel: Ambulatory Referral to Rheumatology; Future  -     Ambulatory Referral to Rheumatology; Future    Vitamin D deficiency  -     Sedimentation rate, automated; Future  -     RF Screen w/ Reflex to Titer; Future  -     CRISTHIAN Screen w/ Reflex to Titer/Pattern; Future  -     CK; Future    Low vitamin B12 level  -     Sedimentation rate, automated; Future  -     RF Screen w/ Reflex to Titer; Future  -     CRISTHIAN Screen w/ Reflex to Titer/Pattern; Future  -     CK; Future    Chronic [facility] pulmonary disease, unspecified [facility] type [facility]  -     Sedimentation rate, automated; Future  -     RF Screen w/ Reflex to Titer; Future  -     CRISTHIAN Screen w/ Reflex to Titer/Pattern; Future  -     CK; Future    Myalgia  -     Sedimentation rate, automated; Future  -     RF Screen w/ Reflex to Titer; Future  -     CRISTHIAN Screen w/ Reflex to Titer/Pattern; Future  -     CK; Future  -     Cancel: Ambulatory Referral to Rheumatology; Future  -     Ambulatory Referral to Rheumatology; Future    Arthralgia, unspecified joint  -     Sedimentation rate, automated;  Future  -     RF Screen w/ Reflex to Titer; Future  -     CRISTHIAN Screen w/ Reflex to Titer/Pattern; Future  -     CK; Future  -     Cancel: Ambulatory Referral to Rheumatology; Future  -     Ambulatory Referral to Rheumatology; Future    Dental abscess  -     Sedimentation rate, automated; Future  -     RF Screen w/ Reflex to Titer; Future  -     CRISTHIAN Screen w/ Reflex to Titer/Pattern; Future  -     CK; Future    Lyme arthritis ([place] [address] )  -     Sedimentation rate, automated; Future  -     RF Screen w/ Reflex to Titer; Future  -     CRISTHIAN Screen w/ Reflex to Titer/Pattern; Future  -     CK; Future  -     Cancel: Ambulatory Referral to Rheumatology; Future  -     Ambulatory Referral to Rheumatology; Future    PMR (polymyalgia rheumatica) ([place] [address] )  -     Ambulatory Referral to Rheumatology; Future    Controlled type 2 diabetes mellitus without complication, without long-term current use of insulin ([facility])    Other orders  -     amoxicillin (AMOXIL) 500 mg capsule; TAKE 2 CAPSULES BY MOUTH NOW, THEN 1 CPASULE BY MOUTH EVERY 6 HOURS UNTIL COMPLETED            Subjective:      Patient ID: Blair Laureano is a 68 y o  female  CC:    Chief Complaint   Patient presents with    Medication Management     Pt states she has been having ongoing issues with her Lyme Disease and PMR and recently a abscess in the jaw  She has a lot of pain and fatigue and joint pain  Also pt states since she was on the high dose Prednisone she has felt some SOB as well  kw       HPI:    Since April patient has had myalgias arthralgias weakness and fatigue  Patient did test positive for Lyme and finish doxycycline therapy  Patient's sed rate was elevated  Patient was diagnosed with PMR  Placed on prednisone  With the prednisone she had shortness of breath and palpitations  Much improved off of prednisone  Patient still with fatigue    Of note patient has not seen rheumatology for PMR diagnosis are had a recent rheumatoid factor      The following portions of the patient's history were reviewed and updated as appropriate: allergies, current medications, past family history, past medical history, past social history, past surgical history and problem list       Review of Systems   Constitutional:        HPI   HENT:        Seeing dentist for tooth abscess is on amoxicillin   Eyes: Negative  Respiratory:        HPI   Cardiovascular:        HPI   Gastrointestinal: Negative  Endocrine: Negative  Genitourinary: Negative  Musculoskeletal:        HPI   Skin: Negative  Allergic/Immunologic: Negative  Neurological:        HPI   Hematological: Negative  Psychiatric/Behavioral: Negative  Data to review:       Objective:    Vitals:    08/02/22 1551   BP: 120/72   BP Location: Right arm   Patient Position: Sitting   Pulse: 80   SpO2: 93%   Weight: 74 5 kg (164 lb 3 2 oz)   Height: 5' 1" (1 549 m)        Physical Exam  Vitals and nursing note reviewed  Constitutional:       Appearance: Normal appearance  HENT:      Head: Normocephalic and atraumatic  Mouth/Throat:      Mouth: Mucous membranes are moist    Eyes:      General: No scleral icterus  Neck:      Vascular: No carotid bruit  Cardiovascular:      Rate and Rhythm: Normal rate and regular rhythm  Heart sounds: Normal heart sounds  Pulmonary:      Effort: Pulmonary effort is normal       Breath sounds: Normal breath sounds  Abdominal:      General: Bowel sounds are normal       Palpations: Abdomen is soft  Tenderness: There is no abdominal tenderness  Musculoskeletal:         General: No tenderness  Cervical back: Neck supple  Right lower leg: No edema  Left lower leg: No edema  Lymphadenopathy:      Cervical: No cervical adenopathy  Skin:     General: Skin is warm and dry  Neurological:      General: No focal deficit present  Mental Status: She is alert     Psychiatric:         Mood and Affect: Mood normal

## 2022-08-02 NOTE — ASSESSMENT & PLAN NOTE
Lab Results   Component Value Date    HGBA1C 5 8 (H) 05/27/2022   Well controlled hemoglobin A1c 5/2205 8

## 2022-08-09 PROBLEM — R76.8 ANA POSITIVE: Status: ACTIVE | Noted: 2022-08-09

## 2022-08-09 LAB
ANA PAT SER IF-IMP: ABNORMAL
ANA PAT SER-IMP: ABNORMAL
ANA SER QL IF: POSITIVE
ANA TITR SER IF: ABNORMAL TITER
ANA TITR SER IF: ABNORMAL TITER
CK SERPL-CCNC: 30 U/L (ref 29–143)
ERYTHROCYTE [SEDIMENTATION RATE] IN BLOOD BY WESTERGREN METHOD: 25 MM/H
RHEUMATOID FACT SERPL-ACNC: <14 IU/ML

## 2022-08-10 ENCOUNTER — TELEPHONE (OUTPATIENT)
Dept: FAMILY MEDICINE CLINIC | Facility: CLINIC | Age: 77
End: 2022-08-10

## 2022-08-10 NOTE — TELEPHONE ENCOUNTER
----- Message from Savanna Nguyen sent at 8/9/2022  5:46 PM EDT -----  Regarding: Results  This is very upsetting to hear  Since Dr Islas Due thinks I may have Lupus, is there any way he can intercede with Rheumatology for me and possibly get me an appointment with them before January? Please advise  Thanks!

## 2022-08-25 ENCOUNTER — CLINICAL SUPPORT (OUTPATIENT)
Dept: FAMILY MEDICINE CLINIC | Facility: CLINIC | Age: 77
End: 2022-08-25
Payer: COMMERCIAL

## 2022-08-25 DIAGNOSIS — E53.8 LOW VITAMIN B12 LEVEL: Primary | ICD-10-CM

## 2022-08-25 PROCEDURE — 96372 THER/PROPH/DIAG INJ SC/IM: CPT

## 2022-08-25 RX ADMIN — CYANOCOBALAMIN 1000 MCG: 1000 INJECTION, SOLUTION INTRAMUSCULAR; SUBCUTANEOUS at 13:44

## 2022-08-25 NOTE — PROGRESS NOTES
Betsy Gabriel is in the office today to receive b12 injection/vaccine  Pt handled the procedure well with no complaints and was able to leave the office in no distress

## 2022-08-31 ENCOUNTER — OFFICE VISIT (OUTPATIENT)
Dept: FAMILY MEDICINE CLINIC | Facility: CLINIC | Age: 77
End: 2022-08-31
Payer: COMMERCIAL

## 2022-08-31 VITALS
HEIGHT: 61 IN | BODY MASS INDEX: 30.96 KG/M2 | HEART RATE: 68 BPM | SYSTOLIC BLOOD PRESSURE: 140 MMHG | DIASTOLIC BLOOD PRESSURE: 68 MMHG | WEIGHT: 164 LBS

## 2022-08-31 DIAGNOSIS — R53.83 FATIGUE, UNSPECIFIED TYPE: ICD-10-CM

## 2022-08-31 DIAGNOSIS — Z12.2 ENCOUNTER FOR SCREENING FOR LUNG CANCER: ICD-10-CM

## 2022-08-31 DIAGNOSIS — E11.9 CONTROLLED TYPE 2 DIABETES MELLITUS WITHOUT COMPLICATION, WITHOUT LONG-TERM CURRENT USE OF INSULIN (HCC): ICD-10-CM

## 2022-08-31 DIAGNOSIS — R76.8 ANA POSITIVE: Primary | ICD-10-CM

## 2022-08-31 DIAGNOSIS — J44.9 CHRONIC OBSTRUCTIVE PULMONARY DISEASE, UNSPECIFIED COPD TYPE (HCC): ICD-10-CM

## 2022-08-31 DIAGNOSIS — R06.02 SHORTNESS OF BREATH: ICD-10-CM

## 2022-08-31 DIAGNOSIS — M62.89 MUSCLE FATIGUE: ICD-10-CM

## 2022-08-31 PROCEDURE — 1160F RVW MEDS BY RX/DR IN RCRD: CPT | Performed by: FAMILY MEDICINE

## 2022-08-31 PROCEDURE — 99215 OFFICE O/P EST HI 40 MIN: CPT | Performed by: FAMILY MEDICINE

## 2022-08-31 RX ORDER — BUDESONIDE AND FORMOTEROL FUMARATE DIHYDRATE 160; 4.5 UG/1; UG/1
2 AEROSOL RESPIRATORY (INHALATION) 2 TIMES DAILY
Qty: 30.6 G | Refills: 3 | Status: SHIPPED | OUTPATIENT
Start: 2022-08-31

## 2022-08-31 NOTE — Clinical Note
Hi Dr Paulo Álvarez,  I saw Ms Michael Davis in the office today  She has had quite an interesting constellation of symptoms for several months now and has had an extensive workup to date  Most recently, she had a positive CRISTHIAN, and her symptoms continue despite being treated for Lyme disease and possible PMR  She has requested to see you but can only get an appointment in January  Would it be possible for you to perhaps see here in the next 1-2 weeks? I have included an extensive history in her note today, and would appreciate your input regarding her continued symptomatology    Many thanks and best regards,  Lynne Crowley

## 2022-08-31 NOTE — PROGRESS NOTES
Assessment and Plan:    Mrs Ziyad Almanza is a pleasant and youthful 59-year-old female presents today to follow-up on several acute medical conditions and other chronic ones  In May of this year she presented with fatigue and malaise associated with muscle weakness and aching, and nausea  Comprehensive blood work was ordered at that time, revealing a positive Lyme titer  She was treated with doxycycline and continue the course, despite some GI upset  However, after her course of treatment she continued to complain of muscle weakness bilaterally, mostly proximal, and fatigue  She also complained of difficulty getting up out of a chair  Sed rate elevated at 45  She has a prior history of PMR, and was given a trial of steroid taper  She did not tolerate the high doses, complaining of palpitations, shakiness and weakness  The dose was decreased, and she did complete the taper but did not note any improvement in symptoms  Of note, she does have a remote history of hepatitis C which has been latent and undetectable  Repeat PCR was also negative  She returned to the office in early August, complaining of continued symptoms  She also complained of some vague shortness of breath and noted the onset of an abscess in her tooth for which she was given amoxicillin  At that time, blood work was ordered which subsequently revealed a positive CRISTHIAN titer of 1:320  Rheumatoid factor was negative  Patient presents for follow-up of symptoms today and to review recent blood work  She is scheduled to see Rheumatology but not until January  Today, patient continues complain of weakness and easy fatigability  She states that she has 3 good hours of activity during the day but after that she has to rest   Nausea has resolved  Shortness of breath is somewhat better  We reviewed her medication list at length, including over-the-counter supplements  1  Positive CRISTHIAN - ?   True positive verses false positive from recent Lyme infection? Patient will follow-up with Rheumatology and I will try to obtain an earlier appointment with Dr Doris Cortez  2  Fatigue -  ? Etiology  New onset rheumatologic condition verses continue Lyme? 3  Muscle fatigue - may be secondary to above  Rule out statin myopathy  I have asked her to stop her rosuvastatin  I have also asked her to stop her vitamin-D, CoQ10 and biotin  She may continue on her probiotic  4  Shortness of breath - vague  She does have a history of COPD and remains on Spiriva  Continue to monitor  Consider further testing, pending rheumatology evaluation  5  Type 2 diabetes - metformin was recently increased  I have asked her to decrease back down to 500 mg daily to see if this makes any difference  6  COPD - patient continues to smoke and is not interested in stopping  She remains on Spiriva  She is due for her annual lung CT screening  I would like her to see Rheumatology as soon as possible and will place a call to Dr Doris Cortez  F/U 4 weeks  45 minutes was spent with patient today, including chart review, lab review, medication review and update and recent visits  Greater than 50% of time spent on counseling, chart review and care coordination  Subjective:      Patient ID: Fatuma Mckeon is a 68 y o  female  CC:    Chief Complaint   Patient presents with    Follow-up     Patient is here for a 3 week f/u re: chronic medical conditions  Patient states everything is the same except the nausea  Patient would like to review BW results  ak       HPI:    Mrs Mirna Santiago is a pleasant and youthful 77-year-old female presents today to follow-up on several acute medical conditions and other chronic ones  In May of this year she presented with fatigue and malaise associated with muscle weakness and aching, and nausea  Comprehensive blood work was ordered at that time, revealing a positive Lyme titer    She was treated with doxycycline and continue the course, despite some GI upset  However, after her course of treatment she continued to complain of muscle weakness bilaterally, mostly proximal, and fatigue  She also complained of difficulty getting up out of a chair  Sed rate elevated at 45  She has a prior history of PMR, and was given a trial of steroid taper  She did not tolerate the high doses, complaining of palpitations, shakiness and weakness  The dose was decreased, and she did complete the taper but did not note any improvement in symptoms  Of note, she does have a remote history of hepatitis C which has been latent and undetectable  Repeat PCR was also negative  She returned to the office in early August, complaining of continued symptoms  She also complained of some vague shortness of breath and noted the onset of an abscess in her tooth for which she was given amoxicillin  At that time, blood work was ordered which subsequently revealed a positive CRISTHIAN titer of 1:320  Rheumatoid factor was negative  Patient presents for follow-up of symptoms today and to review recent blood work  She is scheduled to see Rheumatology but not until January  Today, patient continues complain of weakness and easy fatigability  She states that she has "3 good hours of activity during the day" but after that she has to rest   Nausea has resolved  Shortness of breath is somewhat better  The following portions of the patient's history were reviewed and updated as appropriate: allergies, current medications, past family history, past medical history, past social history, past surgical history and problem list       Review of Systems   Constitutional: Positive for fatigue  See HPI   HENT: Negative for congestion, ear pain, mouth sores, sinus pressure and trouble swallowing  Eyes: Negative for discharge, redness and itching  Respiratory: Negative for apnea, cough, chest tightness, shortness of breath, wheezing and stridor  Cardiovascular: Negative for chest pain, palpitations and leg swelling  Gastrointestinal: Negative for abdominal distention, abdominal pain, blood in stool, constipation, diarrhea, nausea and vomiting  Endocrine: Negative for cold intolerance and heat intolerance  Genitourinary: Negative for difficulty urinating, dysuria, flank pain and urgency  Musculoskeletal: Positive for arthralgias and myalgias  Skin: Negative for rash  Neurological: Positive for weakness  Negative for dizziness, seizures, syncope, speech difficulty, light-headedness, numbness and headaches  Hematological: Negative for adenopathy  Psychiatric/Behavioral: Negative for agitation, behavioral problems, confusion and sleep disturbance  The patient is not nervous/anxious  Data to review:       Objective:    Vitals:    08/31/22 0923   BP: 140/68   Pulse: 68   Weight: 74 4 kg (164 lb)   Height: 5' 1" (1 549 m)        Physical Exam  Vitals and nursing note reviewed  Constitutional:       General: She is not in acute distress  Appearance: Normal appearance  She is well-developed  She is not ill-appearing, toxic-appearing or diaphoretic  Comments: Appears well, in no apparent distress  Well groomed  Excellent historian  Eyes:      General: No scleral icterus  Conjunctiva/sclera: Conjunctivae normal       Pupils: Pupils are equal, round, and reactive to light  Neck:      Vascular: No carotid bruit  Cardiovascular:      Rate and Rhythm: Normal rate and regular rhythm  Heart sounds: Normal heart sounds  No murmur heard  No friction rub  No gallop  Pulmonary:      Effort: Pulmonary effort is normal  No respiratory distress  Breath sounds: Normal breath sounds  No wheezing or rales  Musculoskeletal:         General: Normal range of motion  Cervical back: Normal range of motion and neck supple  Right lower leg: No edema  Left lower leg: No edema        Comments: Upper body strength 5/5 bilaterally  Lower extremity hip flexors 4/5, equal bilaterally  Sensory intact  Lymphadenopathy:      Cervical: No cervical adenopathy  Skin:     General: Skin is warm and dry  Coloration: Skin is not jaundiced  Neurological:      Mental Status: She is alert and oriented to person, place, and time  Psychiatric:         Mood and Affect: Mood normal          Behavior: Behavior normal          Thought Content:  Thought content normal          Judgment: Judgment normal

## 2022-09-02 ENCOUNTER — TELEPHONE (OUTPATIENT)
Dept: FAMILY MEDICINE CLINIC | Facility: CLINIC | Age: 77
End: 2022-09-02

## 2022-09-02 DIAGNOSIS — R79.89 LOW VITAMIN D LEVEL: ICD-10-CM

## 2022-09-02 DIAGNOSIS — M62.81 MUSCLE WEAKNESS: ICD-10-CM

## 2022-09-02 DIAGNOSIS — R76.8 POSITIVE ANA (ANTINUCLEAR ANTIBODY): Primary | ICD-10-CM

## 2022-09-02 NOTE — TELEPHONE ENCOUNTER
Spoke to patient she was scheduled for 9/16/22 with Dr Daniella Terrell  Patient will pass by the office next week to  new lab order

## 2022-09-06 ENCOUNTER — TELEPHONE (OUTPATIENT)
Dept: OBGYN CLINIC | Facility: HOSPITAL | Age: 77
End: 2022-09-06

## 2022-09-06 NOTE — TELEPHONE ENCOUNTER
Dr Clarence Abraham from [address] [place] [address] is not familiar with the Myomarker test ordered, is there a breakdown of what is needed? Or the patient may need to go somewhere else  A new script with a breakdown of tests may be needed   Fax # [phone]     Ruth cb # [phone]

## 2022-09-07 ENCOUNTER — APPOINTMENT (OUTPATIENT)
Dept: LAB | Facility: CLINIC | Age: 77
End: 2022-09-07
Payer: COMMERCIAL

## 2022-09-07 PROCEDURE — 86235 NUCLEAR ANTIGEN ANTIBODY: CPT | Performed by: INTERNAL MEDICINE

## 2022-09-07 PROCEDURE — 83516 IMMUNOASSAY NONANTIBODY: CPT | Performed by: INTERNAL MEDICINE

## 2022-09-07 PROCEDURE — 83520 IMMUNOASSAY QUANT NOS NONAB: CPT | Performed by: INTERNAL MEDICINE

## 2022-09-07 PROCEDURE — 83516 IMMUNOASSAY NONANTIBODY: CPT

## 2022-09-09 LAB
25(OH)D3 SERPL-MCNC: 33 NG/ML (ref 30–100)
ACE SERPL-CCNC: 23.6 U/L (ref 9–67)
ALBUMIN SERPL-MCNC: 4.5 G/DL (ref 3.6–5.1)
ALBUMIN/GLOB SERPL: 1.7 (CALC) (ref 1–2.5)
ALDOLASE SERPL-CCNC: 3.1 U/L
ALP SERPL-CCNC: 104 U/L (ref 37–153)
ALT SERPL-CCNC: 10 U/L (ref 6–29)
AST SERPL-CCNC: 13 U/L (ref 10–35)
BASOPHILS # BLD AUTO: 18 CELLS/UL (ref 0–200)
BASOPHILS NFR BLD AUTO: 0.3 %
BILIRUB SERPL-MCNC: 0.5 MG/DL (ref 0.2–1.2)
BUN SERPL-MCNC: 17 MG/DL (ref 7–25)
BUN/CREAT SERPL: ABNORMAL (CALC) (ref 6–22)
C3 SERPL-MCNC: 155 MG/DL (ref 83–193)
C4 SERPL-MCNC: 26 MG/DL (ref 15–57)
CALCIUM SERPL-MCNC: 9.6 MG/DL (ref 8.6–10.4)
CHLORIDE SERPL-SCNC: 105 MMOL/L (ref 98–110)
CK SERPL-CCNC: 36 U/L (ref 29–143)
CO2 SERPL-SCNC: 27 MMOL/L (ref 20–32)
CREAT SERPL-MCNC: 0.63 MG/DL (ref 0.6–1)
CREAT UR-MCNC: 87 MG/DL (ref 20–275)
CRP SERPL-MCNC: 3.2 MG/L
DSDNA AB SER-ACNC: 1 IU/ML
ENA SS-A AB SER IA-ACNC: NORMAL AI
ENA SS-B AB SER IA-ACNC: NORMAL AI
EOSINOPHIL # BLD AUTO: 132 CELLS/UL (ref 15–500)
EOSINOPHIL NFR BLD AUTO: 2.2 %
ERYTHROCYTE [DISTWIDTH] IN BLOOD BY AUTOMATED COUNT: 14.1 % (ref 11–15)
ERYTHROCYTE [SEDIMENTATION RATE] IN BLOOD BY WESTERGREN METHOD: 28 MM/H
GFR/BSA.PRED SERPLBLD CYS-BASED-ARV: 92 ML/MIN/1.73M2
GLOBULIN SER CALC-MCNC: 2.7 G/DL (CALC) (ref 1.9–3.7)
GLUCOSE SERPL-MCNC: 113 MG/DL (ref 65–99)
HCT VFR BLD AUTO: 39.4 % (ref 35–45)
HGB BLD-MCNC: 13.4 G/DL (ref 11.7–15.5)
HMGCR IGG SER IA-ACNC: <2 CU
LYMPHOCYTES # BLD AUTO: 1620 CELLS/UL (ref 850–3900)
LYMPHOCYTES NFR BLD AUTO: 27 %
MCH RBC QN AUTO: 27.8 PG (ref 27–33)
MCHC RBC AUTO-ENTMCNC: 34 G/DL (ref 32–36)
MCV RBC AUTO: 81.7 FL (ref 80–100)
MONOCYTES # BLD AUTO: 360 CELLS/UL (ref 200–950)
MONOCYTES NFR BLD AUTO: 6 %
NEUTROPHILS # BLD AUTO: 3870 CELLS/UL (ref 1500–7800)
NEUTROPHILS NFR BLD AUTO: 64.5 %
PLATELET # BLD AUTO: 208 THOUSAND/UL (ref 140–400)
PMV BLD REES-ECKER: 11.1 FL (ref 7.5–12.5)
POTASSIUM SERPL-SCNC: 4.2 MMOL/L (ref 3.5–5.3)
PROT SERPL-MCNC: 7.2 G/DL (ref 6.1–8.1)
PROT UR-MCNC: 8 MG/DL (ref 5–24)
PROT/CREAT UR: 0.09 MG/MG CREAT (ref 0.02–0.18)
PROT/CREAT UR: 92 MG/G CREAT (ref 24–184)
RBC # BLD AUTO: 4.82 MILLION/UL (ref 3.8–5.1)
SODIUM SERPL-SCNC: 141 MMOL/L (ref 135–146)
WBC # BLD AUTO: 6 THOUSAND/UL (ref 3.8–10.8)

## 2022-09-15 ENCOUNTER — HOSPITAL ENCOUNTER (OUTPATIENT)
Dept: MAMMOGRAPHY | Facility: MEDICAL CENTER | Age: 77
Discharge: HOME | End: 2022-09-15
Payer: COMMERCIAL

## 2022-09-15 VITALS — HEIGHT: 61 IN | BODY MASS INDEX: 30.97 KG/M2 | WEIGHT: 164.02 LBS

## 2022-09-15 DIAGNOSIS — Z12.31 ENCOUNTER FOR SCREENING MAMMOGRAM FOR BREAST CANCER: ICD-10-CM

## 2022-09-15 PROCEDURE — 77063 BREAST TOMOSYNTHESIS BI: CPT

## 2022-09-15 PROCEDURE — 77067 SCR MAMMO BI INCL CAD: CPT

## 2022-09-15 NOTE — PROGRESS NOTES
Assessment and Plan:   Luly Bryan is a 68 y o   female who presents as a Rheumatology consult referred by PCP for possible autoimmune disease given positive CRISTHIAN (1:320 in nuclear, homogenous, and speckled pattern) and myalgia, arthralgia, and fatigue  Complains of muscle weakness, joint pain, rash, and fatigue  She at least meets criteria for undifferentiated connective tissue disease  Also has positive anti-CCP, so could have underlying Rheumatoid arthritis  Either way, starting HCQ DMARD therapy would be helpful  Do labs  Schedule chest CT scan  Start hydroxychloroquine one and a half tabs daily    Return to clinic in 7 months    Plan:  Diagnoses and all orders for this visit:    Undifferentiated connective tissue disease ([address] )  -     hydroxychloroquine (PLAQUENIL) 200 mg tablet; Take 1 5 tablets (300 mg total) by mouth in the morning    Positive CRISTHIAN (antinuclear antibody)  -     Cyclic citrul peptide antibody, IgG  -     Nuclear antigen antibody  -     hydroxychloroquine (PLAQUENIL) 200 mg tablet; Take 1 5 tablets (300 mg total) by mouth in the morning    High risk medication use  High risk medication use - Benefits and risks of hydroxychloroquine, including but not limited to retinal toxicity, corneal deposits, gastrointestinal side effects, and headaches were discussed with the patient  The need for a regular eye exam to monitor for ocular toxicity while on this medication was also explained to the patient  Follow-up plan: RTC in 7 months        HPI  Luly Bryan is a 68 y o   female who presents as a Rheumatology consult referred by PCP for possible autoimmune disease given positive CRISTHIAN and myalgia, arthralgia, and fatigue  Has issues with SI joints, hips, and legs since a roller skating fall in the 80s  Also had broken arm and knee surgery s/p accident  In the past 20 years, her pain has worsened   Last year in 2/2021, received 2nd COVID vaccine and 2 weeks later, had 2nd shingles vaccine; since then, every time she gets any shot, gets an illness that comes over her for 5 days  In April, had similar symptoms, but it has persisted  Describes symptoms as intense aching and fatigue in arms and legs; feels very weak, can only do activity for 2-3 hours before feeling exhausted  Gets nauseous and shaky  Every day, has 2 hours of energy, which she uses up  Does a lot of yardwork and heavy lifting  Unable to do go out socially because makes her tired; high dose steroids caused side effects  Felt okay on lower doses, but didn't feel it helped her symptoms  Feels fatigue and weakness is worse in the morning and gets better with use  Being off statin has helped arm discomfort  Has been on doxycycline recently Lyme's disease and amoxicillin for tooth abscess  Admits to photosensitivity since having Lyme disease in the 1990s; admits to itchy rashes on arms and back since childhood  Gets dry skin on elbows  Was treated for HCV in the past; had hair loss from medication  Has COPD; gets shortness of breath on occasion  Still smoking 1 PPD of cigarettes  Denies oral/nasal ulcers, sicca symptoms, Raynaud's symptoms or h/o blood clots or miscarriages  In [id number], had PMR  Review of Systems  Review of Systems   Constitutional: Positive for fatigue  HENT: Negative for mouth sores  Respiratory: Negative for cough and shortness of breath  Cardiovascular: Negative for chest pain and leg swelling  Gastrointestinal: Negative for abdominal pain, constipation and diarrhea  Musculoskeletal: Positive for arthralgias and myalgias  Negative for back pain and joint swelling  Skin: Positive for rash  Negative for color change  Neurological: Positive for weakness  Hematological: Negative for adenopathy  Psychiatric/Behavioral: Negative for sleep disturbance  Reviewed and agree      Allergies  Allergies   Allergen Reactions   • Asa [Aspirin]    • Celebrex [Celecoxib] Other (See Comments)     Flushing     • Ibuprofen GI Intolerance   • Nsaids GI Intolerance     To all   • Percocet [Oxycodone-Acetaminophen] GI Intolerance       Home Medications    Current Outpatient Medications:   •  acetaminophen (TYLENOL) 325 mg tablet, Take 2 tablets (650 mg total) by mouth every 6 (six) hours as needed for mild pain, Disp: 30 tablet, Rfl: 0  •  albuterol (Ventolin HFA) 90 mcg/act inhaler, Inhale 1-2 puffs as needed for wheezing, Disp: 8 g, Rfl: 3  •  budesonide-formoterol (Symbicort) 160-4 5 mcg/act inhaler, Inhale 2 puffs 2 (two) times a day, Disp: 30 6 g, Rfl: 3  •  Digestive Enzyme CAPS, Take by mouth, Disp: , Rfl:   •  hydroxychloroquine (PLAQUENIL) 200 mg tablet, Take 1 5 tablets (300 mg total) by mouth in the morning, Disp: 45 tablet, Rfl: 6  •  montelukast (SINGULAIR) 10 mg tablet, Take 1 tablet (10 mg total) by mouth daily, Disp: 90 tablet, Rfl: 3  •  polyethylene glycol (GLYCOLAX) 17 GM/SCOOP powder, Take 17 g by mouth daily, Disp: , Rfl:   •  Probiotic Product (PROBIOTIC-10) CAPS, Take by mouth, Disp: , Rfl:   •  Spiriva Respimat 2 5 MCG/ACT AERS inhaler, Inhale 2 puffs daily 3 inhalers, Disp: 16 g, Rfl: 3  •  gabapentin (Neurontin) 100 mg capsule, Take 1 capsule (100 mg total) by mouth 3 (three) times a day As needed for back pain, Disp: 90 capsule, Rfl: 11  •  lansoprazole (PREVACID) 30 mg capsule, Take 1 capsule (30 mg total) by mouth daily 1 tab po BID, Disp: 90 capsule, Rfl: 3  •  metFORMIN (GLUCOPHAGE) 500 mg tablet, Take 1 tablet (500 mg total) by mouth 2 (two) times a day with meals, Disp: 180 tablet, Rfl: 2    Current Facility-Administered Medications:   •  cyanocobalamin injection 1,000 mcg, 1,000 mcg, Intramuscular, Q30 Days, Jeannette Rivera MD, 1,000 mcg at 08/25/22 1344  •  cyanocobalamin injection 1,000 mcg, 1,000 mcg, Intramuscular, Q30 Days, Jeannette Rivera MD, 1,000 mcg at 05/10/22 1328  •  cyanocobalamin injection 1,000 mcg, 1,000 mcg, Intramuscular, Q30 Days, Jeannette Rivera MD, 1,000 mcg at 11/30/21 1425  • cyanocobalamin injection 1,000 mcg, 1,000 mcg, Intramuscular, Q30 Days, Uriel Medrano MD, 1,000 mcg at 09/27/22 1347  •  cyanocobalamin injection 1,000 mcg, 1,000 mcg, Intramuscular, Q30 Days, Uriel Medrano MD, 1,000 mcg at 10/25/22 1344    Past Medical History  Past Medical History:   Diagnosis Date   • Arthritis    • Back pain    • Diabetes mellitus ([facility] )    • Emphysema of lung (HCC)    • GERD (gastroesophageal reflux disease)    • Lyme disease    • Osteoporosis    • Polymyalgia rheumatica ([facility] [address] )     last assessed 2/15/17   • Reactive airway disease     last assessed 6/9/16   • Sciatica        Past Surgical History   Past Surgical History:   Procedure Laterality Date   • BREAST BIOPSY Left    • HYSTERECTOMY      1987, total   • ORTHOPEDIC SURGERY         Family History    Family History   Problem Relation Age of Onset   • Thyroid cancer Mother 48   • COPD Father         mild   • Hypertension Father    • No Known Problems Maternal Grandmother    • Colon cancer Maternal Grandfather 80   • No Known Problems Paternal Grandmother    • No Known Problems Paternal Grandfather    • Breast cancer Maternal Aunt 55   • No Known Problems Maternal Aunt    • No Known Problems Paternal Aunt    brother - Rheumatoid arthritis, gout  Maternal uncle - Rheumatoid arthritis  Father - psoriasis      Social History  Occupation: retired; used to do office work  Social History     Substance and Sexual Activity   Alcohol Use Never     Social History     Substance and Sexual Activity   Drug Use No     Social History     Tobacco Use   Smoking Status Current Every Day Smoker   • Packs/day: 1 00   • Types: Cigarettes   Smokeless Tobacco Never Used       Objective:  Vitals:    09/16/22 1356   BP: 134/72   Weight: 75 3 kg (166 lb)   Height: 5' 1" (1 549 m)       Physical Exam  Constitutional:       General: She is not in acute distress  HENT:      Head: Normocephalic and atraumatic     Eyes:      Conjunctiva/sclera: Conjunctivae normal  Cardiovascular:      Rate and Rhythm: Normal rate and regular rhythm  Heart sounds: S1 normal and S2 normal      No friction rub  Pulmonary:      Effort: Pulmonary effort is normal  No respiratory distress  Breath sounds: Normal breath sounds  No wheezing, rhonchi or rales  Musculoskeletal:      Cervical back: Neck supple  Comments: Uses cane to ambulate   Skin:     Coloration: Skin is not pale  Neurological:      Mental Status: She is alert  Mental status is at baseline  Psychiatric:         Mood and Affect: Mood normal          Behavior: Behavior normal        Reviewed labs and imaging  Imaging:   XR lumbar spine 8/2/2021  Mild facet arthropathy is present L4-5 and L5-S1  XR right knee 6/25/2021  No acute osseous abnormality  DXA scan 10/2/2020  RESULTS:   LUMBAR SPINE:  L1-L4:  BMD 0 802 gm/cm2  T-score below normal, -2 2 and 8% less than 2018 and unchanged from 2015  The 8% is statistically significant change  Z-score 0 2     LEFT TOTAL HIP:  BMD 0 714 gm/cm2  T-score below normal, -1 9  Z-score -0 1     LEFT FEMORAL NECK:  BMD 0 519 gm/cm2  T-score below normal, -3 0, osteoporosis, and 2% higher than 2018 and 4% less than 2015  Z-score -0 9     The left forearm BMD is 0 492 and the T score is below normal, -3 4, serious osteoporosis and 1% higher than 2018  The Z score is -0 8     A 25-hydroxy vitamin D level, an intact PTH, and a comprehensive metabolic panel are suggested in this patient      Treatment is advised perhaps with Prolia for a number of years followed then by intravenous Reclast      IMPRESSION:  1  Based on the [facility] classification, this study identifies a diagnosis of osteoporosis, notable at the femoral neck and and the patient is considered at increased risk for fracture          Labs:   Orders Only on 09/06/2022   Component Date Value Ref Range Status   • C3 Complement 09/06/2022 155  83 - 193 mg/dL Final   • C4, COMPLEMENT 09/06/2022 26  15 - 57 mg/dL Final • Glucose, Random 09/06/2022 113 (A) 65 - 99 mg/dL Final    Comment:               Fasting reference interval     For someone without known diabetes, a glucose value  between 100 and 125 mg/dL is consistent with  prediabetes and should be confirmed with a  follow-up test         • BUN 09/06/2022 17  7 - 25 mg/dL Final   • Creatinine 09/06/2022 0 63  0 60 - 1 00 mg/dL Final   • eGFR 09/06/2022 92  > OR = 60 mL/min/1 73m2 Final    Comment: The eGFR is based on the CKD-EPI 2021 equation  To calculate   the new eGFR from a previous Creatinine or Cystatin C  result, go to [employer] at  org/professionals/  [email or web address]     • SL AMB BUN/CREATININE RATIO 62/86/3351 NOT APPLICABLE  6 - 22 (calc) Final   • Sodium 09/06/2022 141  135 - 146 mmol/L Final   • Potassium 09/06/2022 4 2  3 5 - 5 3 mmol/L Final   • Chloride 09/06/2022 105  98 - 110 mmol/L Final   • CO2 09/06/2022 27  20 - 32 mmol/L Final   • Calcium 09/06/2022 9 6  8 6 - 10 4 mg/dL Final   • Protein, Total 09/06/2022 7 2  6 1 - 8 1 g/dL Final   • Albumin 09/06/2022 4 5  3 6 - 5 1 g/dL Final   • Globulin 09/06/2022 2 7  1 9 - 3 7 g/dL (calc) Final   • Albumin/Globulin Ratio 09/06/2022 1 7  1 0 - 2 5 (calc) Final   • TOTAL BILIRUBIN 09/06/2022 0 5  0 2 - 1 2 mg/dL Final   • Alkaline Phosphatase 09/06/2022 104  37 - 153 U/L Final   • AST 09/06/2022 13  10 - 35 U/L Final   • ALT 09/06/2022 10  6 - 29 U/L Final   • Creatine Kinase, Total 09/06/2022 36  29 - 143 U/L Final   • Aldolase 09/06/2022 3 1  <=8 1 U/L Final   • Angio Convert Enzyme 09/06/2022 23 6  9 - 67 U/L Final   • HMGCR AB (IGG) 09/06/2022 <2  <20 CU Final    Comment: 3-Hydroxy-3-Methylglutaryl-Coenzyme A Reductase (HMGCR) Ab  is associated with necrotizing myopathy and is often found  with the use of statin medications  Rarely, HMGCR Ab  associated myositis has also been seen in patients ingesting  mushrooms and other foods       • Sedimentation Rate 09/06/2022 28  < OR = 30 mm/h Final   • White Blood Cell Count 09/06/2022 6 0  3 8 - 10 8 Thousand/uL Final   • Red Blood Cell Count 09/06/2022 4 82  3 80 - 5 10 Million/uL Final   • Hemoglobin 09/06/2022 13 4  11 7 - 15 5 g/dL Final   • HCT 09/06/2022 39 4  35 0 - 45 0 % Final   • MCV 09/06/2022 81 7  80 0 - 100 0 fL Final   • MCH 09/06/2022 27 8  27 0 - 33 0 pg Final   • MCHC 09/06/2022 34 0  32 0 - 36 0 g/dL Final   • RDW 09/06/2022 14 1  11 0 - 15 0 % Final   • Platelet Count 02/36/2157 208  140 - 400 Thousand/uL Final   • SL AMB MPV 09/06/2022 11 1  7 5 - 12 5 fL Final   • Neutrophils (Absolute) 09/06/2022 3,870  1,500 - 7,800 cells/uL Final   • Lymphocytes (Absolute) 09/06/2022 1,620  850 - 3,900 cells/uL Final   • Monocytes (Absolute) 09/06/2022 360  200 - 950 cells/uL Final   • Eosinophils (Absolute) 09/06/2022 132  15 - 500 cells/uL Final   • Basophils ABS 09/06/2022 18  0 - 200 cells/uL Final   • Neutrophils 09/06/2022 64 5  % Final   • Lymphocytes 09/06/2022 27 0  % Final   • Monocytes 09/06/2022 6 0  % Final   • Eosinophils 09/06/2022 2 2  % Final   • Basophils PCT 09/06/2022 0 3  % Final   • ds DNA Ab 09/06/2022 1  IU/mL Final    Comment:                            IU/mL       Interpretation                             < or = 4    Negative                             5-9         Indeterminate                             > or = 10   Positive        • C-Reactive Protein, Quant 09/06/2022 3 2  <8 0 mg/L Final   • Sjogren's Antibody (SS-A) 09/06/2022 <1 0 NEG  <1 0 NEG AI Final   • Sjogren's Antibody (SS-B) 09/06/2022 <1 0 NEG  <1 0 NEG AI Final   • Vitamin D, 25-Hydroxy, Serum 09/06/2022 33  30 - 100 ng/mL Final    Comment: Vitamin D Status         25-OH Vitamin D:     Deficiency:                    <20 ng/mL  Insufficiency:             20 - 29 ng/mL  Optimal:                 > or = 30 ng/mL     For 25-OH Vitamin D testing on patients on   D2-supplementation and patients for whom quantitation   of D2 and D3 fractions is required, the QuestAssureD(TM)  25-OH VIT D, (D2,D3), LC/MS/MS is recommended: order   code 25773 (patients >2yrs)  See Note 1     Note 1     For additional information, please refer to   http://[employer]Who/faq/XWH369   (This link is being provided for informational/  educational purposes only )     • Creatinine, Urine 09/06/2022 87  20 - 275 mg/dL Final   • Protein/Creat Ratio 09/06/2022 92  24 - 184 mg/g creat Final   • Protein/Creat Ratio 09/06/2022 0 092  0 024 - 0 184 mg/mg creat Final   • Total Protein, Urine 09/06/2022 8  5 - 24 mg/dL Final   Orders Only on 09/02/2022   Component Date Value Ref Range Status   • ROBBIE-1 Antibody 09/07/2022 <20  <20 Units Final   • PL 7 AutoAbs 09/07/2022 Negative  Negative Final   • PL 12 AutoAbs 09/07/2022 Negative  Negative Final   • EJ AutoAbs 09/07/2022 Negative  Negative Final   • OJ AutoAbs 09/07/2022 Negative  Negative Final   • SRP AutoAbs 09/07/2022 Negative  Negative Final   • MI-2 Autoab 09/07/2022 Negative  Negative Final   • Anti-TIF-1 gamma Ab (RDL) 09/07/2022 <20  <20 Units Final   • Anti-MDA-5-Ab  09/07/2022 <20  <20 Units Final   • ANTI-NXP-2 09/07/2022 <20  <20 Units Final   • ANTI-SAE1 AB, IGG (RDL) 09/07/2022 <20  <20 Units Final   • PM-SCL Ab 09/07/2022 <20  <20 Units Final   • KU AutoAbs 09/07/2022 Negative  Negative Final   • ANTI-SS-A 52KD AB, IGG (RDL) 09/07/2022 <20  <20 Units Final   • Anti-U1 RNP Ab (RDL) 09/07/2022 <20  <20 Units Final   • ANTI-U2 RNP AB (RDL) 09/07/2022 Negative  Negative Final   • U3 RNP  09/07/2022 Negative  Negative Final        Interpretation for Anti-Robbie-1, Anti-TIF-1gamma,      Anti-MDA-5, Anti-NXP-2, Anti-SAE1, Anti-PM/Scl-100,      Anti-SS-A 52 kD, Anti-U1 RNP:          Negative:                                <20          Weak Positive:                       20 - 39          Moderate Positive:                   40 - 80          Strong Positive:                         >80   Orders Only on 08/04/2022   Component Date Value Ref Range Status   • Creatine Kinase, Total 08/04/2022 30  29 - 143 U/L Final   • Sedimentation Rate 08/04/2022 25  < OR = 30 mm/h Final   • CRISTHIAN Screen, IFA 08/04/2022 POSITIVE (A) NEGATIVE Final    Comment: CRISTHIAN IFA is a first line screen for detecting the  presence of up to approximately 150 autoantibodies in  various autoimmune diseases  A positive CRISTHIAN IFA result  is suggestive of autoimmune disease and reflexes to  titer and pattern  Further laboratory testing may be  considered if clinically indicated  For additional information, please refer to  http://[employer]/faq/HPC389  (This link is being provided for informational/  educational purposes only )         • Rheumatoid Factor 08/04/2022 <14  <14 IU/mL Final   • SL AMB CRISTHIAN TITER 08/04/2022 1:320 (A) titer Final    Comment:                 Reference Range                  <1:40        Negative                  1:40-1:80    Low Antibody Level                  >1:80        Elevated Antibody Level        • SL AMB CRISTHIAN PATTERN 08/04/2022 Nuclear, Homogeneous (A)  Final    Comment: Homogeneous pattern is associated with systemic lupus  erythematosus (SLE), drug-induced lupus and juvenile  idiopathic arthritis  AC-1: Homogeneous     International Consensus on CRISTHIAN Patterns  (https://doi org/10 1515/hhje-1363-3770)     • CRISTHIAN Titer 1 08/04/2022 1:80 (A) titer Final    Comment: A low level CRISTHIAN titer may be present in pre-clinical  autoimmune diseases and normal individuals  Reference Range                  <1:40        Negative                  1:40-1:80    Low Antibody Level                  >1:80        Elevated Antibody Level        • CRISTHIAN Pattern 1 08/04/2022 Nuclear, Speckled (A)  Final    Comment: Speckled pattern is associated with mixed connective  tissue disease (MCTD), systemic lupus erythematosus  (SLE), Sjogren's syndrome, dermatomyositis, and   systemic sclerosis/polymyositis overlap       AC-2,4,5,29: Speckled     International Consensus on CRISTHIAN Patterns  (https://doi org/10 1515/gmht-6818-0490)     Appointment on 06/22/2022   Component Date Value Ref Range Status   • Sed Rate 06/22/2022 45 (A) 0 - 29 mm/hour Final   • Amylase 06/22/2022 70  25 - 115 IU/L Final   • Lipase 06/22/2022 137  73 - 393 u/L Final   Orders Only on 05/27/2022   Component Date Value Ref Range Status   • Total Cholesterol 05/27/2022 149  <200 mg/dL Final   • HDL 05/27/2022 55  > OR = 50 mg/dL Final   • Triglycerides 05/27/2022 109  <150 mg/dL Final   • LDL Calculated 05/27/2022 75  mg/dL (calc) Final    Comment: Reference range: <100     Desirable range <100 mg/dL for primary prevention;    <70 mg/dL for patients with CHD or diabetic patients   with > or = 2 CHD risk factors  LDL-C is now calculated using the Dainel-Kim   calculation, which is a validated novel method providing   better accuracy than the Friedewald equation in the   estimation of LDL-C  Dg Red[place]  [place] [id number];136(01): 6948-2645   (http://[employer]/faq/QBB644)     • Chol HDLC Ratio 05/27/2022 2 7  <5 0 (calc) Final   • Non-HDL Cholesterol 05/27/2022 94  <130 mg/dL (calc) Final    Comment: For patients with diabetes plus 1 major ASCVD risk   factor, treating to a non-HDL-C goal of <100 mg/dL   (LDL-C of <70 mg/dL) is considered a therapeutic   option  • Glucose, Random 05/27/2022 104 (A) 65 - 99 mg/dL Final    Comment:               Fasting reference interval     For someone without known diabetes, a glucose value  between 100 and 125 mg/dL is consistent with  prediabetes and should be confirmed with a  follow-up test         • BUN 05/27/2022 19  7 - 25 mg/dL Final   • Creatinine 05/27/2022 0 63  0 60 - 0 93 mg/dL Final    Comment: For patients >52years of age, the reference limit  for Creatinine is approximately 13% higher for people  identified as -American          • eGFR Non  05/27/2022 87  > OR = 60 mL/min/1 73m2 Final   • eGFR  05/27/2022 101  > OR = 60 mL/min/1 73m2 Final   • SL AMB BUN/CREATININE RATIO 40/00/2527 NOT APPLICABLE  6 - 22 (calc) Final   • Sodium 05/27/2022 139  135 - 146 mmol/L Final   • Potassium 05/27/2022 4 2  3 5 - 5 3 mmol/L Final   • Chloride 05/27/2022 106  98 - 110 mmol/L Final   • CO2 05/27/2022 24  20 - 32 mmol/L Final   • Calcium 05/27/2022 9 7  8 6 - 10 4 mg/dL Final   • Protein, Total 05/27/2022 7 1  6 1 - 8 1 g/dL Final   • Albumin 05/27/2022 4 3  3 6 - 5 1 g/dL Final   • Globulin 05/27/2022 2 8  1 9 - 3 7 g/dL (calc) Final   • Albumin/Globulin Ratio 05/27/2022 1 5  1 0 - 2 5 (calc) Final   • TOTAL BILIRUBIN 05/27/2022 0 4  0 2 - 1 2 mg/dL Final   • Alkaline Phosphatase 05/27/2022 98  37 - 153 U/L Final   • AST 05/27/2022 13  10 - 35 U/L Final   • ALT 05/27/2022 10  6 - 29 U/L Final   • Methylmalonic Acid, S 05/27/2022 122  87 - 318 nmol/L Final    Comment: This test was developed and its analytical performance  characteristics have been determined by [address] [place] [address]  It has  not been cleared or approved by the [facility]  Food and Drug  Administration  This assay has been validated pursuant  to the CLIA regulations and is used for clinical  purposes          • White Blood Cell Count 05/27/2022 6 2  3 8 - 10 8 Thousand/uL Final   • Red Blood Cell Count 05/27/2022 4 59  3 80 - 5 10 Million/uL Final   • Hemoglobin 05/27/2022 13 0  11 7 - 15 5 g/dL Final   • HCT 05/27/2022 38 0  35 0 - 45 0 % Final   • MCV 05/27/2022 82 8  80 0 - 100 0 fL Final   • MCH 05/27/2022 28 3  27 0 - 33 0 pg Final   • MCHC 05/27/2022 34 2  32 0 - 36 0 g/dL Final   • RDW 05/27/2022 12 9  11 0 - 15 0 % Final   • Platelet Count 54/03/1943 216  140 - 400 Thousand/uL Final   • SL AMB MPV 05/27/2022 10 7  7 5 - 12 5 fL Final   • Neutrophils (Absolute) 05/27/2022 4,266  1,500 - 7,800 cells/uL Final   • Lymphocytes (Absolute) 05/27/2022 1,327  850 - 3,900 cells/uL Final   • Monocytes (Absolute) 05/27/2022 403  200 - 950 cells/uL Final   • Eosinophils (Absolute) 05/27/2022 174  15 - 500 cells/uL Final   • Basophils ABS 05/27/2022 31  0 - 200 cells/uL Final   • Neutrophils 05/27/2022 68 8  % Final   • Lymphocytes 05/27/2022 21 4  % Final   • Monocytes 05/27/2022 6 5  % Final   • Eosinophils 05/27/2022 2 8  % Final   • Basophils PCT 05/27/2022 0 5  % Final   • Lyme Ab Screen 05/27/2022 8 09 (A) index Final    Comment:                    Index                Interpretation                     -----                --------------                     < 0 90               Negative                     0  90-1 09            Equivocal                     > 1 09               Positive      As recommended by the Food and Drug Administration   (FDA), all samples with positive or equivocal   results in a Borrelia burgdorferi antibody screen  will be tested using a blot method  Positive or   equivocal screening test results should not be   interpreted as truly positive until verified as such   using a supplemental assay (e g , B  burgdorferi blot)  The screening test and/or blot for B  burgdorferi   antibodies may be falsely negative in early stages  of Lyme disease, including the period when erythema   migrans is apparent          • EBV VCA IgM 05/27/2022 <36 00  U/mL Final    Comment:       U/mL              Interpretation        ----              --------------        <36 00            Negative        36 00-43 99       Equivocal        >43 99            Positive     • EBV VCA IgG 05/27/2022 112 00 (A) U/mL Final    Comment:        U/mL             Interpretation         ----             --------------         <18 00           Negative         18 00-21 99      Equivocal         >21 99           Positive     • EBV Nuclear Ag Ab 05/27/2022 141 00 (A) U/mL Final    Comment:        U/mL             Interpretation         ----             --------------         <18 00           Negative 18 00-21 99      Equivocal         >21 99           Positive     • Interpretation: 05/27/2022    Final    Comment:    Suggestive of a past Anisa-Barr virus infection  In infants, a similar pattern may occur as a result  of passive maternal transfer of antibody  • T4, Total 05/27/2022 6 0  5 1 - 11 9 mcg/dL Final   • TSH 05/27/2022 1 55  0 40 - 4 50 mIU/L Final   • Vitamin B-12 05/27/2022 303  200 - 1,100 pg/mL Final    Comment:    Please Note: Although the reference range for vitamin  B12 is 200-1100 pg/mL, it has been reported that between  5 and 10% of patients with values between 200 and 400  pg/mL may experience neuropsychiatric and hematologic  abnormalities due to occult B12 deficiency; less than 1%  of patients with values above 400 pg/mL will have symptoms  • Vitamin D, 25-Hydroxy, Serum 05/27/2022 41  30 - 100 ng/mL Final    Comment: Vitamin D Status         25-OH Vitamin D:     Deficiency:                    <20 ng/mL  Insufficiency:             20 - 29 ng/mL  Optimal:                 > or = 30 ng/mL     For 25-OH Vitamin D testing on patients on   D2-supplementation and patients for whom quantitation   of D2 and D3 fractions is required, the QuestAssureD(TM)  25-OH VIT D, (D2,D3), LC/MS/MS is recommended: order   code 06633 (patients >2yrs)  See Note 1     Note 1     For additional information, please refer to   http://education  Egnyte/faq/CYC323   (This link is being provided for informational/  educational purposes only )     • HCV RNA, Quantitative Real Time PCR 05/27/2022 <15 NOT DETECTED  NOT DETECTED IU/mL Final   • HCV RNA, Quantitative Real Time PCR 05/27/2022 <1 18 NOT DETECTED  NOT DETECTED Log IU/mL Final    Comment: This test was performed using Real-Time Polymerase Chain  Reaction  Reportable Range: 15 IU/mL to 100,000,000 IU/mL  (1 18 Log IU/mL to 8 00 Log IU/mL)        The analytical performance characteristics of this  assay have been determined by [facility]  The modifications have not been cleared or approved by  the FDA  This assay has been validated pursuant to the   CLIA regulations and is used for clinical purposes  For more information on this test, go to:  http://Kanshu/faq/EVV96s4  (This link is being provided for informational/  educational purposes only )        • Hemoglobin A1C 05/27/2022 5 8 (A) <5 7 % of total Hgb Final    Comment: For someone without known diabetes, a hemoglobin   A1c value between 5 7% and 6 4% is consistent with  prediabetes and should be confirmed with a   follow-up test      For someone with known diabetes, a value <7%  indicates that their diabetes is well controlled  A1c  targets should be individualized based on duration of  diabetes, age, comorbid conditions, and other  considerations  This assay result is consistent with an increased risk  of diabetes  Currently, no consensus exists regarding use of  hemoglobin A1c for diagnosis of diabetes for children          • Lyme Disease Ab (IgG), Blot 05/27/2022 POSITIVE (A) NEGATIVE Final   • Lyme 18 kD IgG 05/27/2022 REACTIVE (A)  Final   • Lyme 23 kD IgG 05/27/2022 REACTIVE (A)  Final   • Lyme 28 kD IgG 05/27/2022 REACTIVE (A)  Final   • Lyme 30 kD IgG 05/27/2022 REACTIVE (A)  Final   • Lyme 39 kD IgG 05/27/2022 REACTIVE (A)  Final   • Lyme 41 kD IgG 05/27/2022 REACTIVE (A)  Final   • Lyme 45 kD IgG 05/27/2022 REACTIVE (A)  Final   • Lyme 58 kD IgG 05/27/2022 REACTIVE (A)  Final   • Lyme 66 kD IgG 05/27/2022 REACTIVE (A)  Final   • Lyme 93 kD IgG 05/27/2022 REACTIVE (A)  Final   • Lyme Disease Ab (IgM), Blot 05/27/2022 NEGATIVE  NEGATIVE Final   • Lyme 23 kD IgM 05/27/2022 REACTIVE (A)  Final   • Lyme 39 kD IgM 05/27/2022 NON-REACTIVE   Final   • Lyme 41 kD IgM 05/27/2022 NON-REACTIVE   Final    Comment: As per CDC criteria, a Lyme disease IgG Immunoblot must  show reactivity to at least 5 of 10 specific borrelial  proteins to be considered positive; similarly, a   positive Lyme disease IgM immunoblot requires  reactivity to 2 of 3 specific borrelial proteins  Although considered negative, IgG reactivity to fewer  specific borrelial proteins or IgM reactivity to only  1 protein may indicate recent B  burgdorferi infection  and warrant testing of a later sample  A positive IgM  but negative IgG result obtained more than a month  after onset of symptoms likely represents a false-  positive IgM result rather than acute Lyme disease  In rare instances, Lyme disease immunoblot reactivity  may represent antibodies induced by exposure to other  spirochetes  Lyme immunoblot testing should only be performed on  samples from patients who have had a Positive or  Equivocal result in a screening assay       Telephone on 05/12/2022   Component Date Value Ref Range Status   • Severity 03/17/2022 Normal   Final   • Right Eye Diabetic Retinopathy 03/17/2022 None   Final   • Left Eye Diabetic Retinopathy 03/17/2022 None   Final

## 2022-09-16 ENCOUNTER — OFFICE VISIT (OUTPATIENT)
Dept: RHEUMATOLOGY | Facility: CLINIC | Age: 77
End: 2022-09-16
Payer: COMMERCIAL

## 2022-09-16 VITALS
WEIGHT: 166 LBS | SYSTOLIC BLOOD PRESSURE: 134 MMHG | BODY MASS INDEX: 31.34 KG/M2 | DIASTOLIC BLOOD PRESSURE: 72 MMHG | HEIGHT: 61 IN

## 2022-09-16 DIAGNOSIS — M35.9 UNDIFFERENTIATED CONNECTIVE TISSUE DISEASE (HCC): Primary | ICD-10-CM

## 2022-09-16 DIAGNOSIS — Z79.899 HIGH RISK MEDICATION USE: ICD-10-CM

## 2022-09-16 DIAGNOSIS — R76.8 POSITIVE ANA (ANTINUCLEAR ANTIBODY): ICD-10-CM

## 2022-09-16 PROBLEM — E11.51 ANGIOPATHY, DIABETIC (HCC): Status: ACTIVE | Noted: 2022-09-16

## 2022-09-16 PROCEDURE — 99204 OFFICE O/P NEW MOD 45 MIN: CPT | Performed by: INTERNAL MEDICINE

## 2022-09-16 RX ORDER — HYDROXYCHLOROQUINE SULFATE 200 MG/1
300 TABLET, FILM COATED ORAL DAILY
Qty: 45 TABLET | Refills: 6 | Status: SHIPPED | OUTPATIENT
Start: 2022-09-16 | End: 2023-04-14

## 2022-09-16 NOTE — PATIENT INSTRUCTIONS
Do labs  Schedule chest CT scan  Start hydroxychloroquine one and a half tabs daily    Return to clinic in 7 months

## 2022-09-22 LAB — CCP IGG SERPL-ACNC: 40 UNITS

## 2022-09-26 LAB
25(OH)D3 SERPL-MCNC: 33 NG/ML (ref 30–100)
ACE SERPL-CCNC: 23.6 U/L (ref 9–67)
ALBUMIN SERPL-MCNC: 4.5 G/DL (ref 3.6–5.1)
ALBUMIN/GLOB SERPL: 1.7 (CALC) (ref 1–2.5)
ALDOLASE SERPL-CCNC: 3.1 U/L
ALP SERPL-CCNC: 104 U/L (ref 37–153)
ALT SERPL-CCNC: 10 U/L (ref 6–29)
ANA PAT SER IF-IMP: ABNORMAL
ANA PAT SER-IMP: ABNORMAL
ANA SER QL IF: POSITIVE
ANA TITR SER IF: ABNORMAL TITER
ANA TITR SER IF: ABNORMAL TITER
AST SERPL-CCNC: 13 U/L (ref 10–35)
BASOPHILS # BLD AUTO: 18 CELLS/UL (ref 0–200)
BASOPHILS NFR BLD AUTO: 0.3 %
BILIRUB SERPL-MCNC: 0.5 MG/DL (ref 0.2–1.2)
BUN SERPL-MCNC: 17 MG/DL (ref 7–25)
BUN/CREAT SERPL: ABNORMAL (CALC) (ref 6–22)
C3 SERPL-MCNC: 155 MG/DL (ref 83–193)
C4 SERPL-MCNC: 26 MG/DL (ref 15–57)
CALCIUM SERPL-MCNC: 9.6 MG/DL (ref 8.6–10.4)
CCP IGG SERPL-ACNC: 40 UNITS
CHLORIDE SERPL-SCNC: 105 MMOL/L (ref 98–110)
CK SERPL-CCNC: 36 U/L (ref 29–143)
CO2 SERPL-SCNC: 27 MMOL/L (ref 20–32)
CREAT SERPL-MCNC: 0.63 MG/DL (ref 0.6–1)
CREAT UR-MCNC: 87 MG/DL (ref 20–275)
CRP SERPL-MCNC: 3.2 MG/L
DSDNA AB SER-ACNC: 1 IU/ML
ENA SS-A AB SER IA-ACNC: NORMAL AI
ENA SS-B AB SER IA-ACNC: NORMAL AI
EOSINOPHIL # BLD AUTO: 132 CELLS/UL (ref 15–500)
EOSINOPHIL NFR BLD AUTO: 2.2 %
ERYTHROCYTE [DISTWIDTH] IN BLOOD BY AUTOMATED COUNT: 14.1 % (ref 11–15)
ERYTHROCYTE [SEDIMENTATION RATE] IN BLOOD BY WESTERGREN METHOD: 28 MM/H
GFR/BSA.PRED SERPLBLD CYS-BASED-ARV: 92 ML/MIN/1.73M2
GLOBULIN SER CALC-MCNC: 2.7 G/DL (CALC) (ref 1.9–3.7)
GLUCOSE SERPL-MCNC: 113 MG/DL (ref 65–99)
HCT VFR BLD AUTO: 39.4 % (ref 35–45)
HGB BLD-MCNC: 13.4 G/DL (ref 11.7–15.5)
HMGCR IGG SER IA-ACNC: <2 CU
LYMPHOCYTES # BLD AUTO: 1620 CELLS/UL (ref 850–3900)
LYMPHOCYTES NFR BLD AUTO: 27 %
MCH RBC QN AUTO: 27.8 PG (ref 27–33)
MCHC RBC AUTO-ENTMCNC: 34 G/DL (ref 32–36)
MCV RBC AUTO: 81.7 FL (ref 80–100)
MONOCYTES # BLD AUTO: 360 CELLS/UL (ref 200–950)
MONOCYTES NFR BLD AUTO: 6 %
NEUTROPHILS # BLD AUTO: 3870 CELLS/UL (ref 1500–7800)
NEUTROPHILS NFR BLD AUTO: 64.5 %
PLATELET # BLD AUTO: 208 THOUSAND/UL (ref 140–400)
PMV BLD REES-ECKER: 11.1 FL (ref 7.5–12.5)
POTASSIUM SERPL-SCNC: 4.2 MMOL/L (ref 3.5–5.3)
PROT SERPL-MCNC: 7.2 G/DL (ref 6.1–8.1)
PROT UR-MCNC: 8 MG/DL (ref 5–24)
PROT/CREAT UR: 0.09 MG/MG CREAT (ref 0.02–0.18)
PROT/CREAT UR: 92 MG/G CREAT (ref 24–184)
RBC # BLD AUTO: 4.82 MILLION/UL (ref 3.8–5.1)
SODIUM SERPL-SCNC: 141 MMOL/L (ref 135–146)
WBC # BLD AUTO: 6 THOUSAND/UL (ref 3.8–10.8)

## 2022-09-27 ENCOUNTER — CLINICAL SUPPORT (OUTPATIENT)
Dept: FAMILY MEDICINE CLINIC | Facility: CLINIC | Age: 77
End: 2022-09-27
Payer: COMMERCIAL

## 2022-09-27 DIAGNOSIS — E53.8 LOW VITAMIN B12 LEVEL: Primary | ICD-10-CM

## 2022-09-27 PROCEDURE — 96372 THER/PROPH/DIAG INJ SC/IM: CPT

## 2022-09-27 RX ADMIN — CYANOCOBALAMIN 1000 MCG: 1000 INJECTION, SOLUTION INTRAMUSCULAR; SUBCUTANEOUS at 13:47

## 2022-09-27 NOTE — PROGRESS NOTES
Isaiah Easton is in the office today to receive B12 1cc injection/vaccine  Pt handled the procedure well with no complaints and was able to leave the office in no distress

## 2022-09-29 LAB
EJ AB SER QL: NEGATIVE
ENA JO1 AB SER IA-ACNC: <20 UNITS
ENA PM/SCL AB SER-ACNC: <20 UNITS
ENA SS-A 52KD IGG SER IA-ACNC: <20 UNITS
FIBRILLARIN AB SER QL: NEGATIVE
KU AB SER QL: NEGATIVE
MDA5 AB SER LINE BLOT-ACNC: <20 UNITS
MI2 AB SER QL: NEGATIVE
MJ AB SER LINE BLOT-ACNC: <20 UNITS
OJ AB SER QL: NEGATIVE
PL12 AB SER QL: NEGATIVE
PL7 AB SER QL: NEGATIVE
SAE1 IGG SER QL LINE BLOT: <20 UNITS
SRP AB SERPL QL: NEGATIVE
TIF1-GAMMA AB SER LINE BLOT-ACNC: <20 UNITS
U1 SNRNP AB SER IA-ACNC: <20 UNITS
U2 SNRNP AB SER QL: NEGATIVE

## 2022-10-02 PROBLEM — M35.9 UNDIFFERENTIATED CONNECTIVE TISSUE DISEASE (HCC): Status: ACTIVE | Noted: 2022-10-02

## 2022-10-04 ENCOUNTER — OFFICE VISIT (OUTPATIENT)
Dept: FAMILY MEDICINE CLINIC | Facility: CLINIC | Age: 77
End: 2022-10-04
Payer: COMMERCIAL

## 2022-10-04 VITALS
SYSTOLIC BLOOD PRESSURE: 120 MMHG | BODY MASS INDEX: 31.34 KG/M2 | DIASTOLIC BLOOD PRESSURE: 74 MMHG | HEIGHT: 61 IN | WEIGHT: 166 LBS | HEART RATE: 80 BPM

## 2022-10-04 DIAGNOSIS — J44.9 CHRONIC OBSTRUCTIVE PULMONARY DISEASE, UNSPECIFIED COPD TYPE (HCC): ICD-10-CM

## 2022-10-04 DIAGNOSIS — M46.1 SACROILIITIS (HCC): ICD-10-CM

## 2022-10-04 DIAGNOSIS — M54.32 SCIATICA OF LEFT SIDE: ICD-10-CM

## 2022-10-04 DIAGNOSIS — M67.952 TENDINOPATHY OF LEFT GLUTEAL REGION: ICD-10-CM

## 2022-10-04 DIAGNOSIS — K22.70 BARRETT'S ESOPHAGUS WITHOUT DYSPLASIA: ICD-10-CM

## 2022-10-04 DIAGNOSIS — R76.8 ANA POSITIVE: ICD-10-CM

## 2022-10-04 DIAGNOSIS — E11.9 CONTROLLED TYPE 2 DIABETES MELLITUS WITHOUT COMPLICATION, WITHOUT LONG-TERM CURRENT USE OF INSULIN (HCC): ICD-10-CM

## 2022-10-04 DIAGNOSIS — E53.8 LOW VITAMIN B12 LEVEL: ICD-10-CM

## 2022-10-04 DIAGNOSIS — M35.9 UNDIFFERENTIATED CONNECTIVE TISSUE DISEASE (HCC): Primary | ICD-10-CM

## 2022-10-04 PROCEDURE — 99214 OFFICE O/P EST MOD 30 MIN: CPT | Performed by: FAMILY MEDICINE

## 2022-10-04 RX ORDER — LANSOPRAZOLE 30 MG/1
30 CAPSULE, DELAYED RELEASE ORAL DAILY
Qty: 90 CAPSULE | Refills: 3
Start: 2022-10-04

## 2022-10-04 NOTE — PROGRESS NOTES
Name: Yessy Haley      : 1945      MRN: [id number]  Encounter Provider: Monae Barnard MD  Encounter Date: 10/4/2022   Encounter department: [facility]     Mrs Nayla Montenegro presents today to follow-up from her last appointment  Since I last saw her, she has seen Dr Flanagan, Rheumatology, for her continued musculoskeletal complaints and easy fatigability, as well as a recent positive RCISTHIAN  She was diagnosed with undifferentiated connective tissue disease and was started on Plaquenil  Patient reports minimal improvement  She does note that after stopping the statin, the deep ache in her arms resolved  She also decreased her metformin to once a day, and stopped B12 and other supplements  She does not feel that made any difference to her symptoms, and would like to restart them  Patient reports she has slightly more energy and less fatigability of her muscles  She is able to do more, and for longer period of time  She complains of continued pain in her left sacroiliac region, buttock, and down her left leg  The last time she had physical therapy was in May  She has seen pain management in the remote past, and received a sacroiliac injection which did not help her symptoms at all  1  Undifferentiated connective tissue disease -now on Plaquenil  Further blood work ordered by Rheumatology  Follow-up with Dr Delma Patrick  2  CRISTHIAN positive - further BW is pending and she will f/u with Rheum  3  Diabetes mellitus -patient may return to her previous dosage of metformin 500 mg b i d   Continue to monitor diet and follow hemoglobin A1c     4  COPD - she feels her symptoms are stable  Continue Symbicort  She is resistant to stopping smoking at this point in time  5  Vitamin B12 deficiency  - continue vitamin B12 IM injection every 30 days      6  Piriformis/ sciatica/left gluteal medius tendonitis - referred to physical therapy for pain and strengthening of muscle imbalance     She may choose to go to [facility]  Follow-up in 3 months    Subjective      Mrs Pan Book presents today to follow-up from her last appointment  Since I last saw her, she has seen Dr Flanagan, Rheumatology, for her continued musculoskeletal complaints and easy fatigability, as well as a recent positive CRISTHIAN  She was diagnosed with undifferentiated connective tissue disease and was started on Plaquenil  Patient reports minimal improvement  She does note that after stopping the statin, the deep ache in her arms resolved  She also decreased her metformin to once a day, and stopped B12 and other supplements  She does not feel that made any difference to her symptoms, and would like to restart them  Patient reports she has slightly more energy and less fatigability of her muscles  She is able to do more, and for longer period of time  She complains of continued pain in her left sacroiliac region, buttock, and down her left leg  The last time she had physical therapy was in May  She has seen pain management in the remote past, and received a sacroiliac injection which did not help her symptoms at all  Review of Systems   Constitutional:        See HPI   HENT: Negative for congestion, ear pain, mouth sores, sinus pressure and trouble swallowing  Eyes: Negative for discharge, redness and itching  Respiratory: Negative for apnea, cough, chest tightness, shortness of breath, wheezing and stridor  Cardiovascular: Negative for chest pain, palpitations and leg swelling  Gastrointestinal: Negative for abdominal distention, abdominal pain, blood in stool, constipation, diarrhea, nausea and vomiting  Endocrine: Negative for cold intolerance and heat intolerance  Genitourinary: Negative for difficulty urinating, dysuria, flank pain and urgency  Musculoskeletal: Negative for arthralgias and myalgias  Easy fatigability muscles   Skin: Negative for rash  Neurological: Positive for weakness  Negative for dizziness, seizures, syncope, speech difficulty, light-headedness, numbness and headaches  Weakness getting up out of a chair   Hematological: Negative for adenopathy  Psychiatric/Behavioral: Negative for agitation, behavioral problems, confusion and sleep disturbance  The patient is not nervous/anxious  Current Outpatient Medications on File Prior to Visit   Medication Sig    acetaminophen (TYLENOL) 325 mg tablet Take 2 tablets (650 mg total) by mouth every 6 (six) hours as needed for mild pain    albuterol (Ventolin HFA) 90 mcg/act inhaler Inhale 1-2 puffs as needed for wheezing    budesonide-formoterol (Symbicort) 160-4 5 mcg/act inhaler Inhale 2 puffs 2 (two) times a day    Digestive Enzyme CAPS Take by mouth    hydroxychloroquine (PLAQUENIL) 200 mg tablet Take 1 5 tablets (300 mg total) by mouth in the morning    lansoprazole (PREVACID) 30 mg capsule 1 tab po BID (Patient taking differently: daily 1 tab po BID)    metFORMIN (GLUCOPHAGE) 500 mg tablet Take 500 mg by mouth daily with breakfast    montelukast (SINGULAIR) 10 mg tablet Take 1 tablet (10 mg total) by mouth daily    polyethylene glycol (GLYCOLAX) 17 GM/SCOOP powder Take 17 g by mouth daily    Probiotic Product (PROBIOTIC-10) CAPS Take by mouth    Spiriva Respimat 2 5 MCG/ACT AERS inhaler Inhale 2 puffs daily 3 inhalers    [DISCONTINUED] metFORMIN (GLUCOPHAGE) 500 mg tablet Take 1 tablet (500 mg total) by mouth 2 (two) times a day with meals (Patient not taking: Reported on 10/4/2022)       Objective       Chief Complaint   Patient presents with    Follow-up     Follow up to chronic conditions and review bw results  [facility]          /74   Pulse 80   Ht 5' 1" (1 549 m)   Wt 75 3 kg (166 lb)   LMP  (LMP Unknown)   BMI 31 37 kg/m²      Chief Complaint   Patient presents with    Follow-up     Follow up to chronic conditions and review bw results    s Physical Exam  Vitals and nursing note reviewed  Constitutional:       General: She is not in acute distress  Appearance: Normal appearance  She is well-developed  She is not ill-appearing, toxic-appearing or diaphoretic  Eyes:      General: No scleral icterus  Neck:      Vascular: No carotid bruit  Cardiovascular:      Rate and Rhythm: Normal rate and regular rhythm  Heart sounds: Normal heart sounds  No murmur heard  No friction rub  No gallop  Comments: No carotid bruits appreciated  Pulmonary:      Effort: Pulmonary effort is normal  No respiratory distress  Breath sounds: No wheezing or rales  Chest:      Chest wall: No tenderness  Musculoskeletal:         General: Normal range of motion  Cervical back: Normal range of motion and neck supple  Right lower leg: No edema  Left lower leg: No edema  Comments: Motor and sensory of upper extremities are 5/5 bilaterally  However, she does seem to have some weakness of her proximal muscles  She has weakness when getting up out of a chair, without holding on  She is also using a cane for added stability  Skin:     General: Skin is warm and dry  Coloration: Skin is not jaundiced  Neurological:      Mental Status: She is alert and oriented to person, place, and time  Gait: Gait abnormal       Deep Tendon Reflexes: Reflexes are normal and symmetric  Psychiatric:         Mood and Affect: Mood normal          Behavior: Behavior normal          Thought Content:  Thought content normal          Judgment: Judgment normal        Guy Keys MD

## 2022-10-07 DIAGNOSIS — M54.50 LUMBOSACRAL PAIN, CHRONIC: Primary | ICD-10-CM

## 2022-10-07 DIAGNOSIS — G89.29 LUMBOSACRAL PAIN, CHRONIC: Primary | ICD-10-CM

## 2022-10-07 RX ORDER — GABAPENTIN 100 MG/1
100 CAPSULE ORAL 3 TIMES DAILY
Qty: 90 CAPSULE | Refills: 11 | Status: SHIPPED | OUTPATIENT
Start: 2022-10-07

## 2022-10-25 ENCOUNTER — CLINICAL SUPPORT (OUTPATIENT)
Dept: FAMILY MEDICINE CLINIC | Facility: CLINIC | Age: 77
End: 2022-10-25
Payer: COMMERCIAL

## 2022-10-25 DIAGNOSIS — E55.9 VITAMIN D DEFICIENCY: Primary | ICD-10-CM

## 2022-10-25 RX ADMIN — CYANOCOBALAMIN 1000 MCG: 1000 INJECTION, SOLUTION INTRAMUSCULAR; SUBCUTANEOUS at 13:44

## 2022-11-21 DIAGNOSIS — E11.9 CONTROLLED TYPE 2 DIABETES MELLITUS WITHOUT COMPLICATION, WITHOUT LONG-TERM CURRENT USE OF INSULIN (HCC): ICD-10-CM

## 2022-12-06 ENCOUNTER — CLINICAL SUPPORT (OUTPATIENT)
Dept: FAMILY MEDICINE CLINIC | Facility: CLINIC | Age: 77
End: 2022-12-06

## 2022-12-06 DIAGNOSIS — E53.8 LOW VITAMIN B12 LEVEL: Primary | ICD-10-CM

## 2022-12-06 RX ADMIN — CYANOCOBALAMIN 1000 MCG: 1000 INJECTION, SOLUTION INTRAMUSCULAR; SUBCUTANEOUS at 13:28

## 2023-01-04 ENCOUNTER — OFFICE VISIT (OUTPATIENT)
Dept: FAMILY MEDICINE CLINIC | Facility: CLINIC | Age: 78
End: 2023-01-04

## 2023-01-04 VITALS
RESPIRATION RATE: 16 BRPM | HEART RATE: 72 BPM | SYSTOLIC BLOOD PRESSURE: 118 MMHG | WEIGHT: 165.6 LBS | BODY MASS INDEX: 31.26 KG/M2 | DIASTOLIC BLOOD PRESSURE: 72 MMHG | HEIGHT: 61 IN

## 2023-01-04 DIAGNOSIS — E53.8 VITAMIN B12 DEFICIENCY: ICD-10-CM

## 2023-01-04 DIAGNOSIS — M35.9 UNDIFFERENTIATED CONNECTIVE TISSUE DISEASE (HCC): Primary | ICD-10-CM

## 2023-01-04 DIAGNOSIS — M79.10 MYALGIA: ICD-10-CM

## 2023-01-04 DIAGNOSIS — J44.9 CHRONIC OBSTRUCTIVE PULMONARY DISEASE, UNSPECIFIED COPD TYPE (HCC): ICD-10-CM

## 2023-01-04 DIAGNOSIS — G89.29 LUMBOSACRAL PAIN, CHRONIC: ICD-10-CM

## 2023-01-04 DIAGNOSIS — K22.70 BARRETT'S ESOPHAGUS WITHOUT DYSPLASIA: ICD-10-CM

## 2023-01-04 DIAGNOSIS — M54.50 LUMBOSACRAL PAIN, CHRONIC: ICD-10-CM

## 2023-01-04 DIAGNOSIS — E11.9 CONTROLLED TYPE 2 DIABETES MELLITUS WITHOUT COMPLICATION, WITHOUT LONG-TERM CURRENT USE OF INSULIN (HCC): ICD-10-CM

## 2023-01-04 DIAGNOSIS — Z86.19 HISTORY OF LYME DISEASE: ICD-10-CM

## 2023-01-04 LAB — SL AMB POCT HEMOGLOBIN AIC: 5.9 (ref ?–6.5)

## 2023-01-04 RX ORDER — DULOXETIN HYDROCHLORIDE 20 MG/1
20 CAPSULE, DELAYED RELEASE ORAL DAILY
Qty: 30 CAPSULE | Refills: 3 | Status: SHIPPED | OUTPATIENT
Start: 2023-01-04

## 2023-01-04 RX ORDER — PREDNISONE 1 MG/1
1 TABLET ORAL 2 TIMES DAILY WITH MEALS
Qty: 60 TABLET | Refills: 0 | Status: SHIPPED | OUTPATIENT
Start: 2023-01-04

## 2023-01-04 RX ADMIN — CYANOCOBALAMIN 1000 MCG: 1000 INJECTION, SOLUTION INTRAMUSCULAR; SUBCUTANEOUS at 09:55

## 2023-01-04 NOTE — ASSESSMENT & PLAN NOTE
Patient was on Plaquenil but it was stopped after she experienced a reaction  For the month that she was on it she does report feeling better  She was then started on gabapentin and again experienced dizziness, SOB and palpations  No longer on gabapentin  Within a week the pain and fatigue returned  Her complaints and physical exam are more consistent with muscle soreness and easy fatigability, than actual joint pain and swelling  It is almost fibromyalgia-like  Initiate trial of low-dose Cymbalta as well as low-dose prednisone 2 mg daily  She remarks that in the remote past when she had PMR, her symptoms were similar and she did respond to low-dose steroids  I am willing to try this new combination and see her back in 2 weeks to review  In the interim, she has an appointment with her rheumatologist and will defer any further intervention to Dr Delma Patrick  We will hold off on an infectious disease consultation, in the setting of recent Lyme disease, for the present time

## 2023-01-04 NOTE — ASSESSMENT & PLAN NOTE
Patient remains on metformin 500 mg daily  Her hemoglobin A1c is stable at 5 9  Continue to monitor sweets and carbs and be mindful of food choices and portion control      Lab Results   Component Value Date    HGBA1C 5 8 (H) 05/27/2022

## 2023-01-04 NOTE — PROGRESS NOTES
Name: Isaiah Mckay      : 1945      MRN: [id number]  Encounter Provider: Nicholas Fischer MD  Encounter Date: 2023   Encounter department: [place] [address]  Undifferentiated connective tissue disease ([facility] )  Assessment & Plan:  Patient was on Plaquenil but it was stopped after she experienced a reaction  For the month that she was on it she does report feeling better  She was then started on gabapentin and again experienced dizziness, SOB and palpations  No longer on gabapentin  Within a week the pain and fatigue returned  Her complaints and physical exam are more consistent with muscle soreness and easy fatigability, than actual joint pain and swelling  It is almost fibromyalgia-like  Initiate trial of low-dose Cymbalta as well as low-dose prednisone 2 mg daily  She remarks that in the remote past when she had PMR, her symptoms were similar and she did respond to low-dose steroids  I am willing to try this new combination and see her back in 2 weeks to review  In the interim, she has an appointment with her rheumatologist and will defer any further intervention to Dr Nicole Rothman  We will hold off on an infectious disease consultation, in the setting of recent Lyme disease, for the present time  Orders:  -     DULoxetine (CYMBALTA) 20 mg capsule; Take 1 capsule (20 mg total) by mouth daily  -     predniSONE 1 mg tablet; Take 1 tablet (1 mg total) by mouth 2 (two) times a day with meals    2  Myalgia  -     DULoxetine (CYMBALTA) 20 mg capsule; Take 1 capsule (20 mg total) by mouth daily  -     predniSONE 1 mg tablet; Take 1 tablet (1 mg total) by mouth 2 (two) times a day with meals    3  History of Lyme disease  Comments: We reviewed this today, and whether her symptoms may be related to a long, ongoing active Lyme infection  Symptoms are somewhat vague for this  Assessment & Plan:   We reviewed this today and whether her symptoms may be related to a long, ongoing active Lyme infection  Her symptomatology is not classic for this, but perhaps she may benefit from a consultation with infectious disease  Patient would like to hold off on this, pending other interventions from today  She will discuss further with her rheumatologist       4  Controlled type 2 diabetes mellitus without complication, without long-term current use of insulin ([facility] )  Assessment & Plan:  Patient remains on metformin 500 mg daily  Her hemoglobin A1c is stable at 5 9  Continue to monitor sweets and carbs and be mindful of food choices and portion control  Lab Results   Component Value Date    HGBA1C 5 8 (H) 05/27/2022       Orders:  -     POCT hemoglobin A1c    5  Chronic obstructive pulmonary disease, unspecified COPD type ([facility] )  Assessment & Plan:  Symptoms are stable on Symbicort and Spiriva  She does feel some intermittent muscle tightness in her chest, which I believed to be related to her current autoimmune/inflammatory state  It is unlikely to be cardiac in nature, as she remarks that she has raked leaves and been active, without symptomatology  Continue to monitor  6  Lumbosacral pain, chronic  Assessment & Plan:  Stable  Tylenol, prn       7  Holly's esophagus without dysplasia  Assessment & Plan:  Mohini Mane, Dr Erlin Maier his lansoprazole  8  Vitamin B12 deficiency  Comments:  Vitamin B12 injection today  Return to office in 2 weeks to review the above interventions and symptomatology  She is also scheduled to see her rheumatologist within the next few weeks  45 minutes spent with patient today with greater than 50% of time spent on discussion of above, review of differential diagnoses, review of extensive blood work to date and medications, both present and past   Additional time was spent reviewing the chart and specialist visits  BMI Counseling: Body mass index is 31 29 kg/m²   The BMI is above normal  Nutrition recommendations include decreasing portion sizes, encouraging healthy choices of fruits and vegetables and decreasing fast food intake  Exercise recommendations include exercising 3-5 times per week  Rationale for BMI follow-up plan is due to patient being overweight or obese  Depression Screening and Follow-up Plan: Patient was screened for depression during today's encounter  They screened negative with a PHQ-2 score of 0  Subjective      Patient presents today to follow-up on chronic medical conditions  In summary, she was in her usual state of good health until April 2022 when she presented with arthralgias, myalgias, feelings of muscle weakness and easy fatigability  Blood work was done at that time and revealed a positive Lyme titer  She was treated with doxycycline -she had GI side effects but persevered and completed the course  After treatment, symptoms persisted and further testing was done which revealed a positive CRISTHIAN  She was seen by Dr Judy Pena, and started on Plaquenil for undifferentiated connective tissue disease  Patient feels she did improve somewhat on the Plaquenil, but had a severe side effect and reaction including facial swelling, prompting discontinuance of the drug  She was then started on gabapentin and after a few weeks developed a similar reaction  At the present time, she is not on any additional medications  She feels that her symptoms are slightly improved from onset, but still complains of easy fatigability and soreness of distal muscle groups of arms and legs  Review of Systems   Constitutional:        See HPI   HENT: Negative for congestion, ear pain, mouth sores, sinus pressure and trouble swallowing  Eyes: Negative for discharge, redness and itching  Respiratory: Negative for apnea, cough, chest tightness, shortness of breath, wheezing and stridor  Cardiovascular: Negative for chest pain, palpitations and leg swelling     Gastrointestinal: Negative for abdominal distention, abdominal pain, blood in stool, constipation, diarrhea, nausea and vomiting  Endocrine: Negative for cold intolerance and heat intolerance  Genitourinary: Negative for difficulty urinating, dysuria, flank pain and urgency  Musculoskeletal: Positive for myalgias  Negative for arthralgias  See HPI   Skin: Negative for rash  Neurological: Negative for dizziness, seizures, syncope, speech difficulty, weakness, light-headedness, numbness and headaches  Hematological: Negative for adenopathy  Psychiatric/Behavioral: Negative for agitation, behavioral problems, confusion and sleep disturbance  The patient is not nervous/anxious  Current Outpatient Medications on File Prior to Visit   Medication Sig   • acetaminophen (TYLENOL) 325 mg tablet Take 2 tablets (650 mg total) by mouth every 6 (six) hours as needed for mild pain   • albuterol (Ventolin HFA) 90 mcg/act inhaler Inhale 1-2 puffs as needed for wheezing   • budesonide-formoterol (Symbicort) 160-4 5 mcg/act inhaler Inhale 2 puffs 2 (two) times a day   • Digestive Enzyme CAPS Take by mouth   • lansoprazole (PREVACID) 30 mg capsule Take 1 capsule (30 mg total) by mouth daily 1 tab po BID   • metFORMIN (GLUCOPHAGE) 500 mg tablet Take 1 tablet (500 mg total) by mouth daily with breakfast   • montelukast (SINGULAIR) 10 mg tablet Take 1 tablet (10 mg total) by mouth daily   • polyethylene glycol (GLYCOLAX) 17 GM/SCOOP powder Take 17 g by mouth daily   • Probiotic Product (PROBIOTIC-10) CAPS Take by mouth   • Spiriva Respimat 2 5 MCG/ACT AERS inhaler Inhale 2 puffs daily 3 inhalers       Objective     /72 (BP Location: Left arm, Patient Position: Sitting, Cuff Size: Large)   Pulse 72   Resp 16   Ht 5' 1" (1 549 m)   Wt 75 1 kg (165 lb 9 6 oz)   LMP  (LMP Unknown)   BMI 31 29 kg/m²       Diabetic Foot Exam    Patient's shoes and socks removed  Right Foot/Ankle   Right Foot Inspection  Skin Exam: skin normal and skin intact  No dry skin, no warmth, no callus, no erythema, no maceration, no abnormal color, no pre-ulcer, no ulcer and no callus  Toe Exam: ROM and strength within normal limits  No swelling, no tenderness, erythema and  no right toe deformity    Sensory   Proprioception: intact  Monofilament testing: intact    Vascular  Capillary refills: < 3 seconds  The right DP pulse is 2+  The right PT pulse is 2+  Left Foot/Ankle  Left Foot Inspection  Skin Exam: skin normal and skin intact  No dry skin, no warmth, no erythema, no maceration, normal color, no pre-ulcer, no ulcer and no callus  Toe Exam: ROM and strength within normal limits  No swelling, no tenderness, no erythema and no left toe deformity  Sensory   Proprioception: intact  Monofilament testing: intact    Vascular  Capillary refills: < 3 seconds  The left DP pulse is 2+  Assign Risk Category  No deformity present  No loss of protective sensation  No weak pulses  Risk: 0    Physical Exam  Vitals and nursing note reviewed  Constitutional:       General: She is not in acute distress  Appearance: Normal appearance  She is normal weight  She is not ill-appearing, toxic-appearing or diaphoretic  Comments: Patient encountered sitting in office chair in no apparent distress  She is well-dressed and well-groomed  She is an excellent historian  Eyes:      Conjunctiva/sclera: Conjunctivae normal    Neck:      Vascular: No carotid bruit  Cardiovascular:      Rate and Rhythm: Normal rate and regular rhythm  Pulses: Normal pulses  no weak pulses          Dorsalis pedis pulses are 2+ on the right side and 2+ on the left side  Posterior tibial pulses are 2+ on the right side  Heart sounds: Normal heart sounds  No murmur heard  No friction rub  No gallop  Pulmonary:      Effort: Pulmonary effort is normal  No respiratory distress  Breath sounds: Normal breath sounds  No stridor  No wheezing, rhonchi or rales     Chest: Chest wall: No tenderness  Musculoskeletal:         General: Tenderness present  No swelling, deformity or signs of injury  Normal range of motion  Cervical back: Normal range of motion and neck supple  Right lower leg: No edema  Left lower leg: No edema  Comments: Muscle strength is 5/5 and equal bilaterally  Sensory is intact  She feels a soreness over  both her forearms and lower legs to palpation  Deep tendon reflexes are normal and equal bilaterally  Patient is able to get up out of a chair without difficulty  Her gait is normal    Feet:      Right foot:      Skin integrity: No ulcer, skin breakdown, erythema, warmth, callus or dry skin  Left foot:      Skin integrity: No ulcer, skin breakdown, erythema, warmth, callus or dry skin  Skin:     Coloration: Skin is not jaundiced  Neurological:      General: No focal deficit present  Mental Status: She is alert and oriented to person, place, and time  Mental status is at baseline  Motor: No weakness  Coordination: Coordination normal       Gait: Gait normal       Deep Tendon Reflexes: Reflexes normal       Comments: Neurologic exam is grossly within normal limits  Psychiatric:         Mood and Affect: Mood normal          Behavior: Behavior normal          Thought Content:  Thought content normal          Judgment: Judgment normal        Nicholas Fischer MD

## 2023-01-05 PROBLEM — E11.51 ANGIOPATHY, DIABETIC (HCC): Status: RESOLVED | Noted: 2022-09-16 | Resolved: 2023-01-05

## 2023-01-05 PROBLEM — Z86.19 HISTORY OF LYME DISEASE: Status: ACTIVE | Noted: 2023-01-05

## 2023-01-05 NOTE — ASSESSMENT & PLAN NOTE
Symptoms are stable on Symbicort and Spiriva  She does feel some intermittent muscle tightness in her chest, which I believed to be related to her current autoimmune/inflammatory state  It is unlikely to be cardiac in nature, as she remarks that she has raked leaves and been active, without symptomatology  Continue to monitor

## 2023-01-05 NOTE — ASSESSMENT & PLAN NOTE
We reviewed this today and whether her symptoms may be related to a long, ongoing active Lyme infection  Her symptomatology is not classic for this, but perhaps she may benefit from a consultation with infectious disease  Patient would like to hold off on this, pending other interventions from today    She will discuss further with her rheumatologist

## 2023-01-18 ENCOUNTER — OFFICE VISIT (OUTPATIENT)
Dept: FAMILY MEDICINE CLINIC | Facility: CLINIC | Age: 78
End: 2023-01-18

## 2023-01-18 VITALS
RESPIRATION RATE: 16 BRPM | BODY MASS INDEX: 31.38 KG/M2 | DIASTOLIC BLOOD PRESSURE: 76 MMHG | HEIGHT: 61 IN | WEIGHT: 166.2 LBS | SYSTOLIC BLOOD PRESSURE: 118 MMHG | HEART RATE: 80 BPM

## 2023-01-18 DIAGNOSIS — Z86.19 HISTORY OF LYME DISEASE: ICD-10-CM

## 2023-01-18 DIAGNOSIS — M35.9 UNDIFFERENTIATED CONNECTIVE TISSUE DISEASE (HCC): Primary | ICD-10-CM

## 2023-01-18 DIAGNOSIS — M79.10 MYALGIA: ICD-10-CM

## 2023-01-18 DIAGNOSIS — G89.29 LUMBOSACRAL PAIN, CHRONIC: ICD-10-CM

## 2023-01-18 DIAGNOSIS — J43.8 OTHER EMPHYSEMA (HCC): ICD-10-CM

## 2023-01-18 DIAGNOSIS — M54.50 LUMBOSACRAL PAIN, CHRONIC: ICD-10-CM

## 2023-01-18 NOTE — PROGRESS NOTES
Name: Lenin Stuart      : 1945      MRN: [id number]  Encounter Provider: Aaron Bucio MD  Encounter Date: 2023   Encounter department: [address] [place] [address] PRIMARY CARE    Assessment & Plan     Patient presents today to follow-up after last visit when we started prednisone 1 mg twice daily as well as Cymbalta, for her ongoing myalgias  Patient has started the prednisone, but is holding the Cymbalta for now  She did not start it yet  Patient reports feeling less arthralgic symptoms in both arms and improved lower extremity pain  She still has some background achiness and pain of SI joint and hips, but feels a "tad improved" with the addition of prednisone  Scheduled to see Dr Sherryle Grosser next week  1  Undifferentiated connective tissue disease ([facility] )  Assessment & Plan:  See notes from last visit  Patient has started prednisone 2 mg daily, as she stated that this is helped her in the remote past when she had similar symptoms  She is feeling a little better  She has not yet started the Cymbalta, which I had suggested given her chronic pain  In the recent past, she has tried gabapentin as well as Plaquenil but had side effects to both  Today, she is willing to start low-dose Cymbalta  She may continue on low-dose prednisone for now, as it is helping her symptoms  However, the goal would be to stop prednisone if the Cymbalta helps  She has a follow-up with Dr Sherryle Grosser, her rheumatologist, next week to further discuss  Today, we once again discussed her history of recurrent Lyme disease and whether her symptoms may or may not be related to chronic Lyme  As she is starting to feel better, she would like to hold off on any further intervention with infectious disease, for now  Continue to monitor  2  Myalgia  Comments:  See above  Starting to improve slowly  Continue low-dose prednisone  Start Cymbalta  Follow-up with rheumatology      3  History of Lyme disease  Comments:  See above     4  Other emphysema ([address] )  Assessment & Plan:  Symptoms remain stable with no recent flares  Continue Symbicort and Spiriva  She has albuterol to use as needed but has not required it lately  5  Lumbosacral pain, chronic  Assessment & Plan:  Patient is walking with a cane for added stability and intermittent pain  Hopefully her current current pain will improve, as noted above  She is ongoing SI issues and perhaps consider another round of physical therapy or even injection with pain management, in the future  Follow-up with me in 4 weeks  Depression Screening and Follow-up Plan: Patient was screened for depression during today's encounter  They screened negative with a PHQ-2 score of 0  Subjective      Chief Complaint   Patient presents with   • Follow-up     Pt here for a follow up  rcruz         Patient presents today to follow-up after last visit when we started prednisone 1 mg twice daily as well as Cymbalta, for her ongoing myalgias  Patient has started the prednisone, but is holding the Cymbalta for now  She did not start it yet  Patient reports feeling less arthralgic symptoms in both arms and improved lower extremity pain  She still has some background achiness and pain of SI joint and hips, but feels a "tad improved" with the addition of prednisone  Scheduled to see Dr Mackenzie Luong next week  Review of Systems   Constitutional:        See HPI   HENT: Negative for congestion, ear pain, mouth sores, sinus pressure and trouble swallowing  Eyes: Negative for discharge, redness and itching  Respiratory: Negative for apnea, cough, chest tightness, shortness of breath, wheezing and stridor  Cardiovascular: Negative for chest pain, palpitations and leg swelling  Gastrointestinal: Negative for abdominal distention, abdominal pain, blood in stool, constipation, diarrhea, nausea and vomiting  Endocrine: Negative for cold intolerance and heat intolerance  Genitourinary: Negative for difficulty urinating, dysuria, flank pain and urgency  Musculoskeletal: Positive for arthralgias and myalgias  See HPI   Skin: Negative for rash  Neurological: Negative for dizziness, seizures, syncope, speech difficulty, weakness, light-headedness, numbness and headaches  Hematological: Negative for adenopathy  Psychiatric/Behavioral: Negative for agitation, behavioral problems, confusion and sleep disturbance  The patient is not nervous/anxious  Current Outpatient Medications on File Prior to Visit   Medication Sig   • acetaminophen (TYLENOL) 325 mg tablet Take 2 tablets (650 mg total) by mouth every 6 (six) hours as needed for mild pain   • albuterol (Ventolin HFA) 90 mcg/act inhaler Inhale 1-2 puffs as needed for wheezing   • budesonide-formoterol (Symbicort) 160-4 5 mcg/act inhaler Inhale 2 puffs 2 (two) times a day   • Digestive Enzyme CAPS Take by mouth   • DULoxetine (CYMBALTA) 20 mg capsule Take 1 capsule (20 mg total) by mouth daily   • lansoprazole (PREVACID) 30 mg capsule Take 1 capsule (30 mg total) by mouth daily 1 tab po BID   • metFORMIN (GLUCOPHAGE) 500 mg tablet Take 1 tablet (500 mg total) by mouth daily with breakfast   • montelukast (SINGULAIR) 10 mg tablet Take 1 tablet (10 mg total) by mouth daily   • polyethylene glycol (GLYCOLAX) 17 GM/SCOOP powder Take 17 g by mouth daily   • predniSONE 1 mg tablet Take 1 tablet (1 mg total) by mouth 2 (two) times a day with meals   • Probiotic Product (PROBIOTIC-10) CAPS Take by mouth   • Spiriva Respimat 2 5 MCG/ACT AERS inhaler Inhale 2 puffs daily 3 inhalers       Objective     /76 (BP Location: Left arm, Patient Position: Sitting, Cuff Size: Standard)   Pulse 80   Resp 16   Ht 5' 1" (1 549 m)   Wt 75 4 kg (166 lb 3 2 oz)   LMP  (LMP Unknown)   BMI 31 40 kg/m²     Physical Exam  Vitals and nursing note reviewed  Constitutional:       General: She is not in acute distress       Appearance: Normal appearance  She is normal weight  She is not ill-appearing, toxic-appearing or diaphoretic  Eyes:      Conjunctiva/sclera: Conjunctivae normal    Neck:      Vascular: No carotid bruit  Cardiovascular:      Rate and Rhythm: Normal rate and regular rhythm  Pulses: Normal pulses  Heart sounds: Normal heart sounds  No murmur heard  No friction rub  No gallop  Pulmonary:      Effort: Pulmonary effort is normal  No respiratory distress  Breath sounds: Normal breath sounds  No stridor  No wheezing, rhonchi or rales  Chest:      Chest wall: No tenderness  Musculoskeletal:      Cervical back: Normal range of motion and neck supple  Right lower leg: No edema  Left lower leg: No edema  Comments: Ambulating with cane for added stability  Skin:     Coloration: Skin is not jaundiced  Neurological:      Mental Status: She is alert  Psychiatric:         Mood and Affect: Mood normal          Behavior: Behavior normal          Thought Content:  Thought content normal          Judgment: Judgment normal        Adam Peres MD

## 2023-01-20 NOTE — PROGRESS NOTES
Assessment and Plan:   Haritha Dougherty is a 68 y o   female who presents as follow up of rheumatoid arthritis (positive anti-CCP and positive CRISTHIAN)  Prednisone helped her symptoms  Did not do well on hydroxychloroquine  Gabapentin caused shortness of breath  We will try her on sulfasalazine for DMARD therapy of her rheumatoid arthritis  Do labs  Schedule chest CT scan  Start sulfasalazine 1 tab daily for a week, then 1 tab twice a day for a week, then 2 tabs twice a day  Continue prednisone 2 mg daily     Return to clinic in 3 months     Plan:  Diagnoses and all orders for this visit:    Rheumatoid arthritis, involving unspecified site, unspecified whether rheumatoid factor present (HCC)  -     sulfaSALAzine (AZULFIDINE) 500 mg tablet; Take 1 tab daily for a week, then 1 tab twice a day for a week, then 2 tabs twice a day  -     Sedimentation rate, automated  -     C-reactive protein  -     CBC and differential  -     Comprehensive metabolic panel    Undifferentiated connective tissue disease (HCC)  -     sulfaSALAzine (AZULFIDINE) 500 mg tablet; Take 1 tab daily for a week, then 1 tab twice a day for a week, then 2 tabs twice a day  -     predniSONE 1 mg tablet; Take 1 tablet (1 mg total) by mouth 2 (two) times a day with meals    Positive CRISTHIAN (antinuclear antibody)    Myalgia  -     predniSONE 1 mg tablet; Take 1 tablet (1 mg total) by mouth 2 (two) times a day with meals  Follow-up plan: RTC as scheduled 4/17/23        Rheumatic Disease Summary  9/16/22: Complains of muscle weakness, joint pain, rash, and fatigue  She at least meets criteria for undifferentiated connective tissue disease  Also has positive anti-CCP, so could have underlying Rheumatoid arthritis  Either way, starting HCQ DMARD therapy would be helpful  Do labs  Schedule chest CT scan  Start hydroxychloroquine one and a half tabs daily  Return to clinic in 7 months    HPI  Haritha Dougherty is a 68 y o   female who presents as follow up   Last visit, initial consult, was 9/16/22  Patient states she doesn't feel as bad as she did a few months ago, but she doesn't feel normal  Had another reaction on 1 tab of gabapentin so discontinued it between Christmas and the New Year  Is currently taking 2 mg daily prednisone  She states the prednisone has taken the edge off the pain in arms and legs  Smokes less than 1 pack/day of cigarettes  The following portions of the patient's history were reviewed and updated as appropriate: allergies, current medications, past family history, past medical history, past social history, past surgical history and problem list     Review of Systems:   Review of Systems   Constitutional: Negative for chills and fever  HENT: Negative for ear pain and sore throat  Eyes: Negative for pain and visual disturbance  Respiratory: Positive for shortness of breath and wheezing  Negative for cough  Cardiovascular: Negative for chest pain and palpitations  Gastrointestinal: Negative for abdominal pain and vomiting  Genitourinary: Negative for dysuria and hematuria  Musculoskeletal: Positive for arthralgias and joint swelling  Negative for back pain  Skin: Negative for color change and rash  Neurological: Negative for seizures and syncope  All other systems reviewed and are negative  Reviewed and agree      Home Medications:    Current Outpatient Medications:   •  acetaminophen (TYLENOL) 325 mg tablet, Take 2 tablets (650 mg total) by mouth every 6 (six) hours as needed for mild pain, Disp: 30 tablet, Rfl: 0  •  albuterol (Ventolin HFA) 90 mcg/act inhaler, Inhale 1-2 puffs as needed for wheezing, Disp: 8 g, Rfl: 3  •  budesonide-formoterol (Symbicort) 160-4 5 mcg/act inhaler, Inhale 2 puffs 2 (two) times a day, Disp: 30 6 g, Rfl: 3  •  Digestive Enzyme CAPS, Take by mouth, Disp: , Rfl:   •  lansoprazole (PREVACID) 30 mg capsule, Take 1 capsule (30 mg total) by mouth daily 1 tab po BID, Disp: 90 capsule, Rfl: 3  • metFORMIN (GLUCOPHAGE) 500 mg tablet, Take 1 tablet (500 mg total) by mouth daily with breakfast, Disp: 90 tablet, Rfl: 2  •  montelukast (SINGULAIR) 10 mg tablet, Take 1 tablet (10 mg total) by mouth daily, Disp: 90 tablet, Rfl: 3  •  polyethylene glycol (GLYCOLAX) 17 GM/SCOOP powder, Take 17 g by mouth daily, Disp: , Rfl:   •  predniSONE 1 mg tablet, Take 1 tablet (1 mg total) by mouth 2 (two) times a day with meals, Disp: 60 tablet, Rfl: 3  •  Probiotic Product (PROBIOTIC-10) CAPS, Take by mouth, Disp: , Rfl:   •  Spiriva Respimat 2 5 MCG/ACT AERS inhaler, Inhale 2 puffs daily 3 inhalers, Disp: 16 g, Rfl: 3  •  sulfaSALAzine (AZULFIDINE) 500 mg tablet, Take 1 tab daily for a week, then 1 tab twice a day for a week, then 2 tabs twice a day, Disp: 120 tablet, Rfl: 6  •  DULoxetine (CYMBALTA) 20 mg capsule, TAKE 1 CAPSULE BY MOUTH EVERY DAY, Disp: 90 capsule, Rfl: 2    Current Facility-Administered Medications:   •  cyanocobalamin injection 1,000 mcg, 1,000 mcg, Intramuscular, Q30 Days, Davey Root MD, 1,000 mcg at 01/04/23 0508  •  cyanocobalamin injection 1,000 mcg, 1,000 mcg, Intramuscular, Q30 Days, Davey Root MD, 1,000 mcg at 05/10/22 1328  •  cyanocobalamin injection 1,000 mcg, 1,000 mcg, Intramuscular, Q30 Days, Davey Root MD, 1,000 mcg at 11/30/21 1425  •  cyanocobalamin injection 1,000 mcg, 1,000 mcg, Intramuscular, Q30 Days, Davey Root MD, 1,000 mcg at 09/27/22 1347  •  cyanocobalamin injection 1,000 mcg, 1,000 mcg, Intramuscular, Q30 Days, Davey Root MD, 1,000 mcg at 10/25/22 1344    Objective:    Vitals:    01/24/23 1456   BP: 126/70   Weight: 75 8 kg (167 lb)   Height: 5' 1" (1 549 m)       Physical Exam  Constitutional:       General: She is not in acute distress  HENT:      Head: Normocephalic and atraumatic  Eyes:      Conjunctiva/sclera: Conjunctivae normal    Cardiovascular:      Rate and Rhythm: Normal rate and regular rhythm  Heart sounds: S1 normal and S2 normal      No friction rub  Pulmonary:      Effort: Pulmonary effort is normal  No respiratory distress  Breath sounds: Normal breath sounds  No wheezing, rhonchi or rales  Musculoskeletal:         General: Swelling and tenderness present  Cervical back: Neck supple  Comments: B L ankle swelling  Left sternal tenderness  Ambulates with cane   Skin:     Coloration: Skin is not pale  Findings: Erythema present  Comments: Telangiectasias on cheeks   Neurological:      Mental Status: She is alert  Mental status is at baseline  Psychiatric:         Mood and Affect: Mood normal          Behavior: Behavior normal        Reviewed labs and imaging  Imaging:   XR lumbar spine 8/2/21  Mild facet arthropathy is present L4-5 and L5-S1  XR R knee 6/25/21  Unremarkable     XR R knee 6/21/21  Unremarkable     Labs:   Office Visit on 01/04/2023   Component Date Value Ref Range Status   • Hemoglobin A1C 01/04/2023 5 9  6 5 Final   Orders Only on 09/21/2022   Component Date Value Ref Range Status   • CRISTHIAN Screen, IFA 09/21/2022 POSITIVE (A)  NEGATIVE Final    Comment: CRISTHIAN IFA is a first line screen for detecting the  presence of up to approximately 150 autoantibodies in  various autoimmune diseases  A positive CRISTHIAN IFA result  is suggestive of autoimmune disease and reflexes to  titer and pattern  Further laboratory testing may be  considered if clinically indicated  For additional information, please refer to  [email or web address]  (This link is being provided for informational/  educational purposes only )         • Cyclic Citrullinated Peptide (CCP)* 09/21/2022 40 (H)  UNITS Final    Comment: Reference Range  Negative:            <20  Weak Positive:       20-39  Moderate Positive:   40-59  Strong Positive:     >59        • SL AMB CRISTHIAN TITER 09/21/2022 1:320 (H)  titer Final    Comment:                 Reference Range                  <1:40        Negative                  1:40-1:80    Low Antibody Level                  >1:80        Elevated Antibody Level        • SL AMB CRISTHIAN PATTERN 09/21/2022 Nuclear, Speckled (A)   Final    Comment: Speckled pattern is associated with mixed connective  tissue disease (MCTD), systemic lupus erythematosus  (SLE), Sjogren's syndrome, dermatomyositis, and   systemic sclerosis/polymyositis overlap  AC-2,4,5,29: Speckled     International Consensus on CRISTHIAN Patterns  (https://doi org/10 1515/rrcn-5668-3445)     • CRISTHIAN Titer 1 09/21/2022 1:320 (H)  titer Final    Comment:                 Reference Range                  <1:40        Negative                  1:40-1:80    Low Antibody Level                  >1:80        Elevated Antibody Level        • CRISTHIAN Pattern 1 09/21/2022 Nuclear, Homogeneous (A)   Final    Comment: Homogeneous pattern is associated with systemic lupus  erythematosus (SLE), drug-induced lupus and juvenile  idiopathic arthritis  AC-1: Homogeneous     International Consensus on CRISTHIAN Patterns  (https://doi org/10 1515/nbpw-6685-3129)     Orders Only on 09/06/2022   Component Date Value Ref Range Status   • C3 Complement 09/06/2022 155  83 - 193 mg/dL Final   • C4, COMPLEMENT 09/06/2022 26  15 - 57 mg/dL Final   • Glucose, Random 09/06/2022 113 (H)  65 - 99 mg/dL Final    Comment:               Fasting reference interval     For someone without known diabetes, a glucose value  between 100 and 125 mg/dL is consistent with  prediabetes and should be confirmed with a  follow-up test         • BUN 09/06/2022 17  7 - 25 mg/dL Final   • Creatinine 09/06/2022 0 63  0 60 - 1 00 mg/dL Final   • eGFR 09/06/2022 92  > OR = 60 mL/min/1 73m2 Final    Comment: The eGFR is based on the CKD-EPI 2021 equation  To calculate   the new eGFR from a previous Creatinine or Cystatin C  result, go to [employer] at  org/professionals/  kdoqi/[email or web address]     • SL AMB BUN/CREATININE RATIO 81/85/3965 NOT APPLICABLE  6 - 22 (calc) Final   • Sodium 09/06/2022 141  135 - 146 mmol/L Final   • Potassium 09/06/2022 4 2  3 5 - 5 3 mmol/L Final   • Chloride 09/06/2022 105  98 - 110 mmol/L Final   • CO2 09/06/2022 27  20 - 32 mmol/L Final   • Calcium 09/06/2022 9 6  8 6 - 10 4 mg/dL Final   • Protein, Total 09/06/2022 7 2  6 1 - 8 1 g/dL Final   • Albumin 09/06/2022 4 5  3 6 - 5 1 g/dL Final   • Globulin 09/06/2022 2 7  1 9 - 3 7 g/dL (calc) Final   • Albumin/Globulin Ratio 09/06/2022 1 7  1 0 - 2 5 (calc) Final   • TOTAL BILIRUBIN 09/06/2022 0 5  0 2 - 1 2 mg/dL Final   • Alkaline Phosphatase 09/06/2022 104  37 - 153 U/L Final   • AST 09/06/2022 13  10 - 35 U/L Final   • ALT 09/06/2022 10  6 - 29 U/L Final   • Creatine Kinase, Total 09/06/2022 36  29 - 143 U/L Final   • Aldolase 09/06/2022 3 1  <=8 1 U/L Final   • Angio Convert Enzyme 09/06/2022 23 6  9 - 67 U/L Final   • HMGCR AB (IGG) 09/06/2022 <2  <20 CU Final    Comment: 3-Hydroxy-3-Methylglutaryl-Coenzyme A Reductase (HMGCR) Ab  is associated with necrotizing myopathy and is often found  with the use of statin medications  Rarely, HMGCR Ab  associated myositis has also been seen in patients ingesting  mushrooms and other foods       • Sedimentation Rate 09/06/2022 28  < OR = 30 mm/h Final   • White Blood Cell Count 09/06/2022 6 0  3 8 - 10 8 Thousand/uL Final   • Red Blood Cell Count 09/06/2022 4 82  3 80 - 5 10 Million/uL Final   • Hemoglobin 09/06/2022 13 4  11 7 - 15 5 g/dL Final   • HCT 09/06/2022 39 4  35 0 - 45 0 % Final   • MCV 09/06/2022 81 7  80 0 - 100 0 fL Final   • MCH 09/06/2022 27 8  27 0 - 33 0 pg Final   • MCHC 09/06/2022 34 0  32 0 - 36 0 g/dL Final   • RDW 09/06/2022 14 1  11 0 - 15 0 % Final   • Platelet Count 65/39/8649 208  140 - 400 Thousand/uL Final   • SL AMB MPV 09/06/2022 11 1  7 5 - 12 5 fL Final   • Neutrophils (Absolute) 09/06/2022 3,870  1,500 - 7,800 cells/uL Final   • Lymphocytes (Absolute) 09/06/2022 1,620  850 - 3,900 cells/uL Final   • Monocytes (Absolute) 09/06/2022 360  200 - 950 cells/uL Final • Eosinophils (Absolute) 09/06/2022 132  15 - 500 cells/uL Final   • Basophils ABS 09/06/2022 18  0 - 200 cells/uL Final   • Neutrophils 09/06/2022 64 5  % Final   • Lymphocytes 09/06/2022 27 0  % Final   • Monocytes 09/06/2022 6 0  % Final   • Eosinophils 09/06/2022 2 2  % Final   • Basophils PCT 09/06/2022 0 3  % Final   • ds DNA Ab 09/06/2022 1  IU/mL Final    Comment:                            IU/mL       Interpretation                             < or = 4    Negative                             5-9         Indeterminate                             > or = 10   Positive        • C-Reactive Protein, Quant 09/06/2022 3 2  <8 0 mg/L Final   • Sjogren's Antibody (SS-A) 09/06/2022 <1 0 NEG  <1 0 NEG AI Final   • Sjogren's Antibody (SS-B) 09/06/2022 <1 0 NEG  <1 0 NEG AI Final   • Vitamin D, 25-Hydroxy, Serum 09/06/2022 33  30 - 100 ng/mL Final    Comment: Vitamin D Status         25-OH Vitamin D:     Deficiency:                    <20 ng/mL  Insufficiency:             20 - 29 ng/mL  Optimal:                 > or = 30 ng/mL     For 25-OH Vitamin D testing on patients on   D2-supplementation and patients for whom quantitation   of D2 and D3 fractions is required, the QuestAssureD(TM)  25-OH VIT D, (D2,D3), LC/MS/MS is recommended: order   code 29554 (patients >2yrs)  See Note 1     Note 1     For additional information, please refer to   [email or web address]   (This link is being provided for informational/  educational purposes only )     • Creatinine, Urine 09/06/2022 87  20 - 275 mg/dL Final   • Protein/Creat Ratio 09/06/2022 92  24 - 184 mg/g creat Final   • Protein/Creat Ratio 09/06/2022 0 092  0 024 - 0 184 mg/mg creat Final   • Total Protein, Urine 09/06/2022 8  5 - 24 mg/dL Final   Orders Only on 09/02/2022   Component Date Value Ref Range Status   • ROBBIE-1 Antibody 09/07/2022 <20  <20 Units Final   • PL 7 AutoAbs 09/07/2022 Negative  Negative Final   • PL 12 AutoAbs 09/07/2022 Negative  Negative Final   • EJ AutoAbs 09/07/2022 Negative  Negative Final   • OJ AutoAbs 09/07/2022 Negative  Negative Final   • SRP AutoAbs 09/07/2022 Negative  Negative Final   • MI-2 Autoab 09/07/2022 Negative  Negative Final   • Anti-TIF-1 gamma Ab (RDL) 09/07/2022 <20  <20 Units Final   • Anti-MDA-5-Ab  09/07/2022 <20  <20 Units Final   • ANTI-NXP-2 09/07/2022 <20  <20 Units Final   • ANTI-SAE1 AB, IGG (RDL) 09/07/2022 <20  <20 Units Final   • PM-SCL Ab 09/07/2022 <20  <20 Units Final   • KU AutoAbs 09/07/2022 Negative  Negative Final   • ANTI-SS-A 52KD AB, IGG (RDL) 09/07/2022 <20  <20 Units Final   • Anti-U1 RNP Ab (RDL) 09/07/2022 <20  <20 Units Final   • ANTI-U2 RNP AB (RDL) 09/07/2022 Negative  Negative Final   • U3 RNP  09/07/2022 Negative  Negative Final        Interpretation for Anti-Anyi-1, Anti-TIF-1gamma,      Anti-MDA-5, Anti-NXP-2, Anti-SAE1, Anti-PM/Scl-100,      Anti-SS-A 52 kD, Anti-U1 RNP:          Negative:                                <20          Weak Positive:                       20 - 39          Moderate Positive:                   40 - 80          Strong Positive:                         >80   Orders Only on 08/04/2022   Component Date Value Ref Range Status   • Creatine Kinase, Total 08/04/2022 30  29 - 143 U/L Final   • Sedimentation Rate 08/04/2022 25  < OR = 30 mm/h Final   • CRISTHIAN Screen, IFA 08/04/2022 POSITIVE (A)  NEGATIVE Final    Comment: CRISTHIAN IFA is a first line screen for detecting the  presence of up to approximately 150 autoantibodies in  various autoimmune diseases  A positive CRISTHIAN IFA result  is suggestive of autoimmune disease and reflexes to  titer and pattern  Further laboratory testing may be  considered if clinically indicated  For additional information, please refer to  [email or web address]/[email or web address]  (This link is being provided for informational/  educational purposes only )         • Rheumatoid Factor 08/04/2022 <14  <14 IU/mL Final   • SL AMB CRISTHIAN TITER 08/04/2022 1:320 (H)  titer Final    Comment:                 Reference Range                  <1:40        Negative                  1:40-1:80    Low Antibody Level                  >1:80        Elevated Antibody Level        • SL AMB CRISTHIAN PATTERN 08/04/2022 Nuclear, Homogeneous (A)   Final    Comment: Homogeneous pattern is associated with systemic lupus  erythematosus (SLE), drug-induced lupus and juvenile  idiopathic arthritis  AC-1: Homogeneous     International Consensus on CRISTHIAN Patterns  (https://doi org/10 1515/aynm-3610-7746)     • CRISTHIAN Titer 1 08/04/2022 1:80 (H)  titer Final    Comment: A low level CRISTHIAN titer may be present in pre-clinical  autoimmune diseases and normal individuals  Reference Range                  <1:40        Negative                  1:40-1:80    Low Antibody Level                  >1:80        Elevated Antibody Level        • CRISTHIAN Pattern 1 08/04/2022 Nuclear, Speckled (A)   Final    Comment: Speckled pattern is associated with mixed connective  tissue disease (MCTD), systemic lupus erythematosus  (SLE), Sjogren's syndrome, dermatomyositis, and   systemic sclerosis/polymyositis overlap       AC-2,4,5,29: Speckled     International Consensus on CRISTHIAN Patterns  (https://doi org/10 1515/mznn-4542-5446)

## 2023-01-20 NOTE — ASSESSMENT & PLAN NOTE
Symptoms remain stable with no recent flares  Continue Symbicort and Spiriva  She has albuterol to use as needed but has not required it lately

## 2023-01-20 NOTE — ASSESSMENT & PLAN NOTE
Patient is walking with a cane for added stability and intermittent pain  Hopefully her current current pain will improve, as noted above  She is ongoing SI issues and perhaps consider another round of physical therapy or even injection with pain management, in the future

## 2023-01-20 NOTE — ASSESSMENT & PLAN NOTE
See notes from last visit  Patient has started prednisone 2 mg daily, as she stated that this is helped her in the remote past when she had similar symptoms  She is feeling a little better  She has not yet started the Cymbalta, which I had suggested given her chronic pain  In the recent past, she has tried gabapentin as well as Plaquenil but had side effects to both  Today, she is willing to start low-dose Cymbalta  She may continue on low-dose prednisone for now, as it is helping her symptoms  However, the goal would be to stop prednisone if the Cymbalta helps  She has a follow-up with Dr Nereida Isbell, her rheumatologist, next week to further discuss  Today, we once again discussed her history of recurrent Lyme disease and whether her symptoms may or may not be related to chronic Lyme  As she is starting to feel better, she would like to hold off on any further intervention with infectious disease, for now  Continue to monitor

## 2023-01-24 ENCOUNTER — OFFICE VISIT (OUTPATIENT)
Dept: RHEUMATOLOGY | Facility: CLINIC | Age: 78
End: 2023-01-24

## 2023-01-24 VITALS
HEIGHT: 61 IN | DIASTOLIC BLOOD PRESSURE: 70 MMHG | WEIGHT: 167 LBS | BODY MASS INDEX: 31.53 KG/M2 | SYSTOLIC BLOOD PRESSURE: 126 MMHG

## 2023-01-24 DIAGNOSIS — M06.9 RHEUMATOID ARTHRITIS, INVOLVING UNSPECIFIED SITE, UNSPECIFIED WHETHER RHEUMATOID FACTOR PRESENT (HCC): Primary | ICD-10-CM

## 2023-01-24 DIAGNOSIS — M79.10 MYALGIA: ICD-10-CM

## 2023-01-24 DIAGNOSIS — M35.9 UNDIFFERENTIATED CONNECTIVE TISSUE DISEASE (HCC): ICD-10-CM

## 2023-01-24 DIAGNOSIS — R76.8 POSITIVE ANA (ANTINUCLEAR ANTIBODY): ICD-10-CM

## 2023-01-24 RX ORDER — PREDNISONE 1 MG/1
1 TABLET ORAL 2 TIMES DAILY WITH MEALS
Qty: 60 TABLET | Refills: 3 | Status: SHIPPED | OUTPATIENT
Start: 2023-01-24

## 2023-01-24 RX ORDER — SULFASALAZINE 500 MG/1
TABLET ORAL
Qty: 120 TABLET | Refills: 6 | Status: SHIPPED | OUTPATIENT
Start: 2023-01-24

## 2023-01-24 NOTE — PATIENT INSTRUCTIONS
Do labs when you get the chance  Schedule chest CT scan  Start sulfasalazine 1 tab daily for a week, then 1 tab twice a day for a week, then 2 tabs twice a day  Continue prednisone 2 mg daily for now     Return to clinic in 3 months

## 2023-01-27 DIAGNOSIS — M79.10 MYALGIA: ICD-10-CM

## 2023-01-27 DIAGNOSIS — M35.9 UNDIFFERENTIATED CONNECTIVE TISSUE DISEASE (HCC): ICD-10-CM

## 2023-01-28 RX ORDER — DULOXETIN HYDROCHLORIDE 20 MG/1
CAPSULE, DELAYED RELEASE ORAL
Qty: 90 CAPSULE | Refills: 2 | Status: SHIPPED | OUTPATIENT
Start: 2023-01-28

## 2023-02-08 DIAGNOSIS — J30.1 ALLERGIC RHINITIS DUE TO POLLEN, UNSPECIFIED SEASONALITY: ICD-10-CM

## 2023-02-08 RX ORDER — MONTELUKAST SODIUM 10 MG/1
TABLET ORAL
Qty: 90 TABLET | Refills: 3 | Status: SHIPPED | OUTPATIENT
Start: 2023-02-08

## 2023-02-09 ENCOUNTER — RA CDI HCC (OUTPATIENT)
Dept: OTHER | Facility: HOSPITAL | Age: 78
End: 2023-02-09

## 2023-02-09 NOTE — PROGRESS NOTES
[facility] 75  coding opportunities          Chart Reviewed number of suggestions sent to Provider: 2   [phone] see 2/17/22 foot exam assessment in Media tab  [id number]    Patients Insurance     Medicare Insurance: [address] [place] [address]

## 2023-02-15 ENCOUNTER — OFFICE VISIT (OUTPATIENT)
Dept: FAMILY MEDICINE CLINIC | Facility: CLINIC | Age: 78
End: 2023-02-15

## 2023-02-15 VITALS
HEIGHT: 61 IN | WEIGHT: 167 LBS | HEART RATE: 80 BPM | BODY MASS INDEX: 31.53 KG/M2 | DIASTOLIC BLOOD PRESSURE: 72 MMHG | SYSTOLIC BLOOD PRESSURE: 120 MMHG

## 2023-02-15 DIAGNOSIS — E53.8 VITAMIN B12 DEFICIENCY: ICD-10-CM

## 2023-02-15 DIAGNOSIS — E11.9 CONTROLLED TYPE 2 DIABETES MELLITUS WITHOUT COMPLICATION, WITHOUT LONG-TERM CURRENT USE OF INSULIN (HCC): ICD-10-CM

## 2023-02-15 DIAGNOSIS — Z72.0 TOBACCO ABUSE: ICD-10-CM

## 2023-02-15 DIAGNOSIS — G89.4 CHRONIC PAIN SYNDROME: ICD-10-CM

## 2023-02-15 DIAGNOSIS — J43.8 OTHER EMPHYSEMA (HCC): ICD-10-CM

## 2023-02-15 DIAGNOSIS — M35.9 UNDIFFERENTIATED CONNECTIVE TISSUE DISEASE (HCC): Primary | ICD-10-CM

## 2023-02-15 RX ADMIN — CYANOCOBALAMIN 1000 MCG: 1000 INJECTION, SOLUTION INTRAMUSCULAR; SUBCUTANEOUS at 10:30

## 2023-02-15 NOTE — PROGRESS NOTES
Name: Elina Banda      : 1945      MRN: [id number]  Encounter Provider: Akshat Leonard MD  Encounter Date: 2/15/2023   Encounter department: [place] [address]  Undifferentiated connective tissue disease ([facility] )  Comments:  Start azulfidine  Assessment & Plan:  We discussed her recent visit with Dr Camilo Rick  I do recommend she start the Azulfidine  I emphasized that it is an old and safe medication and 1 that may give her quite substantial relief if she tolerates it  She is willing to try it, as instructed by Dr Camilo Rick  For now, she will continue prednisone 1 mg twice daily but she understands that the goal is to wean off prednisone and not remain on it long-term  Cymbalta was suggested recently, to perhaps help her pain, but she is holding off on that for the present time and it will be taken off her med list   She may continue to use Tylenol, as needed  I will see her back in one month to review  2  Chronic pain syndrome  Assessment & Plan:  Continue with Tylenol, as needed  Medication adjustments made, see above, in regards to connective tissue disease  3  Other emphysema ([facility] )  Assessment & Plan:  Patient has noted a slight increase in shortness of breath recently  Continue Symbicort and Spiriva  She has albuterol to use as needed  She knows that her smoking is contributing to her symptoms but is not interested in quitting at the present time  Her pulse ox today is 95% at room air  4  Controlled type 2 diabetes mellitus without complication, without long-term current use of insulin ([facility])  Comments:  Am sugars are WNL  PM in 140s  Assessment & Plan:  Sugars remain well controlled  Continue metformin 500 mg daily  We did discuss diet at length today  She is going to try to return to healthier food choices with emphasis on fruits and vegetables, lean proteins, and low carbs    Lab Results   Component Value Date    HGBA1C 5 9 2023 5  Tobacco abuse  Assessment & Plan:  Patient smokes a pack a day and is resistant to stopping smoking at this time  6  Vitamin B12 deficiency  Assessment & Plan:  IM cyanocobalamin monthly  RTO in 4 weeks  Subjective       Patient presents follow-up on chronic arthralgias and myalgias, and recent diagnosis of connective tissue disease  Since I last saw her, she has seen Dr Bhanu Varela who recommended she start Azulfidine  Patient has not done so yet because she wanted to discuss it with me  She currently is still on prednisone 1 mg twice daily  She has not started Cymbalta and plans to hold off on that for now  She also adds that her diet has not been so good and she has been eating many more carbs lately and feeling the effects  She intends to get back on track soon  Today she seems upset and tired by her ongoing symptoms  She is usually an active person who enjoys being outdoors and in the garden  She continues to smoke a pack a day and is not interested in stopping  She remains on her inhalers for COPD and notes some mild increase in shortness of breath with the warmer weather days  Chief Complaint   Patient presents with   • Follow-up     4 week follow up  • B12 Injection     Given in left deltoid  -mjs            Review of Systems   Constitutional:        See HPI   HENT: Negative for congestion, ear pain, mouth sores, sinus pressure and trouble swallowing  Eyes: Negative for discharge, redness and itching  Respiratory: Negative for apnea, cough, chest tightness, shortness of breath, wheezing and stridor  Cardiovascular: Negative for chest pain, palpitations and leg swelling  Gastrointestinal: Negative for abdominal distention, abdominal pain, blood in stool, constipation, diarrhea, nausea and vomiting  Endocrine: Negative for cold intolerance and heat intolerance  Genitourinary: Negative for difficulty urinating, dysuria, flank pain and urgency  Musculoskeletal: Positive for arthralgias, back pain, gait problem and myalgias  Negative for joint swelling  Skin: Negative for rash  Neurological: Negative for dizziness, seizures, syncope, speech difficulty, weakness, light-headedness, numbness and headaches  Hematological: Negative for adenopathy  Psychiatric/Behavioral: Negative for agitation, behavioral problems, confusion and sleep disturbance  The patient is not nervous/anxious  Current Outpatient Medications on File Prior to Visit   Medication Sig   • acetaminophen (TYLENOL) 325 mg tablet Take 2 tablets (650 mg total) by mouth every 6 (six) hours as needed for mild pain   • albuterol (Ventolin HFA) 90 mcg/act inhaler Inhale 1-2 puffs as needed for wheezing   • budesonide-formoterol (Symbicort) 160-4 5 mcg/act inhaler Inhale 2 puffs 2 (two) times a day   • Digestive Enzyme CAPS Take by mouth   • lansoprazole (PREVACID) 30 mg capsule Take 1 capsule (30 mg total) by mouth daily 1 tab po BID   • metFORMIN (GLUCOPHAGE) 500 mg tablet Take 1 tablet (500 mg total) by mouth daily with breakfast   • montelukast (SINGULAIR) 10 mg tablet TAKE 1 TABLET BY MOUTH EVERY DAY   • polyethylene glycol (GLYCOLAX) 17 GM/SCOOP powder Take 17 g by mouth daily   • predniSONE 1 mg tablet Take 1 tablet (1 mg total) by mouth 2 (two) times a day with meals   • Probiotic Product (PROBIOTIC-10) CAPS Take by mouth   • Spiriva Respimat 2 5 MCG/ACT AERS inhaler Inhale 2 puffs daily 3 inhalers   • sulfaSALAzine (AZULFIDINE) 500 mg tablet Take 1 tab daily for a week, then 1 tab twice a day for a week, then 2 tabs twice a day       Objective     /72   Pulse 80   Ht 5' 1" (1 549 m)   Wt 75 8 kg (167 lb)   LMP  (LMP Unknown)   BMI 31 55 kg/m²     Physical Exam  Vitals and nursing note reviewed  Constitutional:       General: She is not in acute distress  Appearance: Normal appearance  She is normal weight   She is not ill-appearing, toxic-appearing or diaphoretic  Eyes:      Conjunctiva/sclera: Conjunctivae normal    Neck:      Vascular: No carotid bruit  Cardiovascular:      Rate and Rhythm: Normal rate and regular rhythm  Pulses: Normal pulses  Heart sounds: Normal heart sounds  No murmur heard  No friction rub  No gallop  Pulmonary:      Effort: Pulmonary effort is normal  No respiratory distress  Breath sounds: Normal breath sounds  No stridor  No wheezing, rhonchi or rales  Comments: Good air movement  Chest:      Chest wall: No tenderness  Musculoskeletal:      Cervical back: Normal range of motion and neck supple  Right lower leg: No edema  Left lower leg: No edema  Comments: Pt is ambulating with a cane  She has generalized arthralgias and myalgias which are evident with her reliance on a cane  Her gait and general movement are slowed and not as spritely as a year ago  Skin:     Coloration: Skin is not jaundiced  Neurological:      Mental Status: She is alert  Psychiatric:         Mood and Affect: Mood normal          Behavior: Behavior normal          Thought Content:  Thought content normal          Judgment: Judgment normal        Federico Chavez MD

## 2023-02-16 NOTE — ASSESSMENT & PLAN NOTE
Patient has noted a slight increase in shortness of breath recently  Continue Symbicort and Spiriva  She has albuterol to use as needed  She knows that her smoking is contributing to her symptoms but is not interested in quitting at the present time  Her pulse ox today is 95% at room air

## 2023-02-16 NOTE — ASSESSMENT & PLAN NOTE
Continue with Tylenol, as needed  Medication adjustments made, see above, in regards to connective tissue disease

## 2023-02-16 NOTE — ASSESSMENT & PLAN NOTE
Sugars remain well controlled  Continue metformin 500 mg daily  We did discuss diet at length today  She is going to try to return to healthier food choices with emphasis on fruits and vegetables, lean proteins, and low carbs    Lab Results   Component Value Date    HGBA1C 5 9 01/04/2023

## 2023-02-16 NOTE — ASSESSMENT & PLAN NOTE
We discussed her recent visit with Dr Vanessa Thomas  I do recommend she start the Azulfidine  I emphasized that it is an old and safe medication and 1 that may give her quite substantial relief if she tolerates it  She is willing to try it, as instructed by Dr Vanessa Thomas  For now, she will continue prednisone 1 mg twice daily but she understands that the goal is to wean off prednisone and not remain on it long-term  Cymbalta was suggested recently, to perhaps help her pain, but she is holding off on that for the present time and it will be taken off her med list   She may continue to use Tylenol, as needed  I will see her back in one month to review

## 2023-02-18 ENCOUNTER — HOSPITAL ENCOUNTER (OUTPATIENT)
Dept: CT IMAGING | Facility: HOSPITAL | Age: 78
Discharge: HOME | End: 2023-02-18

## 2023-02-18 DIAGNOSIS — Z12.2 ENCOUNTER FOR SCREENING FOR LUNG CANCER: ICD-10-CM

## 2023-02-23 LAB
ALBUMIN SERPL-MCNC: 4.3 G/DL (ref 3.6–5.1)
ALBUMIN/GLOB SERPL: 1.5 (CALC) (ref 1–2.5)
ALP SERPL-CCNC: 138 U/L (ref 37–153)
ALT SERPL-CCNC: 8 U/L (ref 6–29)
AST SERPL-CCNC: 10 U/L (ref 10–35)
BASOPHILS # BLD AUTO: 32 CELLS/UL (ref 0–200)
BASOPHILS NFR BLD AUTO: 0.5 %
BILIRUB SERPL-MCNC: 0.4 MG/DL (ref 0.2–1.2)
BUN SERPL-MCNC: 21 MG/DL (ref 7–25)
BUN/CREAT SERPL: ABNORMAL (CALC) (ref 6–22)
CALCIUM SERPL-MCNC: 9.5 MG/DL (ref 8.6–10.4)
CHLORIDE SERPL-SCNC: 103 MMOL/L (ref 98–110)
CO2 SERPL-SCNC: 28 MMOL/L (ref 20–32)
CREAT SERPL-MCNC: 0.7 MG/DL (ref 0.6–1)
CRP SERPL-MCNC: 4.7 MG/L
EOSINOPHIL # BLD AUTO: 58 CELLS/UL (ref 15–500)
EOSINOPHIL NFR BLD AUTO: 0.9 %
ERYTHROCYTE [DISTWIDTH] IN BLOOD BY AUTOMATED COUNT: 14 % (ref 11–15)
ERYTHROCYTE [SEDIMENTATION RATE] IN BLOOD BY WESTERGREN METHOD: 31 MM/H
GFR/BSA.PRED SERPLBLD CYS-BASED-ARV: 89 ML/MIN/1.73M2
GLOBULIN SER CALC-MCNC: 2.9 G/DL (CALC) (ref 1.9–3.7)
GLUCOSE SERPL-MCNC: 112 MG/DL (ref 65–99)
HCT VFR BLD AUTO: 38.8 % (ref 35–45)
HGB BLD-MCNC: 13.4 G/DL (ref 11.7–15.5)
LYMPHOCYTES # BLD AUTO: 1280 CELLS/UL (ref 850–3900)
LYMPHOCYTES NFR BLD AUTO: 20 %
MCH RBC QN AUTO: 28.6 PG (ref 27–33)
MCHC RBC AUTO-ENTMCNC: 34.5 G/DL (ref 32–36)
MCV RBC AUTO: 82.7 FL (ref 80–100)
MONOCYTES # BLD AUTO: 339 CELLS/UL (ref 200–950)
MONOCYTES NFR BLD AUTO: 5.3 %
NEUTROPHILS # BLD AUTO: 4691 CELLS/UL (ref 1500–7800)
NEUTROPHILS NFR BLD AUTO: 73.3 %
PLATELET # BLD AUTO: 216 THOUSAND/UL (ref 140–400)
PMV BLD REES-ECKER: 11.2 FL (ref 7.5–12.5)
POTASSIUM SERPL-SCNC: 4.1 MMOL/L (ref 3.5–5.3)
PROT SERPL-MCNC: 7.2 G/DL (ref 6.1–8.1)
RBC # BLD AUTO: 4.69 MILLION/UL (ref 3.8–5.1)
SODIUM SERPL-SCNC: 139 MMOL/L (ref 135–146)
WBC # BLD AUTO: 6.4 THOUSAND/UL (ref 3.8–10.8)

## 2023-03-09 LAB
LEFT EYE DIABETIC RETINOPATHY: NORMAL
RIGHT EYE DIABETIC RETINOPATHY: NORMAL

## 2023-03-11 DIAGNOSIS — J45.20 MILD INTERMITTENT REACTIVE AIRWAY DISEASE WITHOUT COMPLICATION: ICD-10-CM

## 2023-03-13 RX ORDER — ALBUTEROL SULFATE 90 UG/1
1-2 AEROSOL, METERED RESPIRATORY (INHALATION) AS NEEDED
Qty: 8 G | Refills: 3 | Status: SHIPPED | OUTPATIENT
Start: 2023-03-13

## 2023-03-15 ENCOUNTER — OFFICE VISIT (OUTPATIENT)
Dept: FAMILY MEDICINE CLINIC | Facility: CLINIC | Age: 78
End: 2023-03-15

## 2023-03-15 VITALS
SYSTOLIC BLOOD PRESSURE: 124 MMHG | RESPIRATION RATE: 16 BRPM | HEART RATE: 92 BPM | DIASTOLIC BLOOD PRESSURE: 62 MMHG | WEIGHT: 168.4 LBS | BODY MASS INDEX: 31.79 KG/M2 | TEMPERATURE: 97.9 F | HEIGHT: 61 IN

## 2023-03-15 DIAGNOSIS — Z72.0 TOBACCO ABUSE: ICD-10-CM

## 2023-03-15 DIAGNOSIS — M35.9 UNDIFFERENTIATED CONNECTIVE TISSUE DISEASE (HCC): Primary | ICD-10-CM

## 2023-03-15 DIAGNOSIS — E11.9 CONTROLLED TYPE 2 DIABETES MELLITUS WITHOUT COMPLICATION, WITHOUT LONG-TERM CURRENT USE OF INSULIN (HCC): ICD-10-CM

## 2023-03-15 DIAGNOSIS — E55.9 VITAMIN D DEFICIENCY: ICD-10-CM

## 2023-03-15 DIAGNOSIS — E78.00 PURE HYPERCHOLESTEROLEMIA: ICD-10-CM

## 2023-03-15 DIAGNOSIS — J43.8 OTHER EMPHYSEMA (HCC): ICD-10-CM

## 2023-03-15 RX ORDER — MELATONIN
Qty: 100 TABLET | Refills: 3
Start: 2023-03-15

## 2023-03-15 RX ADMIN — CYANOCOBALAMIN 1000 MCG: 1000 INJECTION, SOLUTION INTRAMUSCULAR; SUBCUTANEOUS at 11:33

## 2023-03-15 NOTE — PROGRESS NOTES
Name: Amelia Broussard      : 1945      MRN: [id number]  Encounter Provider: Federico Chavez MD  Encounter Date: 3/15/2023   Encounter department: [facility]       Mrs Daniele Rider presents today to review symptoms after starting azulfidine a mo nth ago  She states she has had several good days in a row and is starting to feel more like herself  She notes that she may have overdone it, and after 3-4 days she felt extremely fatigued, which then resolved after a few days of rest   She is tolerating the meds without side effects  Continues to smoke but has decreased to 1ppd  Notes intermittent SOB on exertion that has been stable  Continues to hold off on statin      1  Undifferentiated connective tissue disease - tolerating azulfidine and it appears to be helping  Labs reviewed - sed rate lower than several months ago  CRP stable  F/U with Dr Lissa Wray next month  2  Vit D Def - she does take 2000u daily  Emphasized need to maintain Vit D in the setting of azulfidine    3  Tobacco abuse - encouraged cessation; She is trying to decrease daily usage  4  COPD - continue on Symbicort and Spiriva  Albuterol, prn  She understands that continuing to smoke will only exacerbate her disease  5  DM II - stable on metformin    6  Lipids - her statin was stopped a few months ago when she presented with diffuse myalgias, to R/O statin myalgia  As she seems to be improving with the addition of azulfidine, it would be reasonable to restart the statin  She would like to wait and revisit this at her f/u in 3 months  Labs will be ordered  7  Vit D Def - continue to take OTC supplements  Check Vit D level  Now on azulfidine - may contribute to vit d def  Labs and f/u in 3 months  Depression Screening and Follow-up Plan: Patient was screened for depression during today's encounter  They screened negative with a PHQ-2 score of 0          Subjective      Chief Complaint   Patient presents with   • Follow-up     Patient in office for 4 weeks follow up and B12 injection         Mrs Mak Mendez presents today to review symptoms after starting azulfidine a mo nth ago  She states she has had several good days in a row and is starting to feel more like herself  She notes that she may have overdone it, and after 3-4 days she felt extremely fatigued, which then resolved after a few days of rest   She is tolerating the meds without side effects  Continues to smoke but has decreased to 1ppd  Notes intermittent SOB on exertion that has been stable  Continues to hold off on statin        Review of Systems   Constitutional: Positive for fatigue  Negative for chills and fever  Fatigue is better than previous, but still present if she overdoes it  HENT: Negative for ear pain and sore throat  Eyes: Negative for pain and visual disturbance  Respiratory: Negative for cough and shortness of breath  Cardiovascular: Negative for chest pain and palpitations  Gastrointestinal: Negative for abdominal pain and vomiting  Genitourinary: Negative for dysuria and hematuria  Musculoskeletal: Positive for myalgias  Negative for arthralgias and back pain  See HPI  Myalgias are starting to lessen   Skin: Negative for color change and rash  Neurological: Negative for seizures and syncope  All other systems reviewed and are negative        Current Outpatient Medications on File Prior to Visit   Medication Sig   • acetaminophen (TYLENOL) 325 mg tablet Take 2 tablets (650 mg total) by mouth every 6 (six) hours as needed for mild pain   • albuterol (PROVENTIL HFA,VENTOLIN HFA) 90 mcg/act inhaler INHALE 1-2 PUFFS AS NEEDED FOR WHEEZING   • budesonide-formoterol (Symbicort) 160-4 5 mcg/act inhaler Inhale 2 puffs 2 (two) times a day   • Digestive Enzyme CAPS Take by mouth   • lansoprazole (PREVACID) 30 mg capsule Take 1 capsule (30 mg total) by mouth daily 1 tab po BID   • metFORMIN (GLUCOPHAGE) 500 mg tablet Take 1 tablet (500 mg total) by mouth daily with breakfast   • montelukast (SINGULAIR) 10 mg tablet TAKE 1 TABLET BY MOUTH EVERY DAY   • polyethylene glycol (GLYCOLAX) 17 GM/SCOOP powder Take 17 g by mouth daily   • predniSONE 1 mg tablet Take 1 tablet (1 mg total) by mouth 2 (two) times a day with meals   • Probiotic Product (PROBIOTIC-10) CAPS Take by mouth   • Spiriva Respimat 2 5 MCG/ACT AERS inhaler Inhale 2 puffs daily 3 inhalers   • sulfaSALAzine (AZULFIDINE) 500 mg tablet Take 1 tab daily for a week, then 1 tab twice a day for a week, then 2 tabs twice a day       Objective     /62 (BP Location: Right arm, Patient Position: Sitting, Cuff Size: Adult)   Pulse 92   Temp 97 9 °F (36 6 °C) (Temporal)   Resp 16   Ht 5' 1" (1 549 m)   Wt 76 4 kg (168 lb 6 4 oz)   LMP  (LMP Unknown)   BMI 31 82 kg/m²     Physical Exam  Vitals and nursing note reviewed  Constitutional:       General: She is not in acute distress  Appearance: Normal appearance  She is normal weight  She is not ill-appearing, toxic-appearing or diaphoretic  Comments: Pt appears almost like her "old self " She is smiling and engaged, animated and much more energetic  She is well groomed and an excellent historian  Eyes:      Conjunctiva/sclera: Conjunctivae normal    Neck:      Vascular: No carotid bruit  Cardiovascular:      Rate and Rhythm: Normal rate and regular rhythm  Pulses: Normal pulses  Heart sounds: Normal heart sounds  No murmur heard  No friction rub  No gallop  Pulmonary:      Effort: Pulmonary effort is normal  No respiratory distress  Breath sounds: Normal breath sounds  No stridor  No wheezing, rhonchi or rales  Chest:      Chest wall: No tenderness  Musculoskeletal:      Cervical back: Normal range of motion and neck supple  Right lower leg: No edema  Left lower leg: No edema  Comments: Continues to use a cane for added stability     Skin:     Coloration: Skin is not jaundiced  Neurological:      Mental Status: She is alert  Psychiatric:         Mood and Affect: Mood normal          Behavior: Behavior normal          Thought Content: Thought content normal          Judgment: Judgment normal       Comments: Mood is more upbeat and optimistic         Akshat Leonard MD

## 2023-04-17 ENCOUNTER — OFFICE VISIT (OUTPATIENT)
Dept: RHEUMATOLOGY | Facility: CLINIC | Age: 78
End: 2023-04-17

## 2023-04-17 VITALS
SYSTOLIC BLOOD PRESSURE: 126 MMHG | HEIGHT: 61 IN | WEIGHT: 169.4 LBS | BODY MASS INDEX: 31.98 KG/M2 | DIASTOLIC BLOOD PRESSURE: 68 MMHG | HEART RATE: 79 BPM

## 2023-04-17 DIAGNOSIS — M06.9 RHEUMATOID ARTHRITIS, INVOLVING UNSPECIFIED SITE, UNSPECIFIED WHETHER RHEUMATOID FACTOR PRESENT (HCC): Primary | ICD-10-CM

## 2023-04-17 DIAGNOSIS — Z79.899 HIGH RISK MEDICATION USE: ICD-10-CM

## 2023-04-17 DIAGNOSIS — R76.8 POSITIVE ANA (ANTINUCLEAR ANTIBODY): ICD-10-CM

## 2023-04-17 RX ORDER — LEFLUNOMIDE 10 MG/1
10 TABLET ORAL DAILY
Qty: 30 TABLET | Refills: 5 | Status: SHIPPED | OUTPATIENT
Start: 2023-04-17

## 2023-04-17 NOTE — PATIENT INSTRUCTIONS
Do labs in a month  Stop sulfasalazine  Start leflunomide 10mg daily  Continue prednisone 1mg daily  Try nicotine gum    Return to clinic in 4 months

## 2023-05-09 ENCOUNTER — TELEPHONE (OUTPATIENT)
Dept: OBGYN CLINIC | Facility: HOSPITAL | Age: 78
End: 2023-05-09

## 2023-05-09 NOTE — TELEPHONE ENCOUNTER
Caller: Patient    Doctor: Dr Veronica Ingram    Reason for call: Patient calling stating that she has not started the Leflunomide because she wanted to give her body a break side effects from all the medications that she has been prescribed  She is ready to start the Leflunomide but she read the brochure from the pharmacy which states that it can be hard on people who have had Hep C which she had in the past   It also states that while on medication you are required to get liver and kidney function tests every 2 weeks  Patient asking if it is safe for her to start this med without getting any tests?   Patient asking to speak to clinical team      Call back#: [phone]

## 2023-05-11 NOTE — TELEPHONE ENCOUNTER
Please let her know it's fine for her to start it, just do labs 1 month after starting it, which is what I had advised her when I first prescribed it; her past labs looked fine

## 2023-05-11 NOTE — TELEPHONE ENCOUNTER
"Called and spoke to pt  Relayed provider message  Pt wanted to confirm with you that she has had \"liver labs done previously\" and that they checked out okay  "

## 2023-05-13 DIAGNOSIS — M06.9 RHEUMATOID ARTHRITIS, INVOLVING UNSPECIFIED SITE, UNSPECIFIED WHETHER RHEUMATOID FACTOR PRESENT (HCC): ICD-10-CM

## 2023-05-13 RX ORDER — LEFLUNOMIDE 10 MG/1
TABLET ORAL
Qty: 90 TABLET | Refills: 2 | Status: SHIPPED | OUTPATIENT
Start: 2023-05-13

## 2023-05-16 ENCOUNTER — CLINICAL SUPPORT (OUTPATIENT)
Dept: FAMILY MEDICINE CLINIC | Facility: CLINIC | Age: 78
End: 2023-05-16

## 2023-05-16 DIAGNOSIS — E53.8 B12 DEFICIENCY: Primary | ICD-10-CM

## 2023-05-16 RX ADMIN — CYANOCOBALAMIN 1000 MCG: 1000 INJECTION, SOLUTION INTRAMUSCULAR; SUBCUTANEOUS at 14:35

## 2023-05-17 NOTE — TELEPHONE ENCOUNTER
Called and spoke to pt  Pt stated that she started taking LEF this morning due to not feeling well last week

## 2023-06-19 LAB
25(OH)D3 SERPL-MCNC: 54 NG/ML (ref 30–100)
ALBUMIN SERPL-MCNC: 4.3 G/DL (ref 3.6–5.1)
ALBUMIN/GLOB SERPL: 1.7 (CALC) (ref 1–2.5)
ALP SERPL-CCNC: 115 U/L (ref 37–153)
ALT SERPL-CCNC: 10 U/L (ref 6–29)
AST SERPL-CCNC: 11 U/L (ref 10–35)
BASOPHILS # BLD AUTO: 31 CELLS/UL (ref 0–200)
BASOPHILS NFR BLD AUTO: 0.5 %
BILIRUB SERPL-MCNC: 0.4 MG/DL (ref 0.2–1.2)
BUN SERPL-MCNC: 17 MG/DL (ref 7–25)
BUN/CREAT SERPL: ABNORMAL (CALC) (ref 6–22)
CALCIUM SERPL-MCNC: 9.3 MG/DL (ref 8.6–10.4)
CHLORIDE SERPL-SCNC: 107 MMOL/L (ref 98–110)
CHOLEST SERPL-MCNC: 254 MG/DL
CHOLEST/HDLC SERPL: 4.6 (CALC)
CO2 SERPL-SCNC: 23 MMOL/L (ref 20–32)
CREAT SERPL-MCNC: 0.66 MG/DL (ref 0.6–1)
EOSINOPHIL # BLD AUTO: 153 CELLS/UL (ref 15–500)
EOSINOPHIL NFR BLD AUTO: 2.5 %
ERYTHROCYTE [DISTWIDTH] IN BLOOD BY AUTOMATED COUNT: 13.3 % (ref 11–15)
GFR/BSA.PRED SERPLBLD CYS-BASED-ARV: 90 ML/MIN/1.73M2
GLOBULIN SER CALC-MCNC: 2.6 G/DL (CALC) (ref 1.9–3.7)
GLUCOSE SERPL-MCNC: 114 MG/DL (ref 65–99)
HBA1C MFR BLD: 5.8 % OF TOTAL HGB
HCT VFR BLD AUTO: 40.1 % (ref 35–45)
HDLC SERPL-MCNC: 55 MG/DL
HGB BLD-MCNC: 13.6 G/DL (ref 11.7–15.5)
LDLC SERPL CALC-MCNC: 165 MG/DL (CALC)
LYMPHOCYTES # BLD AUTO: 1574 CELLS/UL (ref 850–3900)
LYMPHOCYTES NFR BLD AUTO: 25.8 %
MCH RBC QN AUTO: 28.5 PG (ref 27–33)
MCHC RBC AUTO-ENTMCNC: 33.9 G/DL (ref 32–36)
MCV RBC AUTO: 83.9 FL (ref 80–100)
MONOCYTES # BLD AUTO: 403 CELLS/UL (ref 200–950)
MONOCYTES NFR BLD AUTO: 6.6 %
NEUTROPHILS # BLD AUTO: 3941 CELLS/UL (ref 1500–7800)
NEUTROPHILS NFR BLD AUTO: 64.6 %
NONHDLC SERPL-MCNC: 199 MG/DL (CALC)
PLATELET # BLD AUTO: 213 THOUSAND/UL (ref 140–400)
PMV BLD REES-ECKER: 11 FL (ref 7.5–12.5)
POTASSIUM SERPL-SCNC: 4.1 MMOL/L (ref 3.5–5.3)
PROT SERPL-MCNC: 6.9 G/DL (ref 6.1–8.1)
RBC # BLD AUTO: 4.78 MILLION/UL (ref 3.8–5.1)
SODIUM SERPL-SCNC: 139 MMOL/L (ref 135–146)
T4 SERPL-MCNC: 6.3 MCG/DL (ref 5.1–11.9)
TRIGL SERPL-MCNC: 181 MG/DL
TSH SERPL-ACNC: 1.7 MIU/L (ref 0.4–4.5)
WBC # BLD AUTO: 6.1 THOUSAND/UL (ref 3.8–10.8)

## 2023-06-20 ENCOUNTER — CLINICAL SUPPORT (OUTPATIENT)
Dept: FAMILY MEDICINE CLINIC | Facility: CLINIC | Age: 78
End: 2023-06-20
Payer: COMMERCIAL

## 2023-06-20 DIAGNOSIS — E53.8 B12 DEFICIENCY: Primary | ICD-10-CM

## 2023-06-20 LAB
CRP SERPL-MCNC: 4.8 MG/L
ERYTHROCYTE [SEDIMENTATION RATE] IN BLOOD BY WESTERGREN METHOD: 17 MM/H

## 2023-06-20 PROCEDURE — 96372 THER/PROPH/DIAG INJ SC/IM: CPT

## 2023-06-20 RX ADMIN — CYANOCOBALAMIN 1000 MCG: 1000 INJECTION, SOLUTION INTRAMUSCULAR; SUBCUTANEOUS at 14:24

## 2023-06-28 ENCOUNTER — TELEPHONE (OUTPATIENT)
Dept: FAMILY MEDICINE CLINIC | Facility: CLINIC | Age: 78
End: 2023-06-28

## 2023-06-28 NOTE — TELEPHONE ENCOUNTER
Hi, this is Melissa Nguyen, birth date 1945. I'm a patient of Doctor Sheikh. I'm calling because my foot doctor, Doctor Torres at [facility] has sent several forms since March for to be filled out for me to get a pair of Medicare covered shoes. And they tell me they've sent the form several times and they have never gotten a response from this office. So I'm just wondering what the status is. My number is [phone]. Thank you.

## 2023-07-19 ENCOUNTER — OFFICE VISIT (OUTPATIENT)
Dept: FAMILY MEDICINE CLINIC | Facility: CLINIC | Age: 78
End: 2023-07-19
Payer: COMMERCIAL

## 2023-07-19 VITALS
HEIGHT: 61 IN | WEIGHT: 163.5 LBS | TEMPERATURE: 97.5 F | SYSTOLIC BLOOD PRESSURE: 120 MMHG | DIASTOLIC BLOOD PRESSURE: 64 MMHG | HEART RATE: 81 BPM | BODY MASS INDEX: 30.87 KG/M2

## 2023-07-19 DIAGNOSIS — J43.8 OTHER EMPHYSEMA (HCC): ICD-10-CM

## 2023-07-19 DIAGNOSIS — E78.1 PURE HYPERTRIGLYCERIDEMIA: ICD-10-CM

## 2023-07-19 DIAGNOSIS — E53.8 VITAMIN B12 DEFICIENCY: ICD-10-CM

## 2023-07-19 DIAGNOSIS — R53.83 OTHER FATIGUE: ICD-10-CM

## 2023-07-19 DIAGNOSIS — Z72.0 TOBACCO ABUSE: ICD-10-CM

## 2023-07-19 DIAGNOSIS — Z00.00 MEDICARE ANNUAL WELLNESS VISIT, SUBSEQUENT: ICD-10-CM

## 2023-07-19 DIAGNOSIS — M35.9 UNDIFFERENTIATED CONNECTIVE TISSUE DISEASE (HCC): ICD-10-CM

## 2023-07-19 DIAGNOSIS — L30.9 DERMATITIS: Primary | ICD-10-CM

## 2023-07-19 DIAGNOSIS — M62.81 MUSCLE WEAKNESS: ICD-10-CM

## 2023-07-19 DIAGNOSIS — E11.9 CONTROLLED TYPE 2 DIABETES MELLITUS WITHOUT COMPLICATION, WITHOUT LONG-TERM CURRENT USE OF INSULIN (HCC): ICD-10-CM

## 2023-07-19 PROCEDURE — G0439 PPPS, SUBSEQ VISIT: HCPCS

## 2023-07-19 PROCEDURE — 99215 OFFICE O/P EST HI 40 MIN: CPT

## 2023-07-19 RX ORDER — ROSUVASTATIN CALCIUM 5 MG/1
TABLET, COATED ORAL
Qty: 45 TABLET | Refills: 2 | Status: SHIPPED | OUTPATIENT
Start: 2023-07-19

## 2023-07-19 RX ORDER — GLUCOSAMINE/D3/BOSWELLIA SERRA 1500MG-400
TABLET ORAL
COMMUNITY

## 2023-07-19 RX ORDER — CLOTRIMAZOLE AND BETAMETHASONE DIPROPIONATE 10; .64 MG/G; MG/G
CREAM TOPICAL 2 TIMES DAILY
Qty: 45 G | Refills: 0 | Status: SHIPPED | OUTPATIENT
Start: 2023-07-19

## 2023-07-19 RX ADMIN — CYANOCOBALAMIN 1000 MCG: 1000 INJECTION, SOLUTION INTRAMUSCULAR; SUBCUTANEOUS at 10:12

## 2023-07-19 NOTE — PROGRESS NOTES
Assessment and Plan:       Mrs. Mary Hook is a pleasant 51-year-old female who presents today to follow-up on chronic medical conditions. She also has blood work to review today. She has several new complaints today. 1. Dermatitis  Assessment & Plan:  Rash on neck present for 1-2 months, itches at times. PE suggestive of atopic dermatitis vs tinea corpora. Trial of lotrisone sparingly BID for 10 days. PT to avoid wearing jewelry as she knows she has an allergy to certain metals. Orders:  -     clotrimazole-betamethasone (LOTRISONE) 1-0.05 % cream; Apply topically 2 (two) times a day Apply sparingly to affected area BID for 10 days    2. Other fatigue  Assessment & Plan:  Continues to complain of muscle weakness and easy fatigability which has not returned to baseline. Also notes SOB when this occurs. Rule out myasthenia. Check anti-acetylcholine receptor antibodies  She has had a recent CT chest with no evidence of thymoma. Orders:  -     Myasthenia Gravis Panel 1; Future    3. Muscle weakness  -     Myasthenia Gravis Panel 1; Future    4. Controlled type 2 diabetes mellitus without complication, without long-term current use of insulin ([facility])  Assessment & Plan:  Remained stable. Continue metformin 500 mg daily. Lab Results   Component Value Date    HGBA1C 5.8 (H) 06/19/2023         5. Other emphysema ([address])  Assessment & Plan:  Patient continues to smoke about a pack a day. She has tried different  smoking cessation techniques in the past and is currently using NicoDerm gum. We discussed other medications as possible options including Wellbutrin and Chantix. She prefers to hold off for now. Remains on Spiriva. Stable. Some SOB with increased exertion mostly in the heat / sun. 6. Undifferentiated connective tissue disease ([address])  Assessment & Plan:  Patient is currently off Azulfidine and on [employer]. She is taking minimal doses every other day, as tolerated.   She feels that her symptoms are slowly improving but notes that she is not back to her baseline. She has come to except this as her "new normal."  She remains on prednisone 1 mg daily -I suggest she try to wean off this if possible. She will follow-up with Dr. Flavia Navarrete. Orders:  -     Myasthenia Gravis Panel 1; Future    7. Pure hypertriglyceridemia  Assessment & Plan:  Patient has been off her statin for about a year, due to the onset of muscle weakness and fatigue. Labs today reveal elevated lipid panel and I suggest she restart Crestor 5 mg, every other day, to be increased as tolerated. Orders:  -     rosuvastatin (CRESTOR) 5 mg tablet; 1 tab po every other day    8. Vitamin B12 deficiency  Assessment & Plan:  Patient has been receiving IM B12 injections monthly. Check vitamin B12 and methylmalonic acid. Orders:  -     Vitamin B12; Future  -     Methylmalonic acid, serum; Future    9. Tobacco abuse  Assessment & Plan:  See above. 10. Medicare annual wellness visit, subsequent  Comments:  Advanced directives completed. Pt encouraged to bring copy to be scanned. Immun reviewed. Labs reviewed today. Labs printed and reviewed at length with patient. Problem List Items Addressed This Visit        Endocrine    Controlled diabetes mellitus type II without complication (HCC)     Remained stable. Continue metformin 500 mg daily. Lab Results   Component Value Date    HGBA1C 5.8 (H) 06/19/2023               Respiratory    COPD (chronic obstructive pulmonary disease) ([address])     Patient continues to smoke about a pack a day. She has tried different  smoking cessation techniques in the past and is currently using NicoDerm gum. We discussed other medications as possible options including Wellbutrin and Chantix. She prefers to hold off for now. Remains on Spiriva. Stable. Some SOB with increased exertion mostly in the heat / sun.             Musculoskeletal and Integument    Dermatitis - Primary     Rash on neck present for 1-2 months, itches at times. PE suggestive of atopic dermatitis vs tinea corpora. Trial of lotrisone sparingly BID for 10 days. PT to avoid wearing jewelry as she knows she has an allergy to certain metals. Relevant Medications    clotrimazole-betamethasone (LOTRISONE) 1-0.05 % cream       Other    Tobacco abuse     See above. Hyperlipidemia     Patient has been off her statin for about a year, due to the onset of muscle weakness and fatigue. Labs today reveal elevated lipid panel and I suggest she restart Crestor 5 mg, every other day, to be increased as tolerated. Relevant Medications    rosuvastatin (CRESTOR) 5 mg tablet    Vitamin B12 deficiency     Patient has been receiving IM B12 injections monthly. Check vitamin B12 and methylmalonic acid. Relevant Orders    Vitamin B12    Methylmalonic acid, serum    Undifferentiated connective tissue disease ([address])     Patient is currently off Azulfidine and on [employer]. She is taking minimal doses every other day, as tolerated. She feels that her symptoms are slowly improving but notes that she is not back to her baseline. She has come to except this as her "new normal."  She remains on prednisone 1 mg daily -I suggest she try to wean off this if possible. She will follow-up with Dr. Ramiro Booker. Relevant Orders    Myasthenia Gravis Panel 1    Other fatigue     Continues to complain of muscle weakness and easy fatigability which has not returned to baseline. Also notes SOB when this occurs. Rule out myasthenia. Check anti-acetylcholine receptor antibodies  She has had a recent CT chest with no evidence of thymoma. Relevant Orders    Myasthenia Gravis Panel 1   Other Visit Diagnoses     Muscle weakness        Relevant Orders    Myasthenia Gravis Panel 1    Medicare annual wellness visit, subsequent        Advanced directives completed. Pt encouraged to bring copy to be scanned. Immun reviewed. Labs reviewed today. Depression Screening and Follow-up Plan: Patient was screened for depression during today's encounter. They screened negative with a PHQ-2 score of 0. Tobacco Cessation Counseling: Tobacco cessation counseling was provided. The patient is sincerely urged to quit consumption of tobacco. She is ready to quit tobacco. Nicotine gum was prescribed. Has tried patch, hypnotist, smoke-cori      Preventive health issues were discussed with patient, and age appropriate screening tests were ordered as noted in patient's After Visit Summary. Personalized health advice and appropriate referrals for health education or preventive services given if needed, as noted in patient's After Visit Summary. History of Present Illness:     Chief Complaint   Patient presents with   • Medicare Wellness Visit     Pt wants to discuss her autoimmune disease.  mgb         Patient presents for a Medicare Wellness Visit    On very low dose of Arava, every other day. She has good days and less good days. Still has physical fatigue and weakness; also some shortness of breath. These symptoms come and go. She does not seem to have the same stamina that she used to. Notes she is not as social as she used to be. Notes it is a physical fatigue - does not feel sleepy. She is on prednisone 1mg daily. Brother was diagnosed with RF 2 years ago. He is actually doing very well. Continues to smoke - trying to quit; using nicoderm gum; smokes 1 ppd    C/O rash on neck for 1 1/2 month - using topical anti-itch cream    Would like B12 shot today. She would like to have her B12 checked. Patient Care Team:  Onel Chavez MD as PCP - General (Family Medicine)  Elver Hope MD     Review of Systems:     Review of Systems   Constitutional: Positive for fatigue. See HPI   HENT: Negative for congestion, ear pain, mouth sores, sinus pressure and trouble swallowing. Eyes: Negative for discharge, redness and itching.    Respiratory: Positive for shortness of breath. Negative for apnea, cough, chest tightness, wheezing and stridor. See HPI   Cardiovascular: Negative for chest pain, palpitations and leg swelling. Gastrointestinal: Negative for abdominal distention, abdominal pain, blood in stool, constipation, diarrhea, nausea and vomiting. Endocrine: Negative for cold intolerance and heat intolerance. Genitourinary: Negative for difficulty urinating, dysuria, flank pain and urgency. Musculoskeletal: Positive for myalgias. See HPI   Skin: Positive for rash. Neurological: Negative for dizziness, seizures, syncope, speech difficulty, weakness, light-headedness, numbness and headaches. Hematological: Negative for adenopathy. Psychiatric/Behavioral: Negative for agitation, behavioral problems, confusion and sleep disturbance. The patient is not nervous/anxious.          Problem List:     Patient Active Problem List   Diagnosis   • Vitamin D deficiency   • 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   • Tobacco abuse   • Osteoporosis   • Lumbosacral pain, chronic   • Hyperlipidemia   • Coronary artery calcification   • COPD (chronic obstructive pulmonary disease) (HCC)   • Controlled diabetes mellitus type II without complication (HCC)   • Holly's esophagus   • Allergic rhinitis   • History of colonic polyps   • Irritable bowel syndrome with both constipation and diarrhea   • Lateral epicondylitis   • Vitamin B12 deficiency   • Paget's bone disease   • Osteoarthritis   • Chronic constipation   • Sacroiliitis (HCC)   • Chronic pain syndrome   • Lumbar facet arthropathy   • CRISTHIAN positive   • Undifferentiated connective tissue disease ([address])   • History of Lyme disease   • Other fatigue   • Dermatitis      Past Medical and Surgical History:     Past Medical History:   Diagnosis Date   • Arthritis    • Back pain    • Diabetes mellitus ([address])    • Emphysema of lung ([address])    • GERD (gastroesophageal reflux disease)    • Lyme disease • Osteoporosis    • Polymyalgia rheumatica ([facility])     last assessed 2/15/17   • Reactive airway disease     last assessed 6/9/16   • Sciatica      Past Surgical History:   Procedure Laterality Date   • BREAST BIOPSY Left    • HYSTERECTOMY      1987, total   • ORTHOPEDIC SURGERY        Family History:     Family History   Problem Relation Age of Onset   • Thyroid cancer Mother 48   • COPD Father         mild   • Hypertension Father    • No Known Problems Maternal Grandmother    • Colon cancer Maternal Grandfather 80   • No Known Problems Paternal Grandmother    • No Known Problems Paternal Grandfather    • Breast cancer Maternal Aunt 54   • No Known Problems Maternal Aunt    • No Known Problems Paternal Aunt       Social History:     Social History     Socioeconomic History   • Marital status: Single     Spouse name: None   • Number of children: None   • Years of education: None   • Highest education level: None   Occupational History   • Occupation: retired   Tobacco Use   • Smoking status: Every Day     Packs/day: 1.00     Types: Cigarettes   • Smokeless tobacco: Never   Vaping Use   • Vaping Use: Never used   Substance and Sexual Activity   • Alcohol use: Never   • Drug use: No   • Sexual activity: Yes   Other Topics Concern   • None   Social History Narrative   • None     Social Determinants of Health     Financial Resource Strain: Low Risk  (7/19/2023)    Overall Financial Resource Strain (CARDIA)    • Difficulty of Paying Living Expenses: Not hard at all   Food Insecurity: Not on file   Transportation Needs: No Transportation Needs (7/19/2023)    PRAPARE - Transportation    • Lack of Transportation (Medical): No    • Lack of Transportation (Non-Medical):  No   Physical Activity: Not on file   Stress: Not on file   Social Connections: Not on file   Intimate Partner Violence: Not on file   Housing Stability: Not on file      Medications and Allergies:     Current Outpatient Medications   Medication Sig Dispense Refill   • acetaminophen (TYLENOL) 325 mg tablet Take 2 tablets (650 mg total) by mouth every 6 (six) hours as needed for mild pain 30 tablet 0   • albuterol (PROVENTIL HFA,VENTOLIN HFA) 90 mcg/act inhaler INHALE 1-2 PUFFS AS NEEDED FOR WHEEZING 8 g 3   • Biotin 59914 MCG TABS Take by mouth     • budesonide-formoterol (Symbicort) 160-4.5 mcg/act inhaler Inhale 2 puffs 2 (two) times a day 30.6 g 3   • cholecalciferol (VITAMIN D3) 1,000 units tablet Take 4000u daily 100 tablet 3   • clotrimazole-betamethasone (LOTRISONE) 1-0.05 % cream Apply topically 2 (two) times a day Apply sparingly to affected area BID for 10 days 45 g 0   • Digestive Enzyme CAPS Take by mouth     • lansoprazole (PREVACID) 30 mg capsule Take 1 capsule (30 mg total) by mouth daily 1 tab po BID 90 capsule 3   • leflunomide (ARAVA) 10 MG tablet TAKE 1 TABLET BY MOUTH EVERY DAY (Patient taking differently: Qod) 90 tablet 2   • metFORMIN (GLUCOPHAGE) 500 mg tablet Take 1 tablet (500 mg total) by mouth daily with breakfast 90 tablet 2   • montelukast (SINGULAIR) 10 mg tablet TAKE 1 TABLET BY MOUTH EVERY DAY 90 tablet 3   • Multiple Vitamins-Minerals (HAIR SKIN AND NAILS FORMULA PO) Take by mouth     • polyethylene glycol (GLYCOLAX) 17 GM/SCOOP powder Take 17 g by mouth daily     • predniSONE 1 mg tablet Take 1 tablet (1 mg total) by mouth 2 (two) times a day with meals (Patient taking differently: Take 1 mg by mouth daily) 60 tablet 3   • Probiotic Product (PROBIOTIC-10) CAPS Take by mouth     • rosuvastatin (CRESTOR) 5 mg tablet 1 tab po every other day 45 tablet 2   • Spiriva Respimat 2.5 MCG/ACT AERS inhaler Inhale 2 puffs daily 3 inhalers 16 g 3     Current Facility-Administered Medications   Medication Dose Route Frequency Provider Last Rate Last Admin   • cyanocobalamin injection 1,000 mcg  1,000 mcg Intramuscular Q30 Days Sanjuana Mcrae MD   1,000 mcg at 07/19/23 1012   • cyanocobalamin injection 1,000 mcg  1,000 mcg Intramuscular Q30 Days [facility] MD Jaskaran   1,000 mcg at 05/10/22 1328   • cyanocobalamin injection 1,000 mcg  1,000 mcg Intramuscular Q30 Days Norma Nowak MD   1,000 mcg at 11/30/21 1425   • cyanocobalamin injection 1,000 mcg  1,000 mcg Intramuscular Q30 Days Norma Nowak MD   1,000 mcg at 09/27/22 1347   • cyanocobalamin injection 1,000 mcg  1,000 mcg Intramuscular Q30 Days Norma Nowak MD   1,000 mcg at 03/15/23 1133     Allergies   Allergen Reactions   • Gabapentin Palpitations, Dizziness and Edema   • Hydroxychloroquine Edema, Blisters and Facial Swelling   • Tomato - Food Allergy Swelling and Throat Swelling   • Asa [Aspirin]    • Celebrex [Celecoxib] Other (See Comments)     Flushing     • Ibuprofen GI Intolerance   • Nsaids GI Intolerance     To all   • Percocet [Oxycodone-Acetaminophen] GI Intolerance      Immunizations:     Immunization History   Administered Date(s) Administered   • COVID-19 MODERNA VACC 0.5 ML IM 01/11/2021, 02/08/2021, 12/16/2021   • Hep B, adult 01/24/2012, 02/23/2012, 07/24/2012   • INFLUENZA 10/08/2014, 10/26/2015, 10/18/2016   • Influenza Quadrivalent Preservative Free 3 years and older IM 10/26/2015, 10/18/2016   • Influenza Split High Dose Preservative Free IM 09/19/2017   • Influenza, high dose seasonal 0.7 mL 10/09/2018, 11/20/2019, 10/28/2020, 11/10/2021   • Influenza, seasonal, injectable 10/11/2012, 10/08/2014   • Pneumococcal Conjugate 13-Valent 10/09/2018   • Pneumococcal Polysaccharide PPV23 07/26/2012, 07/26/2012   • Zoster 02/14/2018   • Zoster Vaccine Recombinant 03/08/2020, 09/28/2020      Health Maintenance:         Topic Date Due   • Breast Cancer Screening: Mammogram  09/15/2023   • Colorectal Cancer Screening  03/30/2026   • Hepatitis C Screening  Discontinued         Topic Date Due   • COVID-19 Vaccine (4 - Booster for Moderna series) 02/10/2022   • Influenza Vaccine (1) 09/01/2023      Medicare Screening Tests and Risk Assessments:         Health Risk Assessment:   Patient rates overall health as good. Patient feels that their physical health rating is slightly better. Patient is satisfied with their life. Eyesight was rated as same. Hearing was rated as same. Patient feels that their emotional and mental health rating is slightly better. Patients states they are never, rarely angry. Patient states they are often unusually tired/fatigued. Pain experienced in the last 7 days has been a lot. Patient's pain rating has been 8/10. Patient states that she has experienced no weight loss or gain in last 6 months. Depression Screening:   PHQ-2 Score: 0      Fall Risk Screening: In the past year, patient has experienced: no history of falling in past year      Urinary Incontinence Screening:   Patient has not leaked urine accidently in the last six months. Home Safety:  Patient does not have trouble with stairs inside or outside of their home. Patient has working smoke alarms and has working carbon monoxide detector. Home safety hazards include: none. Nutrition:   Current diet is Regular. Medications:   Patient is currently taking over-the-counter supplements. OTC medications include: see medication list. Patient is able to manage medications. Activities of Daily Living (ADLs)/Instrumental Activities of Daily Living (IADLs):   Walk and transfer into and out of bed and chair?: Yes  Dress and groom yourself?: Yes    Bathe or shower yourself?: Yes    Feed yourself? Yes  Do your laundry/housekeeping?: Yes  Manage your money, pay your bills and track your expenses?: Yes  Make your own meals?: Yes    Do your own shopping?: Yes    Previous Hospitalizations:   Any hospitalizations or ED visits within the last 12 months?: No      Advance Care Planning:     Durable POA for healthcare:  Yes    Advanced directive: Yes    Advanced directive counseling given: Yes    Five wishes given: Yes    End of Life Decisions reviewed with patient: Yes      Cognitive Screening:   Provider or family/friend/caregiver concerned regarding cognition?: No    PREVENTIVE SCREENINGS      Cardiovascular Screening:    General: Screening Not Indicated and History Lipid Disorder      Diabetes Screening:     General: Screening Not Indicated and History Diabetes      Colorectal Cancer Screening:     General: Screening Current      Breast Cancer Screening:     General: Screening Current      Cervical Cancer Screening:    General: Screening Not Indicated      Osteoporosis Screening:    General: Screening Not Indicated and History Osteoporosis      Abdominal Aortic Aneurysm (AAA) Screening:        General: Screening Not Indicated      Lung Cancer Screening:     General: Screening Current      Hepatitis C Screening:    General: Screening Not Indicated and History Hepatitis C    Screening, Brief Intervention, and Referral to Treatment (SBIRT)    Screening      Single Item Drug Screening:  How often have you used an illegal drug (including marijuana) or a prescription medication for non-medical reasons in the past year? never    Single Item Drug Screen Score: 0  Interpretation: Negative screen for possible drug use disorder    No results found. Physical Exam:     /64 (BP Location: Left arm, Patient Position: Sitting, Cuff Size: Large)   Pulse 81   Temp 97.5 °F (36.4 °C) (Temporal)   Ht 5' 1" (1.549 m)   Wt 74.2 kg (163 lb 8 oz)   LMP  (LMP Unknown)   BMI 30.89 kg/m²     Physical Exam  Vitals and nursing note reviewed. Constitutional:       General: She is not in acute distress. Appearance: Normal appearance. She is well-developed. She is not ill-appearing, toxic-appearing or diaphoretic. HENT:      Head: Normocephalic and atraumatic. Mouth/Throat:      Mouth: Mucous membranes are moist.   Eyes:      General: No scleral icterus. Conjunctiva/sclera: Conjunctivae normal.      Pupils: Pupils are equal, round, and reactive to light. Comments: No eyelid droop   Neck:      Vascular: No carotid bruit. Cardiovascular:      Rate and Rhythm: Normal rate and regular rhythm. Heart sounds: Normal heart sounds. No murmur heard. No friction rub. No gallop. Pulmonary:      Effort: Pulmonary effort is normal. No respiratory distress. Breath sounds: Normal breath sounds. No wheezing or rales. Musculoskeletal:         General: Normal range of motion. Cervical back: Normal range of motion and neck supple. No rigidity or tenderness. Right lower leg: No edema. Left lower leg: No edema. Lymphadenopathy:      Cervical: No cervical adenopathy. Skin:     General: Skin is warm and dry. Coloration: Skin is not jaundiced. Findings: Rash present. Comments: On lateral side of left lower neck area there is a 2.5 cm round pinkish, well-circumscribed lesion with some scales. Neurological:      Mental Status: She is alert and oriented to person, place, and time. Psychiatric:         Mood and Affect: Mood normal.         Behavior: Behavior normal.         Thought Content:  Thought content normal.         Judgment: Judgment normal.          Elias Cantu MD

## 2023-07-19 NOTE — ASSESSMENT & PLAN NOTE
Patient continues to smoke about a pack a day. She has tried different  smoking cessation techniques in the past and is currently using [employer]ico[employer] gum. We discussed other medications as possible options including Wellbutrin and Chantix. She prefers to hold off for now. Remains on Spiriva. Stable. Some SOB with increased exertion mostly in the heat / sun.

## 2023-07-19 NOTE — ASSESSMENT & PLAN NOTE
Patient has been off her statin for about a year, due to the onset of muscle weakness and fatigue. Labs today reveal elevated lipid panel and I suggest she restart Crestor 5 mg, every other day, to be increased as tolerated.

## 2023-07-19 NOTE — ASSESSMENT & PLAN NOTE
Patient is currently off Azulfidine and on [employer]. She is taking minimal doses every other day, as tolerated. She feels that her symptoms are slowly improving but notes that she is not back to her baseline. She has come to except this as her "new normal."  She remains on prednisone 1 mg daily -I suggest she try to wean off this if possible. She will follow-up with Dr. Deana Dennis.

## 2023-07-19 NOTE — ASSESSMENT & PLAN NOTE
Continues to complain of muscle weakness and easy fatigability which has not returned to baseline. Also notes SOB when this occurs. Rule out myasthenia. Check anti-acetylcholine receptor antibodies  She has had a recent CT chest with no evidence of thymoma.

## 2023-07-19 NOTE — ASSESSMENT & PLAN NOTE
Remained stable. Continue metformin 500 mg daily.     Lab Results   Component Value Date    HGBA1C 5.8 (H) 06/19/2023

## 2023-07-19 NOTE — ASSESSMENT & PLAN NOTE
Rash on neck present for 1-2 months, itches at times. PE suggestive of atopic dermatitis vs tinea corpora. Trial of lotrisone sparingly BID for 10 days. PT to avoid wearing jewelry as she knows she has an allergy to certain metals.

## 2023-07-23 LAB
METHYLMALONATE SERPL-SCNC: 113 NMOL/L (ref 87–318)
VIT B12 SERPL-MCNC: >2000 PG/ML (ref 200–1100)

## 2023-07-26 LAB
ACHR BIND AB SER-SCNC: <0.3 NMOL/L
METHYLMALONATE SERPL-SCNC: 113 NMOL/L (ref 87–318)
STRIA MUS AB SER QL: NEGATIVE
STRIA MUS AB TITR SER IF: NORMAL TITER
VIT B12 SERPL-MCNC: >2000 PG/ML (ref 200–1100)

## 2023-08-11 ENCOUNTER — TELEPHONE (OUTPATIENT)
Dept: GASTROENTEROLOGY | Facility: CLINIC | Age: 78
End: 2023-08-11

## 2023-08-16 ENCOUNTER — CLINICAL SUPPORT (OUTPATIENT)
Dept: FAMILY MEDICINE CLINIC | Facility: CLINIC | Age: 78
End: 2023-08-16
Payer: COMMERCIAL

## 2023-08-16 DIAGNOSIS — E53.8 B12 DEFICIENCY: Primary | ICD-10-CM

## 2023-08-16 PROCEDURE — 96372 THER/PROPH/DIAG INJ SC/IM: CPT

## 2023-08-16 RX ADMIN — CYANOCOBALAMIN 1000 MCG: 1000 INJECTION, SOLUTION INTRAMUSCULAR; SUBCUTANEOUS at 13:25

## 2023-08-16 NOTE — PROGRESS NOTES
Pt here for her monthly B12 injection. Patient tolerated injection well and left office in no distress.

## 2023-08-18 DIAGNOSIS — E11.9 CONTROLLED TYPE 2 DIABETES MELLITUS WITHOUT COMPLICATION, WITHOUT LONG-TERM CURRENT USE OF INSULIN (HCC): ICD-10-CM

## 2023-09-20 ENCOUNTER — CLINICAL SUPPORT (OUTPATIENT)
Dept: FAMILY MEDICINE CLINIC | Facility: CLINIC | Age: 78
End: 2023-09-20
Payer: COMMERCIAL

## 2023-09-20 DIAGNOSIS — E53.8 B12 DEFICIENCY: Primary | ICD-10-CM

## 2023-09-20 PROCEDURE — 96372 THER/PROPH/DIAG INJ SC/IM: CPT

## 2023-09-20 RX ADMIN — CYANOCOBALAMIN 1000 MCG: 1000 INJECTION, SOLUTION INTRAMUSCULAR; SUBCUTANEOUS at 13:28

## 2023-09-22 ENCOUNTER — OFFICE VISIT (OUTPATIENT)
Dept: RHEUMATOLOGY | Facility: CLINIC | Age: 78
End: 2023-09-22
Payer: COMMERCIAL

## 2023-09-22 VITALS
HEART RATE: 73 BPM | BODY MASS INDEX: 31 KG/M2 | DIASTOLIC BLOOD PRESSURE: 74 MMHG | WEIGHT: 164.2 LBS | OXYGEN SATURATION: 92 % | HEIGHT: 61 IN | SYSTOLIC BLOOD PRESSURE: 116 MMHG

## 2023-09-22 DIAGNOSIS — M25.511 RIGHT SHOULDER PAIN, UNSPECIFIED CHRONICITY: ICD-10-CM

## 2023-09-22 DIAGNOSIS — R21 RASH: ICD-10-CM

## 2023-09-22 DIAGNOSIS — M06.9 RHEUMATOID ARTHRITIS, INVOLVING UNSPECIFIED SITE, UNSPECIFIED WHETHER RHEUMATOID FACTOR PRESENT (HCC): Primary | ICD-10-CM

## 2023-09-22 DIAGNOSIS — M62.81 MUSCLE WEAKNESS: ICD-10-CM

## 2023-09-22 DIAGNOSIS — M79.10 MYALGIA: ICD-10-CM

## 2023-09-22 DIAGNOSIS — M81.0 AGE-RELATED OSTEOPOROSIS WITHOUT CURRENT PATHOLOGICAL FRACTURE: ICD-10-CM

## 2023-09-22 PROCEDURE — 99214 OFFICE O/P EST MOD 30 MIN: CPT | Performed by: INTERNAL MEDICINE

## 2023-09-22 RX ORDER — CLOBETASOL PROPIONATE 0.5 MG/G
CREAM TOPICAL 2 TIMES DAILY
Qty: 60 G | Refills: 6 | Status: SHIPPED | OUTPATIENT
Start: 2023-09-22

## 2023-09-22 NOTE — PATIENT INSTRUCTIONS
Do labs now  Do right shoulder x-rays  Stay off DMARD medications  Schedule DEXA scan  Get dental clearance for Evenity or Prolia when see dentist in 12/2023  Continue daily Vit.  D  Try clobetasol cream for elbow rashes as needed    Return to clinic in 6 months

## 2023-09-22 NOTE — ASSESSMENT & PLAN NOTE
Continue taking vitamin D 4000 IUs. I ordered a repeat DEXA scan. I will prescribe Evenity injection once a month for 1 year and Prolia injection every 6 months. I will have her start any of these medications depending on the DEXA scan result. Discuss the side effects and contraindications of the medication and suggest seeing a dentist in December for dental clearance for possible Evenity and Prolia treatment.

## 2023-09-22 NOTE — PROGRESS NOTES
Assessment and Plan:  Maria L Griffin is a 68 y.o.  female who presents for follow-up of Rheumatoid arthritis mostly manifesting as muscle weakness. Seems to have reactions to whichever medication I put her on, usually shortness of breath; so far, she has tried and had adverse effects to hydroxychloroquine, sulfasalazine, and leflunomide. Is willing to take a drug holiday for now. Rheumatoid arthritis  I will order an inflammatory marker test and muscle enzyme. Educates patient on medication hiatus and emphasized that her muscle weakness might recur. Discussed option of other medications which do not cause shortness of breath. I discussed azathioprine as the next option if her joint/muscle symptoms worsen. Frozen right shoulder  I ordered a repeat shoulder x-ray. Patient does not wish to pursue PT at this time    Skin rash  Prescribed clobetasol cream for her elbows rashes as needed. Do labs now  Do right shoulder x-rays  Stay off DMARD medications  Schedule DEXA scan  Get dental clearance for Evenity or Prolia when see dentist in 12/2023  Continue daily Vit. D  Try clobetasol cream for elbow rashes as needed    Return to clinic in 6 months    Plan:  1. Rheumatoid arthritis, involving unspecified site, unspecified whether rheumatoid factor present (HCC)  -     CBC and differential  -     Comprehensive metabolic panel  -     C-reactive protein  -     Sedimentation rate, automated    2. Muscle weakness    3. Myalgia  -     CK  -     Aldolase    4. Right shoulder pain, unspecified chronicity  -     XR shoulder 2+ vw right; Future    5. Rash  -     clobetasol (TEMOVATE) 0.05 % cream; Apply topically 2 (two) times a day    6. Age-related osteoporosis without current pathological fracture  Assessment & Plan:  Continue taking vitamin D 4000 IUs. I ordered a repeat DEXA scan. Discussed Evenity and/or Prolia as potential medication options in the future after dental clearance.   Orders:  -     DXA bone density spine hip and pelvis; Future      Chief Complaint  Follow up of Rheumatoid arthritis    Rheumatic Disease Summary  4/17/23:  Patient presents as follow up of rheumatoid arthritis (positive anti-CCP and positive CRISTHIAN). SSZ seems to be causing side effects, will switch to leflunomide.   Do labs in a month  Stop sulfasalazine  Start leflunomide 10mg daily  Continue prednisone 1mg daily  Try nicotine gum    HPI  Blaire Maurer is a 49-year-old female who presents for follow-up of her rheumatoid arthritis. Patient consented to [facility] recording. Patient discontinue taking leflunomide last week 09/11/2023 due to dyspnea that she experienced. She has been taking it for 4 months, starting on 05/19/2023 and observes having shortness of breath after 4 days. She used to take it once daily, and after noticing the symptoms she went on once every other day for several weeks. The longer she is on medication, the worse the dyspnea has become. She tried a regimen with 1 day followed by 2 days without medication for several months, yet episodes of dyspnea persist. In anticipation of a 3.5-hour drive for her upcoming vacation, she opted to cease her medication temporarily due to concern of its side effects causing shortness of breath. She does observe that the shortness of breath has gotten better, although it does not subside completely. Originally, she complaints on joint pain, rash, and fatigue. New symptoms that emerged are weakness in the arms and legs. She had joint pain over time and rashes. The rashes have progressively deteriorated at this point. She is taking sulfasalazine, leflunomide and hydroxychloroquine. She notes that the arm and leg weakness has improved with all the medications however dyspnea overrode. She inquires about taking a break from her treatment regimen. She has pain in her right arm muscles most of the time lately.  The pain limits her to perform activities of daily living like washing or brushing her hair. It has been going on for 3 weeks. Historically, she has frozen shoulder in the right arm 10 to 12 years ago, but she perceives that the pain originates in the triceps area which intermittently come and go. She emphasized that the pain is not as severe as it requires medication or physical therapy. She added that she can only tolerate steroids within 5 mg dose, higher than that gives her shortness of breath. She did physical therapy with her Frozen shoulder before but will not opt to have it done again as she tires easily. She reports being tired after 5PM. She does not want to take prednisone as she was just off from it 2 months ago. Patient has "little whiskers in the skin" and when it rubs her clothes it gets painful. She has elbow rashes. She has tried applying hydrocortisone cream and triamcinolone on it, but it has not improved. Last week, she used pain patches and although it has a substantial benefit, he got a skin reaction out of it. She recalls the onset of her elbow rash when she was diagnosed with hepatitis C. She has osteoporosis and leg weakness but denies episodes of falls. She has not taken any medication for osteoporosis. She is taking vitamin D 4000 IUs a day. She discontinued taking calcium 1 to 2 years ago. On history, her family has osteoarthritis. Her mother and sister had a history of fractured bones. Historically, she had fractured a bone in the arm. Currently, she is still smoking, and she will be seeing Dr. Jenny Ayala next month to discuss it. She has tried nicotine gum. She tried smoking cessation. Review of Systems:   Pertinent ROS positive for fatigue, shortness of breath, wheezing, diarrhea, constipation, joint pain, back pain, neck pain, muscle pain, skin rashes, persistent itching, weakness, bruising otherwise rest of 14-point ROS reviewed and were negative.       Home Medications:    Current Outpatient Medications:   •  acetaminophen (TYLENOL) 325 mg tablet, Take 2 tablets (650 mg total) by mouth every 6 (six) hours as needed for mild pain, Disp: 30 tablet, Rfl: 0  •  albuterol (PROVENTIL HFA,VENTOLIN HFA) 90 mcg/act inhaler, INHALE 1-2 PUFFS AS NEEDED FOR WHEEZING, Disp: 8 g, Rfl: 3  •  Biotin 77859 MCG TABS, Take by mouth, Disp: , Rfl:   •  budesonide-formoterol (Symbicort) 160-4.5 mcg/act inhaler, Inhale 2 puffs 2 (two) times a day, Disp: 30.6 g, Rfl: 3  •  cholecalciferol (VITAMIN D3) 1,000 units tablet, Take 4000u daily, Disp: 100 tablet, Rfl: 3  •  clobetasol (TEMOVATE) 0.05 % cream, Apply topically 2 (two) times a day, Disp: 60 g, Rfl: 6  •  clotrimazole-betamethasone (LOTRISONE) 1-0.05 % cream, Apply topically 2 (two) times a day Apply sparingly to affected area BID for 10 days, Disp: 45 g, Rfl: 0  •  Digestive Enzyme CAPS, Take by mouth, Disp: , Rfl:   •  lansoprazole (PREVACID) 30 mg capsule, Take 1 capsule (30 mg total) by mouth daily 1 tab po BID, Disp: 90 capsule, Rfl: 3  •  metFORMIN (GLUCOPHAGE) 500 mg tablet, TAKE 1 TABLET BY MOUTH EVERY DAY WITH BREAKFAST, Disp: 90 tablet, Rfl: 2  •  montelukast (SINGULAIR) 10 mg tablet, TAKE 1 TABLET BY MOUTH EVERY DAY, Disp: 90 tablet, Rfl: 3  •  Multiple Vitamins-Minerals (HAIR SKIN AND NAILS FORMULA PO), Take by mouth, Disp: , Rfl:   •  polyethylene glycol (GLYCOLAX) 17 GM/SCOOP powder, Take 17 g by mouth as needed, Disp: , Rfl:   •  Probiotic Product (PROBIOTIC-10) CAPS, Take by mouth, Disp: , Rfl:   •  rosuvastatin (CRESTOR) 5 mg tablet, 1 tab po every other day, Disp: 45 tablet, Rfl: 2  •  Spiriva Respimat 2.5 MCG/ACT AERS inhaler, Inhale 2 puffs daily 3 inhalers, Disp: 16 g, Rfl: 3  •  leflunomide (ARAVA) 10 MG tablet, TAKE 1 TABLET BY MOUTH EVERY DAY (Patient not taking: Reported on 9/22/2023), Disp: 90 tablet, Rfl: 2  •  predniSONE 1 mg tablet, Take 1 tablet (1 mg total) by mouth 2 (two) times a day with meals (Patient not taking: Reported on 9/22/2023), Disp: 60 tablet, Rfl: 3    Current Facility-Administered Medications:   •  cyanocobalamin injection 1,000 mcg, 1,000 mcg, Intramuscular, Q30 Days, Van Stevens MD, 1,000 mcg at 08/16/23 1325  •  cyanocobalamin injection 1,000 mcg, 1,000 mcg, Intramuscular, Q30 Days, Van Stevens MD, 1,000 mcg at 05/10/22 1328  •  cyanocobalamin injection 1,000 mcg, 1,000 mcg, Intramuscular, Q30 Days, Van Stevens MD, 1,000 mcg at 11/30/21 1425  •  cyanocobalamin injection 1,000 mcg, 1,000 mcg, Intramuscular, Q30 Days, Van Stevens MD, 1,000 mcg at 09/27/22 1347  •  cyanocobalamin injection 1,000 mcg, 1,000 mcg, Intramuscular, Q30 Days, Van Stevens MD, 1,000 mcg at 09/20/23 1328    Objective:    Vitals:    09/22/23 1039   BP: 116/74   Pulse: 73   SpO2: 92%   Weight: 74.5 kg (164 lb 3.2 oz)   Height: 5' 1" (1.549 m)       Physical Exam:  Constitutional:       General: She is not in acute distress. HENT:      Head: Normocephalic and atraumatic. Eyes:      Conjunctiva/sclera: Conjunctivae normal.   Cardiovascular:      Rate and Rhythm: Normal rate and regular rhythm. Heart sounds: S1 normal and S2 normal.      No friction rub. Pulmonary:      Effort: Pulmonary effort is normal. No respiratory distress. Breath sounds: Normal breath sounds. No wheezing, rhonchi or rales. Musculoskeletal: ambulating with cane     Cervical back: Neck supple. Extremities: Slightly restricted external rotation of right shoulder. Skin:     Coloration: Telangiectasias on the face. Hyperpigmentation on the bilateral elbows  Neurological:      Mental Status: She is alert. Mental status is at baseline. Psychiatric:         Mood and Affect: Mood normal.         Behavior: Behavior normal.      I have personally reviewed results with the patient. Labs:   Orders Only on 07/19/2023   Component Date Value Ref Range Status   • Striated Muscle Ab Screen 07/19/2023 NEGATIVE  NEGATIVE Final    Comment:  This test was developed and its analytical performance  characteristics have been determined by [facility]. It has not been  cleared or approved by FDA. This assay has been validated  pursuant to the CLIA regulations and is used for clinical  purposes. • Anti-striation Abs 07/19/2023 TNP  titer Final    Comment:    Test Not Performed. Screening test Negative or Not Detected. Titer not performed. • AChR Binding Ab, Serum 07/19/2023 <0.30  nmol/L Final    Comment: Reference Ranges for Acetylcholine Receptor    Binding Antibody:      Negative: < or =0.30 nmol/L  Equivocal:  0.31-0.49 nmol/L   Positive: > or =0.50 nmol/L     • Methylmalonic Acid, S 07/19/2023 113  87 - 318 nmol/L Final    Comment: This test was developed and its analytical performance  characteristics have been determined by [address] [place] [address], [address] [place] [address]. It has  not been cleared or approved by the U.S. Food and Drug  Administration. This assay has been validated pursuant  to the CLIA regulations and is used for clinical  purposes. • Vitamin B-12 07/19/2023 >2,000 (H)  200 - 1,100 pg/mL Final   Orders Only on 06/19/2023   Component Date Value Ref Range Status   • Sedimentation Rate 06/19/2023 17  < OR = 30 mm/h Final   • C-Reactive Protein, Quant 06/19/2023 4.8  <8.0 mg/L Final   Orders Only on 06/19/2023   Component Date Value Ref Range Status   • Total Cholesterol 06/19/2023 254 (H)  <200 mg/dL Final   • HDL 06/19/2023 55  > OR = 50 mg/dL Final   • Triglycerides 06/19/2023 181 (H)  <150 mg/dL Final   • LDL Calculated 06/19/2023 165 (H)  mg/dL (calc) Final    Comment: Reference range: <100     Desirable range <100 mg/dL for primary prevention;    <70 mg/dL for patients with CHD or diabetic patients   with > or = 2 CHD risk factors. LDL-C is now calculated using the Daniel-Kim   calculation, which is a validated novel method providing   better accuracy than the Friedewald equation in the   estimation of LDL-C. Miguel Crespo al. Brenton Kush.  3150;253(43): 9948-3635 (http://education. [employer]. com/faq/YMA109)     • Chol HDLC Ratio 06/19/2023 4.6  <5.0 (calc) Final   • Non-HDL Cholesterol 06/19/2023 199 (H)  <130 mg/dL (calc) Final    Comment: For patients with diabetes plus 1 major ASCVD risk   factor, treating to a non-HDL-C goal of <100 mg/dL   (LDL-C of <70 mg/dL) is considered a therapeutic   option. • Glucose, Random 06/19/2023 114 (H)  65 - 99 mg/dL Final    Comment:               Fasting reference interval     For someone without known diabetes, a glucose value  between 100 and 125 mg/dL is consistent with  prediabetes and should be confirmed with a  follow-up test.        • BUN 06/19/2023 17  7 - 25 mg/dL Final   • Creatinine 06/19/2023 0.66  0.60 - 1.00 mg/dL Final   • eGFR 06/19/2023 90  > OR = 60 mL/min/1.73m2 Final    Comment: The eGFR is based on the CKD-EPI 2021 equation. To calculate   the new eGFR from a previous Creatinine or Cystatin C  result, go to [employer]. org/professionals/  kdoqi/gfr%5Fcalculator     • SL AMB BUN/CREATININE RATIO 05/19/7413 NOT APPLICABLE  6 - 22 (calc) Final   • Sodium 06/19/2023 139  135 - 146 mmol/L Final   • Potassium 06/19/2023 4.1  3.5 - 5.3 mmol/L Final   • Chloride 06/19/2023 107  98 - 110 mmol/L Final   • CO2 06/19/2023 23  20 - 32 mmol/L Final   • Calcium 06/19/2023 9.3  8.6 - 10.4 mg/dL Final   • Protein, Total 06/19/2023 6.9  6.1 - 8.1 g/dL Final   • Albumin 06/19/2023 4.3  3.6 - 5.1 g/dL Final   • Globulin 06/19/2023 2.6  1.9 - 3.7 g/dL (calc) Final   • Albumin/Globulin Ratio 06/19/2023 1.7  1.0 - 2.5 (calc) Final   • TOTAL BILIRUBIN 06/19/2023 0.4  0.2 - 1.2 mg/dL Final   • Alkaline Phosphatase 06/19/2023 115  37 - 153 U/L Final   • AST 06/19/2023 11  10 - 35 U/L Final   • ALT 06/19/2023 10  6 - 29 U/L Final   • White Blood Cell Count 06/19/2023 6.1  3.8 - 10.8 Thousand/uL Final   • Red Blood Cell Count 06/19/2023 4.78  3.80 - 5.10 Million/uL Final   • Hemoglobin 06/19/2023 13.6  11.7 - 15.5 g/dL Final • HCT 06/19/2023 40.1  35.0 - 45.0 % Final   • MCV 06/19/2023 83.9  80.0 - 100.0 fL Final   • MCH 06/19/2023 28.5  27.0 - 33.0 pg Final   • MCHC 06/19/2023 33.9  32.0 - 36.0 g/dL Final   • RDW 06/19/2023 13.3  11.0 - 15.0 % Final   • Platelet Count 01/24/4628 213  140 - 400 Thousand/uL Final   • SL AMB MPV 06/19/2023 11.0  7.5 - 12.5 fL Final   • Neutrophils (Absolute) 06/19/2023 3,941  1,500 - 7,800 cells/uL Final   • Lymphocytes (Absolute) 06/19/2023 1,574  850 - 3,900 cells/uL Final   • Monocytes (Absolute) 06/19/2023 403  200 - 950 cells/uL Final   • Eosinophils (Absolute) 06/19/2023 153  15 - 500 cells/uL Final   • Basophils ABS 06/19/2023 31  0 - 200 cells/uL Final   • Neutrophils 06/19/2023 64.6  % Final   • Lymphocytes 06/19/2023 25.8  % Final   • Monocytes 06/19/2023 6.6  % Final   • Eosinophils 06/19/2023 2.5  % Final   • Basophils PCT 06/19/2023 0.5  % Final   • T4, Total 06/19/2023 6.3  5.1 - 11.9 mcg/dL Final   • TSH 06/19/2023 1.70  0.40 - 4.50 mIU/L Final   • Vitamin D, 25-Hydroxy, Serum 06/19/2023 54  30 - 100 ng/mL Final    Comment: Vitamin D Status         25-OH Vitamin D:     Deficiency:                    <20 ng/mL  Insufficiency:             20 - 29 ng/mL  Optimal:                 > or = 30 ng/mL     For 25-OH Vitamin D testing on patients on   D2-supplementation and patients for whom quantitation   of D2 and D3 fractions is required, the QuestAssureD(TM)  25-OH VIT D, (D2,D3), LC/MS/MS is recommended: order   code 99330 (patients >2yrs). See Note 1     Note 1     For additional information, please refer to   http://education. [employer]/faq/ALW529   (This link is being provided for informational/  educational purposes only.)     • Hemoglobin A1C 06/19/2023 5.8 (H)  <5.7 % of total Hgb Final    Comment: For someone without known diabetes, a hemoglobin   A1c value between 5.7% and 6.4% is consistent with  prediabetes and should be confirmed with a   follow-up test.     For someone with known diabetes, a value <7%  indicates that their diabetes is well controlled. A1c  targets should be individualized based on duration of  diabetes, age, comorbid conditions, and other  considerations. This assay result is consistent with an increased risk  of diabetes. Currently, no consensus exists regarding use of  hemoglobin A1c for diagnosis of diabetes for children. Transcribed for Michell Singleton MD, by Uma Jacobs on 09/23/23 at 3:53 PM. Powered by [employer].

## 2023-09-29 ENCOUNTER — HOSPITAL ENCOUNTER (OUTPATIENT)
Dept: MAMMOGRAPHY | Facility: MEDICAL CENTER | Age: 78
Discharge: HOME | End: 2023-09-29
Payer: COMMERCIAL

## 2023-09-29 ENCOUNTER — HOSPITAL ENCOUNTER (OUTPATIENT)
Dept: BONE DENSITY | Facility: MEDICAL CENTER | Age: 78
Discharge: HOME | End: 2023-09-29
Payer: COMMERCIAL

## 2023-09-29 VITALS — WEIGHT: 164.11 LBS | HEIGHT: 61 IN | BODY MASS INDEX: 30.98 KG/M2

## 2023-09-29 DIAGNOSIS — Z12.31 ENCOUNTER FOR SCREENING MAMMOGRAM FOR MALIGNANT NEOPLASM OF BREAST: ICD-10-CM

## 2023-09-29 DIAGNOSIS — M81.0 AGE-RELATED OSTEOPOROSIS WITHOUT CURRENT PATHOLOGICAL FRACTURE: ICD-10-CM

## 2023-09-29 LAB
ALBUMIN SERPL-MCNC: 4.4 G/DL (ref 3.6–5.1)
ALBUMIN/GLOB SERPL: 1.6 (CALC) (ref 1–2.5)
ALDOLASE SERPL-CCNC: 3.2 U/L
ALP SERPL-CCNC: 109 U/L (ref 37–153)
ALT SERPL-CCNC: 12 U/L (ref 6–29)
AST SERPL-CCNC: 13 U/L (ref 10–35)
BASOPHILS # BLD AUTO: 41 CELLS/UL (ref 0–200)
BASOPHILS NFR BLD AUTO: 0.6 %
BILIRUB SERPL-MCNC: 0.4 MG/DL (ref 0.2–1.2)
BUN SERPL-MCNC: 15 MG/DL (ref 7–25)
BUN/CREAT SERPL: ABNORMAL (CALC) (ref 6–22)
CALCIUM SERPL-MCNC: 9.4 MG/DL (ref 8.6–10.4)
CHLORIDE SERPL-SCNC: 104 MMOL/L (ref 98–110)
CK SERPL-CCNC: 47 U/L (ref 29–143)
CO2 SERPL-SCNC: 28 MMOL/L (ref 20–32)
CREAT SERPL-MCNC: 0.66 MG/DL (ref 0.6–1)
CRP SERPL-MCNC: 2.6 MG/L
EOSINOPHIL # BLD AUTO: 102 CELLS/UL (ref 15–500)
EOSINOPHIL NFR BLD AUTO: 1.5 %
ERYTHROCYTE [DISTWIDTH] IN BLOOD BY AUTOMATED COUNT: 14 % (ref 11–15)
ERYTHROCYTE [SEDIMENTATION RATE] IN BLOOD BY WESTERGREN METHOD: 19 MM/H
GFR/BSA.PRED SERPLBLD CYS-BASED-ARV: 90 ML/MIN/1.73M2
GLOBULIN SER CALC-MCNC: 2.7 G/DL (CALC) (ref 1.9–3.7)
GLUCOSE SERPL-MCNC: 114 MG/DL (ref 65–99)
HCT VFR BLD AUTO: 40.6 % (ref 35–45)
HGB BLD-MCNC: 13.3 G/DL (ref 11.7–15.5)
LYMPHOCYTES # BLD AUTO: 1605 CELLS/UL (ref 850–3900)
LYMPHOCYTES NFR BLD AUTO: 23.6 %
MCH RBC QN AUTO: 28.2 PG (ref 27–33)
MCHC RBC AUTO-ENTMCNC: 32.8 G/DL (ref 32–36)
MCV RBC AUTO: 86 FL (ref 80–100)
MONOCYTES # BLD AUTO: 333 CELLS/UL (ref 200–950)
MONOCYTES NFR BLD AUTO: 4.9 %
NEUTROPHILS # BLD AUTO: 4719 CELLS/UL (ref 1500–7800)
NEUTROPHILS NFR BLD AUTO: 69.4 %
PLATELET # BLD AUTO: 224 THOUSAND/UL (ref 140–400)
PMV BLD REES-ECKER: 11.3 FL (ref 7.5–12.5)
POTASSIUM SERPL-SCNC: 4.3 MMOL/L (ref 3.5–5.3)
PROT SERPL-MCNC: 7.1 G/DL (ref 6.1–8.1)
RBC # BLD AUTO: 4.72 MILLION/UL (ref 3.8–5.1)
SODIUM SERPL-SCNC: 139 MMOL/L (ref 135–146)
WBC # BLD AUTO: 6.8 THOUSAND/UL (ref 3.8–10.8)

## 2023-09-29 PROCEDURE — 77063 BREAST TOMOSYNTHESIS BI: CPT

## 2023-09-29 PROCEDURE — 77067 SCR MAMMO BI INCL CAD: CPT

## 2023-09-29 PROCEDURE — 77080 DXA BONE DENSITY AXIAL: CPT

## 2023-10-03 LAB
LEFT EYE DIABETIC RETINOPATHY: NORMAL
RIGHT EYE DIABETIC RETINOPATHY: NORMAL

## 2023-10-23 NOTE — TELEPHONE ENCOUNTER
Left detailed message advising pt Rx has been sent to her [facility] pharmacy  Patients blood pressures with MAPS consistently in the high 40's, low 50's. Spoke to Hospitalists about interventions. Albumin to be started on patient. Will continue to monitor.      Altaf Krueger RN  10/22/23 0349

## 2023-10-24 PROBLEM — L30.9 DERMATITIS: Status: RESOLVED | Noted: 2023-07-19 | Resolved: 2023-10-24

## 2023-10-24 PROBLEM — M77.10 LATERAL EPICONDYLITIS: Status: RESOLVED | Noted: 2019-06-03 | Resolved: 2023-10-24

## 2023-10-24 PROBLEM — R53.83 OTHER FATIGUE: Status: RESOLVED | Noted: 2023-07-19 | Resolved: 2023-10-24

## 2023-10-24 PROBLEM — Z86.19 HISTORY OF LYME DISEASE: Status: RESOLVED | Noted: 2023-01-05 | Resolved: 2023-10-24

## 2023-10-25 ENCOUNTER — OFFICE VISIT (OUTPATIENT)
Dept: FAMILY MEDICINE CLINIC | Facility: CLINIC | Age: 78
End: 2023-10-25
Payer: COMMERCIAL

## 2023-10-25 VITALS
HEIGHT: 61 IN | SYSTOLIC BLOOD PRESSURE: 122 MMHG | BODY MASS INDEX: 30.21 KG/M2 | WEIGHT: 160 LBS | DIASTOLIC BLOOD PRESSURE: 74 MMHG | HEART RATE: 76 BPM

## 2023-10-25 DIAGNOSIS — Z72.0 TOBACCO ABUSE: ICD-10-CM

## 2023-10-25 DIAGNOSIS — Z23 ENCOUNTER FOR IMMUNIZATION: ICD-10-CM

## 2023-10-25 DIAGNOSIS — M35.9 UNDIFFERENTIATED CONNECTIVE TISSUE DISEASE (HCC): ICD-10-CM

## 2023-10-25 DIAGNOSIS — E78.00 PURE HYPERCHOLESTEROLEMIA: ICD-10-CM

## 2023-10-25 DIAGNOSIS — E11.9 CONTROLLED TYPE 2 DIABETES MELLITUS WITHOUT COMPLICATION, WITHOUT LONG-TERM CURRENT USE OF INSULIN (HCC): ICD-10-CM

## 2023-10-25 DIAGNOSIS — J43.8 OTHER EMPHYSEMA (HCC): Primary | ICD-10-CM

## 2023-10-25 PROCEDURE — 99214 OFFICE O/P EST MOD 30 MIN: CPT | Performed by: FAMILY MEDICINE

## 2023-10-25 PROCEDURE — 90686 IIV4 VACC NO PRSV 0.5 ML IM: CPT | Performed by: FAMILY MEDICINE

## 2023-10-25 PROCEDURE — 90677 PCV20 VACCINE IM: CPT | Performed by: FAMILY MEDICINE

## 2023-10-25 PROCEDURE — G0009 ADMIN PNEUMOCOCCAL VACCINE: HCPCS | Performed by: FAMILY MEDICINE

## 2023-10-25 PROCEDURE — G0008 ADMIN INFLUENZA VIRUS VAC: HCPCS | Performed by: FAMILY MEDICINE

## 2023-10-25 RX ADMIN — CYANOCOBALAMIN 1000 MCG: 1000 INJECTION, SOLUTION INTRAMUSCULAR; SUBCUTANEOUS at 10:01

## 2023-10-25 NOTE — PROGRESS NOTES
Name: Stefany Anand      : 1945      MRN: [id number]  Encounter Provider: Mimi Terrazas MD  Encounter Date: 10/25/2023   Encounter department: [facility]     Mrs. Olivia Akers is a pleasant 15-year-old female who presents today to follow-up on chronic medical conditions. She continues to follow-up with Dr. 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 Since I last saw her, she has stopped all her Biologics due to side effects, and reports feeling the same, if not, slightly better. She was experience side effects which have resolved since stopping. She does have intermittent myalgias, and has had massage therapy in the recent past.  She continues to smoke 1 pack a day, and is aware of the effects on her lungs in the setting of current diagnosis of COPD. 1.  COPD -continue Spiriva and albuterol. She continues to smoke 1 pack/day and finds it "so enjoyable."  She is not interested in quitting at this time. We did discuss options such as starting Wellbutrin or the NicoDerm patch which will help with the nicotine withdrawal symptoms. Patient would like to think about it. 2.  Diabetes mellitus -last hemoglobin A1c was 5.8. Continue metformin 500 mg daily. 3.  Undifferentiated connective tissue disease  -she is not on any Biologics at the present time. She is not on prednisone. Symptoms remain stable. Recent C-reactive protein and sed rate are within normal limits, and decreased from prior. Continue follow-up with rheumatology. 4.  Hyperlipidemia -last lipids were elevated as she has stopped her statin due to myalgias. She has since restarted rosuvastatin 5 mg daily. Continue to monitor lipids. 5. Influenza prevention -vaccine given today. 6.  Pneumococcal pneumonia prevention -Prevnar 20 given today. 7.  Tobacco abuse -see above. Recent blood work reviewed with patient today. Return to office in 4 months.          Subjective Chief Complaint   Patient presents with    Follow-up     Follow up to chronic conditions       B12 Injection         Follows with Dr. Khanh Coello on Sept 11 and does not feel any worse - actually feels better bc no SOB or palpatiations  Aches, pains, easy fatigability remains the same  Went for massage therapy Ruben Oates at [facility] - not interested in stopping; "I love it so much." 1ppd    Discuss flu shot  Pneumonia vaccine  Covid vaccine - afraid to because that is when she became sick      Review of Systems   Constitutional:         See HPI   HENT:  Negative for congestion, ear pain, mouth sores, sinus pressure and trouble swallowing. Eyes:  Negative for discharge, redness and itching. Respiratory:  Negative for apnea, cough, chest tightness, shortness of breath, wheezing and stridor. Cardiovascular:  Negative for chest pain, palpitations and leg swelling. Gastrointestinal:  Negative for abdominal distention, abdominal pain, blood in stool, constipation, diarrhea, nausea and vomiting. Endocrine: Negative for cold intolerance and heat intolerance. Genitourinary:  Negative for difficulty urinating, dysuria, flank pain and urgency. Musculoskeletal:  Positive for myalgias and neck pain. Negative for arthralgias and joint swelling. See HPI   Skin:  Negative for rash. Neurological:  Negative for dizziness, seizures, syncope, speech difficulty, weakness, light-headedness, numbness and headaches. Hematological:  Negative for adenopathy. Psychiatric/Behavioral:  Negative for agitation, behavioral problems, confusion and sleep disturbance. The patient is not nervous/anxious.         Current Outpatient Medications on File Prior to Visit   Medication Sig    acetaminophen (TYLENOL) 325 mg tablet Take 2 tablets (650 mg total) by mouth every 6 (six) hours as needed for mild pain    albuterol (PROVENTIL HFA,VENTOLIN HFA) 90 mcg/act inhaler INHALE 1-2 PUFFS AS NEEDED FOR WHEEZING    Biotin 59327 MCG TABS Take by mouth    budesonide-formoterol (Symbicort) 160-4.5 mcg/act inhaler Inhale 2 puffs 2 (two) times a day    cholecalciferol (VITAMIN D3) 1,000 units tablet Take 4000u daily    clobetasol (TEMOVATE) 0.05 % cream Apply topically 2 (two) times a day    clotrimazole-betamethasone (LOTRISONE) 1-0.05 % cream Apply topically 2 (two) times a day Apply sparingly to affected area BID for 10 days    Digestive Enzyme CAPS Take by mouth    lansoprazole (PREVACID) 30 mg capsule Take 1 capsule (30 mg total) by mouth daily 1 tab po BID    metFORMIN (GLUCOPHAGE) 500 mg tablet TAKE 1 TABLET BY MOUTH EVERY DAY WITH BREAKFAST    montelukast (SINGULAIR) 10 mg tablet TAKE 1 TABLET BY MOUTH EVERY DAY    polyethylene glycol (GLYCOLAX) 17 GM/SCOOP powder Take 17 g by mouth as needed    Probiotic Product (PROBIOTIC-10) CAPS Take by mouth    rosuvastatin (CRESTOR) 5 mg tablet 1 tab po every other day    Spiriva Respimat 2.5 MCG/ACT AERS inhaler Inhale 2 puffs daily 3 inhalers    [DISCONTINUED] leflunomide (ARAVA) 10 MG tablet TAKE 1 TABLET BY MOUTH EVERY DAY (Patient not taking: Reported on 9/22/2023)    [DISCONTINUED] Multiple Vitamins-Minerals (HAIR SKIN AND NAILS FORMULA PO) Take by mouth (Patient not taking: Reported on 10/25/2023)    [DISCONTINUED] predniSONE 1 mg tablet Take 1 tablet (1 mg total) by mouth 2 (two) times a day with meals (Patient not taking: Reported on 9/22/2023)       Objective     /74   Pulse 76   Ht 5' 1" (1.549 m)   Wt 72.6 kg (160 lb)   LMP  (LMP Unknown)   BMI 30.23 kg/m²     Physical Exam  Vitals and nursing note reviewed. Constitutional:       General: She is not in acute distress. Appearance: Normal appearance. She is normal weight. She is not ill-appearing, toxic-appearing or diaphoretic. Comments: Patient appears well. Excellent historian, well-groomed, in no apparent distress.    Eyes:      Conjunctiva/sclera: Conjunctivae normal.   Neck: Vascular: No carotid bruit. Cardiovascular:      Rate and Rhythm: Normal rate and regular rhythm. Pulses: Normal pulses. Heart sounds: Normal heart sounds. No murmur heard. No friction rub. No gallop. Pulmonary:      Effort: Pulmonary effort is normal. No respiratory distress. Breath sounds: Normal breath sounds. No stridor. No wheezing, rhonchi or rales. Chest:      Chest wall: No tenderness. Musculoskeletal:      Cervical back: Normal range of motion and neck supple. Right lower leg: No edema. Left lower leg: No edema. Skin:     Coloration: Skin is not jaundiced. Neurological:      Mental Status: She is alert. Psychiatric:         Mood and Affect: Mood normal.         Behavior: Behavior normal.         Thought Content:  Thought content normal.         Judgment: Judgment normal.       Norma Contreras MD

## 2023-10-29 DIAGNOSIS — J44.9 CHRONIC OBSTRUCTIVE PULMONARY DISEASE, UNSPECIFIED COPD TYPE (HCC): ICD-10-CM

## 2023-10-29 NOTE — TELEPHONE ENCOUNTER
Medication Refill Request     Name Spiriva Respimat 2.5 MCG/ACT AERS inhaler   Dose/Frequency  Inhale 2 puffs daily 3 inhalers   Quantity 16g  Verified pharmacy   [x]

## 2023-10-30 RX ORDER — TIOTROPIUM BROMIDE INHALATION SPRAY 3.12 UG/1
2 SPRAY, METERED RESPIRATORY (INHALATION) DAILY
Qty: 16 G | Refills: 3 | Status: SHIPPED | OUTPATIENT
Start: 2023-10-30

## 2023-11-03 ENCOUNTER — TELEPHONE (OUTPATIENT)
Dept: FAMILY MEDICINE CLINIC | Facility: CLINIC | Age: 78
End: 2023-11-03

## 2023-11-22 ENCOUNTER — CLINICAL SUPPORT (OUTPATIENT)
Dept: FAMILY MEDICINE CLINIC | Facility: CLINIC | Age: 78
End: 2023-11-22
Payer: COMMERCIAL

## 2023-11-22 DIAGNOSIS — E53.8 B12 DEFICIENCY: Primary | ICD-10-CM

## 2023-11-22 PROCEDURE — 96372 THER/PROPH/DIAG INJ SC/IM: CPT

## 2023-11-22 RX ADMIN — CYANOCOBALAMIN 1000 MCG: 1000 INJECTION, SOLUTION INTRAMUSCULAR; SUBCUTANEOUS at 13:56

## 2023-12-01 DIAGNOSIS — J44.9 CHRONIC OBSTRUCTIVE PULMONARY DISEASE, UNSPECIFIED COPD TYPE (HCC): ICD-10-CM

## 2023-12-01 RX ORDER — BUDESONIDE AND FORMOTEROL FUMARATE DIHYDRATE 160; 4.5 UG/1; UG/1
2 AEROSOL RESPIRATORY (INHALATION) 2 TIMES DAILY
Qty: 30.6 G | Refills: 3 | Status: SHIPPED | OUTPATIENT
Start: 2023-12-01

## 2023-12-01 NOTE — TELEPHONE ENCOUNTER
Good morning. My name is Apoorva River. My date of birth is [birth year]. The drug is it's an inhaler budesonide formoterol 160, Dash 4.5. The pharmacy is [facility]. Their telephone number is [phone] and it's for 90 days and I just wanted to mention I actually am out. I thought I had and I ran out last night. I thought I had another one and I do not, so I know it's going to take three days, but I just wanted to mention that I'm out. So thank you very much. Have a nice day.

## 2023-12-21 ENCOUNTER — CLINICAL SUPPORT (OUTPATIENT)
Dept: FAMILY MEDICINE CLINIC | Facility: CLINIC | Age: 78
End: 2023-12-21
Payer: COMMERCIAL

## 2023-12-21 DIAGNOSIS — E53.8 B12 DEFICIENCY: Primary | ICD-10-CM

## 2023-12-21 PROCEDURE — 96372 THER/PROPH/DIAG INJ SC/IM: CPT

## 2023-12-21 RX ADMIN — CYANOCOBALAMIN 1000 MCG: 1000 INJECTION, SOLUTION INTRAMUSCULAR; SUBCUTANEOUS at 13:39

## 2023-12-21 NOTE — PROGRESS NOTES
Monthly B12 inj given in Left deltoid  Patient tolerated injection well and left office in no distress.

## 2023-12-27 ENCOUNTER — RA CDI HCC (OUTPATIENT)
Dept: OTHER | Facility: HOSPITAL | Age: 78
End: 2023-12-27

## 2023-12-27 NOTE — PROGRESS NOTES
E11.51, M46.1 for 2024  HCC coding opportunities          Chart Reviewed number of suggestions sent to Provider: 2     Patients Insurance     Medicare Insurance: [employer]vantage

## 2023-12-29 DIAGNOSIS — J45.20 MILD INTERMITTENT REACTIVE AIRWAY DISEASE WITHOUT COMPLICATION: ICD-10-CM

## 2023-12-29 RX ORDER — ALBUTEROL SULFATE 90 UG/1
1-2 AEROSOL, METERED RESPIRATORY (INHALATION) AS NEEDED
Qty: 18 G | Refills: 3 | Status: SHIPPED | OUTPATIENT
Start: 2023-12-29

## 2024-01-03 ENCOUNTER — CONSULT (OUTPATIENT)
Dept: FAMILY MEDICINE CLINIC | Facility: CLINIC | Age: 79
End: 2024-01-03
Payer: COMMERCIAL

## 2024-01-03 VITALS
TEMPERATURE: 96.9 F | BODY MASS INDEX: 31.26 KG/M2 | RESPIRATION RATE: 15 BRPM | WEIGHT: 159.25 LBS | SYSTOLIC BLOOD PRESSURE: 110 MMHG | HEIGHT: 60 IN | HEART RATE: 78 BPM | DIASTOLIC BLOOD PRESSURE: 74 MMHG | OXYGEN SATURATION: 96 %

## 2024-01-03 DIAGNOSIS — Z01.818 PRE-OP EVALUATION: Primary | ICD-10-CM

## 2024-01-03 DIAGNOSIS — M35.9 UNDIFFERENTIATED CONNECTIVE TISSUE DISEASE (HCC): ICD-10-CM

## 2024-01-03 DIAGNOSIS — E11.9 CONTROLLED TYPE 2 DIABETES MELLITUS WITHOUT COMPLICATION, WITHOUT LONG-TERM CURRENT USE OF INSULIN (HCC): ICD-10-CM

## 2024-01-03 DIAGNOSIS — E78.00 PURE HYPERCHOLESTEROLEMIA: ICD-10-CM

## 2024-01-03 DIAGNOSIS — H25.011 CORTICAL AGE-RELATED CATARACT OF RIGHT EYE: ICD-10-CM

## 2024-01-03 DIAGNOSIS — J43.8 OTHER EMPHYSEMA (HCC): ICD-10-CM

## 2024-01-03 DIAGNOSIS — Z72.0 TOBACCO ABUSE: ICD-10-CM

## 2024-01-03 DIAGNOSIS — E53.8 VITAMIN B12 DEFICIENCY: ICD-10-CM

## 2024-01-03 DIAGNOSIS — M81.8 OTHER OSTEOPOROSIS WITHOUT CURRENT PATHOLOGICAL FRACTURE: ICD-10-CM

## 2024-01-03 PROBLEM — M06.9 RHEUMATOID ARTHRITIS, INVOLVING UNSPECIFIED SITE, UNSPECIFIED WHETHER RHEUMATOID FACTOR PRESENT (HCC): Status: ACTIVE | Noted: 2024-01-03

## 2024-01-03 LAB — SL AMB POCT HEMOGLOBIN AIC: 6 (ref ?–6.5)

## 2024-01-03 PROCEDURE — 99214 OFFICE O/P EST MOD 30 MIN: CPT | Performed by: FAMILY MEDICINE

## 2024-01-03 PROCEDURE — 83036 HEMOGLOBIN GLYCOSYLATED A1C: CPT | Performed by: FAMILY MEDICINE

## 2024-01-03 PROCEDURE — 93000 ELECTROCARDIOGRAM COMPLETE: CPT | Performed by: FAMILY MEDICINE

## 2024-01-03 NOTE — PROGRESS NOTES
Presurgical Evaluation      Subjective:     Mrs. Nguyen is a pleasant 78-year-old female who presents today for preop clearance for upcoming cataract surgeries.  She has been feeling well and has no acute complaints today.    1.  Preop evaluation -medically cleared for cataract surgery with Dr. Cotton on 1/5 and 1/19.  EKG performed today which is within normal limits.    2.  Age-related cataract -cleared for surgery, as above.    3.  Diabetes mellitus -globin A1c today is 6.0.  Continue metformin 500 mg daily and continue to be mindful of sugar and flour.    4.  COPD -stable on Symbicort and albuterol as needed.  Unfortunately, she continues to smoke, 1 pack/day.  She is not interested in stopping at this point.    5.  Hyperlipidemia -she is tolerating rosuvastatin 5 mg daily.  Continue to monitor lipids.    6.  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  She is not on any Biologics or anti-inflammatories at this time.  She will continue to follow-up with Dr. Flanagan.    7.  Vitamin B12 deficiency -continue IM monthly supplementation.  Check vitamin B12 and methylmalonic acid.    8.  Renewal of handicap placard filled out for patient today.  Diagnosis is lumbar facet arthropathy with chronic low back pain; patient continues to ambulate with a cane for added stability.    9.  Tobaccoism -not interested in smoking cessation at this time.    10.  Age-related osteoporosis -we discussed the possibilities of Prolia and Fosamax.  At this time, patient declines medication due to side effect profile.  She is willing to revisit in the future.    Follow-up in 3 to 4 months.     Patient ID: Melissa Nguyen is a 78 y.o. female.    Chief Complaint   Patient presents with    Pre-op Exam     Cataract left eye 1/5 and right 1/19. Pt states she does not need an EKG.  Mgb      Follow-up     For chronic conditions.  mgb       Patient presents for preop exam for upcoming cataract surgery.  She remarks  that Dr. Goodwin recommended Prolia  She went to dentist and has gum disease and apparently required tooth to be pulled. She chose not to have Prolia and so Fosamax was recommended.        The following portions of the patient's history were reviewed and updated as appropriate: allergies, current medications, past family history, past medical history, past social history, past surgical history, and problem list.    Procedure date: January 5, 2024    Surgeon:  Dr. Silvestre Cotton  Planned procedure:  Cataract removal  Diagnosis for procedure:  Age-related cataract    Prior anesthesia: Yes    CAD History: None   NOTE: Patient should see Cardiology if time available before surgery, and if appropriate.    Pulmonary History: COPD    Renal history: None    Diabetes History:  Type 2  Controlled     Neurological History: None     On Immunosuppressant meds/biologics: No      Review of Systems   Constitutional:         See HPI   HENT:  Negative for congestion, ear pain, mouth sores, sinus pressure and trouble swallowing.    Eyes:  Negative for discharge, redness and itching.   Respiratory:  Negative for apnea, cough, chest tightness, shortness of breath, wheezing and stridor.    Cardiovascular:  Negative for chest pain, palpitations and leg swelling.   Gastrointestinal:  Negative for abdominal distention, abdominal pain, blood in stool, constipation, diarrhea, nausea and vomiting.   Endocrine: Negative for cold intolerance and heat intolerance.   Genitourinary:  Negative for difficulty urinating, dysuria, flank pain and urgency.   Musculoskeletal:  Negative for arthralgias and myalgias.   Skin:  Negative for rash.   Neurological:  Negative for dizziness, seizures, syncope, speech difficulty, weakness, light-headedness, numbness and headaches.   Hematological:  Negative for adenopathy.   Psychiatric/Behavioral:  Negative for agitation, behavioral problems, confusion and sleep disturbance. The patient is not nervous/anxious.           Current Outpatient Medications   Medication Sig Dispense Refill    acetaminophen (TYLENOL) 325 mg tablet Take 2 tablets (650 mg total) by mouth every 6 (six) hours as needed for mild pain 30 tablet 0    albuterol (PROVENTIL HFA,VENTOLIN HFA) 90 mcg/act inhaler INHALE 1-2 PUFFS AS NEEDED FOR WHEEZING 18 g 3    Biotin 61806 MCG TABS Take by mouth      budesonide-formoterol (Symbicort) 160-4.5 mcg/act inhaler Inhale 2 puffs 2 (two) times a day 30.6 g 3    cholecalciferol (VITAMIN D3) 1,000 units tablet Take 4000u daily 100 tablet 3    clobetasol (TEMOVATE) 0.05 % cream Apply topically 2 (two) times a day 60 g 6    clotrimazole-betamethasone (LOTRISONE) 1-0.05 % cream Apply topically 2 (two) times a day Apply sparingly to affected area BID for 10 days 45 g 0    Digestive Enzyme CAPS Take by mouth      lansoprazole (PREVACID) 30 mg capsule Take 1 capsule (30 mg total) by mouth daily 1 tab po BID 90 capsule 3    metFORMIN (GLUCOPHAGE) 500 mg tablet TAKE 1 TABLET BY MOUTH EVERY DAY WITH BREAKFAST 90 tablet 2    montelukast (SINGULAIR) 10 mg tablet TAKE 1 TABLET BY MOUTH EVERY DAY 90 tablet 3    polyethylene glycol (GLYCOLAX) 17 GM/SCOOP powder Take 17 g by mouth as needed      Probiotic Product (PROBIOTIC-10) CAPS Take by mouth      rosuvastatin (CRESTOR) 5 mg tablet 1 tab po every other day 45 tablet 2    Spiriva Respimat 2.5 MCG/ACT AERS inhaler Inhale 2 puffs daily 3 inhalers 16 g 3     Current Facility-Administered Medications   Medication Dose Route Frequency Provider Last Rate Last Admin    cyanocobalamin injection 1,000 mcg  1,000 mcg Intramuscular Q30 Days Zayra Jessica MD   1,000 mcg at 11/22/23 1356    cyanocobalamin injection 1,000 mcg  1,000 mcg Intramuscular Q30 Days Zayra Jessica MD   1,000 mcg at 05/10/22 1328    cyanocobalamin injection 1,000 mcg  1,000 mcg Intramuscular Q30 Days Zayra Jessica MD   1,000 mcg at 11/30/21 1425    cyanocobalamin injection 1,000 mcg  1,000 mcg Intramuscular Q30 Days Zayra Jessica MD    1,000 mcg at 12/21/23 1339    cyanocobalamin injection 1,000 mcg  1,000 mcg Intramuscular Q30 Days Zayra Jessica MD   1,000 mcg at 09/20/23 1328       Allergies on file:   Gabapentin, Hydroxychloroquine, Tomato - food allergy, Asa [aspirin], Celebrex [celecoxib], Dairy aid [tilactase], Ibuprofen, Nsaids, Percocet [oxycodone-acetaminophen], Leflunomide, and Sulfasalazine    Patient Active Problem List   Diagnosis    Vitamin D deficiency    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    Tobacco abuse    Osteoporosis    Lumbosacral pain, chronic    Hyperlipidemia    Coronary artery calcification    COPD (chronic obstructive pulmonary disease) (HCC)    Controlled diabetes mellitus type II without complication (HCC)    Holly's esophagus    Allergic rhinitis    History of colonic polyps    Irritable bowel syndrome with both constipation and diarrhea    Vitamin B12 deficiency    Paget's bone disease    Osteoarthritis    Chronic constipation    Sacroiliitis (HCC)    Chronic pain syndrome    Lumbar facet arthropathy    CRISTHIAN positive    Undifferentiated connective tissue disease (HCC)        Past Medical History:   Diagnosis Date    Arthritis     Back pain     Diabetes mellitus (HCC)     Emphysema of lung (HCC)     GERD (gastroesophageal reflux disease)     History of Lyme disease 01/05/2023    We reviewed this today, and whether her symptoms may be related to a long, ongoing active Lyme infection.  Symptoms are somewhat vague for this.    Lyme disease     Osteoporosis     Polymyalgia rheumatica (HCC)     last assessed 2/15/17    Reactive airway disease     last assessed 6/9/16    Sciatica        Past Surgical History:   Procedure Laterality Date    BREAST BIOPSY Left     HYSTERECTOMY      1987, total    ORTHOPEDIC SURGERY         Family History   Problem Relation Age of Onset    Thyroid cancer Mother 53    COPD Father         mild    Hypertension Father     No Known Problems Maternal Grandmother     Colon cancer Maternal  Grandfather 90    No Known Problems Paternal Grandmother     No Known Problems Paternal Grandfather     Breast cancer Maternal Aunt 55    No Known Problems Maternal Aunt     No Known Problems Paternal Aunt        Social History     Tobacco Use    Smoking status: Every Day     Current packs/day: 1.00     Types: Cigarettes    Smokeless tobacco: Never   Vaping Use    Vaping status: Never Used   Substance Use Topics    Alcohol use: Never    Drug use: No       Objective:    Vitals:    01/03/24 1139   BP: 110/74   BP Location: Left arm   Patient Position: Sitting   Cuff Size: Standard   Pulse: 78   Temp: (!) 96.9 °F (36.1 °C)   TempSrc: Temporal   SpO2: 96%   Weight: 72.2 kg (159 lb 4 oz)   Height: 5' (1.524 m)        Physical Exam  Vitals and nursing note reviewed.   Constitutional:       General: She is not in acute distress.     Appearance: Normal appearance. She is normal weight. She is not ill-appearing, toxic-appearing or diaphoretic.   HENT:      Mouth/Throat:      Pharynx: Oropharynx is clear. No oropharyngeal exudate or posterior oropharyngeal erythema.   Eyes:      General: No scleral icterus.  Neck:      Vascular: No carotid bruit.   Cardiovascular:      Rate and Rhythm: Normal rate and regular rhythm.      Pulses: Normal pulses.      Heart sounds: Normal heart sounds. No murmur heard.     No friction rub. No gallop.   Pulmonary:      Effort: Pulmonary effort is normal. No respiratory distress.      Breath sounds: Normal breath sounds. No stridor. No wheezing, rhonchi or rales.   Chest:      Chest wall: No tenderness.   Abdominal:      General: Abdomen is flat. Bowel sounds are normal. There is no distension.      Palpations: Abdomen is soft. There is no mass.      Tenderness: There is no abdominal tenderness. There is no guarding or rebound.      Hernia: No hernia is present.   Musculoskeletal:      Cervical back: Normal range of motion and neck supple.      Right lower leg: No edema.      Left lower leg: No  edema.   Skin:     Coloration: Skin is not jaundiced.   Neurological:      General: No focal deficit present.      Mental Status: She is alert and oriented to person, place, and time.   Psychiatric:         Mood and Affect: Mood normal.         Behavior: Behavior normal.         Thought Content: Thought content normal.         Judgment: Judgment normal.           Preop labs/testing available and reviewed: no               EKG yes    Echo no    Stress test/cath no    PFT/Lorne no    Functional capacity: General house cleaning     3 Mets   Pick the highest level patient can comfortably perform   4 mets or greater for surgery    RCRI  High Risk surgery?         1 Point  CAD History:         1 Point   MI; Positive Stress Test; CP due to Mi;  Nitrate Usage to control Angina; Pathologic Q wave on EKG  CHF Active:         1 Point   Pulm Edema; Paroxysmal Nocturnal Dyspnea;  Bibasilar Rales (crackles);S3; CHF on CXR  Cerebrovascular Disease (TIA or CVA):     1 Point  DM on Insulin:        1 Point  Serum Creat >2.0 mg/dl:       1 Point          Total Points: 1     Scorin: Class I, Very Low Risk (0.4%)     1: Class II, Low risk (0.9%)     2: Class III Moderate (6.6%)     3: Class IV High (>11%)      WILBERT Risk:  GFR:        For PCP:  If GFR>60, Hold ACE/ARB/Diuretic on the day of surgery, and NSAIDS 10 days before.    If GFR<45, Consider PRE and POST op Nephrology Consult.    If 46 <GFR> 59 : Has Patient had WILBERT in last 6 Months? no   If YES: Preop Nephrology consult   If No:  Post Op Nephrology consult.           Assessment/Plan:    Patient is medically optimized (cleared) for the planned procedure.    Further testing/evaluation is not required.    Postop concerns: no    EKG: normal EKG, normal sinus rhythm, unchanged from previous tracings.  No acute ST / T wave changes.      Problem List Items Addressed This Visit       Hyperlipidemia    COPD (chronic obstructive pulmonary disease) (HCC)    Controlled diabetes mellitus  type II without complication (HCC)    Relevant Orders    POCT hemoglobin A1c    Undifferentiated connective tissue disease (HCC)     Other Visit Diagnoses       Pre-op evaluation    -  Primary    Cortical age-related cataract of right eye                 Diagnoses and all orders for this visit:    Pre-op evaluation    Cortical age-related cataract of right eye    Controlled type 2 diabetes mellitus without complication, without long-term current use of insulin (HCC)  -     POCT hemoglobin A1c    Other emphysema (HCC)    Pure hypercholesterolemia    Undifferentiated connective tissue disease (HCC)    Vitamin B12 deficiency  -     Vitamin B12; Future  -     Methylmalonic acid, serum; Future    Tobacco abuse    Other osteoporosis without current pathological fracture          Pre-Surgery Instructions:   Medication Instructions    acetaminophen (TYLENOL) 325 mg tablet per anesthesia guidelines     albuterol (PROVENTIL HFA,VENTOLIN HFA) 90 mcg/act inhaler per anesthesia guidelines     Biotin 55217 MCG TABS per anesthesia guidelines     budesonide-formoterol (Symbicort) 160-4.5 mcg/act inhaler per anesthesia guidelines     cholecalciferol (VITAMIN D3) 1,000 units tablet per anesthesia guidelines     clobetasol (TEMOVATE) 0.05 % cream per anesthesia guidelines     clotrimazole-betamethasone (LOTRISONE) 1-0.05 % cream per anesthesia guidelines     Digestive Enzyme CAPS per anesthesia guidelines     lansoprazole (PREVACID) 30 mg capsule per anesthesia guidelines     metFORMIN (GLUCOPHAGE) 500 mg tablet per anesthesia guidelines     montelukast (SINGULAIR) 10 mg tablet per anesthesia guidelines     polyethylene glycol (GLYCOLAX) 17 GM/SCOOP powder per anesthesia guidelines     Probiotic Product (PROBIOTIC-10) CAPS per anesthesia guidelines     rosuvastatin (CRESTOR) 5 mg tablet per anesthesia guidelines     Spiriva Respimat 2.5 MCG/ACT AERS inhaler per anesthesia guidelines         NOTE: Please use the above to review  "important meds for your specialty, the remainder \"per anesthesia Guidelines.\"    NOTE: Please place an Inbasket message for \"SOC\" pool for complicated patients.    "

## 2024-02-05 ENCOUNTER — TELEPHONE (OUTPATIENT)
Dept: FAMILY MEDICINE CLINIC | Facility: CLINIC | Age: 79
End: 2024-02-05

## 2024-02-05 DIAGNOSIS — R19.5 DARK RED STOOL: ICD-10-CM

## 2024-02-05 DIAGNOSIS — B83.9 WORMS IN STOOL: Primary | ICD-10-CM

## 2024-02-05 NOTE — TELEPHONE ENCOUNTER
----- Message from Sasha Hunter sent at 2/5/2024  3:40 PM EST -----  Regarding: FW: Possible blood in stool  Contact: [phone]  Please review and advise. Thank you.  ----- Message -----  From: Dayan Still  Sent: 2/5/2024   3:37 PM EST  To: [facility]  Subject: FW: Possible blood in stool                        ----- Message -----  From: Melissa Nguyen  Sent: 2/5/2024   9:50 AM EST  To: [facility]  Subject: Possible blood in stool                          Hi Doc,  Hoping you can help me out with something. Over the past 2 weeks, I first noticed what looked like some swirls of pink & now am seeing what appears to be kind of a reddish brown sediment in the bowl when I flush after a bowel movement. It doesn't happen every time. I'm not entirely sure, because my vision is still blurry since my 2nd cataract surgery on 1/19, & my color perception is also off. Also, on 3 occasions I saw what looked like dental floss -more than 1 inch in length- in my stool. The 1st one had a spot of blood(?) at the end. Worms, maybe? Other than that, I feel fine. No pain/distress of any kind. I've been going back and forth between diarrhea and constipation, but that's normal for me. Am taking Miralax when needed.   I hesitate to call my gastro doc because I can't drive yet and it would be difficult for me to get a lift all the way over to [facility].  What do you think I should do? Appt, stool specimen tests, or other, and if so, can I go thru your office? Thank you!!!

## 2024-02-07 ENCOUNTER — TELEPHONE (OUTPATIENT)
Age: 79
End: 2024-02-07

## 2024-02-07 NOTE — TELEPHONE ENCOUNTER
Patient called to confirm if the labs are ordered and if she can  the tests at the [facility].  Patient has tried calling and no one is answering.  I confirmed the hours of the lab for the patient and advised her that the lab orders are in her chart.  I advised she should be able to just go  the containers needed to do the collection at home.

## 2024-02-12 ENCOUNTER — APPOINTMENT (OUTPATIENT)
Dept: LAB | Facility: CLINIC | Age: 79
End: 2024-02-12
Payer: COMMERCIAL

## 2024-02-12 DIAGNOSIS — R19.5 DARK RED STOOL: ICD-10-CM

## 2024-02-12 DIAGNOSIS — B83.9 WORMS IN STOOL: ICD-10-CM

## 2024-02-12 LAB — HEMOCCULT STL QL IA: NEGATIVE

## 2024-02-12 PROCEDURE — 87209 SMEAR COMPLEX STAIN: CPT

## 2024-02-12 PROCEDURE — 87493 C DIFF AMPLIFIED PROBE: CPT

## 2024-02-12 PROCEDURE — 87329 GIARDIA AG IA: CPT

## 2024-02-12 PROCEDURE — G0328 FECAL BLOOD SCRN IMMUNOASSAY: HCPCS

## 2024-02-12 PROCEDURE — 87505 NFCT AGENT DETECTION GI: CPT

## 2024-02-12 PROCEDURE — 87177 OVA AND PARASITES SMEARS: CPT

## 2024-02-12 PROCEDURE — 89055 LEUKOCYTE ASSESSMENT FECAL: CPT

## 2024-02-13 LAB
C COLI+JEJUNI TUF STL QL NAA+PROBE: NEGATIVE
C DIFF TOX GENS STL QL NAA+PROBE: NEGATIVE
EC STX1+STX2 GENES STL QL NAA+PROBE: NEGATIVE
SALMONELLA SP SPAO STL QL NAA+PROBE: NEGATIVE
SHIGELLA SP+EIEC IPAH STL QL NAA+PROBE: NEGATIVE

## 2024-02-14 LAB
G LAMBLIA AG STL QL IA: NEGATIVE
WBC SPEC QL GRAM STN: NORMAL

## 2024-02-22 DIAGNOSIS — J30.1 ALLERGIC RHINITIS DUE TO POLLEN, UNSPECIFIED SEASONALITY: ICD-10-CM

## 2024-02-22 RX ORDER — MONTELUKAST SODIUM 10 MG/1
10 TABLET ORAL DAILY
Qty: 90 TABLET | Refills: 1 | Status: SHIPPED | OUTPATIENT
Start: 2024-02-22

## 2024-02-22 NOTE — TELEPHONE ENCOUNTER
Reason for call:   [x] Refill   [] Prior Auth  [] Other:     Office:   [x] PCP/Provider -   [] Specialty/Provider -     Medication: montelukast (SINGULAIR) 10 mg tablet     Quantity: #90    Pharmacy: [facility]/pharmacy #[id number] - [place] [address] [phone]    Does the patient have enough for 3 days?   [x] Yes   [] No - Send as HP to POD

## 2024-03-11 ENCOUNTER — TELEPHONE (OUTPATIENT)
Age: 79
End: 2024-03-11

## 2024-03-11 DIAGNOSIS — E53.8 VITAMIN B12 DEFICIENCY: Primary | ICD-10-CM

## 2024-03-11 RX ORDER — CYANOCOBALAMIN 1000 UG/ML
1000 INJECTION, SOLUTION INTRAMUSCULAR; SUBCUTANEOUS
Status: SHIPPED | OUTPATIENT
Start: 2024-03-12

## 2024-03-11 NOTE — TELEPHONE ENCOUNTER
Patient called to schedule an appointment for a B-12 injection. Please place order and return patient call to schedule.

## 2024-03-11 NOTE — TELEPHONE ENCOUNTER
Called and left mess to call office to setup monthly b12 shot. She can schedule for tomorrow, Wednesday or Thursday- nurse schedule

## 2024-03-11 NOTE — TELEPHONE ENCOUNTER
Orders were already in chart, but new monthly IM B12 injection placed today.  Please notify patient.  Thank you,  CB

## 2024-03-14 ENCOUNTER — CLINICAL SUPPORT (OUTPATIENT)
Dept: FAMILY MEDICINE CLINIC | Facility: CLINIC | Age: 79
End: 2024-03-14
Payer: COMMERCIAL

## 2024-03-14 DIAGNOSIS — E53.8 VITAMIN B12 DEFICIENCY: Primary | ICD-10-CM

## 2024-03-14 PROCEDURE — 96372 THER/PROPH/DIAG INJ SC/IM: CPT

## 2024-03-14 RX ADMIN — CYANOCOBALAMIN 1000 MCG: 1000 INJECTION, SOLUTION INTRAMUSCULAR; SUBCUTANEOUS at 13:56

## 2024-03-14 NOTE — PROGRESS NOTES
Patient here for monthly B12 injection. Admin in Left deltoid.  Patient tolerated injection well and left office in no distress.

## 2024-03-15 LAB — VIT B12 SERPL-MCNC: 264 PG/ML (ref 200–1100)

## 2024-03-18 LAB
METHYLMALONATE SERPL-SCNC: 98 NMOL/L (ref 87–318)
VIT B12 SERPL-MCNC: 264 PG/ML (ref 200–1100)

## 2024-03-25 ENCOUNTER — OFFICE VISIT (OUTPATIENT)
Dept: RHEUMATOLOGY | Facility: CLINIC | Age: 79
End: 2024-03-25
Payer: COMMERCIAL

## 2024-03-25 VITALS
WEIGHT: 165 LBS | BODY MASS INDEX: 32.39 KG/M2 | HEART RATE: 73 BPM | SYSTOLIC BLOOD PRESSURE: 120 MMHG | OXYGEN SATURATION: 95 % | DIASTOLIC BLOOD PRESSURE: 64 MMHG | HEIGHT: 60 IN

## 2024-03-25 DIAGNOSIS — M06.9 RHEUMATOID ARTHRITIS, INVOLVING UNSPECIFIED SITE, UNSPECIFIED WHETHER RHEUMATOID FACTOR PRESENT (HCC): Primary | ICD-10-CM

## 2024-03-25 DIAGNOSIS — Z79.899 HIGH RISK MEDICATION USE: ICD-10-CM

## 2024-03-25 DIAGNOSIS — M81.0 AGE-RELATED OSTEOPOROSIS WITHOUT CURRENT PATHOLOGICAL FRACTURE: ICD-10-CM

## 2024-03-25 PROCEDURE — 99214 OFFICE O/P EST MOD 30 MIN: CPT | Performed by: INTERNAL MEDICINE

## 2024-03-25 PROCEDURE — 1160F RVW MEDS BY RX/DR IN RCRD: CPT | Performed by: INTERNAL MEDICINE

## 2024-03-25 PROCEDURE — 1159F MED LIST DOCD IN RCRD: CPT | Performed by: INTERNAL MEDICINE

## 2024-03-25 NOTE — PROGRESS NOTES
Assessment and Plan:  Melissa Nguyen is a 78 y.o.  female who presents for follow-up of her Rheumatoid arthritis. She has had allergic reactions to all medications I have tried her on thus far. Next DMARD to try would be azathioprine if her inflammatory markers are elevated/arthritis symptoms are uncontrolled, which they currently aren't.    Do labs now  Consider starting alendronate weekly pill for osteoporosis  Will consider starting azathioprine daily if inflammatory markers are elevated  Continue daily Vit. D  Do hip bursitis exercises    Return to clinic in 6 months    Plan:  1. Rheumatoid arthritis, involving unspecified site, unspecified whether rheumatoid factor present (HCC)  -     CBC and differential  -     Comprehensive metabolic panel  -     C-reactive protein  -     Sedimentation rate, automated  -     TPMT ENZYME ACTIVITY,BLOOD    2. Age-related osteoporosis without current pathological fracture    3. High risk medication use  -     CBC and differential  -     Comprehensive metabolic panel  -     TPMT ENZYME ACTIVITY,BLOOD    RTC in 6 months      Chief Complaint  Follow-up visit for RA    Rheumatic Disease Summary  Last visit 9/22/23: Patient presents for follow-up of Rheumatoid arthritis mostly manifesting as muscle weakness. Seems to have reactions to whichever medication I put her on, usually shortness of breath; so far, she has tried and had adverse effects to hydroxychloroquine, sulfasalazine, and leflunomide. Is willing to take a drug holiday for now.     Rheumatoid arthritis  I will order an inflammatory marker test and muscle enzyme.  Educates patient on medication hiatus and emphasized that her muscle weakness might recur.  Discussed option of other medications which do not cause shortness of breath.  I discussed azathioprine as the next option if her joint/muscle symptoms worsen.     Frozen right shoulder  I ordered a repeat shoulder x-ray.   Patient does not wish to pursue PT at this  time     Skin rash  Prescribed clobetasol cream for her elbows rashes as needed.     Do labs now  Do right shoulder x-rays  Stay off DMARD medications  Schedule DEXA scan  Get dental clearance for Evenity or Prolia when see dentist in 12/2023  Continue daily Vit. D  Try clobetasol cream for elbow rashes as needed    JAGDEEP Nguyen is a 78-year-old female who presents today for follow-up. The patient consents to the use of YASMIN.    The following portions of the patient's history were reviewed and updated as appropriate: allergies, current medications, past family history, past medical history, past social history, past surgical history, and problem list.    Interval History  The patient discontinued leflunomide treatment on 09/11/2023 and experienced no significant issues initially. However, she began to feel fatigued towards the end of the day, with symptoms such as weakness in her limbs and dyspnea on certain days. Approximately a month ago, these symptoms became daily occurrences. Upon discovering her B12 levels were critically low, she received a B12 injection, which significantly improved her condition. Despite the improvement, she still occasionally experiences dyspnea and weakness. She typically receives B12 injections monthly, but due to her recent cataract surgery in 01/2024, which has affected her vision, she missed 2 injections, leading to a drop in her B12 levels. Before receiving her most recent B12 injection, Dr. Jessica conducted blood tests to monitor her cobalamin, B12, and MMA levels. 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 She has not yet obtained a new eyeglass prescription. Given her current health concerns, particularly related to dyspnea and B12 deficiency, she is hesitant to start any new medications, including those for  osteoporosis. She prefers to address her current health issues before considering additional treatments. She has experienced severe allergic reactions in the past, which contributes to her cautious approach to new medications. She denies ongoing adjustments and has been wearing sunglasses indoors for the past 3 days, following a period of not opening her blinds or curtains for 3 months. She has experienced side effects from hydroxychloroquine, sulfasalazine, leflunomide, and gabapentin, and has not yet tried methotrexate due to previous dyspnea and palpitations. She manages muscle weakness by resting and using an albuterol inhaler for dyspnea, without any specific treatment for the muscle weakness itself. She takes 4000 international units of vitamin D daily but does not consume calcium. She is lactose intolerant but consumes cheese daily and does not consume yogurt.    She has reduced her smoking habit, from smoking 1 pack a day to now smoking -2 to -3 cigarettes a pack per day. This change has led to noticeable improvements in her vision and overall well-being, particularly from spending more time outdoors. She plans to continue reducing her smoking further.    Previously, she experienced right shoulder pain, which prompted multiple consultations with a massage therapist. The pain has since resolved, and she has not undergone an x-ray as the issue was addressed through therapy. Her dentist advised against using Evenity or Prolia due to a tooth that requires preservation.    She has been dealing with elbow rashes, which have persisted since her diagnosis of hepatitis C in 2011. Despite various prescriptions, none have been effective. However, a homemade honey potion face mask containing pollen and echinacea, given to her as a Amadou gift, significantly improved her elbow condition. The mask was initially not used on her face but was applied to her elbows, leading to a marked improvement. Her elbows are now much  better, though she still experiences rough skin. Clobetasol cream did not alleviate her symptoms. She can now place her elbow on any surface without pain, indicating that her elbow rashes are currently under control.    In the past, she tolerated low-dose prednisone for 1.5 years but had a severe reaction to a higher dose, leading to an emergency ambulance call due to rapid heart rate and near-fainting. She was then switched to a lower dose of prednisone, which she tolerated well. The exact reason for the prednisone use is unclear, but it may have been related to her back or another ailment.    She reports pain in her ankles and legs, with a particular emphasis on the sides of her shins and thighs. She has received hip bursa injections in the past for temporary relief. She uses a cane for stability and has pain in her legs that is more severe than her ankle pain.    Review of Systems:   Pertinent ROS positive for fatigue, dry eyes, wheezing, back pain, and muscle pain. Rest of 14 point ROS reviewed and were negative.    Home Medications:    Current Outpatient Medications:   •  acetaminophen (TYLENOL) 325 mg tablet, Take 2 tablets (650 mg total) by mouth every 6 (six) hours as needed for mild pain, Disp: 30 tablet, Rfl: 0  •  albuterol (PROVENTIL HFA,VENTOLIN HFA) 90 mcg/act inhaler, INHALE 1-2 PUFFS AS NEEDED FOR WHEEZING, Disp: 18 g, Rfl: 3  •  Biotin 34047 MCG TABS, Take by mouth, Disp: , Rfl:   •  budesonide-formoterol (Symbicort) 160-4.5 mcg/act inhaler, Inhale 2 puffs 2 (two) times a day, Disp: 30.6 g, Rfl: 3  •  cholecalciferol (VITAMIN D3) 1,000 units tablet, Take 4000u daily, Disp: 100 tablet, Rfl: 3  •  clotrimazole-betamethasone (LOTRISONE) 1-0.05 % cream, Apply topically 2 (two) times a day Apply sparingly to affected area BID for 10 days, Disp: 45 g, Rfl: 0  •  Digestive Enzyme CAPS, Take by mouth, Disp: , Rfl:   •  lansoprazole (PREVACID) 30 mg capsule, Take 1 capsule (30 mg total) by mouth daily 1 tab  po BID, Disp: 90 capsule, Rfl: 3  •  metFORMIN (GLUCOPHAGE) 500 mg tablet, TAKE 1 TABLET BY MOUTH EVERY DAY WITH BREAKFAST, Disp: 90 tablet, Rfl: 2  •  montelukast (SINGULAIR) 10 mg tablet, Take 1 tablet (10 mg total) by mouth daily, Disp: 90 tablet, Rfl: 1  •  polyethylene glycol (GLYCOLAX) 17 GM/SCOOP powder, Take 17 g by mouth as needed, Disp: , Rfl:   •  Probiotic Product (PROBIOTIC-10) CAPS, Take by mouth, Disp: , Rfl:   •  rosuvastatin (CRESTOR) 5 mg tablet, 1 tab po every other day, Disp: 45 tablet, Rfl: 2  •  Spiriva Respimat 2.5 MCG/ACT AERS inhaler, Inhale 2 puffs daily 3 inhalers, Disp: 16 g, Rfl: 3    Current Facility-Administered Medications:   •  cyanocobalamin injection 1,000 mcg, 1,000 mcg, Intramuscular, Q30 Days, , 1,000 mcg at 03/14/24 1356    Objective:    Vitals:    03/25/24 1133   BP: 120/64   Pulse: 73   SpO2: 95%   Weight: 74.8 kg (165 lb)   Height: 5' (1.524 m)       Physical Exam:  Constitutional:    General: She is not in acute distress. Ambulating with cane.  HENT:   Head: Normocephalic and atraumatic.   Eyes:   Conjunctiva/sclera: Conjunctivae normal.   Cardiovascular:   Rate and Rhythm: Normal rate and regular rhythm.   Heart sounds: S1 normal and S2 normal.   No friction rubs.   Pulmonary:   Effort: Pulmonary effort is normal. No respiratory distress.   Breath sounds: Normal breath sounds. No wheezing, rhonchi, or rales.   Musculoskeletal:   Cervical back: Neck supple.   Lower extremities: Bilateral trochanteric bursa tenderness.  Skin:  Coloration: Skin is not pale.   Facial: Telangiectasias on bilateral cheeks.  Neurological:   Mental Status: She is alert. Mental status is at baseline.   Psychiatric:      Mood and Affect: Mood normal.      Behavior: Behavior normal.      I have personally reviewed results with the patient.    Imaging:   No results found.    Labs:   Orders Only on 03/14/2024   Component Date Value Ref Range Status   • Methylmalonic Acid, S 03/14/2024 98  87 - 318  nmol/L Final    Comment: This test was developed and its analytical performance  characteristics have been determined by [employer] [place], [place]. It has  not been cleared or approved by the U.S. Food and Drug  Administration. This assay has been validated pursuant  to the CLIA regulations and is used for clinical  purposes.            • Vitamin B-12 03/14/2024 264  200 - 1,100 pg/mL Final    Comment:    Please Note: Although the reference range for vitamin  B12 is 200-1100 pg/mL, it has been reported that between  5 and 10% of patients with values between 200 and 400  pg/mL may experience neuropsychiatric and hematologic  abnormalities due to occult B12 deficiency; less than 1%  of patients with values above 400 pg/mL will have symptoms.        Appointment on 02/12/2024   Component Date Value Ref Range Status   • C.difficile toxin by PCR 02/12/2024 Negative  Negative Final        • Salmonella sp PCR 02/12/2024 Negative  Negative Final        • Shigella sp/Enteroinvasive E. coli* 02/12/2024 Negative  Negative Final        • Campylobacter sp (jejuni and coli)* 02/12/2024 Negative  Negative Final        • Shiga toxin 1/Shiga toxin 2 genes * 02/12/2024 Negative  Negative Final        • White Blood Cells, Stool 02/12/2024 No white blood cells seen.  None Seen Final   • OCCULT BLD, FECAL IMMUNOLOGICAL 02/12/2024 Negative  Negative Final   • Giardia Ag, Stl 02/12/2024 Negative  Negative Final   Consult on 01/03/2024   Component Date Value Ref Range Status   • Hemoglobin A1C 01/03/2024 6.0  6.5 Final   Orders Only on 10/03/2023   Component Date Value Ref Range Status   • Right Eye Diabetic Retinopathy 10/03/2023 None   Final   • Left Eye Diabetic Retinopathy 10/03/2023 None   Final   Orders Only on 09/25/2023   Component Date Value Ref Range Status   • Glucose, Random 09/25/2023 114 (H)  65 - 99 mg/dL Final    Comment:               Fasting reference interval     For someone without known  diabetes, a glucose value  between 100 and 125 mg/dL is consistent with  prediabetes and should be confirmed with a  follow-up test.        • BUN 09/25/2023 15  7 - 25 mg/dL Final   • Creatinine 09/25/2023 0.66  0.60 - 1.00 mg/dL Final   • eGFR 09/25/2023 90  > OR = 60 mL/min/1.73m2 Final   • SL AMB BUN/CREATININE RATIO 09/25/2023 SEE NOTE:  6 - 22 (calc) Final    Comment:    Not Reported: BUN and Creatinine are within     reference range.           • Sodium 09/25/2023 139  135 - 146 mmol/L Final   • Potassium 09/25/2023 4.3  3.5 - 5.3 mmol/L Final   • Chloride 09/25/2023 104  98 - 110 mmol/L Final   • CO2 09/25/2023 28  20 - 32 mmol/L Final   • Calcium 09/25/2023 9.4  8.6 - 10.4 mg/dL Final   • Protein, Total 09/25/2023 7.1  6.1 - 8.1 g/dL Final   • Albumin 09/25/2023 4.4  3.6 - 5.1 g/dL Final   • Globulin 09/25/2023 2.7  1.9 - 3.7 g/dL (calc) Final   • Albumin/Globulin Ratio 09/25/2023 1.6  1.0 - 2.5 (calc) Final   • TOTAL BILIRUBIN 09/25/2023 0.4  0.2 - 1.2 mg/dL Final   • Alkaline Phosphatase 09/25/2023 109  37 - 153 U/L Final   • AST 09/25/2023 13  10 - 35 U/L Final   • ALT 09/25/2023 12  6 - 29 U/L Final   • Creatine Kinase, Total 09/25/2023 47  29 - 143 U/L Final   • Aldolase 09/25/2023 3.2  <=8.1 U/L Final   • Sedimentation Rate 09/25/2023 19  < OR = 30 mm/h Final   • White Blood Cell Count 09/25/2023 6.8  3.8 - 10.8 Thousand/uL Final   • Red Blood Cell Count 09/25/2023 4.72  3.80 - 5.10 Million/uL Final   • Hemoglobin 09/25/2023 13.3  11.7 - 15.5 g/dL Final   • HCT 09/25/2023 40.6  35.0 - 45.0 % Final   • MCV 09/25/2023 86.0  80.0 - 100.0 fL Final   • MCH 09/25/2023 28.2  27.0 - 33.0 pg Final   • MCHC 09/25/2023 32.8  32.0 - 36.0 g/dL Final   • RDW 09/25/2023 14.0  11.0 - 15.0 % Final   • Platelet Count 09/25/2023 224  140 - 400 Thousand/uL Final   • SL AMB MPV 09/25/2023 11.3  7.5 - 12.5 fL Final   • Neutrophils (Absolute) 09/25/2023 4,719  1,500 - 7,800 cells/uL Final   • Lymphocytes (Absolute) 09/25/2023  1,605  850 - 3,900 cells/uL Final   • Monocytes (Absolute) 09/25/2023 333  200 - 950 cells/uL Final   • Eosinophils (Absolute) 09/25/2023 102  15 - 500 cells/uL Final   • Basophils ABS 09/25/2023 41  0 - 200 cells/uL Final   • Neutrophils 09/25/2023 69.4  % Final   • Lymphocytes 09/25/2023 23.6  % Final   • Monocytes 09/25/2023 4.9  % Final   • Eosinophils 09/25/2023 1.5  % Final   • Basophils PCT 09/25/2023 0.6  % Final   • C-Reactive Protein, Quant 09/25/2023 2.6  <8.0 mg/L Final       Transcribed for Yasmany Reynolds MD, by Geraldson Reyes Bihag on 03/31/24 at 9:27 PM. Powered by [employer].

## 2024-03-25 NOTE — PATIENT INSTRUCTIONS
Do labs now  Consider starting alendronate weekly pill for osteoporosis  Will consider starting azathioprine daily if inflammatory markers are elevated  Continue daily Vit. D  Do hip bursitis exercises    Return to clinic in 6 months

## 2024-03-28 LAB
ALBUMIN SERPL-MCNC: 4.5 G/DL (ref 3.6–5.1)
ALBUMIN/GLOB SERPL: 1.8 (CALC) (ref 1–2.5)
ALP SERPL-CCNC: 126 U/L (ref 37–153)
ALT SERPL-CCNC: 10 U/L (ref 6–29)
AST SERPL-CCNC: 12 U/L (ref 10–35)
BASOPHILS # BLD AUTO: 40 CELLS/UL (ref 0–200)
BASOPHILS NFR BLD AUTO: 0.6 %
BILIRUB SERPL-MCNC: 0.4 MG/DL (ref 0.2–1.2)
BUN SERPL-MCNC: 20 MG/DL (ref 7–25)
BUN/CREAT SERPL: ABNORMAL (CALC) (ref 6–22)
CALCIUM SERPL-MCNC: 9.5 MG/DL (ref 8.6–10.4)
CHLORIDE SERPL-SCNC: 104 MMOL/L (ref 98–110)
CO2 SERPL-SCNC: 26 MMOL/L (ref 20–32)
CREAT SERPL-MCNC: 0.73 MG/DL (ref 0.6–1)
CRP SERPL-MCNC: 2.6 MG/L
EOSINOPHIL # BLD AUTO: 119 CELLS/UL (ref 15–500)
EOSINOPHIL NFR BLD AUTO: 1.8 %
ERYTHROCYTE [DISTWIDTH] IN BLOOD BY AUTOMATED COUNT: 13.5 % (ref 11–15)
ERYTHROCYTE [SEDIMENTATION RATE] IN BLOOD BY WESTERGREN METHOD: 25 MM/H
GFR/BSA.PRED SERPLBLD CYS-BASED-ARV: 84 ML/MIN/1.73M2
GLOBULIN SER CALC-MCNC: 2.5 G/DL (CALC) (ref 1.9–3.7)
GLUCOSE SERPL-MCNC: 110 MG/DL (ref 65–99)
HCT VFR BLD AUTO: 38.9 % (ref 35–45)
HGB BLD-MCNC: 13.2 G/DL (ref 11.7–15.5)
LYMPHOCYTES # BLD AUTO: 1729 CELLS/UL (ref 850–3900)
LYMPHOCYTES NFR BLD AUTO: 26.2 %
MCH RBC QN AUTO: 28.6 PG (ref 27–33)
MCHC RBC AUTO-ENTMCNC: 33.9 G/DL (ref 32–36)
MCV RBC AUTO: 84.4 FL (ref 80–100)
MONOCYTES # BLD AUTO: 370 CELLS/UL (ref 200–950)
MONOCYTES NFR BLD AUTO: 5.6 %
NEUTROPHILS # BLD AUTO: 4343 CELLS/UL (ref 1500–7800)
NEUTROPHILS NFR BLD AUTO: 65.8 %
PLATELET # BLD AUTO: 244 THOUSAND/UL (ref 140–400)
PMV BLD REES-ECKER: 10.8 FL (ref 7.5–12.5)
POTASSIUM SERPL-SCNC: 4.3 MMOL/L (ref 3.5–5.3)
PROT SERPL-MCNC: 7 G/DL (ref 6.1–8.1)
RBC # BLD AUTO: 4.61 MILLION/UL (ref 3.8–5.1)
SODIUM SERPL-SCNC: 139 MMOL/L (ref 135–146)
WBC # BLD AUTO: 6.6 THOUSAND/UL (ref 3.8–10.8)

## 2024-04-03 ENCOUNTER — RA CDI HCC (OUTPATIENT)
Dept: OTHER | Facility: HOSPITAL | Age: 79
End: 2024-04-03

## 2024-04-03 NOTE — PROGRESS NOTES
HCC coding opportunities          Chart Reviewed number of suggestions sent to Provider: 1     Patients Insurance     Medicare Insurance: [employer]age         M46.1

## 2024-04-10 ENCOUNTER — CLINICAL SUPPORT (OUTPATIENT)
Dept: FAMILY MEDICINE CLINIC | Facility: CLINIC | Age: 79
End: 2024-04-10
Payer: COMMERCIAL

## 2024-04-10 DIAGNOSIS — E53.8 VITAMIN B12 DEFICIENCY: Primary | ICD-10-CM

## 2024-04-10 PROCEDURE — 96372 THER/PROPH/DIAG INJ SC/IM: CPT

## 2024-04-10 RX ADMIN — CYANOCOBALAMIN 1000 MCG: 1000 INJECTION, SOLUTION INTRAMUSCULAR; SUBCUTANEOUS at 14:40

## 2024-04-11 LAB
ALBUMIN SERPL-MCNC: 4.5 G/DL (ref 3.6–5.1)
ALBUMIN/GLOB SERPL: 1.8 (CALC) (ref 1–2.5)
ALP SERPL-CCNC: 126 U/L (ref 37–153)
ALT SERPL-CCNC: 10 U/L (ref 6–29)
AST SERPL-CCNC: 12 U/L (ref 10–35)
BASOPHILS # BLD AUTO: 40 CELLS/UL (ref 0–200)
BASOPHILS NFR BLD AUTO: 0.6 %
BILIRUB SERPL-MCNC: 0.4 MG/DL (ref 0.2–1.2)
BUN SERPL-MCNC: 20 MG/DL (ref 7–25)
BUN/CREAT SERPL: ABNORMAL (CALC) (ref 6–22)
CALCIUM SERPL-MCNC: 9.5 MG/DL (ref 8.6–10.4)
CHLORIDE SERPL-SCNC: 104 MMOL/L (ref 98–110)
CO2 SERPL-SCNC: 26 MMOL/L (ref 20–32)
CREAT SERPL-MCNC: 0.73 MG/DL (ref 0.6–1)
CRP SERPL-MCNC: 2.6 MG/L
EOSINOPHIL # BLD AUTO: 119 CELLS/UL (ref 15–500)
EOSINOPHIL NFR BLD AUTO: 1.8 %
ERYTHROCYTE [DISTWIDTH] IN BLOOD BY AUTOMATED COUNT: 13.5 % (ref 11–15)
ERYTHROCYTE [SEDIMENTATION RATE] IN BLOOD BY WESTERGREN METHOD: 25 MM/H
GFR/BSA.PRED SERPLBLD CYS-BASED-ARV: 84 ML/MIN/1.73M2
GLOBULIN SER CALC-MCNC: 2.5 G/DL (CALC) (ref 1.9–3.7)
GLUCOSE SERPL-MCNC: 110 MG/DL (ref 65–99)
HCT VFR BLD AUTO: 38.9 % (ref 35–45)
HGB BLD-MCNC: 13.2 G/DL (ref 11.7–15.5)
LYMPHOCYTES # BLD AUTO: 1729 CELLS/UL (ref 850–3900)
LYMPHOCYTES NFR BLD AUTO: 26.2 %
MCH RBC QN AUTO: 28.6 PG (ref 27–33)
MCHC RBC AUTO-ENTMCNC: 33.9 G/DL (ref 32–36)
MCV RBC AUTO: 84.4 FL (ref 80–100)
MONOCYTES # BLD AUTO: 370 CELLS/UL (ref 200–950)
MONOCYTES NFR BLD AUTO: 5.6 %
NEUTROPHILS # BLD AUTO: 4343 CELLS/UL (ref 1500–7800)
NEUTROPHILS NFR BLD AUTO: 65.8 %
PLATELET # BLD AUTO: 244 THOUSAND/UL (ref 140–400)
PMV BLD REES-ECKER: 10.8 FL (ref 7.5–12.5)
POTASSIUM SERPL-SCNC: 4.3 MMOL/L (ref 3.5–5.3)
PROT SERPL-MCNC: 7 G/DL (ref 6.1–8.1)
RBC # BLD AUTO: 4.61 MILLION/UL (ref 3.8–5.1)
SODIUM SERPL-SCNC: 139 MMOL/L (ref 135–146)
TPMT RBC-CCNC: 20 NMOL/HR/ML RBC
WBC # BLD AUTO: 6.6 THOUSAND/UL (ref 3.8–10.8)

## 2024-04-27 DIAGNOSIS — E78.1 PURE HYPERTRIGLYCERIDEMIA: ICD-10-CM

## 2024-04-27 RX ORDER — ROSUVASTATIN CALCIUM 5 MG/1
TABLET, COATED ORAL
Qty: 45 TABLET | Refills: 1 | Status: SHIPPED | OUTPATIENT
Start: 2024-04-27

## 2024-04-30 ENCOUNTER — RA CDI HCC (OUTPATIENT)
Dept: OTHER | Facility: HOSPITAL | Age: 79
End: 2024-04-30

## 2024-04-30 NOTE — PROGRESS NOTES
Error creating encounter    M46.1  HCC coding opportunities          Chart Reviewed number of suggestions sent to Provider: 1     Patients Insurance     Medicare Insurance: [employer]vantage

## 2024-05-12 PROBLEM — K63.8219 SMALL INTESTINAL BACTERIAL OVERGROWTH: Status: ACTIVE | Noted: 2024-05-12

## 2024-05-12 RX ORDER — FAMOTIDINE 40 MG/1
TABLET, FILM COATED ORAL DAILY
COMMUNITY
Start: 2024-04-11

## 2024-05-14 ENCOUNTER — OFFICE VISIT (OUTPATIENT)
Dept: FAMILY MEDICINE CLINIC | Facility: CLINIC | Age: 79
End: 2024-05-14
Payer: COMMERCIAL

## 2024-05-14 VITALS
HEART RATE: 80 BPM | SYSTOLIC BLOOD PRESSURE: 120 MMHG | HEIGHT: 60 IN | BODY MASS INDEX: 32.67 KG/M2 | DIASTOLIC BLOOD PRESSURE: 60 MMHG | WEIGHT: 166.4 LBS

## 2024-05-14 DIAGNOSIS — M35.9 UNDIFFERENTIATED CONNECTIVE TISSUE DISEASE (HCC): ICD-10-CM

## 2024-05-14 DIAGNOSIS — J43.8 OTHER EMPHYSEMA (HCC): ICD-10-CM

## 2024-05-14 DIAGNOSIS — Z72.0 TOBACCO ABUSE: ICD-10-CM

## 2024-05-14 DIAGNOSIS — E53.8 VITAMIN B12 DEFICIENCY: ICD-10-CM

## 2024-05-14 DIAGNOSIS — E11.9 CONTROLLED TYPE 2 DIABETES MELLITUS WITHOUT COMPLICATION, WITHOUT LONG-TERM CURRENT USE OF INSULIN (HCC): Primary | ICD-10-CM

## 2024-05-14 DIAGNOSIS — E78.00 PURE HYPERCHOLESTEROLEMIA: ICD-10-CM

## 2024-05-14 LAB — SL AMB POCT HEMOGLOBIN AIC: 5.9 (ref ?–6.5)

## 2024-05-14 PROCEDURE — 99214 OFFICE O/P EST MOD 30 MIN: CPT | Performed by: FAMILY MEDICINE

## 2024-05-14 PROCEDURE — 96372 THER/PROPH/DIAG INJ SC/IM: CPT | Performed by: FAMILY MEDICINE

## 2024-05-14 PROCEDURE — 83036 HEMOGLOBIN GLYCOSYLATED A1C: CPT | Performed by: FAMILY MEDICINE

## 2024-05-14 RX ADMIN — CYANOCOBALAMIN 1000 MCG: 1000 INJECTION, SOLUTION INTRAMUSCULAR; SUBCUTANEOUS at 09:26

## 2024-05-14 NOTE — ASSESSMENT & PLAN NOTE
Patient has been receiving IM B12 injections monthly.    Follow vitamin B12 and methylmalonic acid levels.

## 2024-05-14 NOTE — PROGRESS NOTES
Name: Melissa Nguyen      : 1945      MRN: [id number]  Encounter Provider: Zayra Jessica MD  Encounter Date: 2024   Encounter department: [facility]    Assessment & Plan     Mrs. Nguyen presents today to review chronic medical conditions.      1. Controlled type 2 diabetes mellitus without complication, without long-term current use of insulin (HCC)  Assessment & Plan:  Remains stable.  Continue metformin 500 mg daily.  Encouraged to return to healthy eating habits, avoiding processed foods, sweets and carbohydrates.    Lab Results   Component Value Date    HGBA1C 5.9 2024     Orders:  -     POCT hemoglobin A1c    2. Pure hypercholesterolemia  Assessment & Plan:  Back on Crestor 5 mg, every other day, to be increased as tolerated.      3. Other emphysema (HCC)  Assessment & Plan:  Patient continues to smoke about a pack a day.  Not interested in quitting.  Remains on Spiriva and Symbicort.        4. Undifferentiated connective tissue disease (HCC)  Assessment & Plan:  She will follow-up with Dr. Flanagan.      5. Vitamin B12 deficiency  Assessment & Plan:  Patient has been receiving IM B12 injections monthly.    Follow vitamin B12 and methylmalonic acid levels.      6. Tobacco abuse  Assessment & Plan:  Resistant to stopping.  Declines CT lung screen at present.  Last was 2023.      Return to office in 3 to 4 months.       Subjective      Chief Complaint   Patient presents with   • Diabetes     Routine f/u   Dm foot exam monofilament completed WNL       Feeling pretty good.  Good days and bad days - weakness comes and goes but more good days  Continues to smoke - 1 ppd -resistant to stopping  Had cataract surgery; also has macular degeneration - wears sunglasses all the time    Review of Systems    Current Outpatient Medications on File Prior to Visit   Medication Sig   • acetaminophen (TYLENOL) 325 mg tablet Take 2 tablets (650 mg total) by mouth every 6 (six) hours as  needed for mild pain   • albuterol (PROVENTIL HFA,VENTOLIN HFA) 90 mcg/act inhaler INHALE 1-2 PUFFS AS NEEDED FOR WHEEZING   • Biotin 03603 MCG TABS Take by mouth   • budesonide-formoterol (Symbicort) 160-4.5 mcg/act inhaler Inhale 2 puffs 2 (two) times a day   • cholecalciferol (VITAMIN D3) 1,000 units tablet Take 4000u daily   • clotrimazole-betamethasone (LOTRISONE) 1-0.05 % cream Apply topically 2 (two) times a day Apply sparingly to affected area BID for 10 days   • Digestive Enzyme CAPS Take by mouth   • lansoprazole (PREVACID) 30 mg capsule Take 1 capsule (30 mg total) by mouth daily 1 tab po BID   • metFORMIN (GLUCOPHAGE) 500 mg tablet TAKE 1 TABLET BY MOUTH EVERY DAY WITH BREAKFAST   • montelukast (SINGULAIR) 10 mg tablet Take 1 tablet (10 mg total) by mouth daily   • Pepcid 40 MG tablet Take by mouth daily   • polyethylene glycol (GLYCOLAX) 17 GM/SCOOP powder Take 17 g by mouth as needed   • Probiotic Product (PROBIOTIC-10) CAPS Take by mouth   • rosuvastatin (CRESTOR) 5 mg tablet TAKE 1 TABLET BY MOUTH EVERY OTHER DAY   • Spiriva Respimat 2.5 MCG/ACT AERS inhaler Inhale 2 puffs daily 3 inhalers       Objective     /60 (BP Location: Left arm, Patient Position: Sitting, Cuff Size: Adult)   Pulse 80   Ht 5' (1.524 m)   Wt 75.5 kg (166 lb 6.4 oz)   LMP  (LMP Unknown)   BMI 32.50 kg/m²   Diabetic Foot Exam    Patient's shoes and socks removed.    Right Foot/Ankle   Right Foot Inspection  Skin Exam: skin normal and skin intact. No dry skin, no warmth, no callus, no erythema, no maceration, no abnormal color, no pre-ulcer, no ulcer and no callus.     Toe Exam: ROM and strength within normal limits. No swelling, no tenderness, erythema and  no right toe deformity    Sensory   Proprioception: intact  Monofilament testing: intact    Vascular  Capillary refills: < 3 seconds  The right DP pulse is 2+. The right PT pulse is 2+.     Left Foot/Ankle  Left Foot Inspection  Skin Exam: skin normal and skin  intact. No dry skin, no warmth, no erythema, no maceration, normal color, no pre-ulcer, no ulcer and no callus.     Toe Exam: ROM and strength within normal limits. No swelling, no tenderness, no erythema and no left toe deformity.     Sensory   Proprioception: intact  Monofilament testing: intact    Vascular  Capillary refills: < 3 seconds  The left DP pulse is 2+. The left PT pulse is 2+.     Assign Risk Category  No deformity present  No loss of protective sensation  No weak pulses  Risk: 0    Physical Exam  Vitals and nursing note reviewed.   Constitutional:       General: She is not in acute distress.     Appearance: Normal appearance. She is normal weight. She is not ill-appearing, toxic-appearing or diaphoretic.   Eyes:      General: No scleral icterus.     Conjunctiva/sclera: Conjunctivae normal.   Neck:      Vascular: No carotid bruit.   Cardiovascular:      Rate and Rhythm: Normal rate and regular rhythm.      Pulses: Normal pulses. no weak pulses.           Dorsalis pedis pulses are 2+ on the right side and 2+ on the left side.        Posterior tibial pulses are 2+ on the right side and 2+ on the left side.      Heart sounds: Normal heart sounds. No murmur heard.     No friction rub. No gallop.   Pulmonary:      Effort: Pulmonary effort is normal. No respiratory distress.      Breath sounds: Normal breath sounds. No stridor. No wheezing, rhonchi or rales.   Chest:      Chest wall: No tenderness.   Abdominal:      General: Abdomen is flat. Bowel sounds are normal. There is no distension.      Palpations: Abdomen is soft. There is no mass.      Tenderness: There is no abdominal tenderness. There is no guarding or rebound.      Hernia: No hernia is present.   Musculoskeletal:      Cervical back: Normal range of motion and neck supple.      Right lower leg: No edema.      Left lower leg: No edema.   Feet:      Right foot:      Skin integrity: No ulcer, skin breakdown, erythema, warmth, callus or dry skin.       Left foot:      Skin integrity: No ulcer, skin breakdown, erythema, warmth, callus or dry skin.   Skin:     Coloration: Skin is not jaundiced.   Neurological:      Mental Status: She is alert.   Psychiatric:         Mood and Affect: Mood normal.         Behavior: Behavior normal.         Thought Content: Thought content normal.         Judgment: Judgment normal.     Zayra Jessica MD

## 2024-05-14 NOTE — ASSESSMENT & PLAN NOTE
Remains stable.  Continue metformin 500 mg daily.  Encouraged to return to healthy eating habits, avoiding processed foods, sweets and carbohydrates.    Lab Results   Component Value Date    HGBA1C 5.9 05/14/2024

## 2024-05-14 NOTE — ASSESSMENT & PLAN NOTE
Patient continues to smoke about a pack a day.  Not interested in quitting.  Remains on Spiriva and Symbicort.

## 2024-05-31 DIAGNOSIS — E11.9 CONTROLLED TYPE 2 DIABETES MELLITUS WITHOUT COMPLICATION, WITHOUT LONG-TERM CURRENT USE OF INSULIN (HCC): ICD-10-CM

## 2024-06-04 ENCOUNTER — TELEPHONE (OUTPATIENT)
Age: 79
End: 2024-06-04

## 2024-06-04 DIAGNOSIS — E53.8 VITAMIN B12 DEFICIENCY: Primary | ICD-10-CM

## 2024-06-04 RX ORDER — CYANOCOBALAMIN 1000 UG/ML
1000 INJECTION, SOLUTION INTRAMUSCULAR; SUBCUTANEOUS
Status: SHIPPED | OUTPATIENT
Start: 2024-06-05 | End: 2025-06-30

## 2024-06-04 NOTE — TELEPHONE ENCOUNTER
Patient called, would like to schedule appt. For a B12 injection.  Order not visible at this time.  Kindly call patient to schedule appt.

## 2024-06-11 ENCOUNTER — CLINICAL SUPPORT (OUTPATIENT)
Dept: FAMILY MEDICINE CLINIC | Facility: CLINIC | Age: 79
End: 2024-06-11
Payer: COMMERCIAL

## 2024-06-11 DIAGNOSIS — E53.8 VITAMIN B12 DEFICIENCY: Primary | ICD-10-CM

## 2024-06-11 PROCEDURE — 96372 THER/PROPH/DIAG INJ SC/IM: CPT

## 2024-06-11 RX ADMIN — CYANOCOBALAMIN 1000 MCG: 1000 INJECTION, SOLUTION INTRAMUSCULAR; SUBCUTANEOUS at 14:25

## 2024-06-29 ENCOUNTER — RA CDI HCC (OUTPATIENT)
Dept: OTHER | Facility: HOSPITAL | Age: 79
End: 2024-06-29

## 2024-06-29 NOTE — PROGRESS NOTES
M46.1  HCC coding opportunities          Chart Reviewed number of suggestions sent to Provider: 1     Patients Insurance     Medicare Insurance: [employer]vantage

## 2024-07-09 ENCOUNTER — CLINICAL SUPPORT (OUTPATIENT)
Dept: FAMILY MEDICINE CLINIC | Facility: CLINIC | Age: 79
End: 2024-07-09
Payer: COMMERCIAL

## 2024-07-09 DIAGNOSIS — E53.8 VITAMIN B12 DEFICIENCY: Primary | ICD-10-CM

## 2024-07-09 PROCEDURE — 96372 THER/PROPH/DIAG INJ SC/IM: CPT

## 2024-07-09 RX ADMIN — CYANOCOBALAMIN 1000 MCG: 1000 INJECTION, SOLUTION INTRAMUSCULAR; SUBCUTANEOUS at 16:09

## 2024-08-06 ENCOUNTER — CLINICAL SUPPORT (OUTPATIENT)
Dept: FAMILY MEDICINE CLINIC | Facility: CLINIC | Age: 79
End: 2024-08-06
Payer: COMMERCIAL

## 2024-08-06 DIAGNOSIS — E53.8 VITAMIN B12 DEFICIENCY: Primary | ICD-10-CM

## 2024-08-06 PROCEDURE — 96372 THER/PROPH/DIAG INJ SC/IM: CPT

## 2024-08-06 RX ADMIN — CYANOCOBALAMIN 1000 MCG: 1000 INJECTION, SOLUTION INTRAMUSCULAR; SUBCUTANEOUS at 13:37

## 2024-08-06 NOTE — PROGRESS NOTES
Melissa Nguyen is in the office today to receive b12 injection/vaccine. Pt handled the procedure well with no complaints and was able to leave the office in no distress.

## 2024-08-25 ENCOUNTER — RA CDI HCC (OUTPATIENT)
Dept: OTHER | Facility: HOSPITAL | Age: 79
End: 2024-08-25

## 2024-08-25 DIAGNOSIS — J30.1 ALLERGIC RHINITIS DUE TO POLLEN, UNSPECIFIED SEASONALITY: ICD-10-CM

## 2024-08-26 RX ORDER — MONTELUKAST SODIUM 10 MG/1
10 TABLET ORAL DAILY
Qty: 90 TABLET | Refills: 1 | Status: SHIPPED | OUTPATIENT
Start: 2024-08-26

## 2024-09-03 ENCOUNTER — CLINICAL SUPPORT (OUTPATIENT)
Dept: FAMILY MEDICINE CLINIC | Facility: CLINIC | Age: 79
End: 2024-09-03
Payer: COMMERCIAL

## 2024-09-03 DIAGNOSIS — E53.8 VITAMIN B12 DEFICIENCY: Primary | ICD-10-CM

## 2024-09-03 PROCEDURE — 96372 THER/PROPH/DIAG INJ SC/IM: CPT

## 2024-09-03 RX ADMIN — CYANOCOBALAMIN 1000 MCG: 1000 INJECTION, SOLUTION INTRAMUSCULAR; SUBCUTANEOUS at 13:34

## 2024-09-20 ENCOUNTER — HOSPITAL ENCOUNTER (OUTPATIENT)
Dept: RADIOLOGY | Facility: HOSPITAL | Age: 79
Discharge: HOME | End: 2024-09-20
Payer: COMMERCIAL

## 2024-09-20 ENCOUNTER — OFFICE VISIT (OUTPATIENT)
Dept: FAMILY MEDICINE CLINIC | Facility: CLINIC | Age: 79
End: 2024-09-20
Payer: COMMERCIAL

## 2024-09-20 VITALS
DIASTOLIC BLOOD PRESSURE: 90 MMHG | SYSTOLIC BLOOD PRESSURE: 126 MMHG | BODY MASS INDEX: 32.79 KG/M2 | OXYGEN SATURATION: 97 % | HEART RATE: 76 BPM | RESPIRATION RATE: 16 BRPM | HEIGHT: 60 IN | WEIGHT: 167 LBS

## 2024-09-20 DIAGNOSIS — M79.675 PAIN OF TOE OF LEFT FOOT: Primary | ICD-10-CM

## 2024-09-20 DIAGNOSIS — M79.675 PAIN OF TOE OF LEFT FOOT: ICD-10-CM

## 2024-09-20 DIAGNOSIS — J30.9 ALLERGIC RHINITIS, UNSPECIFIED SEASONALITY, UNSPECIFIED TRIGGER: ICD-10-CM

## 2024-09-20 PROCEDURE — G2211 COMPLEX E/M VISIT ADD ON: HCPCS | Performed by: NURSE PRACTITIONER

## 2024-09-20 PROCEDURE — 99214 OFFICE O/P EST MOD 30 MIN: CPT | Performed by: NURSE PRACTITIONER

## 2024-09-20 PROCEDURE — 73630 X-RAY EXAM OF FOOT: CPT

## 2024-09-20 NOTE — ASSESSMENT & PLAN NOTE
X-ray of the left foot was ordered to assess for any acute fractures or other osseous abnormalities in the affected area.  In the meantime, the patient was advised to follow RICE and to use Tylenol and ibuprofen as needed for her pain.  Orders:    XR foot 3+ vw left; Future

## 2024-09-20 NOTE — PROGRESS NOTES
Ambulatory Visit  Name: Melissa Nguyen      : 1945      MRN: [id number]  Encounter Provider: JOSEF Gee  Encounter Date: 2024   Encounter department: [facility]    Assessment & Plan  Pain of toe of left foot  X-ray of the left foot was ordered to assess for any acute fractures or other osseous abnormalities in the affected area.  In the meantime, the patient was advised to follow RICE and to use Tylenol and ibuprofen as needed for her pain.  Orders:    XR foot 3+ vw left; Future    Allergic rhinitis, unspecified seasonality, unspecified trigger  Well-controlled on current regimen.           Depression Screening and Follow-up Plan: Patient was screened for depression during today's encounter. They screened negative with a PHQ-2 score of 0.    Tobacco Cessation Counseling: Tobacco cessation counseling was provided. The patient is sincerely urged to quit consumption of tobacco. She is not ready to quit tobacco.       History of Present Illness     Toe injury: Patient reports that she hit her left fifth toe off of an object 2 days ago.  She reports that since that time she has noted pain with movement of the toe and bruising around the base of the toe.    Allergic rhinitis: Well-controlled with daily use of Singulair.          Review of Systems   Constitutional:  Negative for chills and fever.   HENT:  Negative for ear pain and sore throat.    Eyes:  Negative for pain and visual disturbance.   Respiratory:  Negative for cough, chest tightness, shortness of breath and wheezing.    Cardiovascular:  Negative for chest pain, palpitations and leg swelling.   Gastrointestinal:  Negative for abdominal pain, constipation, diarrhea, nausea and vomiting.   Endocrine: Negative for cold intolerance and heat intolerance.   Genitourinary:  Negative for decreased urine volume, dysuria and hematuria.   Musculoskeletal:  Positive for arthralgias (left fifth toe). Negative for back pain  and myalgias.   Skin:  Negative for color change and rash.   Allergic/Immunologic: Negative for environmental allergies.   Neurological:  Negative for dizziness, seizures, syncope, weakness, light-headedness, numbness and headaches.   Hematological:  Negative for adenopathy.   Psychiatric/Behavioral:  Negative for confusion. The patient is not nervous/anxious.    All other systems reviewed and are negative.          Objective     /90 (BP Location: Right arm, Patient Position: Sitting, Cuff Size: Large)   Pulse 76   Resp 16   Ht 5' (1.524 m)   Wt 75.8 kg (167 lb)   LMP  (LMP Unknown)   SpO2 97%   BMI 32.61 kg/m²     Physical Exam  Vitals and nursing note reviewed.   Constitutional:       General: She is not in acute distress.     Appearance: Normal appearance. She is well-developed. She is not ill-appearing.   HENT:      Head: Normocephalic.   Eyes:      Conjunctiva/sclera: Conjunctivae normal.   Cardiovascular:      Rate and Rhythm: Normal rate and regular rhythm.      Pulses: Normal pulses.           Carotid pulses are 2+ on the right side and 2+ on the left side.       Radial pulses are 2+ on the right side and 2+ on the left side.        Dorsalis pedis pulses are 2+ on the right side and 2+ on the left side.        Posterior tibial pulses are 2+ on the right side and 2+ on the left side.      Heart sounds: Normal heart sounds. No murmur heard.  Pulmonary:      Effort: Pulmonary effort is normal. No respiratory distress.      Breath sounds: Normal breath sounds. No decreased breath sounds, wheezing, rhonchi or rales.   Abdominal:      General: Abdomen is flat. Bowel sounds are normal. There is no distension.      Palpations: Abdomen is soft.      Tenderness: There is no abdominal tenderness. There is no guarding.   Musculoskeletal:         General: No swelling. Normal range of motion.      Cervical back: Normal range of motion and neck supple.      Right lower leg: No edema.      Left lower leg: No  edema.        Feet:    Skin:     General: Skin is warm and dry.      Capillary Refill: Capillary refill takes less than 2 seconds.   Neurological:      General: No focal deficit present.      Mental Status: She is alert and oriented to person, place, and time.   Psychiatric:         Mood and Affect: Mood normal.         Behavior: Behavior normal.         Thought Content: Thought content normal.         Judgment: Judgment normal.

## 2024-09-24 ENCOUNTER — OFFICE VISIT (OUTPATIENT)
Dept: FAMILY MEDICINE CLINIC | Facility: CLINIC | Age: 79
End: 2024-09-24
Payer: COMMERCIAL

## 2024-09-24 VITALS
HEART RATE: 72 BPM | WEIGHT: 167.4 LBS | OXYGEN SATURATION: 94 % | SYSTOLIC BLOOD PRESSURE: 124 MMHG | DIASTOLIC BLOOD PRESSURE: 72 MMHG | BODY MASS INDEX: 32.86 KG/M2 | TEMPERATURE: 97.6 F | HEIGHT: 60 IN

## 2024-09-24 DIAGNOSIS — J44.9 CHRONIC OBSTRUCTIVE PULMONARY DISEASE, UNSPECIFIED COPD TYPE (HCC): ICD-10-CM

## 2024-09-24 DIAGNOSIS — E78.00 PURE HYPERCHOLESTEROLEMIA: Primary | ICD-10-CM

## 2024-09-24 DIAGNOSIS — E11.9 CONTROLLED TYPE 2 DIABETES MELLITUS WITHOUT COMPLICATION, WITHOUT LONG-TERM CURRENT USE OF INSULIN (HCC): ICD-10-CM

## 2024-09-24 DIAGNOSIS — Z72.0 TOBACCO ABUSE: ICD-10-CM

## 2024-09-24 DIAGNOSIS — M35.9 UNDIFFERENTIATED CONNECTIVE TISSUE DISEASE (HCC): ICD-10-CM

## 2024-09-24 DIAGNOSIS — Z00.00 MEDICARE ANNUAL WELLNESS VISIT, SUBSEQUENT: ICD-10-CM

## 2024-09-24 DIAGNOSIS — F17.210 SMOKING GREATER THAN 20 PACK YEARS: ICD-10-CM

## 2024-09-24 PROCEDURE — G0439 PPPS, SUBSEQ VISIT: HCPCS | Performed by: FAMILY MEDICINE

## 2024-09-24 PROCEDURE — 99214 OFFICE O/P EST MOD 30 MIN: CPT | Performed by: FAMILY MEDICINE

## 2024-09-24 RX ORDER — TIOTROPIUM BROMIDE INHALATION SPRAY 3.12 UG/1
2 SPRAY, METERED RESPIRATORY (INHALATION) DAILY
Qty: 16 G | Refills: 3 | Status: SHIPPED | OUTPATIENT
Start: 2024-09-24

## 2024-09-24 NOTE — ASSESSMENT & PLAN NOTE
Patient remains on rosuvastatin 5 mg daily.  Lipids are up-to-date.  Continue current regimen.

## 2024-09-24 NOTE — PROGRESS NOTES
"Ambulatory Visit  Name: Melissa Nguyen      : 1945      MRN: [id number]  Encounter Provider: Zayra Jessica MD  Encounter Date: 2024   Encounter department: [facility]    Mrs. Nguyen is a pleasant 78-year-old female who presents today to review chronic medical conditions.  She is also due for an AWV.  Labs are up-to-date.  Immunizatoins are up-to-date.    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 It took her a year to complete. \"[employer].\"  Assessment & Plan  Pure hypercholesterolemia  Patient remains on rosuvastatin 5 mg daily.  Lipids are up-to-date.  Continue current regimen.       Undifferentiated connective tissue disease (HCC)  Patient will follow-up with Dr. Flanagan.  She states that at the present time, she is not interested in trying any new meds.  She understands her limitations in regards to her energy and pain levels and is able to live life accordingly.       Controlled type 2 diabetes mellitus without complication, without long-term current use of insulin (HCC)  Labs are stable.  Continue metformin.  Check urine microalbumin    Lab Results   Component Value Date    HGBA1C 5.9 2024       Orders:    Microalbumin, Random Urine (W/Creatinine)    Chronic obstructive pulmonary disease, unspecified COPD type (HCC)  Stable.  Resistant to smoking cessation.  Continue current regimen.  Orders:    Spiriva Respimat 2.5 MCG/ACT AERS inhaler; Inhale 2 puffs daily 3 inhalers    Smoking greater than 20 pack years  CT lung cancer screening ordered.  Patient signed waiver but unable to sign the order today.  Will check with her insurance carrier       Tobacco abuse  See above       Medicare annual wellness visit, subsequent  Immunizations reviewed today with patient.  Labs are up-to-date.  Advanced directives are completed.        RTO 4 months.    Preventive health issues were discussed with patient, and age appropriate screening tests were " ordered as noted in patient's After Visit Summary. Personalized health advice and appropriate referrals for health education or preventive services given if needed, as noted in patient's After Visit Summary.    History of Present Illness   Chief Complaint   Patient presents with    Medicare Wellness Visit     Subsequent   Recently seen by Dl for toe issue and had xrays     Diabetes    Medication Refill     On pending            Patient Care Team:  Zayra Jessica MD as PCP - General (Family Medicine)  Arpit Mazariegos MD    Review of Systems   Constitutional:         See HPI   HENT:  Negative for congestion, ear pain, mouth sores, sinus pressure and trouble swallowing.    Eyes:  Negative for discharge, redness and itching.   Respiratory:  Negative for apnea, cough, chest tightness, shortness of breath, wheezing and stridor.    Cardiovascular:  Negative for chest pain, palpitations and leg swelling.   Gastrointestinal:  Negative for abdominal distention, abdominal pain, blood in stool, constipation, diarrhea, nausea and vomiting.   Endocrine: Negative for cold intolerance and heat intolerance.   Genitourinary:  Negative for difficulty urinating, dysuria, flank pain and urgency.   Musculoskeletal:  Negative for arthralgias and myalgias.        Intermittent hip and back pain.   Skin:  Negative for rash.   Neurological:  Negative for dizziness, seizures, syncope, speech difficulty, weakness, light-headedness, numbness and headaches.   Hematological:  Negative for adenopathy.   Psychiatric/Behavioral:  Negative for agitation, behavioral problems, confusion and sleep disturbance. The patient is not nervous/anxious.      Medical History Reviewed by provider this encounter:       Annual Wellness Visit Questionnaire   Melissa is here for her Subsequent Wellness visit.     Health Risk Assessment:   Patient rates overall health as good. Patient feels that their physical health rating is same. Patient is satisfied with their life.  Eyesight was rated as slightly worse. Hearing was rated as same. Patient feels that their emotional and mental health rating is same. Patients states they are never, rarely angry. Patient states they are never, rarely unusually tired/fatigued. Pain experienced in the last 7 days has been some. Patient states that she has experienced no weight loss or gain in last 6 months.     Depression Screening:   PHQ-2 Score: 0      Fall Risk Screening:   In the past year, patient has experienced: no history of falling in past year      Urinary Incontinence Screening:   Patient has not leaked urine accidently in the last six months.     Home Safety:  Patient does not have trouble with stairs inside or outside of their home. Patient has working smoke alarms and has no working carbon monoxide detector. Home safety hazards include: none.     Nutrition:   Current diet is Regular.     Medications:   Patient is currently taking over-the-counter supplements. OTC medications include: see medication list. Patient is able to manage medications.     Activities of Daily Living (ADLs)/Instrumental Activities of Daily Living (IADLs):   Walk and transfer into and out of bed and chair?: Yes  Dress and groom yourself?: Yes    Bathe or shower yourself?: Yes    Feed yourself? Yes  Do your laundry/housekeeping?: Yes  Manage your money, pay your bills and track your expenses?: Yes  Make your own meals?: Yes    Do your own shopping?: Yes    Previous Hospitalizations:   Any hospitalizations or ED visits within the last 12 months?: No      Advance Care Planning:   Living will: Yes    Durable POA for healthcare: Yes    Advanced directive: Yes    Advanced directive counseling given: Yes      Cognitive Screening:   Provider or family/friend/caregiver concerned regarding cognition?: No    PREVENTIVE SCREENINGS      Cardiovascular Screening:    General: Screening Not Indicated and History Lipid Disorder      Diabetes Screening:     General: Screening Not  Indicated and History Diabetes      Colorectal Cancer Screening:     General: Screening Current      Breast Cancer Screening:     General: Screening Current      Cervical Cancer Screening:    General: Screening Not Indicated      Osteoporosis Screening:    General: Screening Not Indicated and History Osteoporosis      Abdominal Aortic Aneurysm (AAA) Screening:        General: Screening Not Indicated      Lung Cancer Screening:     General: Screening Not Indicated    Due for: Low Dose CT (LDCT)      Hepatitis C Screening:    General: Screening Not Indicated and History Hepatitis C      Preventive Screening Comments: Tried to order low dose CT but would not accept order.  Pt will call to check with her insurance.  She is within the requirements for testing.    Screening, Brief Intervention, and Referral to Treatment (SBIRT)    Screening  Typical number of drinks in a day: 0  Typical number of drinks in a week: 0  Interpretation: Low risk drinking behavior.    Single Item Drug Screening:  How often have you used an illegal drug (including marijuana) or a prescription medication for non-medical reasons in the past year? never    Single Item Drug Screen Score: 0  Interpretation: Negative screen for possible drug use disorder    Social Determinants of Health     Financial Resource Strain: Low Risk  (7/19/2023)    Overall Financial Resource Strain (CARDIA)     Difficulty of Paying Living Expenses: Not hard at all   Food Insecurity: No Food Insecurity (9/24/2024)    Hunger Vital Sign     Worried About Running Out of Food in the Last Year: Never true     Ran Out of Food in the Last Year: Never true   Transportation Needs: No Transportation Needs (9/24/2024)    PRAPARE - Transportation     Lack of Transportation (Medical): No     Lack of Transportation (Non-Medical): No   Housing Stability: Low Risk  (9/24/2024)    Housing Stability Vital Sign     Unable to Pay for Housing in the Last Year: No     Number of Times Moved in the  Last Year: 0     Homeless in the Last Year: No   Utilities: Not At Risk (9/24/2024)    [facility] Utilities     Threatened with loss of utilities: No     No results found.    Objective     /72 (BP Location: Right arm, Patient Position: Sitting, Cuff Size: Standard)   Pulse 72   Temp 97.6 °F (36.4 °C) (Temporal)   Ht 5' (1.524 m)   Wt 75.9 kg (167 lb 6.4 oz)   LMP  (LMP Unknown)   SpO2 94% Comment: nailpolish on lowest was 91  BMI 32.69 kg/m²     Physical Exam  Vitals and nursing note reviewed.   Constitutional:       General: She is not in acute distress.     Appearance: Normal appearance. She is well-developed. She is not ill-appearing, toxic-appearing or diaphoretic.   Eyes:      General: No scleral icterus.  Neck:      Vascular: No carotid bruit.   Cardiovascular:      Rate and Rhythm: Normal rate and regular rhythm.      Heart sounds: Normal heart sounds. No murmur heard.     No friction rub. No gallop.   Pulmonary:      Effort: Pulmonary effort is normal. No respiratory distress.      Breath sounds: Normal breath sounds. No stridor. No wheezing, rhonchi or rales.   Chest:      Chest wall: No tenderness.   Musculoskeletal:         General: Normal range of motion.      Cervical back: Normal range of motion and neck supple.      Right lower leg: No edema.      Left lower leg: No edema.   Lymphadenopathy:      Cervical: No cervical adenopathy.   Skin:     Coloration: Skin is not jaundiced.   Neurological:      General: No focal deficit present.      Mental Status: She is alert and oriented to person, place, and time.   Psychiatric:         Mood and Affect: Mood normal.         Behavior: Behavior normal.         Thought Content: Thought content normal.         Judgment: Judgment normal.

## 2024-09-24 NOTE — ASSESSMENT & PLAN NOTE
Patient will follow-up with Dr. Flanagan.  She states that at the present time, she is not interested in trying any new meds.  She understands her limitations in regards to her energy and pain levels and is able to live life accordingly.

## 2024-09-24 NOTE — ASSESSMENT & PLAN NOTE
Stable.  Resistant to smoking cessation.  Continue current regimen.  Orders:    Spiriva Respimat 2.5 MCG/ACT AERS inhaler; Inhale 2 puffs daily 3 inhalers

## 2024-09-24 NOTE — ASSESSMENT & PLAN NOTE
Labs are stable.  Continue metformin.  Check urine microalbumin    Lab Results   Component Value Date    HGBA1C 5.9 05/14/2024       Orders:    Microalbumin, Random Urine (W/Creatinine)

## 2024-09-25 ENCOUNTER — TELEPHONE (OUTPATIENT)
Age: 79
End: 2024-09-25

## 2024-09-25 DIAGNOSIS — Z12.2 SCREENING FOR LUNG CANCER: ICD-10-CM

## 2024-09-25 DIAGNOSIS — Z72.0 TOBACCO ABUSE: ICD-10-CM

## 2024-09-25 DIAGNOSIS — F17.210 SMOKING GREATER THAN 20 PACK YEARS: Primary | ICD-10-CM

## 2024-09-25 NOTE — TELEPHONE ENCOUNTER
Jaylan with [facility] called stating that the patient had reached out stating she was unable to get a CT lung cancer screening and she's not sure as to why . Per jaylan, she should be able to receive this testing . States she will need prior auth but if patient would like to have testing done, she would be able to  . Please advise .

## 2024-09-25 NOTE — TELEPHONE ENCOUNTER
Patient calling back to speak with Sasha at [facility]. Warm transferred to Sasha who took over call to assist patient.  Patient thanks us for our help!

## 2024-09-30 DIAGNOSIS — J45.20 MILD INTERMITTENT REACTIVE AIRWAY DISEASE WITHOUT COMPLICATION: ICD-10-CM

## 2024-09-30 RX ORDER — ALBUTEROL SULFATE 90 UG/1
1-2 INHALANT RESPIRATORY (INHALATION) AS NEEDED
Qty: 18 G | Refills: 1 | Status: SHIPPED | OUTPATIENT
Start: 2024-09-30

## 2024-10-01 ENCOUNTER — OFFICE VISIT (OUTPATIENT)
Dept: RHEUMATOLOGY | Facility: CLINIC | Age: 79
End: 2024-10-01
Payer: COMMERCIAL

## 2024-10-01 VITALS
HEIGHT: 60 IN | DIASTOLIC BLOOD PRESSURE: 70 MMHG | OXYGEN SATURATION: 93 % | BODY MASS INDEX: 32.98 KG/M2 | HEART RATE: 82 BPM | WEIGHT: 168 LBS | SYSTOLIC BLOOD PRESSURE: 128 MMHG

## 2024-10-01 DIAGNOSIS — J44.9 CHRONIC OBSTRUCTIVE PULMONARY DISEASE, UNSPECIFIED COPD TYPE (HCC): ICD-10-CM

## 2024-10-01 DIAGNOSIS — M06.9 RHEUMATOID ARTHRITIS, INVOLVING UNSPECIFIED SITE, UNSPECIFIED WHETHER RHEUMATOID FACTOR PRESENT (HCC): Primary | ICD-10-CM

## 2024-10-01 DIAGNOSIS — M35.9 UNDIFFERENTIATED CONNECTIVE TISSUE DISEASE (HCC): ICD-10-CM

## 2024-10-01 DIAGNOSIS — M81.0 AGE-RELATED OSTEOPOROSIS WITHOUT CURRENT PATHOLOGICAL FRACTURE: ICD-10-CM

## 2024-10-01 DIAGNOSIS — Z79.899 HIGH RISK MEDICATION USE: ICD-10-CM

## 2024-10-01 DIAGNOSIS — M62.81 MUSCLE WEAKNESS: ICD-10-CM

## 2024-10-01 DIAGNOSIS — M88.9 PAGET DISEASE OF BONE: ICD-10-CM

## 2024-10-01 PROCEDURE — G2211 COMPLEX E/M VISIT ADD ON: HCPCS | Performed by: INTERNAL MEDICINE

## 2024-10-01 PROCEDURE — 99215 OFFICE O/P EST HI 40 MIN: CPT | Performed by: INTERNAL MEDICINE

## 2024-10-01 RX ORDER — TIOTROPIUM BROMIDE INHALATION SPRAY 3.12 UG/1
2 SPRAY, METERED RESPIRATORY (INHALATION) DAILY
Qty: 48 G | Refills: 1 | Status: SHIPPED | OUTPATIENT
Start: 2024-10-01

## 2024-10-01 RX ORDER — AZATHIOPRINE 50 MG/1
50 TABLET ORAL DAILY
Qty: 30 TABLET | Refills: 6 | Status: SHIPPED | OUTPATIENT
Start: 2024-10-01

## 2024-10-01 NOTE — TELEPHONE ENCOUNTER
Prescription called in on 9/24/24 was sent for 30 day supply Patient needs 90 day supply for insurance to cover not a duplicate please resend     Reason for call:   [x] Refill   [] Prior Auth  [] Other:     Office:   [x] PCP/Provider - Zayra Jessica  [] Specialty/Provider -     Medication: Spiriva Respimat Inhaler    Dose/Frequency: 2.5 mcg 2 inhalations Daily     Quantity: 48    Pharmacy: [facility] [place] [facility]    Does the patient have enough for 3 days?   [] Yes   [x] No - Send as HP to POD

## 2024-10-01 NOTE — PATIENT INSTRUCTIONS
Continue daily Vit. D 4,000 units  Consider daily Evista (raloxifene)  Start azathioprine half tab daily, then 1 tab daily  Do labs  Do SI joint x-rays  Do back exercises    Return to clinic in 6 months

## 2024-10-01 NOTE — PROGRESS NOTES
Assessment and Plan:   Melissa Nguyen is a 78 y.o.  female who presents for follow up of RA and osteoporosis. Last visit in march 2024. Pt reports new weakness in proximal muscle and distal UE and LE. Left SIJ pain after heavy lifting and left foot ijury after injury which is improving now. Her RA is progressing without any treatment, had side effects to every medication we've tried; will try azathioprine. Patient's rheumatologic disease(s) threaten long-term function if not appropriately treated.    # RA diagnosed about 3 years, CRISTHIAN 1: 320, CCP+  Today pt c/o of her chronic pain in hips and b/l SIJ (L>R) which is worst than before after she had long drive trip   Pt is taking Tylenol and applying heat to manage the pain   Pt is allergic to multiple medications  Pt had injection previously but doesn't want it today  Pt continue smoking 1/4 PPD, cessation encouraged  Plan:  - Proceed with blood work  - Xray of the SIJ   - C/w tylenol and heat application  - Start Azathioprine 1/2 tab daily in settings of h/o multiple allergies and anaphylaxis    # Osteoporosis  Last DXA scan from 9/2023 showed L femoral neck T score of -2.9  Pt didn't start treatment as she wasn't cleared by her dentist and still planning some dental work to be done   Pt is taking Vit D 4 000 daily   Pt states that he PCP dc Calcium supplements about a year ago  Plan:   - Fall precaution  - C/w Vitamin D  - Repeat DXA scan in 2024  - possibility of Evista (Raloxifene) was discussed with the pt, she expressed wish to discuss with her PCP    Plan:  Diagnoses and all orders for this visit:    Rheumatoid arthritis, involving unspecified site, unspecified whether rheumatoid factor present (HCC)  -     CBC and differential; Future  -     Comprehensive metabolic panel; Future  -     Sedimentation rate, automated; Future  -     C-reactive protein; Future  -     Cancel: XR sacroiliac joints 3+ views; Future  -     azaTHIOprine (IMURAN) 50 mg tablet; Take 1  tablet (50 mg total) by mouth daily  -     CBC and differential  -     Comprehensive metabolic panel  -     Sedimentation rate, automated  -     C-reactive protein    Muscle weakness  -     azaTHIOprine (IMURAN) 50 mg tablet; Take 1 tablet (50 mg total) by mouth daily    Undifferentiated connective tissue disease (HCC)  -     azaTHIOprine (IMURAN) 50 mg tablet; Take 1 tablet (50 mg total) by mouth daily    Age-related osteoporosis without current pathological fracture    High risk medication use    High risk medication use - Benefits and risks of azathioprine use, including but not limited to gastrointestinal disturbances such as nausea, diarrhea, fatigue, leukopenia, and hepatotoxicity were discussed with the patient.  TPMT enzyme activity was checked.  CBC, CMP will be monitored regularly.    Follow-up plan: RTC in 6 months        Rheumatic Disease Summary  9/22/23: Patient presents for follow-up of Rheumatoid arthritis mostly manifesting as muscle weakness. Seems to have reactions to whichever medication I put her on, usually shortness of breath; so far, she has tried and had adverse effects to hydroxychloroquine, sulfasalazine, and leflunomide. Is willing to take a drug holiday for now.     Rheumatoid arthritis  I will order an inflammatory marker test and muscle enzyme.  Educates patient on medication hiatus and emphasized that her muscle weakness might recur.  Discussed option of other medications which do not cause shortness of breath.  I discussed azathioprine as the next option if her joint/muscle symptoms worsen.     Frozen right shoulder  I ordered a repeat shoulder x-ray.   Patient does not wish to pursue PT at this time     Skin rash  Prescribed clobetasol cream for her elbows rashes as needed.     Do labs now  Do right shoulder x-rays  Stay off DMARD medications  Schedule DEXA scan  Get dental clearance for Evenity or Prolia when see dentist in 12/2023  Continue daily Vit. D  Try clobetasol cream for  elbow rashes as needed    Chief Complaint  Left SIJ and hips pain       HPI  Melissa Nguyen is a 78 y.o.  female who presents for RA follow up.   Pt states that today her chronic pain is little worst than before after long driving trip.    - Reports left SIJ pain after heavy lifting heavy boxes at home.  Pain improving with Tylenol and heat.    - A week before pt hurt her toes, went to ER for imaging which was negative for fracture.    - reports chronic pain in SIJ and hips, no sciatica    - c/o weakness in proximal muscle and distal UE and LE. Left SIJ pain after heavy lifting and left foot ijury after injury which is improving now.    -pt currently is smoking 1/4 PPD      The following portions of the patient's history were reviewed and updated as appropriate: allergies, current medications, past family history, past medical history, past social history, past surgical history and problem list.    Review of Systems:   Review of Systems   Constitutional:  Negative for chills and fever.   HENT:  Negative for ear pain and sore throat.    Eyes:  Negative for pain and visual disturbance.   Respiratory:  Positive for shortness of breath. Negative for cough.    Cardiovascular:  Negative for chest pain and palpitations.   Gastrointestinal:  Negative for abdominal pain and vomiting.        Intermittent diarrhea and constipation due to IBS   Genitourinary:  Negative for dysuria and hematuria.   Musculoskeletal:  Positive for arthralgias. Negative for back pain.   Skin:  Negative for color change and rash.   Neurological:  Negative for seizures and syncope.   All other systems reviewed and are negative.    Reviewed and agree.    Home Medications:    Current Outpatient Medications:     azaTHIOprine (IMURAN) 50 mg tablet, Take 1 tablet (50 mg total) by mouth daily, Disp: 30 tablet, Rfl: 6    acetaminophen (TYLENOL) 325 mg tablet, Take 2 tablets (650 mg total) by mouth every 6 (six) hours as needed for mild pain, Disp: 30  tablet, Rfl: 0    albuterol (PROVENTIL HFA,VENTOLIN HFA) 90 mcg/act inhaler, INHALE 1-2 PUFFS AS NEEDED FOR WHEEZING, Disp: 18 g, Rfl: 1    Biotin 12721 MCG TABS, Take by mouth, Disp: , Rfl:     budesonide-formoterol (Symbicort) 160-4.5 mcg/act inhaler, Inhale 2 puffs 2 (two) times a day, Disp: 30.6 g, Rfl: 3    cholecalciferol (VITAMIN D3) 1,000 units tablet, Take 4000u daily, Disp: 100 tablet, Rfl: 3    clotrimazole-betamethasone (LOTRISONE) 1-0.05 % cream, Apply topically 2 (two) times a day Apply sparingly to affected area BID for 10 days, Disp: 45 g, Rfl: 0    Digestive Enzyme CAPS, Take by mouth, Disp: , Rfl:     lansoprazole (PREVACID) 30 mg capsule, Take 1 capsule (30 mg total) by mouth daily 1 tab po BID, Disp: 90 capsule, Rfl: 3    metFORMIN (GLUCOPHAGE) 500 mg tablet, TAKE 1 TABLET BY MOUTH EVERY DAY WITH BREAKFAST, Disp: 90 tablet, Rfl: 1    montelukast (SINGULAIR) 10 mg tablet, TAKE 1 TABLET BY MOUTH EVERY DAY, Disp: 90 tablet, Rfl: 1    Pepcid 40 MG tablet, Take by mouth daily, Disp: , Rfl:     polyethylene glycol (GLYCOLAX) 17 GM/SCOOP powder, Take 17 g by mouth as needed, Disp: , Rfl:     Probiotic Product (PROBIOTIC-10) CAPS, Take by mouth, Disp: , Rfl:     rosuvastatin (CRESTOR) 5 mg tablet, TAKE 1 TABLET BY MOUTH EVERY OTHER DAY, Disp: 45 tablet, Rfl: 1    Spiriva Respimat 2.5 MCG/ACT AERS inhaler, Inhale 2 puffs daily 3 inhalers, Disp: 48 g, Rfl: 1    Current Facility-Administered Medications:     cyanocobalamin injection 1,000 mcg, 1,000 mcg, Intramuscular, Q30 Days, , 1,000 mcg at 08/06/24 1337    cyanocobalamin injection 1,000 mcg, 1,000 mcg, Intramuscular, Q30 Days, , 1,000 mcg at 09/03/24 1334    Objective:    Vitals:    10/01/24 1032   BP: 128/70   Pulse: 82   SpO2: 93%   Weight: 76.2 kg (168 lb)   Height: 5' (1.524 m)       Physical Exam  Constitutional:       Appearance: Normal appearance. She is not ill-appearing.   HENT:      Head: Normocephalic and atraumatic.      Nose: Nose normal.    Eyes:      Conjunctiva/sclera: Conjunctivae normal.   Cardiovascular:      Rate and Rhythm: Normal rate and regular rhythm.      Pulses: Normal pulses.      Heart sounds: Normal heart sounds.   Pulmonary:      Effort: Pulmonary effort is normal. No respiratory distress.      Breath sounds: No wheezing or rales.   Abdominal:      General: Abdomen is flat. Bowel sounds are normal. There is no distension.      Palpations: Abdomen is soft.      Tenderness: There is no abdominal tenderness. There is no guarding.   Musculoskeletal:      Cervical back: Normal range of motion.      Right lower leg: No edema.      Left lower leg: No edema.      Comments: Tenderness in the left SIJ point   Skin:     General: Skin is warm and dry.   Neurological:      General: No focal deficit present.      Mental Status: She is alert.   Psychiatric:         Mood and Affect: Mood normal.         Behavior: Behavior normal.         Thought Content: Thought content normal.         Judgment: Judgment normal.       Reviewed labs and imaging.    Imaging:   XR foot 3+ vw left    Result Date: 9/20/2024  Narrative: LEFT FOOT INDICATION:   Pain in left toe(s). COMPARISON: 4/20/2020 VIEWS:  XR FOOT 3+ VW LEFT Images: 3 FINDINGS: There is no acute fracture or dislocation. No significant degenerative changes. No lytic or blastic osseous lesion. Soft tissues are unremarkable.     Impression: No acute osseous abnormality. Electronically signed: 09/20/2024 01:16 PM Missael Rivera MPH,MD      Labs:   Office Visit on 10/01/2024   Component Date Value Ref Range Status    White Blood Cell Count 10/03/2024 6.8  3.8 - 10.8 Thousand/uL Final    Red Blood Cell Count 10/03/2024 4.69  3.80 - 5.10 Million/uL Final    Hemoglobin 10/03/2024 13.1  11.7 - 15.5 g/dL Final    HCT 10/03/2024 40.1  35.0 - 45.0 % Final    MCV 10/03/2024 85.5  80.0 - 100.0 fL Final    MCH 10/03/2024 27.9  27.0 - 33.0 pg Final    MCHC 10/03/2024 32.7  32.0 - 36.0 g/dL Final    Comment: For  adults, a slight decrease in the calculated MCHC  value (in the range of 30 to 32 g/dL) is most likely  not clinically significant; however, it should be  interpreted with caution in correlation with other  red cell parameters and the patient's clinical  condition.      RDW 10/03/2024 14.4  11.0 - 15.0 % Final    Platelet Count 10/03/2024 235  140 - 400 Thousand/uL Final    SL AMB MPV 10/03/2024 10.9  7.5 - 12.5 fL Final    Neutrophils (Absolute) 10/03/2024 4,583  1,500 - 7,800 cells/uL Final    Lymphocytes (Absolute) 10/03/2024 1,625  850 - 3,900 cells/uL Final    Monocytes (Absolute) 10/03/2024 401  200 - 950 cells/uL Final    Eosinophils (Absolute) 10/03/2024 150  15 - 500 cells/uL Final    Basophils ABS 10/03/2024 41  0 - 200 cells/uL Final    Neutrophils 10/03/2024 67.4  % Final    Lymphocytes 10/03/2024 23.9  % Final    Monocytes 10/03/2024 5.9  % Final    Eosinophils 10/03/2024 2.2  % Final    Basophils PCT 10/03/2024 0.6  % Final    Glucose, Random 10/03/2024 108 (H)  65 - 99 mg/dL Final    Comment:               Fasting reference interval     For someone without known diabetes, a glucose value  between 100 and 125 mg/dL is consistent with  prediabetes and should be confirmed with a  follow-up test.         BUN 10/03/2024 19  7 - 25 mg/dL Final    Creatinine 10/03/2024 0.66  0.60 - 1.00 mg/dL Final    eGFR 10/03/2024 90  > OR = 60 mL/min/1.73m2 Final    SL AMB BUN/CREATININE RATIO 10/03/2024 SEE NOTE:  6 - 22 (calc) Final    Comment:    Not Reported: BUN and Creatinine are within     reference range.            Sodium 10/03/2024 138  135 - 146 mmol/L Final    Potassium 10/03/2024 4.0  3.5 - 5.3 mmol/L Final    Chloride 10/03/2024 104  98 - 110 mmol/L Final    CO2 10/03/2024 26  20 - 32 mmol/L Final    Calcium 10/03/2024 9.2  8.6 - 10.4 mg/dL Final    Protein, Total 10/03/2024 7.2  6.1 - 8.1 g/dL Final    Albumin 10/03/2024 4.4  3.6 - 5.1 g/dL Final    Globulin 10/03/2024 2.8  1.9 - 3.7 g/dL (calc) Final     Albumin/Globulin Ratio 10/03/2024 1.6  1.0 - 2.5 (calc) Final    TOTAL BILIRUBIN 10/03/2024 0.5  0.2 - 1.2 mg/dL Final    Alkaline Phosphatase 10/03/2024 112  37 - 153 U/L Final    AST 10/03/2024 13  10 - 35 U/L Final    ALT 10/03/2024 11  6 - 29 U/L Final    Sedimentation Rate 10/03/2024 22  < OR = 30 mm/h Final    C-Reactive Protein, Quant 10/03/2024 <3.0  <8.0 mg/L Final   Office Visit on 09/24/2024   Component Date Value Ref Range Status    Creatinine, Urine 10/03/2024 50  20 - 275 mg/dL Final    Albumin,U,Random 10/03/2024 0.4  See Note: mg/dL Final    Comment: Reference Range:    Reference Range  Not established      Microalb/Creat Ratio 10/03/2024 8  <30 mg/g creat Final    Comment:    The ADA defines abnormalities in albumin  excretion as follows:     Albuminuria Category        Result (mg/g creatinine)     Normal to Mildly increased   <30  Moderately increased            Severely increased           > OR = 300     The ADA recommends that at least two of three  specimens collected within a 3-6 month period be  abnormal before considering a patient to be  within a diagnostic category.     Office Visit on 05/14/2024   Component Date Value Ref Range Status    Hemoglobin A1C 05/14/2024 5.9  6.5 Final

## 2024-10-03 ENCOUNTER — HOSPITAL ENCOUNTER (OUTPATIENT)
Dept: RADIOLOGY | Facility: HOSPITAL | Age: 79
Discharge: HOME | End: 2024-10-03
Payer: COMMERCIAL

## 2024-10-03 DIAGNOSIS — M06.9 RHEUMATOID ARTHRITIS, INVOLVING UNSPECIFIED SITE, UNSPECIFIED WHETHER RHEUMATOID FACTOR PRESENT (HCC): ICD-10-CM

## 2024-10-03 PROCEDURE — 72200 X-RAY EXAM SI JOINTS: CPT

## 2024-10-04 LAB
ALBUMIN SERPL-MCNC: 4.4 G/DL (ref 3.6–5.1)
ALBUMIN/CREAT UR: 8 MG/G CREAT
ALBUMIN/GLOB SERPL: 1.6 (CALC) (ref 1–2.5)
ALP SERPL-CCNC: 112 U/L (ref 37–153)
ALT SERPL-CCNC: 11 U/L (ref 6–29)
AST SERPL-CCNC: 13 U/L (ref 10–35)
BASOPHILS # BLD AUTO: 41 CELLS/UL (ref 0–200)
BASOPHILS NFR BLD AUTO: 0.6 %
BILIRUB SERPL-MCNC: 0.5 MG/DL (ref 0.2–1.2)
BUN SERPL-MCNC: 19 MG/DL (ref 7–25)
BUN/CREAT SERPL: ABNORMAL (CALC) (ref 6–22)
CALCIUM SERPL-MCNC: 9.2 MG/DL (ref 8.6–10.4)
CHLORIDE SERPL-SCNC: 104 MMOL/L (ref 98–110)
CO2 SERPL-SCNC: 26 MMOL/L (ref 20–32)
CREAT SERPL-MCNC: 0.66 MG/DL (ref 0.6–1)
CREAT UR-MCNC: 50 MG/DL (ref 20–275)
CRP SERPL-MCNC: <3 MG/L
EOSINOPHIL # BLD AUTO: 150 CELLS/UL (ref 15–500)
EOSINOPHIL NFR BLD AUTO: 2.2 %
ERYTHROCYTE [DISTWIDTH] IN BLOOD BY AUTOMATED COUNT: 14.4 % (ref 11–15)
ERYTHROCYTE [SEDIMENTATION RATE] IN BLOOD BY WESTERGREN METHOD: 22 MM/H
GFR/BSA.PRED SERPLBLD CYS-BASED-ARV: 90 ML/MIN/1.73M2
GLOBULIN SER CALC-MCNC: 2.8 G/DL (CALC) (ref 1.9–3.7)
GLUCOSE SERPL-MCNC: 108 MG/DL (ref 65–99)
HCT VFR BLD AUTO: 40.1 % (ref 35–45)
HGB BLD-MCNC: 13.1 G/DL (ref 11.7–15.5)
LYMPHOCYTES # BLD AUTO: 1625 CELLS/UL (ref 850–3900)
LYMPHOCYTES NFR BLD AUTO: 23.9 %
MCH RBC QN AUTO: 27.9 PG (ref 27–33)
MCHC RBC AUTO-ENTMCNC: 32.7 G/DL (ref 32–36)
MCV RBC AUTO: 85.5 FL (ref 80–100)
MICROALBUMIN UR-MCNC: 0.4 MG/DL
MONOCYTES # BLD AUTO: 401 CELLS/UL (ref 200–950)
MONOCYTES NFR BLD AUTO: 5.9 %
NEUTROPHILS # BLD AUTO: 4583 CELLS/UL (ref 1500–7800)
NEUTROPHILS NFR BLD AUTO: 67.4 %
PLATELET # BLD AUTO: 235 THOUSAND/UL (ref 140–400)
PMV BLD REES-ECKER: 10.9 FL (ref 7.5–12.5)
POTASSIUM SERPL-SCNC: 4 MMOL/L (ref 3.5–5.3)
PROT SERPL-MCNC: 7.2 G/DL (ref 6.1–8.1)
RBC # BLD AUTO: 4.69 MILLION/UL (ref 3.8–5.1)
SODIUM SERPL-SCNC: 138 MMOL/L (ref 135–146)
WBC # BLD AUTO: 6.8 THOUSAND/UL (ref 3.8–10.8)

## 2024-10-08 ENCOUNTER — CLINICAL SUPPORT (OUTPATIENT)
Dept: FAMILY MEDICINE CLINIC | Facility: CLINIC | Age: 79
End: 2024-10-08
Payer: COMMERCIAL

## 2024-10-08 DIAGNOSIS — Z23 NEED FOR INFLUENZA VACCINATION: Primary | ICD-10-CM

## 2024-10-08 PROCEDURE — G0008 ADMIN INFLUENZA VIRUS VAC: HCPCS

## 2024-10-08 PROCEDURE — 96372 THER/PROPH/DIAG INJ SC/IM: CPT

## 2024-10-08 PROCEDURE — 90656 IIV3 VACC NO PRSV 0.5 ML IM: CPT

## 2024-10-08 RX ADMIN — CYANOCOBALAMIN 1000 MCG: 1000 INJECTION, SOLUTION INTRAMUSCULAR; SUBCUTANEOUS at 14:05

## 2024-10-15 ENCOUNTER — HOSPITAL ENCOUNTER (OUTPATIENT)
Dept: NUCLEAR MEDICINE | Facility: HOSPITAL | Age: 79
Discharge: HOME | End: 2024-10-15
Attending: INTERNAL MEDICINE
Payer: COMMERCIAL

## 2024-10-15 DIAGNOSIS — M88.9 PAGET DISEASE OF BONE: ICD-10-CM

## 2024-10-15 PROCEDURE — A9503 TC99M MEDRONATE: HCPCS

## 2024-10-15 PROCEDURE — 78306 BONE IMAGING WHOLE BODY: CPT

## 2024-10-24 DIAGNOSIS — M06.9 RHEUMATOID ARTHRITIS, INVOLVING UNSPECIFIED SITE, UNSPECIFIED WHETHER RHEUMATOID FACTOR PRESENT (HCC): ICD-10-CM

## 2024-10-24 DIAGNOSIS — M35.9 UNDIFFERENTIATED CONNECTIVE TISSUE DISEASE (HCC): ICD-10-CM

## 2024-10-24 DIAGNOSIS — M62.81 MUSCLE WEAKNESS: ICD-10-CM

## 2024-10-24 RX ORDER — AZATHIOPRINE 50 MG/1
50 TABLET ORAL DAILY
Qty: 90 TABLET | Refills: 3 | Status: SHIPPED | OUTPATIENT
Start: 2024-10-24

## 2024-10-30 ENCOUNTER — RA CDI HCC (OUTPATIENT)
Dept: OTHER | Facility: HOSPITAL | Age: 79
End: 2024-10-30

## 2024-11-04 ENCOUNTER — HOSPITAL ENCOUNTER (OUTPATIENT)
Dept: MAMMOGRAPHY | Facility: MEDICAL CENTER | Age: 79
Discharge: HOME | End: 2024-11-04
Payer: COMMERCIAL

## 2024-11-04 VITALS — WEIGHT: 167.99 LBS | BODY MASS INDEX: 32.98 KG/M2 | HEIGHT: 60 IN

## 2024-11-04 DIAGNOSIS — Z12.31 ENCOUNTER FOR SCREENING MAMMOGRAM FOR MALIGNANT NEOPLASM OF BREAST: ICD-10-CM

## 2024-11-04 PROCEDURE — 77063 BREAST TOMOSYNTHESIS BI: CPT

## 2024-11-04 PROCEDURE — 77067 SCR MAMMO BI INCL CAD: CPT

## 2024-11-05 ENCOUNTER — CLINICAL SUPPORT (OUTPATIENT)
Dept: FAMILY MEDICINE CLINIC | Facility: CLINIC | Age: 79
End: 2024-11-05
Payer: COMMERCIAL

## 2024-11-05 DIAGNOSIS — E53.8 VITAMIN B12 DEFICIENCY: ICD-10-CM

## 2024-11-05 DIAGNOSIS — E55.9 VITAMIN D DEFICIENCY: Primary | ICD-10-CM

## 2024-11-05 PROCEDURE — 96372 THER/PROPH/DIAG INJ SC/IM: CPT

## 2024-11-05 RX ADMIN — CYANOCOBALAMIN 1000 MCG: 1000 INJECTION, SOLUTION INTRAMUSCULAR; SUBCUTANEOUS at 13:46

## 2024-11-08 DIAGNOSIS — E78.1 PURE HYPERTRIGLYCERIDEMIA: ICD-10-CM

## 2024-11-08 RX ORDER — ROSUVASTATIN CALCIUM 5 MG/1
TABLET, COATED ORAL
Qty: 45 TABLET | Refills: 0 | Status: SHIPPED | OUTPATIENT
Start: 2024-11-08

## 2024-11-08 NOTE — TELEPHONE ENCOUNTER
Reason for call:   [x] Refill   [] Prior Auth  [] Other:     Office:   [x] PCP/Provider -   [] Specialty/Provider -     Medication: rosuvastatin (CRESTOR) 5 mg TAKE 1 TABLET BY MOUTH EVERY OTHER DAY       Pharmacy: [facility]     Does the patient have enough for 3 days?   [x] Yes   [] No - Send as HP to POD

## 2024-11-11 DIAGNOSIS — E78.00 PURE HYPERCHOLESTEROLEMIA: Primary | ICD-10-CM

## 2024-11-25 ENCOUNTER — RA CDI HCC (OUTPATIENT)
Dept: OTHER | Facility: HOSPITAL | Age: 79
End: 2024-11-25

## 2024-11-25 DIAGNOSIS — M88.9 PAGET'S DISEASE OF BONE: ICD-10-CM

## 2024-11-25 DIAGNOSIS — M81.8 OTHER OSTEOPOROSIS WITHOUT CURRENT PATHOLOGICAL FRACTURE: Primary | ICD-10-CM

## 2024-11-29 DIAGNOSIS — E11.9 CONTROLLED TYPE 2 DIABETES MELLITUS WITHOUT COMPLICATION, WITHOUT LONG-TERM CURRENT USE OF INSULIN (HCC): ICD-10-CM

## 2024-12-03 ENCOUNTER — CLINICAL SUPPORT (OUTPATIENT)
Dept: FAMILY MEDICINE CLINIC | Facility: CLINIC | Age: 79
End: 2024-12-03
Payer: COMMERCIAL

## 2024-12-03 DIAGNOSIS — E53.8 B12 DEFICIENCY: Primary | ICD-10-CM

## 2024-12-03 PROCEDURE — 96372 THER/PROPH/DIAG INJ SC/IM: CPT

## 2024-12-03 RX ADMIN — CYANOCOBALAMIN 1000 MCG: 1000 INJECTION, SOLUTION INTRAMUSCULAR; SUBCUTANEOUS at 13:13

## 2024-12-26 ENCOUNTER — OFFICE VISIT (OUTPATIENT)
Dept: FAMILY MEDICINE CLINIC | Facility: CLINIC | Age: 79
End: 2024-12-26
Payer: COMMERCIAL

## 2024-12-26 VITALS
HEART RATE: 72 BPM | RESPIRATION RATE: 18 BRPM | DIASTOLIC BLOOD PRESSURE: 80 MMHG | WEIGHT: 167 LBS | HEIGHT: 60 IN | OXYGEN SATURATION: 96 % | BODY MASS INDEX: 32.79 KG/M2 | SYSTOLIC BLOOD PRESSURE: 132 MMHG

## 2024-12-26 DIAGNOSIS — J43.8 OTHER EMPHYSEMA (HCC): ICD-10-CM

## 2024-12-26 DIAGNOSIS — Z72.0 TOBACCO ABUSE: ICD-10-CM

## 2024-12-26 DIAGNOSIS — S02.5XXB OPEN FRACTURE OF TOOTH, INITIAL ENCOUNTER: ICD-10-CM

## 2024-12-26 DIAGNOSIS — K14.9 TONGUE IRRITATION: Primary | ICD-10-CM

## 2024-12-26 PROBLEM — S02.5XXA TOOTH FRACTURE: Status: ACTIVE | Noted: 2024-12-26

## 2024-12-26 PROCEDURE — G2211 COMPLEX E/M VISIT ADD ON: HCPCS | Performed by: FAMILY MEDICINE

## 2024-12-26 PROCEDURE — 99214 OFFICE O/P EST MOD 30 MIN: CPT | Performed by: FAMILY MEDICINE

## 2024-12-26 RX ORDER — DIPHENHYDRAMINE HYDROCHLORIDE AND LIDOCAINE HYDROCHLORIDE AND ALUMINUM HYDROXIDE AND MAGNESIUM HYDRO
10 KIT EVERY 4 HOURS PRN
Qty: 119 ML | Refills: 0 | Status: SHIPPED | OUTPATIENT
Start: 2024-12-26

## 2024-12-26 NOTE — PATIENT INSTRUCTIONS
1. Tongue irritation  Assessment & Plan:  Irritation on the posterior aspect of the tongue, on the left side.  Looks like it would be from where the fractured tooth would be irritating her tongue.  Recommend follow-up with dental as scheduled.  Can see them sooner if there is continued problems.  In the meantime, can use Magic mouthwash prior to meals.  Orders:    diphenhydramine, lidocaine, Al/Mg hydroxide, simethicone (Magic Mouthwash) SUSP; Swish and spit 10 mL every 4 (four) hours as needed for mouth pain or discomfort    Ambulatory Referral to Otolaryngology; Future    Orders:  -     diphenhydramine, lidocaine, Al/Mg hydroxide, simethicone (Magic Mouthwash) SUSP; Swish and spit 10 mL every 4 (four) hours as needed for mouth pain or discomfort  -     Ambulatory Referral to Otolaryngology; Future  2. Tobacco abuse  Assessment & Plan:  Does smoke 1 PPD.  Understands risks, recommend quit.       3. Other emphysema (HCC)  Assessment & Plan:  Stable for now.  Would recommend quit smoking.         4. Open fracture of tooth, initial encounter  Assessment & Plan:  Follow with Dental. Should have them look at tongue as well.    Orders:    Ambulatory Referral to Otolaryngology; Future    Orders:  -     Ambulatory Referral to Otolaryngology; Future      COVID 19 Instructions    Melissa Nguyen was advised to limit contact with others to essential tasks such as getting food, medications, and medical care.    Proper handwashing reviewed, and Hand sanitzer when washing is not available.    If the patient develops symptoms of COVID 19, the patient should call the office as soon as possible.    It is strongly recommended that Flu Vaccinations be obtained.      Virtual Visits:  Yaneth: This works on smart phones (any phone with Internet browsing capability).  You should get a text message when the provider is ready to see you.  Click on the link in the text message, and the call should start.  You will need to type in your name,  and allow camera and microphone access.  This is HIPPA compliant, and secure.      If you have not already done so, get immunized to COVID 19.      We are committed to getting you vaccinated as soon as possible and will be closely following [facility] guidelines as they are released and revised.  Please refer to our COVID-19 vaccine webpage for the most up to date information on the vaccine and our distribution efforts.    This site will also have the most up to date recommendations for COVID booster vaccine.    https://[email or web address].org/covid-19/protect-yourself/covid-[email or web address]-vaccine    Call [phone] ([phone]), option 7    You can also visit https://www.vaccines.gov/ to find vaccines in your area.    OUR LOCATION:    formerly [facility]  [address] [place] [address], [address]  [place], [place], [address] [phone]  Fax: [phone]    Lab services, Rheumatology, and OB/GYN are at this location as well.

## 2024-12-26 NOTE — ASSESSMENT & PLAN NOTE
Irritation on the posterior aspect of the tongue, on the left side.  Looks like it would be from where the fractured tooth would be irritating her tongue.  Recommend follow-up with dental as scheduled.  Can see them sooner if there is continued problems.  In the meantime, can use Magic mouthwash prior to meals.  Orders:  •  diphenhydramine, lidocaine, Al/Mg hydroxide, simethicone (Magic Mouthwash) SUSP; Swish and spit 10 mL every 4 (four) hours as needed for mouth pain or discomfort  •  Ambulatory Referral to Otolaryngology; Future

## 2024-12-26 NOTE — ASSESSMENT & PLAN NOTE
Follow with Dental. Should have them look at tongue as well.    Orders:  •  Ambulatory Referral to Otolaryngology; Future

## 2024-12-26 NOTE — PROGRESS NOTES
Name: Melissa Nguyen      : 1945      MRN: [id number]  Encounter Provider: Jude Zee MD  Encounter Date: 2024   Encounter department: [facility]  :  Assessment & Plan  Tongue irritation  Irritation on the posterior aspect of the tongue, on the left side.  Looks like it would be from where the fractured tooth would be irritating her tongue.  Recommend follow-up with dental as scheduled.  Can see them sooner if there is continued problems.  In the meantime, can use Magic mouthwash prior to meals.  Orders:  •  diphenhydramine, lidocaine, Al/Mg hydroxide, simethicone (Magic Mouthwash) SUSP; Swish and spit 10 mL every 4 (four) hours as needed for mouth pain or discomfort  •  Ambulatory Referral to Otolaryngology; Future    Tobacco abuse  Does smoke 1 PPD.  Understands risks, recommend quit.       Other emphysema (HCC)  Stable for now.  Would recommend quit smoking.         Open fracture of tooth, initial encounter  Follow with Dental. Should have them look at tongue as well.    Orders:  •  Ambulatory Referral to Otolaryngology; Future           History of Present Illness     Velia patient does have irritation under the tongue lately.  She would like to see what that is.  There is a yellow streak that she noted.  This is relatively new for her.  She does have a broken molar, and is seeing dental for that.  Does have planned extraction on 15 January.          Review of Systems   Constitutional: Negative.    HENT:          Tongue irritation       Objective   /80 (BP Location: Right arm, Patient Position: Sitting, Cuff Size: Standard)   Pulse 72   Resp 18   Ht 5' (1.524 m)   Wt 75.8 kg (167 lb)   LMP  (LMP Unknown)   SpO2 96%   BMI 32.61 kg/m²      Physical Exam  Vitals and nursing note reviewed.   Constitutional:       Appearance: Normal appearance.   HENT:      Mouth/Throat:     Neurological:      Mental Status: She is alert.

## 2024-12-27 ENCOUNTER — CONSULT (OUTPATIENT)
Dept: ENDOCRINOLOGY | Facility: CLINIC | Age: 79
End: 2024-12-27
Payer: COMMERCIAL

## 2024-12-27 ENCOUNTER — TELEPHONE (OUTPATIENT)
Age: 79
End: 2024-12-27

## 2024-12-27 VITALS
WEIGHT: 165.8 LBS | HEIGHT: 60 IN | SYSTOLIC BLOOD PRESSURE: 130 MMHG | DIASTOLIC BLOOD PRESSURE: 90 MMHG | BODY MASS INDEX: 32.55 KG/M2 | HEART RATE: 68 BPM

## 2024-12-27 DIAGNOSIS — M81.8 OTHER OSTEOPOROSIS WITHOUT CURRENT PATHOLOGICAL FRACTURE: ICD-10-CM

## 2024-12-27 DIAGNOSIS — M88.9 PAGET'S DISEASE OF BONE: Primary | ICD-10-CM

## 2024-12-27 DIAGNOSIS — E11.9 CONTROLLED TYPE 2 DIABETES MELLITUS WITHOUT COMPLICATION, WITHOUT LONG-TERM CURRENT USE OF INSULIN (HCC): ICD-10-CM

## 2024-12-27 PROCEDURE — 99204 OFFICE O/P NEW MOD 45 MIN: CPT | Performed by: INTERNAL MEDICINE

## 2024-12-27 NOTE — ASSESSMENT & PLAN NOTE
Patient with severe osteoporosis and high risk of fracture given DEXA scan results.  She has multiple risk factors for osteoporosis, including age, postmenopausal status, family history of osteoporosis, history of rheumatoid arthritis with multiple courses of glucocorticoids over the years, PPI use and early menopause.  Given concurrent Paget's disease of the bone, recommend therapy with a bisphosphonate such as Reclast   Discussed with patient mechanism of action, side effects of bisphosphonates including, but not limited to infusion reactions, osteonecrosis of the jaw, atypical femoral fracture, etc.  Reviewed pathophysiology of osteoporosis, including risk for fracture and morbidty and mortality associated with fracture  Emphasized importance of regular calcium and vitamin D intake; Goal vitamin D >30 and calcium intake ~1200mg /day.  Weight bearing exercise as tolerated  Can continue follow-up with rheumatology for additional management.    Orders:    Ambulatory Referral to Endocrinology

## 2024-12-27 NOTE — TELEPHONE ENCOUNTER
PA for     diphenhydramine, lidocaine, Al/Mg hydroxide, simethicone (Magic Mouthwash) SUSP   DENIED    Reason:(Screenshot if applicable)      Message sent to office clinical pool Yes    Denial letter scanned into Media Yes    Appeal started No (Provider will need to decide if appeal is warranted and send clinical documentation to Prior Authorization Team for initiation.)    **Please follow up with your patient regarding denial and next steps**

## 2024-12-27 NOTE — PROGRESS NOTES
Melissa Nguyen 79 y.o. female MRN: [id number]    Encounter: [id number]      Assessment & Plan     :  Assessment & Plan  Paget's disease of bone  Noted to have Paget's disease of the bone on imaging in the setting of sacroiliac pain with x-ray demonstrating Paget's disease of the left hip, later confirmed by bone scan.  She reports ongoing sacroiliac pain and hip pain that has been present for years, unclear as to whether it is related to Paget's disease.  Discussed with patient the first-line treatment for this would be a bisphosphonate such as Reclast, which would also help with her osteoporosis.  Discussed mechanism of action, as well as side effects of this medication, all questions were answered.  Advised patient that she can continue following with rheumatology for this.  Follow-up with endocrinology as needed.  Orders:    Ambulatory Referral to Endocrinology    Other osteoporosis without current pathological fracture  Patient with severe osteoporosis and high risk of fracture given DEXA scan results.  She has multiple risk factors for osteoporosis, including age, postmenopausal status, family history of osteoporosis, history of rheumatoid arthritis with multiple courses of glucocorticoids over the years, PPI use and early menopause.  Given concurrent Paget's disease of the bone, recommend therapy with a bisphosphonate such as Reclast   Discussed with patient mechanism of action, side effects of bisphosphonates including, but not limited to infusion reactions, osteonecrosis of the jaw, atypical femoral fracture, etc.  Reviewed pathophysiology of osteoporosis, including risk for fracture and morbidty and mortality associated with fracture  Emphasized importance of regular calcium and vitamin D intake; Goal vitamin D >30 and calcium intake ~1200mg /day.  Weight bearing exercise as tolerated  Can continue follow-up with rheumatology for additional management.    Orders:    Ambulatory Referral to  Endocrinology    Controlled type 2 diabetes mellitus without complication, without long-term current use of insulin ([facility])    Lab Results   Component Value Date    HGBA1C 5.9 05/14/2024   Currently on metformin 500 mg daily  Managed by primary care provider.                CC:  osteoporosis    History of Present Illness      HPI:  Melissa Nguyen is a 79 y.o. female with a PMH of T2DM, osteoporosis, who is here is seen today in consult for evaluation of osteoporosis and Paget's disease of the bone.  Given left sacroiliac joint pain after heavy lifting, an x-ray of the sacroiliac joints was performed with findings consistent with Paget's disease of the bone.  This was confirmed by a bone scan performed on 10/15/2024.  In addition, patient has a history of osteoporosis as evidenced by multiple prior DEXA scans.  Follows up with Dr. Reynolds with [facility] rheumatology.  Reports that was previously told that she has to start Reclast, however has apprehensions about starting new medications and presents today for a second opinion.  She reports ongoing dental work, with tooth extraction and crown placement coming up next month.  Additionally was told that she has periodontal disease.  In terms of symptoms, she endorses back pain which has been present for many years, as well as bilateral hip pain, worse on the left.  No hearing loss.  Denies history of falls.  Endorses prior history of left arm fracture in 1986 in the setting of trauma.  Additionally has noted a 1 inch loss of adult height.  Endorses lactose intolerance, but no history of other malabsorptive conditions.  Denies history of kidney stones, hyperthyroidism.  States that everyone on her mother side has osteoporosis, as well as rheumatoid arthritis.  She has a history of rheumatoid arthritis for which she was recently prescribed azathioprine.  Reports that over the years has been on multiple prednisone courses.  Was recently started on prednisone taper again,  stating that she spent 6 months on prednisone 5 mg daily for possible polymyalgia.  Has also had multiple glucocorticoid joint injections through the years, and takes budesonide/formoterol daily for COPD.  Has been taking lansoprazole for the past 12 years.  Denies use of antiestrogen medications, anticonvulsants or lithium.  Diet is limited in dairy secondary to lactose intolerance, however she notes eating a lot of vegetables and fruits.  Tries to avoid too much meat.  In terms of exercise, she uses an exercise machine to walk/glide daily, additionally notes working outside all the time.  Takes 4000 units of vitamin D daily.  Previously took calcium carbonate, however this was discontinued by her PCP 2 years ago as it was causing her IBS to flare.  Reports a 60-pack-year smoking history, has alcohol rarely, usually on social occasions.  Menopause was at age 32, postsurgical in the setting of sepsis due to IUD eroding through uterine wall.  No HRT.        Review of Systems   Constitutional:  Negative for activity change, appetite change, fatigue and unexpected weight change.   HENT:  Negative for congestion, trouble swallowing and voice change.    Eyes:  Negative for visual disturbance.   Respiratory:  Negative for cough and shortness of breath.    Cardiovascular:  Negative for chest pain and palpitations.   Gastrointestinal:  Negative for abdominal pain, constipation, diarrhea, nausea and vomiting.   Endocrine: Negative for polydipsia and polyuria.   Genitourinary:  Negative for frequency.   Musculoskeletal:  Positive for arthralgias and back pain.   Skin:  Negative for rash.   Neurological:  Negative for dizziness, tremors, weakness, light-headedness and numbness.   Psychiatric/Behavioral:  Negative for dysphoric mood and sleep disturbance. The patient is not nervous/anxious.    All other systems reviewed and are negative.      Historical Information   Past Medical History:   Diagnosis Date    Arthritis     Back  pain     Diabetes mellitus (HCC)     Emphysema of lung (HCC)     GERD (gastroesophageal reflux disease)     History of Lyme disease 01/05/2023    We reviewed this today, and whether her symptoms may be related to a long, ongoing active Lyme infection.  Symptoms are somewhat vague for this.    Lyme disease     Osteoporosis     Polymyalgia rheumatica (HCC)     last assessed 2/15/17    Reactive airway disease     last assessed 6/9/16    Sciatica      Past Surgical History:   Procedure Laterality Date    BREAST BIOPSY Left     HYSTERECTOMY      1987, total    ORTHOPEDIC SURGERY       Social History   Social History     Substance and Sexual Activity   Alcohol Use Never     Social History     Substance and Sexual Activity   Drug Use No     Social History     Tobacco Use   Smoking Status Every Day    Current packs/day: 1.00    Types: Cigarettes   Smokeless Tobacco Never   Tobacco Comments    Little less than a pack      Family History:   Family History   Problem Relation Age of Onset    Thyroid cancer Mother 53    COPD Father         mild    Hypertension Father     No Known Problems Maternal Grandmother     Colon cancer Maternal Grandfather 90    No Known Problems Paternal Grandmother     No Known Problems Paternal Grandfather     Breast cancer Maternal Aunt 55    No Known Problems Maternal Aunt     No Known Problems Paternal Aunt        Meds/Allergies   Current Outpatient Medications   Medication Sig Dispense Refill    acetaminophen (TYLENOL) 325 mg tablet Take 2 tablets (650 mg total) by mouth every 6 (six) hours as needed for mild pain 30 tablet 0    albuterol (PROVENTIL HFA,VENTOLIN HFA) 90 mcg/act inhaler INHALE 1-2 PUFFS AS NEEDED FOR WHEEZING 18 g 1    azaTHIOprine (IMURAN) 50 mg tablet TAKE 1 TABLET BY MOUTH EVERY DAY 90 tablet 3    Biotin 55571 MCG TABS Take by mouth      budesonide-formoterol (Symbicort) 160-4.5 mcg/act inhaler Inhale 2 puffs 2 (two) times a day 30.6 g 3    cholecalciferol (VITAMIN D3) 1,000  units tablet Take 4000u daily 100 tablet 3    clotrimazole-betamethasone (LOTRISONE) 1-0.05 % cream Apply topically 2 (two) times a day Apply sparingly to affected area BID for 10 days 45 g 0    Digestive Enzyme CAPS Take by mouth      diphenhydramine, lidocaine, Al/Mg hydroxide, simethicone (Magic Mouthwash) SUSP Swish and spit 10 mL every 4 (four) hours as needed for mouth pain or discomfort 119 mL 0    lansoprazole (PREVACID) 30 mg capsule Take 1 capsule (30 mg total) by mouth daily 1 tab po BID 90 capsule 3    metFORMIN (GLUCOPHAGE) 500 mg tablet TAKE 1 TABLET BY MOUTH EVERY DAY WITH BREAKFAST 90 tablet 0    montelukast (SINGULAIR) 10 mg tablet TAKE 1 TABLET BY MOUTH EVERY DAY 90 tablet 1    Pepcid 40 MG tablet Take by mouth daily      polyethylene glycol (GLYCOLAX) 17 GM/SCOOP powder Take 17 g by mouth as needed      Probiotic Product (PROBIOTIC-10) CAPS Take by mouth      rosuvastatin (CRESTOR) 5 mg tablet TAKE 1 TABLET BY MOUTH EVERY OTHER DAY 45 tablet 0    Spiriva Respimat 2.5 MCG/ACT AERS inhaler Inhale 2 puffs daily 3 inhalers 48 g 1     Current Facility-Administered Medications   Medication Dose Route Frequency Provider Last Rate Last Admin    cyanocobalamin injection 1,000 mcg  1,000 mcg Intramuscular Q30 Days    1,000 mcg at 12/03/24 1313    cyanocobalamin injection 1,000 mcg  1,000 mcg Intramuscular Q30 Days    1,000 mcg at 11/05/24 1346     Allergies   Allergen Reactions    Gabapentin Palpitations, Dizziness and Edema    Hydroxychloroquine Edema, Blisters and Facial Swelling    Tomato - Food Allergy Swelling and Throat Swelling    Asa [Aspirin]     Celebrex [Celecoxib] Other (See Comments)     Flushing      Dairy Aid [Tilactase] Abdominal Pain, Diarrhea and GI Intolerance    Ibuprofen GI Intolerance    Nsaids GI Intolerance     To all    Percocet [Oxycodone-Acetaminophen] GI Intolerance    Leflunomide Palpitations     Short of breath and nausea.    Sulfasalazine Palpitations     Pt states SOB and  nausea as well.       Objective   Vitals: not currently breastfeeding.    Physical Exam  Vitals reviewed.   Constitutional:       General: She is not in acute distress.     Appearance: Normal appearance. She is normal weight. She is not ill-appearing, toxic-appearing or diaphoretic.   HENT:      Head: Normocephalic and atraumatic.      Nose: Nose normal.      Mouth/Throat:      Pharynx: Oropharynx is clear.   Eyes:      General: No scleral icterus.        Right eye: No discharge.         Left eye: No discharge.      Extraocular Movements: Extraocular movements intact.      Conjunctiva/sclera: Conjunctivae normal.   Cardiovascular:      Rate and Rhythm: Normal rate and regular rhythm.      Heart sounds: Normal heart sounds. No murmur heard.  Pulmonary:      Effort: Pulmonary effort is normal. No respiratory distress.      Breath sounds: No wheezing, rhonchi or rales.   Abdominal:      General: Bowel sounds are normal. There is no distension.      Palpations: Abdomen is soft.      Tenderness: There is no abdominal tenderness. There is no guarding or rebound.   Musculoskeletal:         General: Normal range of motion.      Cervical back: Normal range of motion and neck supple.   Skin:     General: Skin is warm and dry.      Coloration: Skin is not jaundiced or pale.   Neurological:      Mental Status: She is alert and oriented to person, place, and time. Mental status is at baseline.   Psychiatric:         Mood and Affect: Mood normal.         Behavior: Behavior normal.         Thought Content: Thought content normal.         Judgment: Judgment normal.         The history was obtained from the review of the chart, patient.    Lab Results:   Lab Results   Component Value Date/Time    Potassium 4.0 10/03/2024 09:53 AM    Potassium 4.3 03/27/2024 10:53 AM    Chloride 104 10/03/2024 09:53 AM    Chloride 104 03/27/2024 10:53 AM    CO2 26 10/03/2024 09:53 AM    CO2 26 03/27/2024 10:53 AM    BUN 19 10/03/2024 09:53 AM    BUN  20 03/27/2024 10:53 AM    Creatinine 0.66 10/03/2024 09:53 AM    Creatinine 0.73 03/27/2024 10:53 AM    Calcium 9.2 10/03/2024 09:53 AM    Calcium 9.5 03/27/2024 10:53 AM    eGFR 90 10/03/2024 09:53 AM    eGFR 84 03/27/2024 10:53 AM        Imaging Studies:         Results for orders placed during the hospital encounter of 09/29/23    DXA bone density spine hip and pelvis    Impression  1. Based on the WHO classification, this study identifies a diagnosis of osteoporosis, notable at the femoral neck and forearm areas and the patient is considered at  risk for fracture.    2. A daily intake of calcium of at least 1200 mg and vitamin D, 800-1000 IU, as well as weight bearing and muscle strengthening exercise, fall prevention and avoidance of tobacco and excessive alcohol intake as basic preventive measures are recommended.    3. Repeat DXA scan on the same equipment in 18-24 months as clinically indicated.      The 10 year risk of hip fracture is 49%, with the 10 year risk of major osteoporotic fracture being 56%, as calculated by the WHO fracture risk assessment tool (FRAX).  The current NOF guidelines recommend treating patients with FRAX 10 year risk score  of >3% for hip fracture and >20% for major osteoporotic fracture.    Fracture risks exceed treatment thresholds on the FRAX score.    WHO CLASSIFICATION:  Normal (a T-score of -1.0 or higher)  Low bone mineral density (a T-score of less than -1.0 but higher than -2.5)  Osteoporosis (a T-score of -2.5 or less)  Severe osteoporosis (a T-score of -2.5 or less with a fragility fracture)    Thank you for allowing us the opportunity to participate in your patient care.    If you desire to view the full report please contact [facility] medical records or access the PACS system          Workstation performed: [id number]             Results Review Statement: I reviewed radiology reports from this admission including: X-rays of the sacroiliac joints, whole-body bone scan,  "DEXA scan..    Portions of the record may have been created with voice recognition software. Occasional wrong word or \"sound a like\" substitutions may have occurred due to the inherent limitations of voice recognition software. Read the chart carefully and recognize, using context, where substitutions have occurred.        "

## 2024-12-27 NOTE — ASSESSMENT & PLAN NOTE
Lab Results   Component Value Date    HGBA1C 5.9 05/14/2024   Currently on metformin 500 mg daily  Managed by primary care provider.

## 2024-12-27 NOTE — TELEPHONE ENCOUNTER
PA for (Magic Mouthwash) SUBMITTED to [employer]    via    [x]Phone- [facility] 12/27/2024   Excluded from plan    [x]PA sent as URGENT    All office notes, labs and other pertaining documents and studies sent. Clinical questions answered. Awaiting determination from insurance company.     Turnaround time for your insurance to make a decision on your Prior Authorization can take 7-21 business days.

## 2024-12-27 NOTE — TELEPHONE ENCOUNTER
Patient magic mouthwash was denied by insurance and [facility] does NOT do compounded drugs. Patient did get OTC meds such as oragel medicated mouthwash and gel. Asking if this is okay or if an alternative medication can be sent to the pharmacy for her.

## 2024-12-27 NOTE — PATIENT INSTRUCTIONS
At this time recommend discussing further management for Paget's and osteoporosis with Dr. Reynolds.  You can follow up with endocrinology as needed.

## 2024-12-29 ENCOUNTER — APPOINTMENT (EMERGENCY)
Dept: RADIOLOGY | Facility: HOSPITAL | Age: 79
End: 2024-12-29
Payer: COMMERCIAL

## 2024-12-29 ENCOUNTER — HOSPITAL ENCOUNTER (EMERGENCY)
Facility: HOSPITAL | Age: 79
Discharge: HOME | End: 2024-12-29
Attending: EMERGENCY MEDICINE
Payer: COMMERCIAL

## 2024-12-29 VITALS
SYSTOLIC BLOOD PRESSURE: 113 MMHG | BODY MASS INDEX: 33.32 KG/M2 | OXYGEN SATURATION: 94 % | RESPIRATION RATE: 18 BRPM | HEART RATE: 70 BPM | DIASTOLIC BLOOD PRESSURE: 55 MMHG | TEMPERATURE: 98.4 F | WEIGHT: 173.06 LBS

## 2024-12-29 DIAGNOSIS — R25.1 SHAKINESS: Primary | ICD-10-CM

## 2024-12-29 DIAGNOSIS — R03.0 ELEVATED BLOOD PRESSURE READING: ICD-10-CM

## 2024-12-29 LAB
ALBUMIN SERPL BCG-MCNC: 4.2 G/DL (ref 3.5–5)
ALP SERPL-CCNC: 100 U/L (ref 34–104)
ALT SERPL W P-5'-P-CCNC: 7 U/L (ref 7–52)
ANION GAP SERPL CALCULATED.3IONS-SCNC: 11 MMOL/L (ref 4–13)
AST SERPL W P-5'-P-CCNC: 9 U/L (ref 13–39)
ATRIAL RATE: 69 BPM
BASOPHILS # BLD AUTO: 0.04 THOUSANDS/ΜL (ref 0–0.1)
BASOPHILS NFR BLD AUTO: 1 % (ref 0–1)
BILIRUB SERPL-MCNC: 0.32 MG/DL (ref 0.2–1)
BILIRUB UR QL STRIP: NEGATIVE
BUN SERPL-MCNC: 16 MG/DL (ref 5–25)
CALCIUM SERPL-MCNC: 9.1 MG/DL (ref 8.4–10.2)
CARDIAC TROPONIN I PNL SERPL HS: 3 NG/L (ref ?–50)
CHLORIDE SERPL-SCNC: 106 MMOL/L (ref 96–108)
CLARITY UR: CLEAR
CO2 SERPL-SCNC: 23 MMOL/L (ref 21–32)
COLOR UR: YELLOW
CREAT SERPL-MCNC: 0.67 MG/DL (ref 0.6–1.3)
EOSINOPHIL # BLD AUTO: 0.19 THOUSAND/ΜL (ref 0–0.61)
EOSINOPHIL NFR BLD AUTO: 2 % (ref 0–6)
ERYTHROCYTE [DISTWIDTH] IN BLOOD BY AUTOMATED COUNT: 14.6 % (ref 11.6–15.1)
FLUAV AG UPPER RESP QL IA.RAPID: NEGATIVE
FLUBV AG UPPER RESP QL IA.RAPID: NEGATIVE
GFR SERPL CREATININE-BSD FRML MDRD: 83 ML/MIN/1.73SQ M
GLUCOSE SERPL-MCNC: 137 MG/DL (ref 65–140)
GLUCOSE UR STRIP-MCNC: NEGATIVE MG/DL
HCT VFR BLD AUTO: 38.2 % (ref 34.8–46.1)
HGB BLD-MCNC: 12.4 G/DL (ref 11.5–15.4)
HGB UR QL STRIP.AUTO: NEGATIVE
IMM GRANULOCYTES # BLD AUTO: 0.02 THOUSAND/UL (ref 0–0.2)
IMM GRANULOCYTES NFR BLD AUTO: 0 % (ref 0–2)
KETONES UR STRIP-MCNC: NEGATIVE MG/DL
LEUKOCYTE ESTERASE UR QL STRIP: NEGATIVE
LYMPHOCYTES # BLD AUTO: 1.97 THOUSANDS/ΜL (ref 0.6–4.47)
LYMPHOCYTES NFR BLD AUTO: 24 % (ref 14–44)
MAGNESIUM SERPL-MCNC: 1.9 MG/DL (ref 1.9–2.7)
MCH RBC QN AUTO: 27.8 PG (ref 26.8–34.3)
MCHC RBC AUTO-ENTMCNC: 32.5 G/DL (ref 31.4–37.4)
MCV RBC AUTO: 86 FL (ref 82–98)
MONOCYTES # BLD AUTO: 0.43 THOUSAND/ΜL (ref 0.17–1.22)
MONOCYTES NFR BLD AUTO: 5 % (ref 4–12)
NEUTROPHILS # BLD AUTO: 5.48 THOUSANDS/ΜL (ref 1.85–7.62)
NEUTS SEG NFR BLD AUTO: 68 % (ref 43–75)
NITRITE UR QL STRIP: NEGATIVE
NRBC BLD AUTO-RTO: 0 /100 WBCS
P AXIS: 73 DEGREES
PH UR STRIP.AUTO: 6 [PH] (ref 4.5–8)
PLATELET # BLD AUTO: 212 THOUSANDS/UL (ref 149–390)
PMV BLD AUTO: 10.3 FL (ref 8.9–12.7)
POTASSIUM SERPL-SCNC: 3.7 MMOL/L (ref 3.5–5.3)
PR INTERVAL: 146 MS
PROT SERPL-MCNC: 6.9 G/DL (ref 6.4–8.4)
PROT UR STRIP-MCNC: NEGATIVE MG/DL
QRS AXIS: 83 DEGREES
QRSD INTERVAL: 74 MS
QT INTERVAL: 406 MS
QTC INTERVAL: 435 MS
RBC # BLD AUTO: 4.46 MILLION/UL (ref 3.81–5.12)
SARS-COV+SARS-COV-2 AG RESP QL IA.RAPID: NEGATIVE
SODIUM SERPL-SCNC: 140 MMOL/L (ref 135–147)
SP GR UR STRIP.AUTO: 1.01 (ref 1–1.03)
T WAVE AXIS: 78 DEGREES
UROBILINOGEN UR QL STRIP.AUTO: 0.2 E.U./DL
VENTRICULAR RATE: 69 BPM
WBC # BLD AUTO: 8.13 THOUSAND/UL (ref 4.31–10.16)

## 2024-12-29 PROCEDURE — 93010 ELECTROCARDIOGRAM REPORT: CPT | Performed by: STUDENT IN AN ORGANIZED HEALTH CARE EDUCATION/TRAINING PROGRAM

## 2024-12-29 PROCEDURE — 87811 SARS-COV-2 COVID19 W/OPTIC: CPT | Performed by: PHYSICIAN ASSISTANT

## 2024-12-29 PROCEDURE — 85025 COMPLETE CBC W/AUTO DIFF WBC: CPT | Performed by: PHYSICIAN ASSISTANT

## 2024-12-29 PROCEDURE — 80053 COMPREHEN METABOLIC PANEL: CPT | Performed by: PHYSICIAN ASSISTANT

## 2024-12-29 PROCEDURE — 83735 ASSAY OF MAGNESIUM: CPT | Performed by: PHYSICIAN ASSISTANT

## 2024-12-29 PROCEDURE — 93005 ELECTROCARDIOGRAM TRACING: CPT

## 2024-12-29 PROCEDURE — 87804 INFLUENZA ASSAY W/OPTIC: CPT | Performed by: PHYSICIAN ASSISTANT

## 2024-12-29 PROCEDURE — 99285 EMERGENCY DEPT VISIT HI MDM: CPT | Performed by: PHYSICIAN ASSISTANT

## 2024-12-29 PROCEDURE — 81003 URINALYSIS AUTO W/O SCOPE: CPT

## 2024-12-29 PROCEDURE — 71045 X-RAY EXAM CHEST 1 VIEW: CPT

## 2024-12-29 PROCEDURE — 84484 ASSAY OF TROPONIN QUANT: CPT | Performed by: PHYSICIAN ASSISTANT

## 2024-12-29 PROCEDURE — 99285 EMERGENCY DEPT VISIT HI MDM: CPT

## 2024-12-29 PROCEDURE — 36415 COLL VENOUS BLD VENIPUNCTURE: CPT | Performed by: PHYSICIAN ASSISTANT

## 2024-12-29 RX ORDER — ONDANSETRON 4 MG/1
4 TABLET, ORALLY DISINTEGRATING ORAL ONCE
Status: COMPLETED | OUTPATIENT
Start: 2024-12-29 | End: 2024-12-29

## 2024-12-29 RX ADMIN — ONDANSETRON 4 MG: 4 TABLET, ORALLY DISINTEGRATING ORAL at 04:42

## 2024-12-29 NOTE — ED PROVIDER NOTES
Time reflects when diagnosis was documented in both MDM as applicable and the Disposition within this note       Time User Action Codes Description Comment    12/29/2024  3:48 AM Elsie Valentine Add [R25.1] Shakiness     12/29/2024  3:48 AM Elsie Valentine Add [R03.0] Elevated blood pressure reading           ED Disposition       ED Disposition   Discharge    Condition   Stable    Date/Time   Sun Dec 29, 2024  3:48 AM    Comment   Melissa Nguyen discharge to home/self care.                   Assessment & Plan       Medical Decision Making      DDx including but not limited to: Viral illness, dehydration, metabolic abnormality, cardiac abnormality, adverse reaction, UTI, anemia    Patient presenting to ED for evaluation of generalized weakness, shakiness and an episode of dizziness earlier.  When asked to explain what dizziness meant for patient, she explained more lightheaded but this completely resolved.  She also checked her blood pressure at the time and her SBP was 169.  Patient became concerned as she does not have a history of high blood pressure.  She is not having any CP, SOB, LH, vomiting, diarrhea, URI symptoms.  She denies any hematuria, hematochezia or melena.  Will check CBC for evaluation of anemia infection, UA for evaluation urinary tract infection, COVID/flu for evaluation of viral cause of symptoms, CMP for evaluation of LFTs, kidney function electrolyte abnormalities, mag for completeness of electrolytes.  Will check troponin for evaluation of heart strain.  EKG for evaluation of ACS.  CXR for evaluation of acute cardiopulmonary abnormalities.    Symptom onset at around 2200 last evening.  Troponin of 3, EKG without evidence of ACS.  Labs without any actionable findings.  CXR without evidence of acute cardiopulmonary abnormalities.    Discussed findings with patient.  No acute labs or imaging findings to suggest cause of shakiness and weakness.  Patient reports having intermittent nausea and  would like something for nausea.  Will give Zofran prior to discharge.  Patient was able to ambulate with a cane throughout the hallway without any difficulty.  Patient was not unsteady or off balance.  Encourage patient to follow-up with family doctor for further evaluation as needed.    Prior to discharge, discharge instructions were discussed with patient at bedside. Patient was provided both verbal and written instructions. Patient is understanding of the discharge instructions and is agreeable to plan of care. Return precautions were discussed with patient bedside, patient verbalized understanding of signs and symptoms that would necessitate return to the ED. All questions were answered. Patient was comfortable with the plan of care and discharged to home.     Dispo: discharge home with follow up to PCP. Patient stable, in no acute distress and non-toxic at discharge.    Problems Addressed:  Elevated blood pressure reading: acute illness or injury  Shakiness: acute illness or injury    Amount and/or Complexity of Data Reviewed  Labs: ordered.  Radiology: ordered and independent interpretation performed.    Risk  Prescription drug management.             Medications   ondansetron (ZOFRAN-ODT) dispersible tablet 4 mg (4 mg Oral Given 12/29/24 0442)       ED Risk Strat Scores   HEART Risk Score      Flowsheet Row Most Recent Value   Heart Score Risk Calculator    History 0 Filed at: 12/29/2024 0320   ECG 0 Filed at: 12/29/2024 0320   Age 2 Filed at: 12/29/2024 0320   Risk Factors 1 Filed at: 12/29/2024 0320   Troponin 0 Filed at: 12/29/2024 0320   HEART Score 3 Filed at: 12/29/2024 0320          HEART Risk Score      Flowsheet Row Most Recent Value   Heart Score Risk Calculator    History 0 Filed at: 12/29/2024 0320   ECG 0 Filed at: 12/29/2024 0320   Age 2 Filed at: 12/29/2024 0320   Risk Factors 1 Filed at: 12/29/2024 0320   Troponin 0 Filed at: 12/29/2024 0320   HEART Score 3 Filed at: 12/29/2024 0320                             SBIRT 20yo+      Flowsheet Row Most Recent Value   Initial Alcohol Screen: US AUDIT-C     1. How often do you have a drink containing alcohol? 0 Filed at: 12/29/2024 0212   2. How many drinks containing alcohol do you have on a typical day you are drinking?  0 Filed at: 12/29/2024 0212   3a. Male UNDER 65: How often do you have five or more drinks on one occasion? 0 Filed at: 12/29/2024 0212   3b. FEMALE Any Age, or MALE 65+: How often do you have 4 or more drinks on one occassion? 0 Filed at: 12/29/2024 0212   Audit-C Score 0 Filed at: 12/29/2024 0212   CLEVE: How many times in the past year have you...    Used an illegal drug or used a prescription medication for non-medical reasons? Never Filed at: 12/29/2024 0212                            History of Present Illness       Chief Complaint   Patient presents with    Dizziness     Patient brought in by EMS d/t feeling dizzy last night. Patient states she is only feeling weak and shaky right now.       Past Medical History:   Diagnosis Date    Arthritis     Back pain     Diabetes mellitus (HCC)     Emphysema of lung (HCC)     GERD (gastroesophageal reflux disease)     History of Lyme disease 01/05/2023    We reviewed this today, and whether her symptoms may be related to a long, ongoing active Lyme infection.  Symptoms are somewhat vague for this.    Lyme disease     Osteoporosis     Polymyalgia rheumatica (HCC)     last assessed 2/15/17    Reactive airway disease     last assessed 6/9/16    Sciatica       Past Surgical History:   Procedure Laterality Date    BREAST BIOPSY Left     HYSTERECTOMY      1987, total    ORTHOPEDIC SURGERY        Family History   Problem Relation Age of Onset    Thyroid cancer Mother 53    COPD Father         mild    Hypertension Father     No Known Problems Maternal Grandmother     Colon cancer Maternal Grandfather 90    No Known Problems Paternal Grandmother     No Known Problems Paternal Grandfather     Breast  "cancer Maternal Aunt 55    No Known Problems Maternal Aunt     No Known Problems Paternal Aunt       Social History     Tobacco Use    Smoking status: Every Day     Current packs/day: 1.00     Types: Cigarettes    Smokeless tobacco: Never    Tobacco comments:     Little less than a pack    Vaping Use    Vaping status: Never Used   Substance Use Topics    Alcohol use: Never    Drug use: No      E-Cigarette/Vaping    E-Cigarette Use Never User       E-Cigarette/Vaping Substances      I have reviewed and agree with the history as documented.     This is a 79-year-old female BIBA for evaluation of \"dizziness\" around 2200 that resolved. Patient has a history of polymyalgia rheumatica, DM 2, GERD, Lyme disease, emphysema, arthritis, Paget's, IBS.  She currently reports feeling \"weak and shaky.\"  She also reports intermittent nausea.  She denies any CP or SOB.  When asked what she meant by dizziness she said it was more lightheadedness.  She states that the time she checked her blood pressure was concerned because it was elevated in the 160s.  She does not have a history of high blood pressure and does not take any medications for this.  She states in the evening hours she does get some weakness associated with her rheumatologic disorder.  She states this feels different however.  She states that she was in contact with somebody with a cold on [place] is unsure if she is developing a viral infection.  She denies any URI symptoms currently.      History provided by:  Patient   used: No        Review of Systems   Constitutional:  Negative for chills and fever.   Respiratory:  Negative for shortness of breath.    Cardiovascular:  Negative for chest pain.   Gastrointestinal:  Positive for nausea. Negative for abdominal pain and vomiting.   Neurological:  Positive for dizziness and weakness. Negative for headaches.   All other systems reviewed and are negative.          Objective       ED Triage Vitals "   Temperature Pulse Blood Pressure Respirations SpO2 Patient Position - Orthostatic VS   12/29/24 0237 12/29/24 0213 12/29/24 0213 12/29/24 0213 12/29/24 0213 12/29/24 0213   98.4 °F (36.9 °C) 76 169/74 18 97 % Sitting      Temp Source Heart Rate Source BP Location FiO2 (%) Pain Score    12/29/24 0237 12/29/24 0213 12/29/24 0213 -- --    Oral Monitor Right arm        Vitals      Date and Time Temp Pulse SpO2 Resp BP Pain Score FACES Pain Rating User   12/29/24 0430 -- 70 94 % 18 113/55 -- -- LB   12/29/24 0424 -- -- -- -- 113/55 -- -- CM   12/29/24 0237 98.4 °F (36.9 °C) -- -- -- -- -- -- MA   12/29/24 0213 -- 76 97 % 18 169/74 -- -- [facility]            Physical Exam  Vitals reviewed.   Constitutional:       General: She is not in acute distress.     Appearance: Normal appearance. She is well-developed and well-groomed. She is not ill-appearing, toxic-appearing or diaphoretic.   HENT:      Head: Normocephalic and atraumatic.      Right Ear: External ear normal.      Left Ear: External ear normal.      Nose: Nose normal. No congestion or rhinorrhea.      Mouth/Throat:      Mouth: Mucous membranes are moist.      Pharynx: Oropharynx is clear. No oropharyngeal exudate or posterior oropharyngeal erythema.   Eyes:      General: No scleral icterus.        Right eye: No discharge.         Left eye: No discharge.      Extraocular Movements: Extraocular movements intact.      Conjunctiva/sclera: Conjunctivae normal.   Cardiovascular:      Rate and Rhythm: Normal rate and regular rhythm.      Pulses: Normal pulses.      Heart sounds: No murmur heard.     No friction rub. No gallop.   Pulmonary:      Effort: Pulmonary effort is normal. No respiratory distress.      Breath sounds: Normal breath sounds. No wheezing, rhonchi or rales.   Abdominal:      General: Abdomen is flat. There is no distension.      Palpations: Abdomen is soft.      Tenderness: There is no abdominal tenderness. There is no right CVA tenderness, left CVA  tenderness, guarding or rebound.   Musculoskeletal:         General: No deformity. Normal range of motion.      Cervical back: Normal range of motion and neck supple.   Skin:     General: Skin is warm and dry.      Coloration: Skin is not jaundiced or pale.      Findings: No rash.   Neurological:      General: No focal deficit present.      Mental Status: She is alert and oriented to person, place, and time.      GCS: GCS eye subscore is 4. GCS verbal subscore is 5. GCS motor subscore is 6.      Cranial Nerves: Cranial nerves 2-12 are intact. No dysarthria or facial asymmetry.      Sensory: Sensation is intact.      Motor: Motor function is intact. No weakness.      Coordination: Coordination is intact.      Gait: Gait is intact.      Comments: Patient without neurologic deficit; CN II-XII grossly intact, EOMI, PERRLA, strength 5/5 in all extremities, sensation grossly intact. Patient a and o x 3. Coordination intact. Patient ambulatory with cane with a steady gait.   Psychiatric:         Mood and Affect: Mood normal.         Behavior: Behavior normal. Behavior is cooperative.         Results Reviewed       Procedure Component Value Units Date/Time    HS Troponin 0hr (reflex protocol) [[id number]]  (Normal) Collected: 12/29/24 0245    Lab Status: Final result Specimen: Blood from Arm, Left Updated: 12/29/24 0316     hs TnI 0hr 3 ng/L     Comprehensive metabolic panel [[id number]]  (Abnormal) Collected: 12/29/24 0245    Lab Status: Final result Specimen: Blood from Arm, Left Updated: 12/29/24 0310     Sodium 140 mmol/L      Potassium 3.7 mmol/L      Chloride 106 mmol/L      CO2 23 mmol/L      ANION GAP 11 mmol/L      BUN 16 mg/dL      Creatinine 0.67 mg/dL      Glucose 137 mg/dL      Calcium 9.1 mg/dL      AST 9 U/L      ALT 7 U/L      Alkaline Phosphatase 100 U/L      Total Protein 6.9 g/dL      Albumin 4.2 g/dL      Total Bilirubin 0.32 mg/dL      eGFR 83 ml/min/1.73sq m     Narrative:      National Kidney Disease  Foundation guidelines for Chronic Kidney Disease (CKD):     Stage 1 with normal or high GFR (GFR > 90 mL/min/1.73 square meters)    Stage 2 Mild CKD (GFR = 60-89 mL/min/1.73 square meters)    Stage 3A Moderate CKD (GFR = 45-59 mL/min/1.73 square meters)    Stage 3B Moderate CKD (GFR = 30-44 mL/min/1.73 square meters)    Stage 4 Severe CKD (GFR = 15-29 mL/min/1.73 square meters)    Stage 5 End Stage CKD (GFR <15 mL/min/1.73 square meters)  Note: GFR calculation is accurate only with a steady state creatinine    Magnesium [[id number]]  (Normal) Collected: 12/29/24 0245    Lab Status: Final result Specimen: Blood from Arm, Left Updated: 12/29/24 0310     Magnesium 1.9 mg/dL     FLU/COVID Rapid Antigen (30 min. TAT) - Preferred screening test in ED [[id number]]  (Normal) Collected: 12/29/24 0245    Lab Status: Final result Specimen: Nares from Nose Updated: 12/29/24 0307     SARS COV Rapid Antigen Negative     Influenza A Rapid Antigen Negative     Influenza B Rapid Antigen Negative    Narrative:      This test has been performed using the Quidel Sara 2 FLU+SARS Antigen test under the Emergency Use Authorization (EUA). This test has been validated by the  and verified by the performing laboratory. The Sara uses lateral flow immunofluorescent sandwich assay to detect SARS-COV, Influenza A and Influenza B Antigen.     The Quidel Sara 2 SARS Antigen test does not differentiate between SARS-CoV and SARS-CoV-2.     Negative results are presumptive and may be confirmed with a molecular assay, if necessary, for patient management. Negative results do not rule out SARS-CoV-2 or influenza infection and should not be used as the sole basis for treatment or patient management decisions. A negative test result may occur if the level of antigen in a sample is below the limit of detection of this test.     Positive results are indicative of the presence of viral antigens, but do not rule out bacterial infection or  co-infection with other viruses.     All test results should be used as an adjunct to clinical observations and other information available to the provider.    FOR PEDIATRIC PATIENTS - copy/paste COVID Guidelines URL to browser: [email or web address]    CBC and differential [[id number]] Collected: 12/29/24 0245    Lab Status: Final result Specimen: Blood from Arm, Left Updated: 12/29/24 0255     WBC 8.13 Thousand/uL      RBC 4.46 Million/uL      Hemoglobin 12.4 g/dL      Hematocrit 38.2 %      MCV 86 fL      MCH 27.8 pg      MCHC 32.5 g/dL      RDW 14.6 %      MPV 10.3 fL      Platelets 212 Thousands/uL      nRBC 0 /100 WBCs      Segmented % 68 %      Immature Grans % 0 %      Lymphocytes % 24 %      Monocytes % 5 %      Eosinophils Relative 2 %      Basophils Relative 1 %      Absolute Neutrophils 5.48 Thousands/µL      Absolute Immature Grans 0.02 Thousand/uL      Absolute Lymphocytes 1.97 Thousands/µL      Absolute Monocytes 0.43 Thousand/µL      Eosinophils Absolute 0.19 Thousand/µL      Basophils Absolute 0.04 Thousands/µL     Urine Macroscopic, POC [[id number]] Collected: 12/29/24 0250    Lab Status: Final result Specimen: Urine Updated: 12/29/24 0252     Color, UA Yellow     Clarity, UA Clear     pH, UA 6.0     Leukocytes, UA Negative     Nitrite, UA Negative     Protein, UA Negative mg/dl      Glucose, UA Negative mg/dl      Ketones, UA Negative mg/dl      Urobilinogen, UA 0.2 E.U./dl      Bilirubin, UA Negative     Occult Blood, UA Negative     Specific Gravity, UA 1.010    Narrative:      CLINITEK RESULT            XR chest 1 view portable   ED Interpretation by Elsie Valentine PA-C (12/29 0320)   No acute cardiopulmonary disease as interpreted by me at this time.          ECG 12 Lead Documentation Only    Date/Time: 12/29/2024 2:18 AM    Performed by: Elsie Valentine PA-C  Authorized by: Elsie Valentine PA-C    Indications / Diagnosis:  Weakness  ECG  reviewed by me, the ED Provider: yes    Patient location:  ED  Previous ECG:     Previous ECG:  Compared to current    Comparison to cardiac monitor: No    Interpretation:     Interpretation: abnormal    Quality:     Tracing quality:  Limited by artifact  Rate:     ECG rate:  69    ECG rate assessment: normal    Rhythm:     Rhythm: sinus rhythm    Ectopy:     Ectopy: none    QRS:     QRS axis:  Normal    QRS intervals:  Normal  Conduction:     Conduction: normal    ST segments:     ST segments:  Non-specific  T waves:     T waves: normal        ED Medication and Procedure Management   Prior to Admission Medications   Prescriptions Last Dose Informant Patient Reported? Taking?   Biotin 34090 MCG TABS  Self Yes No   Sig: Take by mouth   Digestive Enzyme CAPS  Self Yes No   Sig: Take by mouth   Probiotic Product (PROBIOTIC-10) CAPS  Self Yes No   Sig: Take by mouth   Spiriva Respimat 2.5 MCG/ACT AERS inhaler  Self No No   Sig: Inhale 2 puffs daily 3 inhalers   acetaminophen (TYLENOL) 325 mg tablet  Self No No   Sig: Take 2 tablets (650 mg total) by mouth every 6 (six) hours as needed for mild pain   albuterol (PROVENTIL HFA,VENTOLIN HFA) 90 mcg/act inhaler  Self No No   Sig: INHALE 1-2 PUFFS AS NEEDED FOR WHEEZING   azaTHIOprine (IMURAN) 50 mg tablet  Self No No   Sig: TAKE 1 TABLET BY MOUTH EVERY DAY   Patient not taking: Reported on 12/27/2024   budesonide-formoterol (Symbicort) 160-4.5 mcg/act inhaler  Self No No   Sig: Inhale 2 puffs 2 (two) times a day   cholecalciferol (VITAMIN D3) 1,000 units tablet  Self No No   Sig: Take 4000u daily   clotrimazole-betamethasone (LOTRISONE) 1-0.05 % cream  Self No No   Sig: Apply topically 2 (two) times a day Apply sparingly to affected area BID for 10 days   diphenhydramine, lidocaine, Al/Mg hydroxide, simethicone (Magic Mouthwash) SUSP  Self No No   Sig: Swish and spit 10 mL every 4 (four) hours as needed for mouth pain or discomfort   lansoprazole (PREVACID) 30 mg capsule   Self No No   Sig: Take 1 capsule (30 mg total) by mouth daily 1 tab po BID   metFORMIN (GLUCOPHAGE) 500 mg tablet  Self No No   Sig: TAKE 1 TABLET BY MOUTH EVERY DAY WITH BREAKFAST   montelukast (SINGULAIR) 10 mg tablet  Self No No   Sig: TAKE 1 TABLET BY MOUTH EVERY DAY   polyethylene glycol (GLYCOLAX) 17 GM/SCOOP powder  Self Yes No   Sig: Take 17 g by mouth as needed   rosuvastatin (CRESTOR) 5 mg tablet  Self No No   Sig: TAKE 1 TABLET BY MOUTH EVERY OTHER DAY      Facility-Administered Medications Last Administration Doses Remaining   cyanocobalamin injection 1,000 mcg 12/3/2024  1:13 PM    cyanocobalamin injection 1,000 mcg 11/5/2024  1:46 PM 8        Discharge Medication List as of 12/29/2024  4:17 AM        CONTINUE these medications which have NOT CHANGED    Details   acetaminophen (TYLENOL) 325 mg tablet Take 2 tablets (650 mg total) by mouth every 6 (six) hours as needed for mild pain, Starting Sun 5/6/2018, Normal      albuterol (PROVENTIL HFA,VENTOLIN HFA) 90 mcg/act inhaler INHALE 1-2 PUFFS AS NEEDED FOR WHEEZING, Starting Mon 9/30/2024, Normal      azaTHIOprine (IMURAN) 50 mg tablet TAKE 1 TABLET BY MOUTH EVERY DAY, Starting Thu 10/24/2024, Normal      Biotin 62945 MCG TABS Take by mouth, Historical Med      budesonide-formoterol (Symbicort) 160-4.5 mcg/act inhaler Inhale 2 puffs 2 (two) times a day, Starting Fri 12/1/2023, Normal      cholecalciferol (VITAMIN D3) 1,000 units tablet Take 4000u daily, No Print      clotrimazole-betamethasone (LOTRISONE) 1-0.05 % cream Apply topically 2 (two) times a day Apply sparingly to affected area BID for 10 days, Starting Wed 7/19/2023, Normal      Digestive Enzyme CAPS Take by mouth, Historical Med      diphenhydramine, lidocaine, Al/Mg hydroxide, simethicone (Magic Mouthwash) SUSP Swish and spit 10 mL every 4 (four) hours as needed for mouth pain or discomfort, Starting Thu 12/26/2024, Normal      lansoprazole (PREVACID) 30 mg capsule Take 1 capsule (30 mg  total) by mouth daily 1 tab po BID, Starting Tue 10/4/2022, No Print      metFORMIN (GLUCOPHAGE) 500 mg tablet TAKE 1 TABLET BY MOUTH EVERY DAY WITH BREAKFAST, Starting Fri 11/29/2024, Normal      montelukast (SINGULAIR) 10 mg tablet TAKE 1 TABLET BY MOUTH EVERY DAY, Starting Mon 8/26/2024, Normal      polyethylene glycol (GLYCOLAX) 17 GM/SCOOP powder Take 17 g by mouth as needed, Historical Med      Probiotic Product (PROBIOTIC-10) CAPS Take by mouth, Starting Wed 5/10/2017, Historical Med      rosuvastatin (CRESTOR) 5 mg tablet TAKE 1 TABLET BY MOUTH EVERY OTHER DAY, Normal      Spiriva Respimat 2.5 MCG/ACT AERS inhaler Inhale 2 puffs daily 3 inhalers, Starting Tue 10/1/2024, Normal           No discharge procedures on file.  ED SEPSIS DOCUMENTATION   Time reflects when diagnosis was documented in both MDM as applicable and the Disposition within this note       Time User Action Codes Description Comment    12/29/2024  3:48 AM Elsie Valentine [R25.1] Shakiness     12/29/2024  3:48 AM Elsie Valentine [R03.0] Elevated blood pressure reading                  Elsie Valentine PA-C  12/29/24 0615

## 2025-01-02 ENCOUNTER — OFFICE VISIT (OUTPATIENT)
Dept: FAMILY MEDICINE CLINIC | Facility: CLINIC | Age: 80
End: 2025-01-02
Payer: COMMERCIAL

## 2025-01-02 ENCOUNTER — TELEPHONE (OUTPATIENT)
Age: 80
End: 2025-01-02

## 2025-01-02 VITALS
BODY MASS INDEX: 31.38 KG/M2 | WEIGHT: 163 LBS | OXYGEN SATURATION: 98 % | SYSTOLIC BLOOD PRESSURE: 132 MMHG | HEART RATE: 74 BPM | RESPIRATION RATE: 16 BRPM | DIASTOLIC BLOOD PRESSURE: 72 MMHG | TEMPERATURE: 97.6 F

## 2025-01-02 DIAGNOSIS — R03.0 ELEVATED BLOOD PRESSURE READING: ICD-10-CM

## 2025-01-02 DIAGNOSIS — Z99.89 DEPENDENCE ON CANE: ICD-10-CM

## 2025-01-02 DIAGNOSIS — E11.9 CONTROLLED TYPE 2 DIABETES MELLITUS WITHOUT COMPLICATION, WITHOUT LONG-TERM CURRENT USE OF INSULIN (HCC): ICD-10-CM

## 2025-01-02 DIAGNOSIS — K14.9 TONGUE IRRITATION: ICD-10-CM

## 2025-01-02 DIAGNOSIS — S02.5XXD OPEN FRACTURE OF TOOTH WITH ROUTINE HEALING, SUBSEQUENT ENCOUNTER: Primary | ICD-10-CM

## 2025-01-02 PROCEDURE — 99214 OFFICE O/P EST MOD 30 MIN: CPT | Performed by: FAMILY MEDICINE

## 2025-01-02 PROCEDURE — G2211 COMPLEX E/M VISIT ADD ON: HCPCS | Performed by: FAMILY MEDICINE

## 2025-01-02 NOTE — ASSESSMENT & PLAN NOTE
Secondary to tooth fracture.  Unfortunately, Orajel rinse seemed to cause more problems, as did topical Orajel.  Magic mouthwash was not covered initially, and then [facility] would not compound it, therefore they were not able to take care of that.

## 2025-01-02 NOTE — ASSESSMENT & PLAN NOTE
Patient does use a cane for mobility.  Her gait and mobility are abnormal secondary to multiple different issues that she has including rheumatoid arthritis, Paget's disease, undifferentiated connective tissue disorder.  Following with rheumatology.  Again, using a cane every day for mobility.

## 2025-01-02 NOTE — TELEPHONE ENCOUNTER
Patient called to scheduled ED f/u from her 12/29/24 visit to the ED for dizziness. Patient is no longer dizzy, but feels fatigued. Patient stated she would only like to see Dr. Jessica. Soonest appointment available is 2/25/25. Patient already has an existing appointment ([facility] 4m f/u) and patient stated she will just keep that appointment. Patient was added to a wait list for a sooner OVL.

## 2025-01-02 NOTE — ASSESSMENT & PLAN NOTE
Blood pressure was elevated when she was in the emergency room, but at this office visit it is much better.  Would not recommend any changes at this point.  Would not diagnose hypertension based on this.  At her next office visit with PCP, I would recommend that she bring her home blood pressure cuff and correlate that with the office numbers.

## 2025-01-02 NOTE — PROGRESS NOTES
Name: Melissa Nguyen      : 1945      MRN: [id number]  Encounter Provider: Jude Zee MD  Encounter Date: 2025   Encounter department: [facility] PRIMARY [facility]  :  Assessment & Plan  Open fracture of tooth with routine healing, subsequent encounter  Patient still has issues with tooth fracture.  She is seeing the dentist soon to have that removed.  The tooth was shaved by dental recently to try and decrease the irritation of the tongue.       Tongue irritation  Secondary to tooth fracture.  Unfortunately, Orajel rinse seemed to cause more problems, as did topical Orajel.  Magic mouthwash was not covered initially, and then [facility] would not compound it, therefore they were not able to take care of that.         Elevated blood pressure reading  Blood pressure was elevated when she was in the emergency room, but at this office visit it is much better.  Would not recommend any changes at this point.  Would not diagnose hypertension based on this.  At her next office visit with PCP, I would recommend that she bring her home blood pressure cuff and correlate that with the office numbers.         Controlled type 2 diabetes mellitus without complication, without long-term current use of insulin ([facility])    Lab Results   Component Value Date    HGBA1C 5.9 2024   Patient does have an A1c of 5.9 previously.  This is quite reasonable.  Her blood sugar in the emergency room was 130, but that was nonfasting.  This morning she got 111 for sugar, which is quite reasonable.  Follow-up as scheduled with Dr. Jessica.       Dependence on cane  Patient does use a cane for mobility.  Her gait and mobility are abnormal secondary to multiple different issues that she has including rheumatoid arthritis, Paget's disease, undifferentiated connective tissue disorder.  Following with rheumatology.  Again, using a cane every day for mobility.                 1. Open fracture of tooth with routine healing, subsequent  encounter  Assessment & Plan:  Patient still has issues with tooth fracture.  She is seeing the dentist soon to have that removed.  The tooth was shaved by dental recently to try and decrease the irritation of the tongue.       2. Tongue irritation  Assessment & Plan:  Secondary to tooth fracture.  Unfortunately, Orajel rinse seemed to cause more problems, as did topical Orajel.  Magic mouthwash was not covered initially, and then [facility] would not compound it, therefore they were not able to take care of that.         3. Elevated blood pressure reading  Assessment & Plan:  Blood pressure was elevated when she was in the emergency room, but at this office visit it is much better.  Would not recommend any changes at this point.  Would not diagnose hypertension based on this.  At her next office visit with PCP, I would recommend that she bring her home blood pressure cuff and correlate that with the office numbers.         4. Controlled type 2 diabetes mellitus without complication, without long-term current use of insulin ([facility])  Assessment & Plan:    Lab Results   Component Value Date    HGBA1C 5.9 05/14/2024   Patient does have an A1c of 5.9 previously.  This is quite reasonable.  Her blood sugar in the emergency room was 130, but that was nonfasting.  This morning she got 111 for sugar, which is quite reasonable.  Follow-up as scheduled with Dr. Jessica.       5. Dependence on cane  Assessment & Plan:  Patient does use a cane for mobility.  Her gait and mobility are abnormal secondary to multiple different issues that she has including rheumatoid arthritis, Paget's disease, undifferentiated connective tissue disorder.  Following with rheumatology.  Again, using a cane every day for mobility.      Chief Complaint   Patient presents with    Follow-up     Pt is here for a follow up from the ER, pt states that her BP was high. YB          History of Present Illness     Here to follow up on multiple issues.    She was using  Oragel since last OV, but noted some symptoms of allergy. Stopped it, and felt a bit better.    Later, noted increased problems, with increased BP, and lightheaded.  Had ER eval to R/O stroke.    BP in ER was quite a bit higher.  Had shakiness and dizziness there as well, that fluctuated.  Testing done in the ER, but no real findings.    Released from the ER.  Monday: Saw dental, and tooth extracted.    Since this, the dizziness is better, but has some increased fatigue and irritation.   Has Unspecified connective tissue disorder, and feels this is now a flair of that.    Dental visit: They did feel that she had tongue ulcer from the tooth fracture.  Tooth was shaved, but not removed yet.    BP: Noted elevation at home from her cuff.  She noted elevation to 160 systolic this AM.        Review of Systems   Constitutional: Negative.    HENT: Negative.     Eyes: Negative.    Respiratory: Negative.     Cardiovascular: Negative.    Gastrointestinal: Negative.    Endocrine: Negative.    Genitourinary: Negative.    Musculoskeletal: Negative.    Skin: Negative.    Allergic/Immunologic: Negative.    Neurological: Negative.    Hematological: Negative.    Psychiatric/Behavioral: Negative.         Objective   /72 (BP Location: Left arm, Patient Position: Sitting, Cuff Size: Large)   Pulse 74   Temp 97.6 °F (36.4 °C) (Temporal)   Resp 16   Wt 73.9 kg (163 lb)   LMP  (LMP Unknown)   SpO2 98%   BMI 31.38 kg/m²      Physical Exam  Vitals and nursing note reviewed.   Constitutional:       Appearance: Normal appearance. She is well-developed.   HENT:      Head: Normocephalic and atraumatic.      Mouth/Throat:     Cardiovascular:      Rate and Rhythm: Normal rate and regular rhythm.      Pulses:           Carotid pulses are 2+ on the right side and 2+ on the left side.     Heart sounds: Normal heart sounds.   Pulmonary:      Effort: Pulmonary effort is normal.      Breath sounds: Normal breath sounds. No wheezing or  rales.   Chest:      Chest wall: No tenderness.   Neurological:      Mental Status: She is alert.

## 2025-01-02 NOTE — PATIENT INSTRUCTIONS
1. Open fracture of tooth with routine healing, subsequent encounter  Assessment & Plan:  Patient still has issues with tooth fracture.  She is seeing the dentist soon to have that removed.  The tooth was shaved by dental recently to try and decrease the irritation of the tongue.       2. Tongue irritation  Assessment & Plan:  Secondary to tooth fracture.  Unfortunately, Orajel rinse seemed to cause more problems, as did topical Orajel.  Magic mouthwash was not covered initially, and then [facility] would not compound it, therefore they were not able to take care of that.         3. Elevated blood pressure reading  Assessment & Plan:  Blood pressure was elevated when she was in the emergency room, but at this office visit it is much better.  Would not recommend any changes at this point.  Would not diagnose hypertension based on this.  At her next office visit with PCP, I would recommend that she bring her home blood pressure cuff and correlate that with the office numbers.         4. Controlled type 2 diabetes mellitus without complication, without long-term current use of insulin ([facility])  Assessment & Plan:    Lab Results   Component Value Date    HGBA1C 5.9 05/14/2024   Patient does have an A1c of 5.9 previously.  This is quite reasonable.  Her blood sugar in the emergency room was 130, but that was nonfasting.  This morning she got 111 for sugar, which is quite reasonable.  Follow-up as scheduled with Dr. Jessica.       5. Dependence on cane  Assessment & Plan:  Patient does use a cane for mobility.  Her gait and mobility are abnormal secondary to multiple different issues that she has including rheumatoid arthritis, Paget's disease, undifferentiated connective tissue disorder.  Following with rheumatology.  Again, using a cane every day for mobility.      COVID 19 Instructions    Melissa Nguyen was advised to limit contact with others to essential tasks such as getting food, medications, and medical care.    Proper  handwashing reviewed, and Hand sanitzer when washing is not available.    If the patient develops symptoms of COVID 19, the patient should call the office as soon as possible.    It is strongly recommended that Flu Vaccinations be obtained.      Virtual Visits:  Yaneth: This works on smart phones (any phone with Internet browsing capability).  You should get a text message when the provider is ready to see you.  Click on the link in the text message, and the call should start.  You will need to type in your name, and allow camera and microphone access.  This is HIPPA compliant, and secure.      If you have not already done so, get immunized to COVID 19.      We are committed to getting you vaccinated as soon as possible and will be closely following [facility] and [facility] guidelines as they are released and revised.  Please refer to our COVID-19 vaccine webpage for the most up to date information on the vaccine and our distribution efforts.    This site will also have the most up to date recommendations for COVID booster vaccine.    [email or web address]    Call [phone] ([phone]), option 7    You can also visit https://www.vaccines.gov/ to find vaccines in your area.    OUR LOCATION:    Replaced by [facility]  [address] [place] [address], [address]  [place], [place], [address] [phone]  Fax: [phone]    Lab services, Rheumatology, and OB/GYN are at this location as well.

## 2025-01-02 NOTE — ASSESSMENT & PLAN NOTE
Patient still has issues with tooth fracture.  She is seeing the dentist soon to have that removed.  The tooth was shaved by dental recently to try and decrease the irritation of the tongue.

## 2025-01-02 NOTE — ASSESSMENT & PLAN NOTE
Lab Results   Component Value Date    HGBA1C 5.9 05/14/2024   Patient does have an A1c of 5.9 previously.  This is quite reasonable.  Her blood sugar in the emergency room was 130, but that was nonfasting.  This morning she got 111 for sugar, which is quite reasonable.  Follow-up as scheduled with Dr. Jessica.

## 2025-01-05 ENCOUNTER — APPOINTMENT (EMERGENCY)
Dept: CT IMAGING | Facility: HOSPITAL | Age: 80
End: 2025-01-05
Payer: COMMERCIAL

## 2025-01-05 ENCOUNTER — HOSPITAL ENCOUNTER (EMERGENCY)
Facility: HOSPITAL | Age: 80
Discharge: HOME | End: 2025-01-05
Payer: COMMERCIAL

## 2025-01-05 VITALS
HEART RATE: 74 BPM | OXYGEN SATURATION: 96 % | TEMPERATURE: 97.6 F | DIASTOLIC BLOOD PRESSURE: 75 MMHG | SYSTOLIC BLOOD PRESSURE: 172 MMHG | RESPIRATION RATE: 18 BRPM

## 2025-01-05 DIAGNOSIS — R03.0 ELEVATED BLOOD PRESSURE READING: ICD-10-CM

## 2025-01-05 DIAGNOSIS — R42 LIGHTHEADEDNESS: Primary | ICD-10-CM

## 2025-01-05 LAB
ALBUMIN SERPL BCG-MCNC: 4.6 G/DL (ref 3.5–5)
ALP SERPL-CCNC: 102 U/L (ref 34–104)
ALT SERPL W P-5'-P-CCNC: 8 U/L (ref 7–52)
ANION GAP SERPL CALCULATED.3IONS-SCNC: 10 MMOL/L (ref 4–13)
AST SERPL W P-5'-P-CCNC: 10 U/L (ref 13–39)
ATRIAL RATE: 67 BPM
BASOPHILS # BLD AUTO: 0.03 THOUSANDS/ΜL (ref 0–0.1)
BASOPHILS NFR BLD AUTO: 0 % (ref 0–1)
BILIRUB SERPL-MCNC: 0.4 MG/DL (ref 0.2–1)
BUN SERPL-MCNC: 18 MG/DL (ref 5–25)
CALCIUM SERPL-MCNC: 9.5 MG/DL (ref 8.4–10.2)
CARDIAC TROPONIN I PNL SERPL HS: 3 NG/L (ref ?–50)
CHLORIDE SERPL-SCNC: 105 MMOL/L (ref 96–108)
CO2 SERPL-SCNC: 24 MMOL/L (ref 21–32)
CREAT SERPL-MCNC: 0.68 MG/DL (ref 0.6–1.3)
EOSINOPHIL # BLD AUTO: 0.15 THOUSAND/ΜL (ref 0–0.61)
EOSINOPHIL NFR BLD AUTO: 2 % (ref 0–6)
ERYTHROCYTE [DISTWIDTH] IN BLOOD BY AUTOMATED COUNT: 14.8 % (ref 11.6–15.1)
GFR SERPL CREATININE-BSD FRML MDRD: 83 ML/MIN/1.73SQ M
GLUCOSE SERPL-MCNC: 136 MG/DL (ref 65–140)
HCT VFR BLD AUTO: 40.3 % (ref 34.8–46.1)
HGB BLD-MCNC: 13 G/DL (ref 11.5–15.4)
IMM GRANULOCYTES # BLD AUTO: 0.02 THOUSAND/UL (ref 0–0.2)
IMM GRANULOCYTES NFR BLD AUTO: 0 % (ref 0–2)
LYMPHOCYTES # BLD AUTO: 2.03 THOUSANDS/ΜL (ref 0.6–4.47)
LYMPHOCYTES NFR BLD AUTO: 27 % (ref 14–44)
MCH RBC QN AUTO: 27.9 PG (ref 26.8–34.3)
MCHC RBC AUTO-ENTMCNC: 32.3 G/DL (ref 31.4–37.4)
MCV RBC AUTO: 87 FL (ref 82–98)
MONOCYTES # BLD AUTO: 0.43 THOUSAND/ΜL (ref 0.17–1.22)
MONOCYTES NFR BLD AUTO: 6 % (ref 4–12)
NEUTROPHILS # BLD AUTO: 4.96 THOUSANDS/ΜL (ref 1.85–7.62)
NEUTS SEG NFR BLD AUTO: 65 % (ref 43–75)
NRBC BLD AUTO-RTO: 0 /100 WBCS
P AXIS: 60 DEGREES
PLATELET # BLD AUTO: 236 THOUSANDS/UL (ref 149–390)
PMV BLD AUTO: 10.6 FL (ref 8.9–12.7)
POTASSIUM SERPL-SCNC: 3.9 MMOL/L (ref 3.5–5.3)
PR INTERVAL: 152 MS
PROT SERPL-MCNC: 7.6 G/DL (ref 6.4–8.4)
QRS AXIS: 71 DEGREES
QRSD INTERVAL: 78 MS
QT INTERVAL: 420 MS
QTC INTERVAL: 443 MS
RBC # BLD AUTO: 4.66 MILLION/UL (ref 3.81–5.12)
SODIUM SERPL-SCNC: 139 MMOL/L (ref 135–147)
T WAVE AXIS: 71 DEGREES
TSH SERPL DL<=0.05 MIU/L-ACNC: 2.3 UIU/ML (ref 0.45–4.5)
VENTRICULAR RATE: 67 BPM
WBC # BLD AUTO: 7.62 THOUSAND/UL (ref 4.31–10.16)

## 2025-01-05 PROCEDURE — 36415 COLL VENOUS BLD VENIPUNCTURE: CPT

## 2025-01-05 PROCEDURE — 84484 ASSAY OF TROPONIN QUANT: CPT

## 2025-01-05 PROCEDURE — 93010 ELECTROCARDIOGRAM REPORT: CPT | Performed by: INTERNAL MEDICINE

## 2025-01-05 PROCEDURE — 96360 HYDRATION IV INFUSION INIT: CPT

## 2025-01-05 PROCEDURE — 84443 ASSAY THYROID STIM HORMONE: CPT

## 2025-01-05 PROCEDURE — 70498 CT ANGIOGRAPHY NECK: CPT

## 2025-01-05 PROCEDURE — 99285 EMERGENCY DEPT VISIT HI MDM: CPT

## 2025-01-05 PROCEDURE — 85025 COMPLETE CBC W/AUTO DIFF WBC: CPT

## 2025-01-05 PROCEDURE — 80053 COMPREHEN METABOLIC PANEL: CPT

## 2025-01-05 PROCEDURE — 99284 EMERGENCY DEPT VISIT MOD MDM: CPT

## 2025-01-05 PROCEDURE — 93005 ELECTROCARDIOGRAM TRACING: CPT

## 2025-01-05 PROCEDURE — 70496 CT ANGIOGRAPHY HEAD: CPT

## 2025-01-05 RX ORDER — MECLIZINE HYDROCHLORIDE 25 MG/1
25 TABLET ORAL ONCE
Status: COMPLETED | OUTPATIENT
Start: 2025-01-05 | End: 2025-01-05

## 2025-01-05 RX ADMIN — SODIUM CHLORIDE 1000 ML: 0.9 INJECTION, SOLUTION INTRAVENOUS at 00:45

## 2025-01-05 RX ADMIN — IOHEXOL 85 ML: 350 INJECTION, SOLUTION INTRAVENOUS at 01:49

## 2025-01-05 RX ADMIN — MECLIZINE HYDROCHLORIDE 25 MG: 25 TABLET ORAL at 00:40

## 2025-01-05 NOTE — DISCHARGE INSTRUCTIONS
You have been evaluated in the Emergency Department today for your lightheadedness episode. Your evaluation did not show evidence of medical conditions requiring emergent intervention at this time, however we recommend you follow up with your primary care provider for further testing as an outpatient.    Please follow up with your primary care doctor in 2-3 days. Discuss the possibility of a holter monitor ordered by the primary care or by cardiology, for whom I attached information.    Return to the ER immediately for worsening or uncontrolled symptoms, headache, chest pain, shortness of breath, persistent vomiting, vision changes, recurrent fainting, or for any other concerning symptoms.    Thank you for choosing us for your care.

## 2025-01-05 NOTE — ED PROVIDER NOTES
Time reflects when diagnosis was documented in both MDM as applicable and the Disposition within this note       Time User Action Codes Description Comment    1/5/2025  2:27 AM Ian Vaughn Add [R42] Lightheadedness     1/5/2025  2:28 AM Ian Vaughn Add [R03.0] Elevated blood pressure reading           ED Disposition       ED Disposition   Discharge    Condition   Stable    Date/Time   Sun Jan 5, 2025  2:27 AM    Comment   Melissa Nguyen discharge to home/self care.                   Assessment & Plan       Medical Decision Making  Patient with history as below presented with lightheadedness. History obtained from patient.    Differential diagnosis includes: Symptomatic hypertension, arrhythmia, dehydration, vertebral/carotid artery abnormality, anemia, electrolyte disturbance, ACS, thyroid derangement    Plan: CBC, CMP, troponin, TSH, ECG, CTA head and neck, meclizine, fluids    ECG independently interpreted by myself as below. Labs reviewed and unremarkable. Independently reviewed imaging without acute emergent pathology. Patient was treated with below with improvement in symptoms. Reassessed the patient and they continue to be well appearing.  Presentation undifferentiated, could be due to her elevated blood pressure which is notably elevated both here as well as during her last visit but was normal during her primary care physician she was not having symptoms.  Could also be due to her decreased p.o. intake due to this recent dental work causing some mild dehydration.  Overall seems nonemergent nature. Stable for outpatient management.    Disposition: Discharged with instructions to obtain outpatient follow up of patient's symptoms and findings, with strict return precautions if patient develops new or worsening symptoms. Patient understands this plan and is agreeable. All questions answered. Patient discharged home with return precautions.    Amount and/or Complexity of Data Reviewed  Labs: ordered.  Decision-making details documented in ED Course.  Radiology: ordered.    Risk  Prescription drug management.        ED Course as of 01/05/25 0328   Sun Jan 05, 2025   0048 Procedure Note: EKG  Date/Time: 01/05/25 12:48 AM   Interpreted by: Ian Vaughn   Indications / Diagnosis: lightheadedness  ECG reviewed by me, the ED Provider: yes   The EKG demonstrates:  Rhythm: normal sinus  Intervals: normal intervals  Axis: normal axis  QRS/Blocks: normal QRS  ST Changes: No acute ST Changes, no STD/CHRIS.   0235 hs TnI 0hr: 3  Symptoms >3 hours no indication for delta       Medications   meclizine (ANTIVERT) tablet 25 mg (25 mg Oral Given 1/5/25 0040)   sodium chloride 0.9 % bolus 1,000 mL (0 mL Intravenous Stopped 1/5/25 0158)   iohexol (OMNIPAQUE) 350 MG/ML injection (MULTI-DOSE) 85 mL (85 mL Intravenous Given 1/5/25 0149)       ED Risk Strat Scores                          SBIRT 20yo+      Flowsheet Row Most Recent Value   Initial Alcohol Screen: US AUDIT-C     1. How often do you have a drink containing alcohol? 0 Filed at: 01/05/2025 0011   2. How many drinks containing alcohol do you have on a typical day you are drinking?  0 Filed at: 01/05/2025 0011   3a. Male UNDER 65: How often do you have five or more drinks on one occasion? 0 Filed at: 01/05/2025 0011   3b. FEMALE Any Age, or MALE 65+: How often do you have 4 or more drinks on one occassion? 0 Filed at: 01/05/2025 0011   Audit-C Score 0 Filed at: 01/05/2025 0011   CLEVE: How many times in the past year have you...    Used an illegal drug or used a prescription medication for non-medical reasons? Never Filed at: 01/05/2025 0011                            History of Present Illness       Chief Complaint   Patient presents with    Dizziness     BIBA from home - pt with near syncopal episode while taking down mckenna decor. Pt reports feeling shaky and checking BP at home - systolic 160's. Pt with same issues last week - was admitted with no findings. Pt also  reports L hand and fingers tingling and intermittent headaches, same as last week. Hx COPD and DM2. . Denies vision changes.        Past Medical History:   Diagnosis Date    Arthritis     Back pain     Diabetes mellitus (HCC)     Emphysema of lung (HCC)     GERD (gastroesophageal reflux disease)     History of Lyme disease 01/05/2023    We reviewed this today, and whether her symptoms may be related to a long, ongoing active Lyme infection.  Symptoms are somewhat vague for this.    Lyme disease     Osteoporosis     Polymyalgia rheumatica (HCC)     last assessed 2/15/17    Reactive airway disease     last assessed 6/9/16    Sciatica       Past Surgical History:   Procedure Laterality Date    BREAST BIOPSY Left     HYSTERECTOMY      1987, total    ORTHOPEDIC SURGERY        Family History   Problem Relation Age of Onset    Thyroid cancer Mother 53    COPD Father         mild    Hypertension Father     No Known Problems Maternal Grandmother     Colon cancer Maternal Grandfather 90    No Known Problems Paternal Grandmother     No Known Problems Paternal Grandfather     Breast cancer Maternal Aunt 55    No Known Problems Maternal Aunt     No Known Problems Paternal Aunt       Social History     Tobacco Use    Smoking status: Every Day     Current packs/day: 1.00     Types: Cigarettes    Smokeless tobacco: Never    Tobacco comments:     Little less than a pack    Vaping Use    Vaping status: Never Used   Substance Use Topics    Alcohol use: Never    Drug use: No      E-Cigarette/Vaping    E-Cigarette Use Never User       E-Cigarette/Vaping Substances      I have reviewed and agree with the history as documented.     Patient is a 79-year-old female with a significant past medical history of COPD, presenting for reevaluation of lightheadedness.  Patient was recently seen and evaluated on 12/29/2024 secondary to an episode when she had some lightheadedness, shakiness, nausea, and feeling of warmth and chills.  Patient  reports that this lasted for several hours before resolving.  She had unremarkable workup in the emergency department at that point in time.  Patient reports that she has been intermittently feeling this way since, however today she had a worsening episode several hours prior to arrival.  She says that her symptoms persisted which prompted her to call EMS, however they are starting to improve what they were previously.  Patient did notably have an elevated blood pressure during her previous visit for which she followed up with her primary care doctor, however she was normotensive during that office visit so was not started on antihypertensives.  Patient has been measuring her blood pressure and notes that they have been high.  Patient reports that she has a bandlike sensation around her head which was not present previously, although she does not specifically describe this as a headache.  Patient also reports that she recently had some dental work so she has had some decreased p.o. intake secondary to this.  She reports that the onset of her decreased p.o. intake was around the same time as her symptoms started the other week.  She denies any chest pain.  She denies any difficulty breathing.  She denies any recent illness.  She denies any abdominal pain.  She is otherwise without acute complaint.        Review of Systems   Respiratory:  Negative for shortness of breath.    Cardiovascular:  Negative for chest pain.   Gastrointestinal:  Negative for abdominal pain.   Neurological:  Positive for light-headedness.           Objective       ED Triage Vitals   Temperature Pulse Blood Pressure Respirations SpO2 Patient Position - Orthostatic VS   01/05/25 0238 01/05/25 0011 01/05/25 0011 01/05/25 0011 01/05/25 0011 01/05/25 0011   97.6 °F (36.4 °C) 72 153/75 19 96 % Sitting      Temp Source Heart Rate Source BP Location FiO2 (%) Pain Score    01/05/25 0238 01/05/25 0011 01/05/25 0011 -- --    Oral Monitor Right arm         Vitals      Date and Time Temp Pulse SpO2 Resp BP Pain Score FACES Pain Rating User   01/05/25 0238 97.6 °F (36.4 °C) -- -- -- -- -- -- GJ   01/05/25 0147 -- 74 -- 18 172/75 -- --    01/05/25 0145 -- 74 -- 19 188/77 -- --    01/05/25 0144 -- 74 -- 19 175/73 -- --    01/05/25 0011 -- 72 96 % 19 153/75 -- -- AS            Physical Exam  Vitals and nursing note reviewed.   Constitutional:       General: She is not in acute distress.     Appearance: Normal appearance. She is not ill-appearing or toxic-appearing.   HENT:      Head: Normocephalic and atraumatic.      Right Ear: External ear normal.      Left Ear: External ear normal.      Nose: Nose normal.      Mouth/Throat:      Mouth: Mucous membranes are moist.   Eyes:      General: No visual field deficit or scleral icterus.        Right eye: No discharge.         Left eye: No discharge.      Extraocular Movements: Extraocular movements intact.      Conjunctiva/sclera: Conjunctivae normal.      Pupils: Pupils are equal, round, and reactive to light.   Cardiovascular:      Rate and Rhythm: Normal rate and regular rhythm.      Pulses: Normal pulses.      Heart sounds: Normal heart sounds. No murmur heard.     No friction rub. No gallop.   Pulmonary:      Effort: Pulmonary effort is normal. No respiratory distress.      Breath sounds: Normal breath sounds. No wheezing, rhonchi or rales.   Abdominal:      General: Abdomen is flat. There is no distension.      Palpations: Abdomen is soft. There is no mass.      Tenderness: There is no abdominal tenderness.   Genitourinary:     Comments: Deferred  Musculoskeletal:      Right lower leg: No edema.      Left lower leg: No edema.   Skin:     General: Skin is warm and dry.   Neurological:      General: No focal deficit present.      Mental Status: She is alert and oriented to person, place, and time.      GCS: GCS eye subscore is 4. GCS verbal subscore is 5. GCS motor subscore is 6.      Cranial Nerves: No cranial  nerve deficit, dysarthria or facial asymmetry.      Sensory: Sensation is intact. No sensory deficit.      Motor: Motor function is intact. No pronator drift.      Coordination: Coordination is intact. Finger-Nose-Finger Test normal.   Psychiatric:         Mood and Affect: Mood is anxious.         Results Reviewed       Procedure Component Value Units Date/Time    TSH, 3rd generation with Free T4 reflex [[id number]]  (Normal) Collected: 01/05/25 0042    Lab Status: Final result Specimen: Blood from Arm, Right Updated: 01/05/25 0120     TSH 3RD GENERATON 2.296 uIU/mL     HS Troponin 0hr (reflex protocol) [[id number]]  (Normal) Collected: 01/05/25 0042    Lab Status: Final result Specimen: Blood from Arm, Right Updated: 01/05/25 0111     hs TnI 0hr 3 ng/L     Comprehensive metabolic panel [[id number]]  (Abnormal) Collected: 01/05/25 0042    Lab Status: Final result Specimen: Blood from Arm, Right Updated: 01/05/25 0104     Sodium 139 mmol/L      Potassium 3.9 mmol/L      Chloride 105 mmol/L      CO2 24 mmol/L      ANION GAP 10 mmol/L      BUN 18 mg/dL      Creatinine 0.68 mg/dL      Glucose 136 mg/dL      Calcium 9.5 mg/dL      AST 10 U/L      ALT 8 U/L      Alkaline Phosphatase 102 U/L      Total Protein 7.6 g/dL      Albumin 4.6 g/dL      Total Bilirubin 0.40 mg/dL      eGFR 83 ml/min/1.73sq m     Narrative:      National Kid[employer] guidelines for Chronic Kidney Disease (CKD):     Stage 1 with normal or high GFR (GFR > 90 mL/min/1.73 square meters)    Stage 2 Mild CKD (GFR = 60-89 mL/min/1.73 square meters)    Stage 3A Moderate CKD (GFR = 45-59 mL/min/1.73 square meters)    Stage 3B Moderate CKD (GFR = 30-44 mL/min/1.73 square meters)    Stage 4 Severe CKD (GFR = 15-29 mL/min/1.73 square meters)    Stage 5 End Stage CKD (GFR <15 mL/min/1.73 square meters)  Note: GFR calculation is accurate only with a steady state creatinine    CBC and differential [[id number]] Collected: 01/05/25 0042    Lab Status:  Final result Specimen: Blood from Arm, Right Updated: 01/05/25 0051     WBC 7.62 Thousand/uL      RBC 4.66 Million/uL      Hemoglobin 13.0 g/dL      Hematocrit 40.3 %      MCV 87 fL      MCH 27.9 pg      MCHC 32.3 g/dL      RDW 14.8 %      MPV 10.6 fL      Platelets 236 Thousands/uL      nRBC 0 /100 WBCs      Segmented % 65 %      Immature Grans % 0 %      Lymphocytes % 27 %      Monocytes % 6 %      Eosinophils Relative 2 %      Basophils Relative 0 %      Absolute Neutrophils 4.96 Thousands/µL      Absolute Immature Grans 0.02 Thousand/uL      Absolute Lymphocytes 2.03 Thousands/µL      Absolute Monocytes 0.43 Thousand/µL      Eosinophils Absolute 0.15 Thousand/µL      Basophils Absolute 0.03 Thousands/µL             CTA head and neck with and without contrast   Final Interpretation by Man Napier MD (01/05 0222)      1.  No acute intracranial or angiographic abnormality   2.  Emphysema                  Workstation performed: [id number]             Procedures    ED Medication and Procedure Management   Prior to Admission Medications   Prescriptions Last Dose Informant Patient Reported? Taking?   Biotin 86077 MCG TABS  Self Yes No   Sig: Take by mouth   Digestive Enzyme CAPS  Self Yes No   Sig: Take by mouth   Probiotic Product (PROBIOTIC-10) CAPS  Self Yes No   Sig: Take by mouth   Spiriva Respimat 2.5 MCG/ACT AERS inhaler  Self No No   Sig: Inhale 2 puffs daily 3 inhalers   acetaminophen (TYLENOL) 325 mg tablet  Self No No   Sig: Take 2 tablets (650 mg total) by mouth every 6 (six) hours as needed for mild pain   albuterol (PROVENTIL HFA,VENTOLIN HFA) 90 mcg/act inhaler  Self No No   Sig: INHALE 1-2 PUFFS AS NEEDED FOR WHEEZING   azaTHIOprine (IMURAN) 50 mg tablet  Self No No   Sig: TAKE 1 TABLET BY MOUTH EVERY DAY   Patient not taking: Reported on 12/27/2024   budesonide-formoterol (Symbicort) 160-4.5 mcg/act inhaler  Self No No   Sig: Inhale 2 puffs 2 (two) times a day   cholecalciferol (VITAMIN D3) 1,000  units tablet  Self No No   Sig: Take 4000u daily   clotrimazole-betamethasone (LOTRISONE) 1-0.05 % cream  Self No No   Sig: Apply topically 2 (two) times a day Apply sparingly to affected area BID for 10 days   diphenhydramine, lidocaine, Al/Mg hydroxide, simethicone (Magic Mouthwash) SUSP  Self No No   Sig: Swish and spit 10 mL every 4 (four) hours as needed for mouth pain or discomfort   Patient not taking: Reported on 1/2/2025   lansoprazole (PREVACID) 30 mg capsule  Self No No   Sig: Take 1 capsule (30 mg total) by mouth daily 1 tab po BID   metFORMIN (GLUCOPHAGE) 500 mg tablet  Self No No   Sig: TAKE 1 TABLET BY MOUTH EVERY DAY WITH BREAKFAST   montelukast (SINGULAIR) 10 mg tablet  Self No No   Sig: TAKE 1 TABLET BY MOUTH EVERY DAY   polyethylene glycol (GLYCOLAX) 17 GM/SCOOP powder  Self Yes No   Sig: Take 17 g by mouth as needed   rosuvastatin (CRESTOR) 5 mg tablet  Self No No   Sig: TAKE 1 TABLET BY MOUTH EVERY OTHER DAY      Facility-Administered Medications Last Administration Doses Remaining   cyanocobalamin injection 1,000 mcg 12/3/2024  1:13 PM    cyanocobalamin injection 1,000 mcg 11/5/2024  1:46 PM 8        Discharge Medication List as of 1/5/2025  2:29 AM        CONTINUE these medications which have NOT CHANGED    Details   acetaminophen (TYLENOL) 325 mg tablet Take 2 tablets (650 mg total) by mouth every 6 (six) hours as needed for mild pain, Starting Sun 5/6/2018, Normal      albuterol (PROVENTIL HFA,VENTOLIN HFA) 90 mcg/act inhaler INHALE 1-2 PUFFS AS NEEDED FOR WHEEZING, Starting Mon 9/30/2024, Normal      azaTHIOprine (IMURAN) 50 mg tablet TAKE 1 TABLET BY MOUTH EVERY DAY, Starting Thu 10/24/2024, Normal      Biotin 37668 MCG TABS Take by mouth, Historical Med      budesonide-formoterol (Symbicort) 160-4.5 mcg/act inhaler Inhale 2 puffs 2 (two) times a day, Starting Fri 12/1/2023, Normal      cholecalciferol (VITAMIN D3) 1,000 units tablet Take 4000u daily, No Print       clotrimazole-betamethasone (LOTRISONE) 1-0.05 % cream Apply topically 2 (two) times a day Apply sparingly to affected area BID for 10 days, Starting Wed 7/19/2023, Normal      Digestive Enzyme CAPS Take by mouth, Historical Med      diphenhydramine, lidocaine, Al/Mg hydroxide, simethicone (Magic Mouthwash) SUSP Swish and spit 10 mL every 4 (four) hours as needed for mouth pain or discomfort, Starting Thu 12/26/2024, Normal      lansoprazole (PREVACID) 30 mg capsule Take 1 capsule (30 mg total) by mouth daily 1 tab po BID, Starting Tue 10/4/2022, No Print      metFORMIN (GLUCOPHAGE) 500 mg tablet TAKE 1 TABLET BY MOUTH EVERY DAY WITH BREAKFAST, Starting Fri 11/29/2024, Normal      montelukast (SINGULAIR) 10 mg tablet TAKE 1 TABLET BY MOUTH EVERY DAY, Starting Mon 8/26/2024, Normal      polyethylene glycol (GLYCOLAX) 17 GM/SCOOP powder Take 17 g by mouth as needed, Historical Med      Probiotic Product (PROBIOTIC-10) CAPS Take by mouth, Starting Wed 5/10/2017, Historical Med      rosuvastatin (CRESTOR) 5 mg tablet TAKE 1 TABLET BY MOUTH EVERY OTHER DAY, Normal      Spiriva Respimat 2.5 MCG/ACT AERS inhaler Inhale 2 puffs daily 3 inhalers, Starting Tue 10/1/2024, Normal           No discharge procedures on file.  ED SEPSIS DOCUMENTATION   Time reflects when diagnosis was documented in both MDM as applicable and the Disposition within this note       Time User Action Codes Description Comment    1/5/2025  2:27 AM Ian Vaughn [R42] Lightheadedness     1/5/2025  2:28 AM Ian Vaughn [R03.0] Elevated blood pressure reading                  Ian Vaughn DO  01/05/25 0328

## 2025-01-06 ENCOUNTER — OFFICE VISIT (OUTPATIENT)
Dept: FAMILY MEDICINE CLINIC | Facility: CLINIC | Age: 80
End: 2025-01-06
Payer: COMMERCIAL

## 2025-01-06 VITALS
BODY MASS INDEX: 30.96 KG/M2 | RESPIRATION RATE: 18 BRPM | WEIGHT: 164 LBS | HEART RATE: 74 BPM | OXYGEN SATURATION: 98 % | SYSTOLIC BLOOD PRESSURE: 138 MMHG | DIASTOLIC BLOOD PRESSURE: 80 MMHG | HEIGHT: 61 IN

## 2025-01-06 DIAGNOSIS — R03.0 ELEVATED BLOOD PRESSURE READING: Primary | ICD-10-CM

## 2025-01-06 DIAGNOSIS — J43.8 OTHER EMPHYSEMA (HCC): ICD-10-CM

## 2025-01-06 DIAGNOSIS — Z72.0 TOBACCO ABUSE: ICD-10-CM

## 2025-01-06 DIAGNOSIS — R55 NEAR SYNCOPE: ICD-10-CM

## 2025-01-06 DIAGNOSIS — M06.9 RHEUMATOID ARTHRITIS, INVOLVING UNSPECIFIED SITE, UNSPECIFIED WHETHER RHEUMATOID FACTOR PRESENT (HCC): ICD-10-CM

## 2025-01-06 PROCEDURE — G2211 COMPLEX E/M VISIT ADD ON: HCPCS | Performed by: FAMILY MEDICINE

## 2025-01-06 PROCEDURE — 99214 OFFICE O/P EST MOD 30 MIN: CPT | Performed by: FAMILY MEDICINE

## 2025-01-06 RX ORDER — LISINOPRIL 5 MG/1
5 TABLET ORAL DAILY
Qty: 30 TABLET | Refills: 2 | Status: SHIPPED | OUTPATIENT
Start: 2025-01-06

## 2025-01-06 NOTE — PROGRESS NOTES
"Name: Melissa Nguyen      : 1945      MRN: [id number]  Encounter Provider: Jude Zee MD  Encounter Date: 2025   Encounter department: [facility]  :  Assessment & Plan  Elevated blood pressure reading  Patient continuing to have elevations in blood pressure.  Has been quite significant.  When she was here in the office, however, it was much better.  At this point, perhaps a very low-dose medication will be reasonable.  Consider lisinopril 5 mg.  Would also recommend Holter monitor given the dizziness that she continues to have.  Orders:  •  Holter monitor; Future  •  lisinopril (ZESTRIL) 5 mg tablet; Take 1 tablet (5 mg total) by mouth daily    Near syncope  Several episodes of near syncope.  Cannot really find anything so far in the ER.  Had CT scan, labs, EKG.  So far, all negative.  Will start with low-dose ACE inhibitor for blood pressure, as I do not wish to start beta-blockers as we do not have evidence of tachycardia.  Holter monitor, cardiology follow-up.  Orders:  •  Holter monitor; Future    Tobacco abuse  Recommend quit smoking.  Patient understands risks.  Not interested at the moment.       Rheumatoid arthritis, involving unspecified site, unspecified whether rheumatoid factor present (HCC)  Follow with Rheum.       Other emphysema (HCC)  Stable. No real issues.                History of Present Illness     Patient is having significant amount of issues with regard to her blood pressure.  Please see the ER note that she recently was at.  She has had multiple episodes of this lately.          Review of Systems   Constitutional: Negative.    HENT: Negative.     Cardiovascular:  Negative for chest pain and palpitations.   Neurological:  Positive for dizziness (HPI) and light-headedness.       Objective   /80 (BP Location: Right arm, Patient Position: Sitting, Cuff Size: Large)   Pulse 74   Resp 18   Ht 5' 1\" (1.549 m)   Wt 74.4 kg (164 lb)   LMP  " (LMP Unknown)   SpO2 98%   BMI 30.99 kg/m²      Physical Exam  Vitals and nursing note reviewed.   Constitutional:       Appearance: Normal appearance. She is well-developed.   HENT:      Head: Normocephalic and atraumatic.   Cardiovascular:      Rate and Rhythm: Normal rate and regular rhythm.      Pulses:           Carotid pulses are 2+ on the right side and 2+ on the left side.     Heart sounds: Normal heart sounds.   Pulmonary:      Effort: Pulmonary effort is normal.      Breath sounds: Normal breath sounds. No wheezing or rales.   Chest:      Chest wall: No tenderness.   Neurological:      General: No focal deficit present.      Mental Status: She is alert.      Cranial Nerves: Cranial nerves 2-12 are intact.      Sensory: Sensation is intact.      Motor: Motor function is intact.      Coordination: Coordination is intact.      Gait: Gait is intact.

## 2025-01-06 NOTE — ASSESSMENT & PLAN NOTE
Patient continuing to have elevations in blood pressure.  Has been quite significant.  When she was here in the office, however, it was much better.  At this point, perhaps a very low-dose medication will be reasonable.  Consider lisinopril 5 mg.  Would also recommend Holter monitor given the dizziness that she continues to have.  Orders:  •  Holter monitor; Future  •  lisinopril (ZESTRIL) 5 mg tablet; Take 1 tablet (5 mg total) by mouth daily

## 2025-01-06 NOTE — ASSESSMENT & PLAN NOTE
Several episodes of near syncope.  Cannot really find anything so far in the ER.  Had CT scan, labs, EKG.  So far, all negative.  Will start with low-dose ACE inhibitor for blood pressure, as I do not wish to start beta-blockers as we do not have evidence of tachycardia.  Holter monitor, cardiology follow-up.  Orders:  •  Holter monitor; Future

## 2025-01-07 ENCOUNTER — TELEPHONE (OUTPATIENT)
Age: 80
End: 2025-01-07

## 2025-01-07 NOTE — TELEPHONE ENCOUNTER
Pt states although she was feeling better yesterday at her visit she is dizzy again today and experiencing SOB which she thinks may be due to anxiety. She is scheduled to get the Holter monitor Thursday but she is not sure if she should go to the ER again or what she should do.    Please Advise

## 2025-01-07 NOTE — TELEPHONE ENCOUNTER
If she is feeling anxious and/or having increasing shortness of breath, she can certainly go to the emergency room.

## 2025-01-09 ENCOUNTER — HOSPITAL ENCOUNTER (OUTPATIENT)
Dept: NON INVASIVE DIAGNOSTICS | Facility: HOSPITAL | Age: 80
Discharge: HOME | End: 2025-01-09
Attending: FAMILY MEDICINE
Payer: COMMERCIAL

## 2025-01-09 DIAGNOSIS — R03.0 ELEVATED BLOOD PRESSURE READING: ICD-10-CM

## 2025-01-09 DIAGNOSIS — R55 NEAR SYNCOPE: ICD-10-CM

## 2025-01-09 PROCEDURE — 93225 XTRNL ECG REC<48 HRS REC: CPT

## 2025-01-09 PROCEDURE — 93226 XTRNL ECG REC<48 HR SCAN A/R: CPT

## 2025-01-13 ENCOUNTER — RA CDI HCC (OUTPATIENT)
Dept: OTHER | Facility: HOSPITAL | Age: 80
End: 2025-01-13

## 2025-01-15 PROCEDURE — 93227 XTRNL ECG REC<48 HR R&I: CPT | Performed by: INTERNAL MEDICINE

## 2025-01-20 ENCOUNTER — RESULTS FOLLOW-UP (OUTPATIENT)
Dept: FAMILY MEDICINE CLINIC | Facility: CLINIC | Age: 80
End: 2025-01-20

## 2025-01-20 NOTE — ASSESSMENT & PLAN NOTE
Started on lisinopril recently.  When she increased to 5 mg she felt short of breath which is a typical drug side effect for her.  She did not take any today and is feeling better.  Seems to have tolerated 2.5 mg, and I suggest she restart that.  Blood pressure is acceptable today.  Continue to monitor.

## 2025-01-20 NOTE — ASSESSMENT & PLAN NOTE
Hemoglobin A1c is stable at 6.3.  Continue metformin.      Lab Results   Component Value Date    HGBA1C 6.3 01/21/2025       Orders:  •  POCT hemoglobin A1c

## 2025-01-20 NOTE — TELEPHONE ENCOUNTER
Patient called in and was confused because she received a message on my chart about her being in the hospital when she isn't - warm trans call over to Marie in the office.

## 2025-01-20 NOTE — ASSESSMENT & PLAN NOTE
B12 was 264 in March 2024.  Patient has been receiving IM B12 injections monthly.    Follow vitamin B12 and methylmalonic acid levels.  Chronic usage of PPI as well as metformin.  Orders:  •  Vitamin B12; Future  •  Methylmalonic acid, serum; Future

## 2025-01-20 NOTE — ASSESSMENT & PLAN NOTE
Patient remains on rosuvastatin 5 mg daily.    Lipids have been ordered.  Continue current regimen.  Orders:  •  Lipid panel; Future

## 2025-01-21 ENCOUNTER — APPOINTMENT (OUTPATIENT)
Dept: LAB | Facility: CLINIC | Age: 80
End: 2025-01-21
Payer: COMMERCIAL

## 2025-01-21 ENCOUNTER — OFFICE VISIT (OUTPATIENT)
Dept: FAMILY MEDICINE CLINIC | Facility: CLINIC | Age: 80
End: 2025-01-21
Payer: COMMERCIAL

## 2025-01-21 VITALS
TEMPERATURE: 98.1 F | BODY MASS INDEX: 30.78 KG/M2 | HEART RATE: 74 BPM | SYSTOLIC BLOOD PRESSURE: 146 MMHG | OXYGEN SATURATION: 97 % | DIASTOLIC BLOOD PRESSURE: 82 MMHG | WEIGHT: 163 LBS | HEIGHT: 61 IN

## 2025-01-21 DIAGNOSIS — J43.8 OTHER EMPHYSEMA (HCC): ICD-10-CM

## 2025-01-21 DIAGNOSIS — E55.9 AVITAMINOSIS D: ICD-10-CM

## 2025-01-21 DIAGNOSIS — R03.0 ELEVATED BLOOD PRESSURE READING: ICD-10-CM

## 2025-01-21 DIAGNOSIS — E53.8 BIOTIN-(PROPIONYL-COA-CARBOXYLASE) LIGASE DEFICIENCY: Primary | ICD-10-CM

## 2025-01-21 DIAGNOSIS — E53.8 VITAMIN B12 DEFICIENCY: ICD-10-CM

## 2025-01-21 DIAGNOSIS — M35.9 UNDIFFERENTIATED CONNECTIVE TISSUE DISEASE (HCC): ICD-10-CM

## 2025-01-21 DIAGNOSIS — E11.9 CONTROLLED TYPE 2 DIABETES MELLITUS WITHOUT COMPLICATION, WITHOUT LONG-TERM CURRENT USE OF INSULIN (HCC): ICD-10-CM

## 2025-01-21 DIAGNOSIS — E78.5 HYPERLIPIDEMIA, UNSPECIFIED HYPERLIPIDEMIA TYPE: ICD-10-CM

## 2025-01-21 DIAGNOSIS — H53.9 VISUAL DISTURBANCE OF ONE EYE: ICD-10-CM

## 2025-01-21 DIAGNOSIS — H81.10 BENIGN PAROXYSMAL POSITIONAL VERTIGO, UNSPECIFIED LATERALITY: Primary | ICD-10-CM

## 2025-01-21 DIAGNOSIS — E55.9 VITAMIN D DEFICIENCY: ICD-10-CM

## 2025-01-21 DIAGNOSIS — M88.9 PAGET'S BONE DISEASE: ICD-10-CM

## 2025-01-21 DIAGNOSIS — R55 NEAR SYNCOPE: ICD-10-CM

## 2025-01-21 DIAGNOSIS — E78.00 PURE HYPERCHOLESTEROLEMIA: ICD-10-CM

## 2025-01-21 LAB
25(OH)D3 SERPL-MCNC: 62.4 NG/ML (ref 30–100)
CHOLEST SERPL-MCNC: 159 MG/DL (ref ?–200)
HDLC SERPL-MCNC: 52 MG/DL
LDLC SERPL CALC-MCNC: 86 MG/DL (ref 0–100)
NONHDLC SERPL-MCNC: 107 MG/DL
SL AMB POCT HEMOGLOBIN AIC: 6.3 (ref ?–6.5)
TRIGL SERPL-MCNC: 103 MG/DL (ref ?–150)
VIT B12 SERPL-MCNC: 250 PG/ML (ref 180–914)

## 2025-01-21 PROCEDURE — 83036 HEMOGLOBIN GLYCOSYLATED A1C: CPT | Performed by: FAMILY MEDICINE

## 2025-01-21 PROCEDURE — 80061 LIPID PANEL: CPT

## 2025-01-21 PROCEDURE — 83918 ORGANIC ACIDS TOTAL QUANT: CPT

## 2025-01-21 PROCEDURE — 99215 OFFICE O/P EST HI 40 MIN: CPT | Performed by: FAMILY MEDICINE

## 2025-01-21 PROCEDURE — 82607 VITAMIN B-12: CPT

## 2025-01-21 PROCEDURE — 36415 COLL VENOUS BLD VENIPUNCTURE: CPT

## 2025-01-21 PROCEDURE — G2211 COMPLEX E/M VISIT ADD ON: HCPCS | Performed by: FAMILY MEDICINE

## 2025-01-21 PROCEDURE — 82306 VITAMIN D 25 HYDROXY: CPT

## 2025-01-21 RX ORDER — MECLIZINE HYDROCHLORIDE 25 MG/1
25 TABLET ORAL 3 TIMES DAILY PRN
Qty: 30 TABLET | Refills: 0 | Status: SHIPPED | OUTPATIENT
Start: 2025-01-21

## 2025-01-21 NOTE — ASSESSMENT & PLAN NOTE
Evaluation today suggests BPV as opposed to cardiogenic syncope or orthostasis.  Blood pressure has been normal, in fact elevated.  Workup to date has been negative.

## 2025-01-21 NOTE — ASSESSMENT & PLAN NOTE
Continue over-the-counter vitamin D supplementation.  Check vitamin D level.  Orders:  •  Vitamin D 25 hydroxy; Future

## 2025-01-21 NOTE — PROGRESS NOTES
"Name: Melissa Nguyen      : 1945      MRN: [id number]  Encounter Provider: Zayra Jessica MD  Encounter Date: 2025   Encounter department: [facility] PRIMARY CARE  :    Mrs. Nguyen is a pleasant 79-year-old female who is well-known to me.  Over the last month she was evaluated in the emergency department twice with complaints of dizziness and near syncope.  She has seen Dr. Zee twice since then.  A Holter monitor was ordered which revealed \"  predominant sinus rhythm with an average heart rate of 73 BPM, a minimum heart rate of 54 BPM, and a maximum heart rate of 112 BPM.\"    Today, she presents with continued symptoms of intermittent dizziness.  Since the onset of symptoms she has \"had 7 really bad episodes\", but no \"really bad ones\" within the last 10 days.  She states that the feeling is \"in my head.\"  She has a constant, background headache and feels a pressure in her head.  She states \"I feel like there is a vice around the top of my head.  She cannot drive, cannot vacuum her house, and feels generally incapacitated by her symptoms.    Last week (Thursday) she was sitting on her sofa and all of a sudden the vision in her R eye blurred and she zig zag line her field of vision- only the rigfht eye  Closed eyes and wtihin 7 minutes it resolved.  She has had 3 migraine headaches in her whole life and states that she is not really headache prone.    Last ophtho appointment was about 4 months ago  Cataract surgery 1 year ago, bilateral - Dr. Middleton. Has appt in March    Assessment & Plan  Benign paroxysmal positional vertigo, unspecified laterality  Physical exam today as well as Epley maneuver in the office consistent with BPV, and able to elicit symptoms that she has been having for the last 2 to 3 weeks.  I am almost certain that, given her recent ED evaluation, normal CT contrast of neck and brain, as well as normal Holter monitor, that her symptomatology is due to BPV or acute viral " vestibulitis.  Recommend vestibular rehab.  Meclizine, as needed.  Return to office in 1 week for recheck.  Orders:  •  Ambulatory Referral to Physical Therapy; Future  •  meclizine (ANTIVERT) 25 mg tablet; Take 1 tablet (25 mg total) by mouth 3 (three) times a day as needed for dizziness    Near syncope  Evaluation today suggests BPV as opposed to cardiogenic syncope or orthostasis.  Blood pressure has been normal, in fact elevated.  Workup to date has been negative.       Visual disturbance of one eye  Etiology is unclear and suspicious for migraine.  Patient however, is not a typical migraine suffer.  Recent CT with contrast of head and neck is within normal limits revealing no areas of suspicious stenosis in brain or neck vasculature.  I suggest she see her ophthalmologist.  If symptoms persist, could consider MRI/MRA head and neck.  I will see her back in 1 week for close follow-up.       Elevated blood pressure reading  Started on lisinopril recently.  When she increased to 5 mg she felt short of breath which is a typical drug side effect for her.  She did not take any today and is feeling better.  Seems to have tolerated 2.5 mg, and I suggest she restart that.  Blood pressure is acceptable today.  Continue to monitor.       Controlled type 2 diabetes mellitus without complication, without long-term current use of insulin (HCC)  Hemoglobin A1c is stable at 6.3.  Continue metformin.      Lab Results   Component Value Date    HGBA1C 6.3 01/21/2025       Orders:  •  POCT hemoglobin A1c    Pure hypercholesterolemia  Patient remains on rosuvastatin 5 mg daily.    Lipids have been ordered.  Continue current regimen.  Orders:  •  Lipid panel; Future    Other emphysema (HCC)  Stable.  Resistant to smoking cessation.  Continue spiriva and symbicort.       Vitamin B12 deficiency  B12 was 264 in March 2024.  Patient has been receiving IM B12 injections monthly.    Follow vitamin B12 and methylmalonic acid  "levels.  Chronic usage of PPI as well as metformin.  Orders:  •  Vitamin B12; Future  •  Methylmalonic acid, serum; Future    Paget's bone disease  Saw Dr. Matias, Endocrinology.  Suggested future f/u with Rheumatology - Dr. Flanagan?       Undifferentiated connective tissue disease (HCC)  Patient will follow-up with Dr. Flanagan.  She states that at the present time, she is not interested in trying any new meds.  She understands her limitations in regards to her energy and pain levels and is able to live life accordingly.       Vitamin D deficiency  Continue over-the-counter vitamin D supplementation.  Check vitamin D level.  Orders:  •  Vitamin D 25 hydroxy; Future         Follow-up with me in 1 week.    50 minutes spent on patient today with greater than 50% of time spent on counseling and care coordination of the above, as well as review of chart notes, ED visits, labs and imaging results.  Holter monitor reviewed with patient today.      History of Present Illness   Chief Complaint   Patient presents with   • Follow-up     ED f/u dx syncope   Would like to discuss Holter monitor results   Not feeling better - still feeling dizziness        Mrs. Nguyen is a pleasant 79-year-old female who is well-known to me.  Over the last month she was evaluated in the emergency department twice with complaints of dizziness and near syncope.  She has seen Dr. Zee twice since then.  A Holter monitor was ordered which revealed \"  predominant sinus rhythm with an average heart rate of 73 BPM, a minimum heart rate of 54 BPM, and a maximum heart rate of 112 BPM.\"    Today, she presents with continued symptoms of intermittent dizziness.  Since the onset of symptoms she has \"had 7 really bad episodes\", but no \"really bad ones\" within the last 10 days.  She states that the feeling is \"in my head.\"  She has a constant, background headache and feels a pressure in her head.  She states \"I feel like there is a vice around the top of " "my head.  She cannot drive, cannot vacuum her house, and feels generally incapacitated by her symptoms.    Last week (Thursday) she was sitting on her sofa and all of a sudden the vision in her R eye blurred and she zig zag line her field of vision- only the rigfht eye  Closed eyes and wtihin 7 minutes it resolved.  She has had 3 migraine headaches in her whole life and states that she is not really headache prone.    Last ophtho appointment was about 4 months ago  Cataract surgery 1 year ago, bilateral - Dr. Middleton. Has appt in March          Review of Systems   Constitutional:  Positive for activity change.        See HPI   HENT:  Negative for congestion, ear pain, mouth sores, sinus pressure and trouble swallowing.         Intermittent feeling of \"pressure between my ears.\"   Eyes:  Positive for visual disturbance. Negative for discharge, redness and itching.        HPI   Respiratory:  Negative for apnea, cough, chest tightness, shortness of breath, wheezing and stridor.         See HPI   Cardiovascular:  Negative for chest pain, palpitations and leg swelling.   Gastrointestinal:  Negative for abdominal distention, abdominal pain, blood in stool, constipation, diarrhea, nausea and vomiting.   Endocrine: Negative for cold intolerance and heat intolerance.   Genitourinary:  Negative for difficulty urinating, dysuria, flank pain and urgency.   Musculoskeletal:  Negative for arthralgias and myalgias.   Skin:  Negative for rash.   Neurological:  Positive for dizziness and light-headedness. Negative for seizures, syncope, speech difficulty, weakness, numbness and headaches.        Upon further questioning, patient notes that symptoms are worse when moving around, usually when walking, looking up and looking down.  Symptoms are associated with nausea and occasionally vomiting.  The symptoms do not seem to bother her in bed at night and have not affected her sleep patterns.   Hematological:  Negative for adenopathy. " "  Psychiatric/Behavioral:  Negative for agitation, behavioral problems, confusion and sleep disturbance. The patient is not nervous/anxious.        Objective   /82 (BP Location: Right arm, Patient Position: Sitting, Cuff Size: Adult)   Pulse 74   Temp 98.1 °F (36.7 °C) (Temporal)   Ht 5' 1\" (1.549 m) Comment: on record  Wt 73.9 kg (163 lb)   LMP  (LMP Unknown)   SpO2 97%   BMI 30.80 kg/m²      Physical Exam  Vitals and nursing note reviewed.   Constitutional:       General: She is not in acute distress.     Appearance: Normal appearance. She is well-developed. She is not ill-appearing, toxic-appearing or diaphoretic.      Comments: Patient appears well and in no apparent distress.  She is maneuvering and moving without difficulty.  Her symptoms are able to be reproduced and elicited during today's exam.  See below.   HENT:      Ears:      Comments: Nystagmus noted rightward gaze.  Eyes:      General: No scleral icterus.        Right eye: No discharge.         Left eye: No discharge.      Extraocular Movements: Extraocular movements intact.      Conjunctiva/sclera: Conjunctivae normal.      Pupils: Pupils are equal, round, and reactive to light.   Neck:      Vascular: No carotid bruit.   Cardiovascular:      Rate and Rhythm: Normal rate and regular rhythm.      Heart sounds: Normal heart sounds. No murmur heard.     No friction rub. No gallop.   Pulmonary:      Effort: Pulmonary effort is normal. No respiratory distress.      Breath sounds: Normal breath sounds. No stridor. No wheezing, rhonchi or rales.   Chest:      Chest wall: No tenderness.   Musculoskeletal:         General: Normal range of motion.      Cervical back: Normal range of motion and neck supple. No tenderness.      Right lower leg: No edema.      Left lower leg: No edema.   Lymphadenopathy:      Cervical: No cervical adenopathy.   Skin:     Coloration: Skin is not jaundiced.   Neurological:      Mental Status: She is alert and oriented " to person, place, and time.      Sensory: No sensory deficit.      Motor: No weakness.      Comments: Statin is noted on right work gaze.  Symptoms of dizziness and nausea are elicited with looking upward as well as downward.  When patient was asked to lay back on the exam table, she also felt dizzy and nauseated.    Epley maneuver was performed, eliciting same symptoms.  Patient felt dizzy and nauseated.   Psychiatric:         Mood and Affect: Mood normal.         Behavior: Behavior normal.         Thought Content: Thought content normal.         Judgment: Judgment normal.

## 2025-01-24 ENCOUNTER — EVALUATION (OUTPATIENT)
Facility: REHABILITATION | Age: 80
End: 2025-01-24
Payer: COMMERCIAL

## 2025-01-24 ENCOUNTER — RESULTS FOLLOW-UP (OUTPATIENT)
Dept: FAMILY MEDICINE CLINIC | Facility: CLINIC | Age: 80
End: 2025-01-24

## 2025-01-24 DIAGNOSIS — H81.10 BENIGN PAROXYSMAL POSITIONAL VERTIGO, UNSPECIFIED LATERALITY: Primary | ICD-10-CM

## 2025-01-24 LAB — METHYLMALONATE SERPL-SCNC: 100 NMOL/L (ref 0–378)

## 2025-01-24 PROCEDURE — 97112 NEUROMUSCULAR REEDUCATION: CPT | Performed by: PHYSICAL THERAPIST

## 2025-01-24 PROCEDURE — 97163 PT EVAL HIGH COMPLEX 45 MIN: CPT | Performed by: PHYSICAL THERAPIST

## 2025-01-24 NOTE — PROGRESS NOTES
"PT Evaluation     Today's date: 2025  Patient name: Melissa Nguyen  : 1945  MRN: [id number]  Referring provider: Zayra Jessica MD  Dx:   Encounter Diagnosis     ICD-10-CM    1. Benign paroxysmal positional vertigo, unspecified laterality  H81.10 Ambulatory Referral to Physical Therapy          Start Time: 1015  Stop Time: 1100  Total time in clinic (min): 45 minutes    Assessment  Impairments: activity limitations    Assessment details: Patient is a pleasant 79 year old female who was referred to Out/patient Physical Therapy  s/p three week history of dizziness. She hs been seen I the ER x2.  A Holter monitor was ordered which revealed \"  predominant sinus rhythm with an average heart rate of 73 BPM, a minimum heart rate of 54 BPM, and a maximum heart rate of 112 BPM.\".  She lives by herself, ambulates with a cane due to history of back pain.  Upon assessment she presented with positive Right Robards Hallpike: + rotational nystagmus, lasting 4 seconds, with intensity of 6/10. Canalization maneuve was perform as per flow sheet with resolution of symptoms. Discussed potential symptoms she may feel post CRM, pt communicated understanding.MCTSIB 115/120, FGA 23/30 which puts her at low risk of falls. Her DHI shows her at high perceived level of handicap.   Will follow-up with PT and assess/ intervene as indicated. Continue with patient education.            Goals  Short term goal: 4 weeks  Patient will report no dizziness symptoms in 5 consecutive days  Will improved DHI by 30% for improved perception of handicap  Will improve FGA for improved dynamic balance by 3 points.    Plan    Planned therapy interventions: manual therapy, neuromuscular re-education, patient/caregiver education, postural training and canalith repositioning    Frequency: 1-2x week  Duration in weeks: 4  Plan of Care beginning date: 2025  Plan of Care expiration date: 2025  Treatment plan discussed with: patient        Subjective " Evaluation    History of Present Illness  Date of onset: 2024  Mechanism of injury: I just got super dizzy  when I was in the bathroom. I started sweating, I was able to walk to the living room and called an ambulance. Very dizzy, lightheaded and I thought I was going go fall. I was very unsteady .  She was taken to the hospital, imaging performed. Negative finding. I have episodes of the room spining, they are on and off. I also have ressure in my head., which does not go away.  Quality of life: good    Pain  Current pain ratin  Location: chronic back: padgets diseace. h/f left side soreness    Social Support  Steps to enter house: yes  9  Stairs in house: no   Lives in: one-story house  Lives with: alone    Employment status: not working    Diagnostic Tests  CT scan: normal      Vestibular Symptoms    Onset Acute   Symptoms Vertigo   Tempo Present for hours   Provocation Bending over: Yes  Turning Head: No  Rolling in bed: No  Walking: Yes  Looking up: No  Supine to/from sitting: No    Walking in busy environment: Yes        Objective  118/82    Oculomotor Function    Resting Nystagmus  Normal   Gaze Holding Nystagmus  Normal   Smooth pursuit  Normal   Cross-Cover Testing  Normal       Coordination Testing Normal / Abnormal Notes   Fingertip to Nose Normal    Rapidly Alternating Hand Movements Normal    Pronator Drift Normal       Flowsheet Rows      Flowsheet Row Most Recent Value   Dizziness Handicap Index    Does looking up increase your problem?  4   Because of your problem, do you feel frustrated?  4   Because of your problem, do you restrict your travel for business or recreation? 4   Does walking down the aisle of a supermarket increase your problem? 4   Because of your problem, do you have difficulty getting in or out of bed? 0   Does your problem significantly restrict your participation in social activities (Dinner, movies, dancing, parties)? 4   Because of your problem, do you have difficulty  reading? 4   Does performing more ambitious activities such as sports, dancing, household chores (sweeping or putting dishes away) increase your problems? 4   Because of your problem, are you afraid to leave your home without having someone accompany you? 4   Because of your problem have you been embarrassed in front of others? 0   Do quick movements of your head increase your problem? 4   Because of your problem, do you avoid heights? 4   Does turning over in bed increase your problem? 0   Because of your problem, is it difficult for you to do strenuous homework or yardwork? 2   Because of your problem, are you afraid people may think you are intoxicated? 4   Because of your problem, is it difficult for you to go for a walk by yourself? 4   Does walking down a sidewalk increase your problem? 4   Because of your problem, is it difficult for you to concentrate? 2   Because of your problem, is it difficult for you to walk around your house in the dark? 4   Because of your problem, are you afraid to stay at home alone? 0   Because of your problem ,do you feel handicapped? 4   Has the problem placed stress on your relationships with members of your family? 0   Because of your problem, are you depressed? 2   Does your problem interfere with your job or household responsibilities? 4   Does bending over increase your problem? 4   Total score 74   Functional Gait Assessment    Gait Level Surface  2   Change in GaiT Speed 3   Gait with horizontal head turns 2   Gait with vertical head turns 2   Gait and pivot tuen  2   Step over obstacle 3   Gait with narrow base of supprt 2   Gait with eyes closed 2   Ambulating backwards 3   Steps 2   Total score  23              Static MCTSIB  Sec   FTEO 30   FTEC 30   Foam FTEO 30   Foam FETC 25     Balance & Mobility Measures 1/24/25   Assistive device used? None   Walking speed 1.0 meters/second   Functional Gait Assessment (see below) 23/30   MSTSIB 115/120   Patient-Reported Outcome  Measure: Dizziness Handicap Inventory (DHI) 74     Positional Vertigo Tests:  Right Left   Neck range of motion WFL    Sheridan-Hallpike positive negative   Roll Test negative negative   Nystagmus: Yes   Type: torsional, upbeating  Onset: 0 seconds delay  Duration: 4 seconds  Severity of symptoms: 6/10     Head thrust and Head shaking tests    Head thrust normal          Precautions: back pain       1/24/25            Neuro Re-Ed             Sunitha hallpike  x 2 Initial/  re-ceck            Roll test x2            Epley X1  Right            /120            MCTSIB 23/30

## 2025-01-27 ENCOUNTER — APPOINTMENT (OUTPATIENT)
Facility: REHABILITATION | Age: 80
End: 2025-01-27
Payer: COMMERCIAL

## 2025-01-27 PROBLEM — H53.9 VISUAL DISTURBANCE OF ONE EYE: Status: ACTIVE | Noted: 2025-01-27

## 2025-01-27 PROBLEM — H81.10 BENIGN PAROXYSMAL POSITIONAL VERTIGO: Status: ACTIVE | Noted: 2025-01-27

## 2025-01-28 ENCOUNTER — OFFICE VISIT (OUTPATIENT)
Dept: FAMILY MEDICINE CLINIC | Facility: CLINIC | Age: 80
End: 2025-01-28
Payer: COMMERCIAL

## 2025-01-28 VITALS
SYSTOLIC BLOOD PRESSURE: 132 MMHG | OXYGEN SATURATION: 97 % | HEIGHT: 61 IN | TEMPERATURE: 97.3 F | BODY MASS INDEX: 30.78 KG/M2 | HEART RATE: 92 BPM | WEIGHT: 163 LBS | DIASTOLIC BLOOD PRESSURE: 66 MMHG

## 2025-01-28 DIAGNOSIS — H53.9 VISUAL DISTURBANCE OF ONE EYE: ICD-10-CM

## 2025-01-28 DIAGNOSIS — R03.0 ELEVATED BLOOD PRESSURE READING: ICD-10-CM

## 2025-01-28 DIAGNOSIS — E53.8 VITAMIN B12 DEFICIENCY: ICD-10-CM

## 2025-01-28 DIAGNOSIS — H81.10 BENIGN PAROXYSMAL POSITIONAL VERTIGO, UNSPECIFIED LATERALITY: Primary | ICD-10-CM

## 2025-01-28 DIAGNOSIS — E78.00 PURE HYPERCHOLESTEROLEMIA: ICD-10-CM

## 2025-01-28 DIAGNOSIS — H93.12 TINNITUS, LEFT EAR: ICD-10-CM

## 2025-01-28 DIAGNOSIS — R06.02 SHORTNESS OF BREATH: ICD-10-CM

## 2025-01-28 PROCEDURE — 96372 THER/PROPH/DIAG INJ SC/IM: CPT | Performed by: FAMILY MEDICINE

## 2025-01-28 PROCEDURE — 99214 OFFICE O/P EST MOD 30 MIN: CPT | Performed by: FAMILY MEDICINE

## 2025-01-28 RX ADMIN — CYANOCOBALAMIN 1000 MCG: 1000 INJECTION, SOLUTION INTRAMUSCULAR; SUBCUTANEOUS at 09:18

## 2025-01-28 NOTE — PROGRESS NOTES
1Name: Melissa Nguyen      : 1945      MRN: [id number]  Encounter Provider: Zayra Jessica MD  Encounter Date: 2025   Encounter department: [facility] PRIMARY CARE  :  Mrs. Nguyen presents today for close follow-up.  I saw her 1 week ago for dizziness and ? Near-syncope.  She did see vestibular rehab and reports feeling better, although normal not back to baseline.  She was using meclizine but not in the last couple of days.  L ear - hears tinnitus, buzzing, high pitched or loud ringing - new onset  Still feels a slight headache  but band around head feeling is much improved.    Labs to review today.  Recheck BP  Visual disturbance - has not occurred again. Seeing Optho in March.    Yesterday started to feel some SOB. Started at night. In her experience these sx have occurred in the past and usually related to starting a new med - she remains on lisinopril 2.5mg which was recently started.    Assessment & Plan  Benign paroxysmal positional vertigo, unspecified laterality  Had vestibular rehab and feeling better. Not back to baseline.  Now has tinnitus.       Tinnitus, left ear  Acute onset.  She will go for another visit for Vest rehab.  If sx persist, would do MRI/MRA head and neck, and R/O acoustic neuroma.       Shortness of breath  Mild. She feels this is different from her intermittent COPD SOB which is usually on exertrion.  This feels mild and is present all the time and feels as if she can't take a full breath. Does not interfere with her day. She feels it is due to the onset of new med, lisinopril.  Pulse ox is stable.  D/C lisinopril and see if sx resolve.  Notify me if sx worsen or persist.       Visual disturbance of one eye  Pt to see Optho - in March  No further recurrence       Elevated blood pressure reading  On Lisinopril 2.5mg daily. BP is good but perhaps SOB is due to new med?  BP at home 130s / 60-70s  She will d/c lisinopril for now to see if SOB resolves.  Recheck BP in 2  weeks.       Vitamin B12 deficiency  Vitamin B12 is 250.   To resume IM monthly injections.  Add daily 1000mcg sublingual B12       Pure hypercholesterolemia  Excellent control.  Continue rosuvastatin 5mg daily.         Labs printed and reviewed at length with patient today.  Return to office in 2 weeks.     History of Present Illness   Chief Complaint   Patient presents with   • Follow-up     Routine 4 month f/u   Per pt had a therapy session with PT per pt vertigo has improved since last visit        Mrs. Nguyen presents today for close follow-up.  I saw her 1 week ago for dizziness and ? Near-syncope.  She did see vestibular rehab and reports feeling better, although normal not back to baseline.  She was using meclizine but not in the last couple of days.  L ear - hears tinnitus, buzzing, high pitched or loud ringing - new onset  Still feels a slight headache  but band around head feeling is much improved.    Labs to review today.  Recheck BP  Visual disturbance - has not occurred again. Seeing Optho in March.    Yesterday started to feel some SOB. Started at night. In her experience these sx have occurred in the past and usually related to starting a new med - she remains on lisinopril 2.5mg which was recently started.          Review of Systems   Constitutional:         See HPI   HENT:  Positive for tinnitus. Negative for congestion, ear pain, mouth sores, sinus pressure and trouble swallowing.    Eyes: Negative.  Negative for discharge, redness and itching.   Respiratory:  Positive for shortness of breath. Negative for apnea, cough, chest tightness, wheezing and stridor.    Cardiovascular:  Negative for chest pain, palpitations and leg swelling.   Gastrointestinal:  Negative for abdominal distention, abdominal pain, blood in stool, constipation, diarrhea, nausea and vomiting.   Endocrine: Negative for cold intolerance and heat intolerance.   Genitourinary:  Negative for difficulty urinating, dysuria, flank  "pain and urgency.   Musculoskeletal:  Negative for arthralgias and myalgias.   Skin:  Negative for rash.   Neurological:  Positive for dizziness. Negative for seizures, syncope, speech difficulty, weakness, light-headedness, numbness and headaches.        Still feel off-balance   Hematological:  Negative for adenopathy.   Psychiatric/Behavioral:  Negative for agitation, behavioral problems, confusion and sleep disturbance. The patient is not nervous/anxious.        Objective   /66 (BP Location: Left arm, Patient Position: Sitting, Cuff Size: Adult)   Pulse 92   Temp (!) 97.3 °F (36.3 °C) (Probe)   Ht 5' 1\" (1.549 m)   Wt 73.9 kg (163 lb)   LMP  (LMP Unknown)   SpO2 97%   BMI 30.80 kg/m²      Physical Exam  Vitals and nursing note reviewed.   Constitutional:       General: She is not in acute distress.     Appearance: Normal appearance. She is well-developed. She is not ill-appearing, toxic-appearing or diaphoretic.   HENT:      Right Ear: Tympanic membrane normal.      Left Ear: Tympanic membrane normal.   Eyes:      General: No scleral icterus.     Extraocular Movements: Extraocular movements intact.      Pupils: Pupils are equal, round, and reactive to light.      Comments: No nystagmus   Neck:      Vascular: No carotid bruit.   Cardiovascular:      Rate and Rhythm: Normal rate and regular rhythm.      Heart sounds: Normal heart sounds. No murmur heard.     No friction rub. No gallop.   Pulmonary:      Effort: Pulmonary effort is normal. No respiratory distress.      Breath sounds: Normal breath sounds. No stridor. No wheezing, rhonchi or rales.   Chest:      Chest wall: No tenderness.   Abdominal:      General: Abdomen is flat. Bowel sounds are normal. There is no distension.      Palpations: Abdomen is soft. There is no mass.      Tenderness: There is no abdominal tenderness. There is no guarding or rebound.      Hernia: No hernia is present.   Musculoskeletal:         General: Normal range of " motion.      Cervical back: Normal range of motion and neck supple.      Right lower leg: No edema.      Left lower leg: No edema.   Lymphadenopathy:      Cervical: No cervical adenopathy.   Skin:     Coloration: Skin is not jaundiced.   Neurological:      Mental Status: She is alert and oriented to person, place, and time.   Psychiatric:         Mood and Affect: Mood normal.         Behavior: Behavior normal.         Thought Content: Thought content normal.         Judgment: Judgment normal.

## 2025-01-28 NOTE — ASSESSMENT & PLAN NOTE
On Lisinopril 2.5mg daily. BP is good but perhaps SOB is due to new med?  BP at home 130s / 60-70s  She will d/c lisinopril for now to see if SOB resolves.  Recheck BP in 2 weeks.

## 2025-01-29 ENCOUNTER — OFFICE VISIT (OUTPATIENT)
Facility: REHABILITATION | Age: 80
End: 2025-01-29
Payer: COMMERCIAL

## 2025-01-29 DIAGNOSIS — H81.10 BENIGN PAROXYSMAL POSITIONAL VERTIGO, UNSPECIFIED LATERALITY: Primary | ICD-10-CM

## 2025-01-29 PROCEDURE — 97112 NEUROMUSCULAR REEDUCATION: CPT | Performed by: PHYSICAL THERAPIST

## 2025-01-29 NOTE — PROGRESS NOTES
Daily Note        Today's date: 2025  Patient name: Melissa Nguyen  : 1945  MRN: [id number]  Referring provider: Zayra Jessica MD  Dx:   Encounter Diagnosis     ICD-10-CM    1. Benign paroxysmal positional vertigo, unspecified laterality  H81.10             Start Time: 1015  Stop Time: 1100  Total time in clinic (min): 45 minutes    Assessment    Assessment details: Vestibular system re-tested. Resolution of BPPV pn Right today as compared drew ast visit. She had + very mild dizziness symptom reproduction with left Sunitha Hallpike.  Resolved with recanalization maneuvers.  Worked on ROM c/s via manual therapy as well as there exercises. Improved Left side of head pressure sensation to a smaller area.  She will continue to benefit from continued PT for habituation activities, manual and vestibular re-assessment.          Goals  Short term goal: 4 weeks  Patient will report no dizziness symptoms in 5 consecutive days  Will improved DHI by 30% for improved perception of handicap  Will improve FGA for improved dynamic balance by 3 points.    Plan    Planned therapy interventions: manual therapy    Frequency: 1-2x week  Duration in weeks: 4  Plan of Care beginning date: 2025  Plan of Care expiration date: 2025  Treatment plan discussed with: patient    Subjective:  Orthos  Supine:128/76 BP Hr 74 Spo2 93%  Sitting 126/74 BP HR 88 Spo2 95%  Standing 130/78 BP HR 95 Spo2 94%    Vestibular Symptoms    Onset Acute   Symptoms Vertigo   Tempo Present for hours   Provocation Bending over: Yes  Turning Head: No  Rolling in bed: No  Walking: Yes  Looking up: No  Supine to/from sitting: No    Walking in busy environment: Yes        Objective  118/82      Positional Vertigo Tests:  Right Left   Neck range of motion WFL    Sunitha-Hallpike negative Symptom reproduction   Roll Test negative negative   Nystagmus:no   Onset: 0 seconds delay  Duration: 2 seconds  Severity of symptoms: 6/10            Precautions: back  pain       1/24/25 1/29/25           Neuro Re-Ed             Sunitha doherty  x 2 Initial/  re-ceck Tested x2           Roll test x2 Tested x2           Epley X1  Right x1           MCTSIB 115/120            FGQ 23/30                                                   Manual therapy  Trigger point release  Upper Trap, levator, rhomboids                                                                 There exercises             C/s rotation  8 x :10           Lateral flexion  5 x :10

## 2025-01-31 ENCOUNTER — OFFICE VISIT (OUTPATIENT)
Facility: REHABILITATION | Age: 80
End: 2025-01-31
Payer: COMMERCIAL

## 2025-01-31 DIAGNOSIS — H81.10 BENIGN PAROXYSMAL POSITIONAL VERTIGO, UNSPECIFIED LATERALITY: Primary | ICD-10-CM

## 2025-01-31 PROCEDURE — 97112 NEUROMUSCULAR REEDUCATION: CPT | Performed by: PHYSICAL THERAPIST

## 2025-01-31 PROCEDURE — 97110 THERAPEUTIC EXERCISES: CPT | Performed by: PHYSICAL THERAPIST

## 2025-01-31 NOTE — PROGRESS NOTES
Daily Note        Today's date: 2025  Patient name: Melissa Nguyen  : 1945  MRN: [id number]  Referring provider: Zayra Jessica MD  Dx:   Encounter Diagnosis     ICD-10-CM    1. Benign paroxysmal positional vertigo, unspecified laterality  H81.10             Start Time: 1200  Stop Time: 1240  Total time in clinic (min): 40 minutes    Assessment    Assessment details: Patient reports resolution of vestibular dizziness since last appointment. Does have residual off feeling and ringing.  She ambulates with guarded head movement and postural misalignment. C/s musculature responded well to soft tissue work and increased her ROM. Added shoulder girdles strengthening today as well as habituation activities to help resolve her off balance feeling. She will benefit from continued skilled services              Goals  Short term goal: 4 weeks  Patient will report no dizziness symptoms in 5 consecutive days  Will improved DHI by 30% for improved perception of handicap  Will improve FGA for improved dynamic balance by 3 points.    Plan    Planned therapy interventions: manual therapy, postural training, home exercise program and neuromuscular re-education    Frequency: 1-2x week  Duration in weeks: 4  Plan of Care beginning date: 2025  Plan of Care expiration date: 2025  Treatment plan discussed with: patient      Subjective: right now all I feel is a congestion of my head and an off feeling. No more dizziness.      Vestibular Symptoms    Onset Acute   Symptoms Vertigo   Tempo Present for hours   Provocation Bending over: Yes  Turning Head: No  Rolling in bed: No  Walking: Yes  Looking up: No  Supine to/from sitting: No    Walking in busy environment: Yes        Objective   Postural alignment; Forward head, round shoulders, limited head movement during functional ambulation      Positional Vertigo Tests:  Right Left   Neck range of motion WFL    Sunitha-Hallpike negative Symptom reproduction   Roll Test negative  negative   Nystagmus:no   Onset: 0 seconds delay  Duration: 2 seconds  Severity of symptoms: 6/10            Precautions: back pain       1/24/25 1/29/25 1/31          Neuro Re-Ed             Sunitha doherty  x 2 Initial/  re-ceck Tested x2           Roll test x2 Tested x2           Epley X1  Right x1           MCTSIB 115/120            FGQ 23/30            Overgrond ambulation   ' x 1  ' x 1  Tandem 150' x 2  ' x 1  Backwards 150' x 1               Standing reaching down for cones, following with head diagonally /above head  Min reproduction of pressure feeling  6 min                       Manual therapy  Trigger point release  Upper Trap, levator, rhomboids   Thera gun x 6 min Upper throrac/c/s musculature  4  trigger point release   Upper Trap, levator, rhomboids                                                              There exercises             C/s rotation  8 x :10 5x :10 verbal cues as for proper body mecanics          Lateral flexion  5 x :10 5 x :10          rows   2 x 10 :03 Green theraband          Low rows   2 x 10 :03  GT          extension   2 x 10 :03  GT

## 2025-02-03 DIAGNOSIS — J45.20 MILD INTERMITTENT REACTIVE AIRWAY DISEASE WITHOUT COMPLICATION: ICD-10-CM

## 2025-02-03 RX ORDER — ALBUTEROL SULFATE 90 UG/1
1-2 INHALANT RESPIRATORY (INHALATION) AS NEEDED
Qty: 18 G | Refills: 1 | Status: SHIPPED | OUTPATIENT
Start: 2025-02-03

## 2025-02-04 ENCOUNTER — OFFICE VISIT (OUTPATIENT)
Facility: REHABILITATION | Age: 80
End: 2025-02-04
Payer: COMMERCIAL

## 2025-02-04 ENCOUNTER — RA CDI HCC (OUTPATIENT)
Dept: OTHER | Facility: HOSPITAL | Age: 80
End: 2025-02-04

## 2025-02-04 DIAGNOSIS — H81.10 BENIGN PAROXYSMAL POSITIONAL VERTIGO, UNSPECIFIED LATERALITY: Primary | ICD-10-CM

## 2025-02-04 PROCEDURE — 97110 THERAPEUTIC EXERCISES: CPT | Performed by: PHYSICAL THERAPIST

## 2025-02-04 PROCEDURE — 97112 NEUROMUSCULAR REEDUCATION: CPT | Performed by: PHYSICAL THERAPIST

## 2025-02-04 PROCEDURE — 97140 MANUAL THERAPY 1/> REGIONS: CPT | Performed by: PHYSICAL THERAPIST

## 2025-02-04 NOTE — PROGRESS NOTES
Daily Note        Today's date: 2025  Patient name: Melissa Nguyen  : 1945  MRN: [id number]  Referring provider: Zayra Jessica MD  Dx:   Encounter Diagnosis     ICD-10-CM    1. Benign paroxysmal positional vertigo, unspecified laterality  H81.10               Start Time: 1515  Stop Time: 1600  Total time in clinic (min): 45 minutes    Assessment    Assessment details: Patient reports resolution of vestibular dizziness since last appointment. Does have residual off feeling and ringing.  She ambulates with guarded head movement and postural misalignment. C/s musculature responded well to soft tissue work and increased her ROM. Added shoulder girdles strengthening today as well as habituation activities to help resolve her off balance feeling. She will benefit from continued skilled services              Goals  Short term goal: 4 weeks  Patient will report no dizziness symptoms in 5 consecutive days  Will improved DHI by 30% for improved perception of handicap  Will improve FGA for improved dynamic balance by 3 points.    Plan    Planned therapy interventions: manual therapy, postural training, home exercise program and neuromuscular re-education    Frequency: 1-2x week  Duration in weeks: 4  Plan of Care beginning date: 2025  Plan of Care expiration date: 2025  Treatment plan discussed with: patient      Subjective: my back started bothering me since the last visit, walking backwards and I also started doing the gazelle at home which also makes it hurt. Had two episodes of the dizziness at home      Objective   Postural alignment; Forward head, round shoulders, limited head movement during functional ambulation      Positional Vertigo Tests:  Right Left   Neck range of motion WFL    Sunitha-Hallpike Right dizziness negative   Roll Test negative negative   Nystagmus:no   Onset: 0 seconds delay  Duration: 4 seconds  Severity of symptoms: 5/10            Precautions: back pain       25  2/04         Neuro Re-Ed             [place] hallpike  x 2 Initial/  re-ceck Tested x2  Tested x 2         Roll test x2 Tested x2           Epley X1  Right X1 left  X1 Right         MCTSIB 115/120            FGQ 23/30            Overgrond ambulation   ' x 1  ' x 1  Tandem 150' x 2  ' x 1  Backwards 150' x 1   ' x 2  ' x 2  EC/BWD  25' x 5  No dizziness            Standing reaching down for cones, following with head diagonally /above head  Min reproduction of pressure feeling  6 min                       Manual therapy  Trigger point release  Upper Trap, levator, rhomboids   Thera gun x 6 min Upper throrac/c/s musculature  4  trigger point release   Upper Trap, levator, rhomboids Trigger point release  Upper Trap, levator, rhomboids 10 min                                                             There exercises             C/s rotation  8 x :10 5x :10 verbal cues as for proper body mechanics 5x :10 verbal cues as for proper body mechanics         Lateral flexion  5 x :10 5 x :10 5 x :10         rows   2 x 10 :03 Green theraband 2 x 10 :03 Green theraband         Low rows   2 x 10 :03  GT 2 x 10 :03  GT         extension   2 x 10 :03  GT 2 x 10 :03  GT            A.  Vestibular system tested. + Right [place] Hallpike with resolution od symptoms with recanalization.  Continued with challenged balance activities with decrease reproduction of symptoms. Reporting continuing to diminish area of pressure band around head.  Responded well to manual therapy and resolution of muscle spasms.  She will continue to benefit from skilled PT intervention.    P. Reassess vestibular system as indicated, progress balance and exertional challenge.

## 2025-02-06 ENCOUNTER — APPOINTMENT (OUTPATIENT)
Facility: REHABILITATION | Age: 80
End: 2025-02-06
Payer: COMMERCIAL

## 2025-02-06 PROBLEM — H93.12 TINNITUS, LEFT EAR: Status: ACTIVE | Noted: 2025-02-06

## 2025-02-06 PROBLEM — R06.02 SHORTNESS OF BREATH: Status: ACTIVE | Noted: 2025-02-06

## 2025-02-06 NOTE — ASSESSMENT & PLAN NOTE
Had vestibular rehab and feeling better. Not back to baseline.  Still some intermittent dizziness  Therapy is going well  Left sided tinnitus continues.  She notes that all these sx started after extended dental work.

## 2025-02-06 NOTE — ASSESSMENT & PLAN NOTE
IM monthly injections.  Add daily 1000mcg sublingual B12  Will boost with IM injection today.  Orders:  •  cyanocobalamin injection 1,000 mcg

## 2025-02-06 NOTE — ASSESSMENT & PLAN NOTE
In setting of vertigo, check MRI/MRA head and neck, and R/O acoustic neuroma.  Orders:  •  MRI brain wo contrast mra head wo; Future

## 2025-02-11 ENCOUNTER — OFFICE VISIT (OUTPATIENT)
Dept: FAMILY MEDICINE CLINIC | Facility: CLINIC | Age: 80
End: 2025-02-11
Payer: COMMERCIAL

## 2025-02-11 VITALS
BODY MASS INDEX: 30.96 KG/M2 | HEART RATE: 84 BPM | HEIGHT: 61 IN | WEIGHT: 164 LBS | SYSTOLIC BLOOD PRESSURE: 126 MMHG | TEMPERATURE: 97.1 F | DIASTOLIC BLOOD PRESSURE: 70 MMHG | OXYGEN SATURATION: 94 %

## 2025-02-11 DIAGNOSIS — R06.02 SHORTNESS OF BREATH: ICD-10-CM

## 2025-02-11 DIAGNOSIS — H81.10 BENIGN PAROXYSMAL POSITIONAL VERTIGO, UNSPECIFIED LATERALITY: Primary | ICD-10-CM

## 2025-02-11 DIAGNOSIS — E78.1 PURE HYPERTRIGLYCERIDEMIA: ICD-10-CM

## 2025-02-11 DIAGNOSIS — H93.12 TINNITUS, LEFT EAR: ICD-10-CM

## 2025-02-11 DIAGNOSIS — M88.9 PAGET'S BONE DISEASE: ICD-10-CM

## 2025-02-11 DIAGNOSIS — R42 VERTIGO: ICD-10-CM

## 2025-02-11 DIAGNOSIS — K08.9 TOOTH DISORDER: ICD-10-CM

## 2025-02-11 DIAGNOSIS — E53.8 VITAMIN B12 DEFICIENCY: ICD-10-CM

## 2025-02-11 PROCEDURE — 99214 OFFICE O/P EST MOD 30 MIN: CPT | Performed by: FAMILY MEDICINE

## 2025-02-11 PROCEDURE — 96372 THER/PROPH/DIAG INJ SC/IM: CPT | Performed by: FAMILY MEDICINE

## 2025-02-11 RX ORDER — ROSUVASTATIN CALCIUM 5 MG/1
TABLET, COATED ORAL
Qty: 45 TABLET | Refills: 0 | Status: SHIPPED | OUTPATIENT
Start: 2025-02-11

## 2025-02-11 RX ORDER — CYANOCOBALAMIN 1000 UG/ML
1000 INJECTION, SOLUTION INTRAMUSCULAR; SUBCUTANEOUS ONCE
Status: COMPLETED | OUTPATIENT
Start: 2025-02-11 | End: 2025-02-11

## 2025-02-11 RX ADMIN — CYANOCOBALAMIN 1000 MCG: 1000 INJECTION, SOLUTION INTRAMUSCULAR; SUBCUTANEOUS at 09:11

## 2025-02-11 NOTE — PROGRESS NOTES
Name: Melissa Nguyen      : 1945      MRN: [id number]  Encounter Provider: Zayra Jessica MD  Encounter Date: 2025   Encounter department: [facility]  :  Pt presents for close f/u.  BP s at home 120-150/70s  Positional vertigo  Tinnitus continues  Needs tooth pulled      Assessment & Plan  Benign paroxysmal positional vertigo, unspecified laterality  Had vestibular rehab and feeling better. Not back to baseline.  Still some intermittent dizziness  Therapy is going well  Left sided tinnitus continues.  She notes that all these sx started after extended dental work.       Shortness of breath  Stopped lisinopril and SOB resolved after 3 days.       Tinnitus, left ear  In setting of vertigo, check MRI/MRA head and neck, and R/O acoustic neuroma.  Orders:  •  MRI brain wo contrast mra head wo; Future    Tooth disorder  Will arrange to have tooth pulled and other dental work.       Vitamin B12 deficiency  IM monthly injections.  Add daily 1000mcg sublingual B12  Will boost with IM injection today.  Orders:  •  cyanocobalamin injection 1,000 mcg    Pure hypertriglyceridemia    Orders:  •  rosuvastatin (CRESTOR) 5 mg tablet; TAKE 1 TABLET BY MOUTH EVERY OTHER DAY    Vertigo    Orders:  •  MRI brain wo contrast mra head wo; Future    Paget's bone disease  Seeing Dr. Flanagan  Also saw Dr. Craig  Scheduled to have Reclast       F/U 2 months.       History of Present Illness   Chief Complaint   Patient presents with   • Follow-up     2 week f/u    • Medication Refill     On pending        Pt presents for close f/u.  BP s at home 120-150/70s  Positional vertigo  Tinnitus continues          Review of Systems   Constitutional:         See HPI  Not driving yet   HENT:  Positive for tinnitus. Negative for congestion, ear pain, mouth sores, sinus pressure and trouble swallowing.    Eyes:  Negative for discharge, redness and itching.   Respiratory:  Negative for apnea, cough, chest tightness, shortness  "of breath, wheezing and stridor.    Cardiovascular:  Negative for chest pain, palpitations and leg swelling.   Gastrointestinal:  Negative for abdominal distention, abdominal pain, blood in stool, constipation, diarrhea, nausea and vomiting.   Endocrine: Negative for cold intolerance and heat intolerance.   Genitourinary:  Negative for difficulty urinating, dysuria, flank pain and urgency.   Musculoskeletal:  Negative for arthralgias and myalgias.   Skin:  Negative for rash.   Neurological:  Positive for dizziness. Negative for seizures, syncope, speech difficulty, weakness, light-headedness, numbness and headaches.   Hematological:  Negative for adenopathy.   Psychiatric/Behavioral:  Negative for agitation, behavioral problems, confusion and sleep disturbance. The patient is not nervous/anxious.        Objective   /70 (BP Location: Left arm, Patient Position: Sitting, Cuff Size: Adult)   Pulse 84   Temp (!) 97.1 °F (36.2 °C) (Probe)   Ht 5' 1\" (1.549 m)   Wt 74.4 kg (164 lb)   LMP  (LMP Unknown)   SpO2 94%   BMI 30.99 kg/m²      Physical Exam  Vitals and nursing note reviewed.   Constitutional:       General: She is not in acute distress.     Appearance: Normal appearance. She is well-developed. She is not ill-appearing, toxic-appearing or diaphoretic.   Eyes:      General: No scleral icterus.  Neck:      Vascular: No carotid bruit.   Cardiovascular:      Rate and Rhythm: Normal rate and regular rhythm.      Heart sounds: Normal heart sounds. No murmur heard.     No friction rub. No gallop.   Pulmonary:      Effort: Pulmonary effort is normal. No respiratory distress.      Breath sounds: Normal breath sounds. No stridor. No wheezing, rhonchi or rales.   Chest:      Chest wall: No tenderness.   Musculoskeletal:         General: Normal range of motion.      Cervical back: Normal range of motion and neck supple.      Right lower leg: No edema.      Left lower leg: No edema.   Lymphadenopathy:      " Cervical: No cervical adenopathy.   Skin:     Coloration: Skin is not jaundiced.   Neurological:      General: No focal deficit present.      Mental Status: She is alert and oriented to person, place, and time.      Comments: No nystagmus noted   Psychiatric:         Mood and Affect: Mood normal.         Behavior: Behavior normal.         Thought Content: Thought content normal.         Judgment: Judgment normal.

## 2025-02-12 ENCOUNTER — OFFICE VISIT (OUTPATIENT)
Facility: REHABILITATION | Age: 80
End: 2025-02-12
Payer: COMMERCIAL

## 2025-02-12 DIAGNOSIS — G44.009 CLUSTER HEADACHE, NOT INTRACTABLE, UNSPECIFIED CHRONICITY PATTERN: Primary | ICD-10-CM

## 2025-02-12 DIAGNOSIS — H81.10 BENIGN PAROXYSMAL POSITIONAL VERTIGO, UNSPECIFIED LATERALITY: ICD-10-CM

## 2025-02-12 PROCEDURE — 97140 MANUAL THERAPY 1/> REGIONS: CPT | Performed by: PHYSICAL THERAPIST

## 2025-02-12 PROCEDURE — 97110 THERAPEUTIC EXERCISES: CPT | Performed by: PHYSICAL THERAPIST

## 2025-02-12 PROCEDURE — 97112 NEUROMUSCULAR REEDUCATION: CPT | Performed by: PHYSICAL THERAPIST

## 2025-02-12 NOTE — PROGRESS NOTES
Daily Note        Today's date: 2025  Patient name: Melissa Nguyen  : 1945  MRN: [id number]  Referring provider: Zayra Jessica MD  Dx:   Encounter Diagnosis     ICD-10-CM    1. Cluster headache, not intractable, unspecified chronicity pattern  G44.009       2. Benign paroxysmal positional vertigo, unspecified laterality  H81.10                 Start Time: 0915  Stop Time: 1005  Total time in clinic (min): 50 minutes    Assessment    Assessment details:   Vestibular system tested. - Sunitha Hallpike or Roll tests today. Discussed cervicogenic dizziness, which occurs post postural challenge during functional activities. Reviewed body mechanics and HEP there exercises.  Added chin tucks today.  - dizziness with habituation activities today. Responded well to manual therapy in sitting and supine. She will continue to benefit from skilled PT to address cervical musculature tightness and help resolve reproduction of cervicogenic dizziness.                  Goals  Short term goal: 4 weeks  Patient will report no dizziness symptoms in 5 consecutive days  Will improved DHI by 30% for improved perception of handicap  Will improve FGA for improved dynamic balance by 3 points.    Plan    Planned therapy interventions: manual therapy, postural training, neuromuscular re-education, home exercise program, canalith repositioning, strengthening, stretching and therapeutic exercise    Frequency: 1-2x week  Duration in weeks: 4  Plan of Care beginning date: 2025  Plan of Care expiration date: 2025  Treatment plan discussed with: patient      Subjective: I have been more housework and shoveling and really cleaning the hopper. After that I ha some dizziness.    Objective   Postural alignment; Forward head, round shoulders, limited head movement during functional ambulation      Positional Vertigo Tests:  Right Left   Neck range of motion WFL    Sunitha-Hallpike negative negative   Roll Test negative negative               Precautions: back pain       1/24/25 1/29/25 1/31 2/04 2/12        Neuro Re-Ed             Sunitha doherty  x 2 Initial/  re-ceck Tested x2  Tested x 2 Tested x 1        Roll test x2 Tested x2   Tested x 1        Epley X1  Right X1 left  X1 Right         MCTSIB 115/120            FGQ 23/30            Overgrond ambulation   ' x 1  ' x 1  Tandem 150' x 2  ' x 1  Backwards 150' x 1   ' x 2  ' x 2  EC/BWD  25' x 5  No dizziness            Standing reaching down for cones, following with head diagonally /above head  Min reproduction of pressure feeling  6 min                       Manual therapy  Trigger point release  Upper Trap, levator, rhomboids   Thera gun x 6 min Upper throrac/c/s musculature  4  trigger point release   Upper Trap, levator, rhomboids Trigger point release  Upper Trap, levator, rhomboids 10 min Trigger point release  Upper Trap, levator, rhomboids   Sitting and supine  20 min                                                            There exercises             C/s rotation  8 x :10 5x :10 verbal cues as for proper body mechanics 5x :10 verbal cues as for proper body mechanics 10 x :10        Chin tucks     10 x :10 verbal cues for proper body mechanics        Lateral flexion  5 x :10 5 x :10 5 x :10 10 x ;10        rows   2 x 10 :03 Green theraband 2 x 10 :03 Green theraband 2 x 10 hold :05  BTB        Low rows   2 x 10 :03  GT 2 x 10 :03  GT 2 x :10 :05  BTB        flexion   2 x 10 :03  GT 2 x 10 :03  GT

## 2025-02-14 ENCOUNTER — OFFICE VISIT (OUTPATIENT)
Facility: REHABILITATION | Age: 80
End: 2025-02-14
Payer: COMMERCIAL

## 2025-02-14 DIAGNOSIS — G44.009 CLUSTER HEADACHE, NOT INTRACTABLE, UNSPECIFIED CHRONICITY PATTERN: Primary | ICD-10-CM

## 2025-02-14 DIAGNOSIS — H81.10 BENIGN PAROXYSMAL POSITIONAL VERTIGO, UNSPECIFIED LATERALITY: ICD-10-CM

## 2025-02-14 PROCEDURE — 97140 MANUAL THERAPY 1/> REGIONS: CPT | Performed by: PHYSICAL THERAPIST

## 2025-02-14 PROCEDURE — 97112 NEUROMUSCULAR REEDUCATION: CPT | Performed by: PHYSICAL THERAPIST

## 2025-02-14 PROCEDURE — 97110 THERAPEUTIC EXERCISES: CPT | Performed by: PHYSICAL THERAPIST

## 2025-02-14 NOTE — PROGRESS NOTES
Daily Note        Today's date: 2025  Patient name: Melissa Nguyen  : 1945  MRN: [id number]  Referring provider: Zayra Jessica MD  Dx:   Encounter Diagnosis     ICD-10-CM    1. Cluster headache, not intractable, unspecified chronicity pattern  G44.009       2. Benign paroxysmal positional vertigo, unspecified laterality  H81.10                 Start Time: 1100  Stop Time: 1145  Total time in clinic (min): 45 minutes    Assessment    Assessment details:   Patient continues to make improvement in her balance and has had resolution of vertigo.  She does have cervicogenic dizziness and is making improvement as she continues to have improvement in ROM and decreasing muscle spasms.  Will will continue to benefit from skilled PT.                  Plan    Planned therapy interventions: manual therapy, postural training, neuromuscular re-education, home exercise program, canalith repositioning, strengthening, stretching and therapeutic exercise    Frequency: 1-2x week  Duration in weeks: 4  Plan of Care beginning date: 2025  Plan of Care expiration date: 2025  Treatment plan discussed with: patient    Subjective: I am not dizzy anymore.  I am going to get an MRI before I go to the ENT    Objective   Postural alignment; Forward head, round shoulders, limited head movement during functional ambulation           Precautions: back pain       25       Neuro Re-Ed             Sunitha hallpike  x 2 Initial/  re-ceck Tested x2  Tested x 2 Tested x 1        Roll test x2 Tested x2   Tested x 1        Epley X1  Right X1 left  X1 Right         MCTSIB 115/120            FGQ 23/30            Overgrond ambulation   ' x 1  ' x 1  Tandem 150' x 2  ' x 1  Backwards 150' x 1   ' x 2  ' x 2  EC/BWD  25' x 5  No dizziness  ' x 2  ' x 2  Tandem  100' x 2            Standing reaching down for cones, following with head diagonally /above head  Min  reproduction of pressure feeling  6 min                       Manual therapy  Trigger point release  Upper Trap, levator, rhomboids   Thera gun x 6 min Upper throrac/c/s musculature  4  trigger point release   Upper Trap, levator, rhomboids Trigger point release  Upper Trap, levator, rhomboids 10 min Trigger point release  Upper Trap, levator, rhomboids   Sitting and supine  20 min Trigger point release  Upper Trap, levator, rhomboids   Sitting   15min                                                           There exercises             C/s rotation  8 x :10 5x :10 verbal cues as for proper body mechanics 5x :10 verbal cues as for proper body mechanics 10 x :10 10 x:10       Chin tucks     10 x :10 verbal cues for proper body mechanics 10 x ;10       Lateral flexion  5 x :10 5 x :10 5 x :10 10 x ;10 10 x :10       rows   2 x 10 :03 Green theraband 2 x 10 :03 Green theraband 2 x 10 hold :05  BTB 2 x:10 :05 BTB       Low rows   2 x 10 :03  GT 2 x 10 :03  GT 2 x :10 :05  BTB 2 x:10 :05 BTB       flexion   2 x 10 :03  GT 2 x 10 :03  GT  2 x:10 :05 BTB

## 2025-02-17 ENCOUNTER — OFFICE VISIT (OUTPATIENT)
Facility: REHABILITATION | Age: 80
End: 2025-02-17
Payer: COMMERCIAL

## 2025-02-17 DIAGNOSIS — H81.10 BENIGN PAROXYSMAL POSITIONAL VERTIGO, UNSPECIFIED LATERALITY: ICD-10-CM

## 2025-02-17 DIAGNOSIS — G44.009 CLUSTER HEADACHE, NOT INTRACTABLE, UNSPECIFIED CHRONICITY PATTERN: Primary | ICD-10-CM

## 2025-02-17 PROCEDURE — 97110 THERAPEUTIC EXERCISES: CPT | Performed by: PHYSICAL THERAPIST

## 2025-02-17 PROCEDURE — 97112 NEUROMUSCULAR REEDUCATION: CPT | Performed by: PHYSICAL THERAPIST

## 2025-02-17 PROCEDURE — 97140 MANUAL THERAPY 1/> REGIONS: CPT | Performed by: PHYSICAL THERAPIST

## 2025-02-17 NOTE — PROGRESS NOTES
"Daily Note        Today's date: 2025  Patient name: Melissa Nguyen  : 1945  MRN: [id number]  Referring provider: Zayra Jessica MD  Dx:   Encounter Diagnosis     ICD-10-CM    1. Cluster headache, not intractable, unspecified chronicity pattern  G44.009       2. Benign paroxysmal positional vertigo, unspecified laterality  H81.10                 Start Time: 1100  Stop Time: 1145  Total time in clinic (min): 45 minutes    Assessment    Assessment details:   Patient has had resolution of BPPV/vertigo. She does have some residual, inconsistent light headness. VORs performed today to attempt reproduction of symptoms. Improved with repetition, no loss of balance. Reviewed and performed shoulder girdle strengthening program with black theraband.  She responds well to manual therapy. She has a theragun at home and will attempt to used prior to next session.                 Plan    Planned therapy interventions: manual therapy, postural training, neuromuscular re-education, home exercise program, canalith repositioning, strengthening, stretching and therapeutic exercise    Frequency: 1-2x week  Duration in weeks: 4  Plan of Care beginning date: 2025  Plan of Care expiration date: 2025  Treatment plan discussed with: patient    Subjective: Reports three hours episode on \"not spinning but dizzy\" along with c/s musculature pain    Objective   Discussed importance of postural alignment       Precautions: back pain       25          Neuro Re-Ed             [place] hallpike  x 2 Initial/  re-ceck Tested x2  Tested x 2 Tested x 1        Roll test x2 Tested x2   Tested x 1        Epley X1  Right X1 left  X1 Right         MCTSIB 115/120            FGA       Overgrond ambulation   ' x 1  ' x 1  Tandem 150' x 2  ' x 1  Backwards 150' x 1   ' x 2  ' x 2  EC/BWD  25' x 5  No dizziness  ' x 2  ' x 2  Tandem  100' x 2   ' " x 2  ' x 2    FGA         Standing reaching down for cones, following with head diagonally /above head  Min reproduction of pressure feeling  6 min                       Manual therapy  Trigger point release  Upper Trap, levator, rhomboids   Thera gun x 6 min Upper throrac/c/s musculature  4  trigger point release   Upper Trap, levator, rhomboids Trigger point release  Upper Trap, levator, rhomboids 10 min Trigger point release  Upper Trap, levator, rhomboids   Sitting and supine  20 min Trigger point release  Upper Trap, levator, rhomboids   Sitting   15min Trigger point release  Upper Trap, levator, rhomboids   Sitting   10 min      VOR x 1   Busy background       Hor  Patrick  :30 x 3      VOR Cx       :30 x 3      HT, HN  foam       EO  EC  :30 x 3 eac      Diagonal of foam       EO  EC  :30 x 3 eac      There exercises             C/s rotation  8 x :10 5x :10 verbal cues as for proper body mechanics 5x :10 verbal cues as for proper body mechanics 10 x :10 10 x:10       Chin tucks     10 x :10 verbal cues for proper body mechanics 10 x ;10       Lateral flexion  5 x :10 5 x :10 5 x :10 10 x ;10 10 x :10       rows   2 x 10 :03 Green theraband 2 x 10 :03 Green theraband 2 x 10 hold :05  BTB 2 x:10 :05 BTB 2 x:10 :05 BTB      Low rows   2 x 10 :03  GT 2 x 10 :03  GT 2 x :10 :05  BTB 2 x:10 :05 BTB 2 x:10 :05 BTB      flexion   2 x 10 :03  GT 2 x 10 :03  GT  2 x:10 :05 BTB 2 x:10 :05 BTB         Flowsheet Rows      Flowsheet Row Most Recent Value   Functional Gait Assessment    Gait Level Surface  3   Change in GaiT Speed 3   Gait with horizontal head turns 2   Gait with vertical head turns 2   Gait and pivot tuen  3   Step over obstacle 3   Gait with narrow base of supprt 2   Gait with eyes closed 2   Ambulating backwards 3   Steps 2   Total score  25

## 2025-02-19 ENCOUNTER — EVALUATION (OUTPATIENT)
Facility: REHABILITATION | Age: 80
End: 2025-02-19
Payer: COMMERCIAL

## 2025-02-19 DIAGNOSIS — H81.10 BENIGN PAROXYSMAL POSITIONAL VERTIGO, UNSPECIFIED LATERALITY: Primary | ICD-10-CM

## 2025-02-19 PROCEDURE — 97112 NEUROMUSCULAR REEDUCATION: CPT | Performed by: PHYSICAL THERAPIST

## 2025-02-19 NOTE — PROGRESS NOTES
Daily Note/Re-Evaluation        Today's date: 2025  Patient name: Melissa Nguyen  : 1945  MRN: [id number]  Referring provider: Zayra Jessica MD  Dx:   Encounter Diagnosis     ICD-10-CM    1. Benign paroxysmal positional vertigo, unspecified laterality  H81.10             Start Time: 1015  Stop Time: 1100  Total time in clinic (min): 45 minutes    Assessment    Assessment details:   Patient is a very pleasant 79 year old female who was referred to Outpatient physical therapy due to dizziness. Upon evaluation she was found to have tinnitus, pressure on her head and BPPV.  She underwent recanalization maneuvers and has had resolution of vertigo symptoms. Her functional Outcomes measures where re-assessed and showed a considerable improvement in her static and dynamic balance, as well as her perception of dizziness which decreased from 74% to 18%.   She had no reproduction of dizziness symptoms with above intervention. Patient will benefit from ENT referral and /or Ophthalmology follow up further explore complaints of head swirling.  She denies home stressors, but as she speaks about her condition while sitting, she reports becoming nauseous,  which may have a component of anxiety. Anticipating MRI next week. Will see for follow up visit then.                  Goals  Goals  Short term goal: 4 weeks  Patient will report no dizziness symptoms in 5 consecutive days MET no vertigo, reporting a baseline of head pressure and tinnitus    Long Term Goals:  Will improved DHI by 30% for improved perception of handicap MET  Will improve FGA for improved dynamic balance by 3 points. MET  Patient will demonstrate compliant with HEP 2 weeks  Follow up in two weeks and re-assess and treat as needed.  Patient will have resolution of dizziness in 7 consecutive days    Plan    Planned therapy interventions: manual therapy, postural training, neuromuscular re-education, home exercise program, canalith repositioning,  strengthening, stretching and therapeutic exercise    Frequency: 1-2x week  Duration in weeks: 4  Plan of Care beginning date: 3/3/2025  Plan of Care expiration date: 3/31/2025  Treatment plan discussed with: patient    Subjective: I was not after the last treatment until yesterday, when I leaned down to pick object, the it triggered it.  Reports constant head pressure, which had improved since IE and treatment) as well as tinnitus.  She has an MRI next week and follow up with primary next month.    Objective   Discussed importance of postural alignment    Balance & Mobility Measures 1/24/25 2/19/25   Assistive device used? None    Walking speed 1.0 meters/second 1.2 meters/second   Functional Gait Assessment (see below) 23/30 25/30   MSTSIB 115/120 120/120   Patient-Reported Outcome Measure: Dizziness Handicap Inventory (DHI) 74 18 low perception of disability        Static MCTSIB  Sec    FTEO 30 30   FTEC 30 30   Foam FTEO 30 30   Foam FETC 25 30        Precautions: back pain       1/24/25 1/29/25 1/31 2/04 2/12 2/14 2/17          Neuro Re-Ed             [place] hallpike  x 2 Initial/  re-ceck Tested x2  Tested x 2 Tested x 1        Roll test x2 Tested x2   Tested x 1        Epley X1  Right X1 left  X1 Right         MCTSIB 115/120            FGA 23/30 25/30      Overgrond ambulation   ' x 1  ' x 1  Tandem 150' x 2  ' x 1  Backwards 150' x 1   ' x 2  ' x 2  EC/BWD  25' x 5  No dizziness  ' x 2  ' x 2  Tandem  100' x 2   ' x 2  ' x 2    FGA         Standing reaching down for cones, following with head diagonally /above head  Min reproduction of pressure feeling  6 min                       Manual therapy  Trigger point release  Upper Trap, levator, rhomboids   Thera gun x 6 min Upper throrac/c/s musculature  4  trigger point release   Upper Trap, levator, rhomboids Trigger point release  Upper Trap, levator, rhomboids 10 min Trigger point release  Upper Trap,  levator, rhomboids   Sitting and supine  20 min Trigger point release  Upper Trap, levator, rhomboids   Sitting   15min Trigger point release  Upper Trap, levator, rhomboids   Sitting   10 min      VOR x 1   Busy background       Hor  Patrick  :30 x 3      VOR Cx       :30 x 3      HT, HN  foam       EO  EC  :30 x 3 eac      Diagonal of foam       EO  EC  :30 x 3 eac      There exercises             C/s rotation  8 x :10 5x :10 verbal cues as for proper body mechanics 5x :10 verbal cues as for proper body mechanics 10 x :10 10 x:10       Chin tucks     10 x :10 verbal cues for proper body mechanics 10 x ;10       Lateral flexion  5 x :10 5 x :10 5 x :10 10 x ;10 10 x :10       rows   2 x 10 :03 Green theraband 2 x 10 :03 Green theraband 2 x 10 hold :05  BTB 2 x:10 :05 BTB 2 x:10 :05 BTB      Low rows   2 x 10 :03  GT 2 x 10 :03  GT 2 x :10 :05  BTB 2 x:10 :05 BTB 2 x:10 :05 BTB      flexion   2 x 10 :03  GT 2 x 10 :03  GT  2 x:10 :05 BTB 2 x:10 :05 BTB         Flowsheet Rows      Flowsheet Row Most Recent Value   Dizziness Handicap Index    Does looking up increase your problem?  0   Because of your problem, do you feel frustrated?  4   Because of your problem, do you restrict your travel for business or recreation? 2   Does walking down the aisle of a supermarket increase your problem? 2   Because of your problem, do you have difficulty getting in or out of bed? 0   Does your problem significantly restrict your participation in social activities (Dinner, movies, dancing, parties)? 0   Because of your problem, do you have difficulty reading? 0   Does performing more ambitious activities such as sports, dancing, household chores (sweeping or putting dishes away) increase your problems? 0   Because of your problem, are you afraid to leave your home without having someone accompany you? 0   Because of your problem have you been embarrassed in front of others? 0   Do quick movements of your head increase your problem? 0    Because of your problem, do you avoid heights? 4   Does turning over in bed increase your problem? 0   Because of your problem, is it difficult for you to do strenuous homework or yardwork? 0   Because of your problem, are you afraid people may think you are intoxicated? 0   Because of your problem, is it difficult for you to go for a walk by yourself? 0   Does walking down a sidewalk increase your problem? 2   Because of your problem, is it difficult for you to concentrate? 0   Because of your problem, is it difficult for you to walk around your house in the dark? 0   Because of your problem, are you afraid to stay at home alone? 0   Because of your problem ,do you feel handicapped? 0   Has the problem placed stress on your relationships with members of your family? 0   Because of your problem, are you depressed? 0   Does your problem interfere with your job or household responsibilities? 2   Does bending over increase your problem? 2   Total score 18   Score category Low  perception of handicap (39-0)   Functional Gait Assessment    Gait Level Surface  3   Change in GaiT Speed 3   Gait with horizontal head turns 3   Gait with vertical head turns 3   Gait and pivot tuen  3   Step over obstacle 3   Gait with narrow base of supprt 2   Gait with eyes closed 2   Ambulating backwards 3   Steps 2  [2 rails]   Total score  27

## 2025-02-21 DIAGNOSIS — M88.9 PAGET'S BONE DISEASE: ICD-10-CM

## 2025-02-21 DIAGNOSIS — R42 VERTIGO: ICD-10-CM

## 2025-02-21 DIAGNOSIS — H93.12 TINNITUS, LEFT EAR: Primary | ICD-10-CM

## 2025-02-27 ENCOUNTER — HOSPITAL ENCOUNTER (OUTPATIENT)
Dept: MRI IMAGING | Facility: HOSPITAL | Age: 80
Discharge: HOME | End: 2025-02-27
Payer: COMMERCIAL

## 2025-02-27 DIAGNOSIS — R42 VERTIGO: ICD-10-CM

## 2025-02-27 DIAGNOSIS — H93.12 TINNITUS, LEFT EAR: ICD-10-CM

## 2025-02-27 PROCEDURE — 70544 MR ANGIOGRAPHY HEAD W/O DYE: CPT

## 2025-02-27 PROCEDURE — 70551 MRI BRAIN STEM W/O DYE: CPT

## 2025-02-28 DIAGNOSIS — E11.9 CONTROLLED TYPE 2 DIABETES MELLITUS WITHOUT COMPLICATION, WITHOUT LONG-TERM CURRENT USE OF INSULIN (HCC): ICD-10-CM

## 2025-03-02 DIAGNOSIS — R90.89 ABNORMAL MRA, BRAIN: Primary | ICD-10-CM

## 2025-03-02 DIAGNOSIS — H93.12 TINNITUS, LEFT EAR: ICD-10-CM

## 2025-03-02 DIAGNOSIS — R42 VERTIGO: ICD-10-CM

## 2025-03-03 ENCOUNTER — OFFICE VISIT (OUTPATIENT)
Facility: REHABILITATION | Age: 80
End: 2025-03-03
Payer: COMMERCIAL

## 2025-03-03 DIAGNOSIS — G44.009 CLUSTER HEADACHE, NOT INTRACTABLE, UNSPECIFIED CHRONICITY PATTERN: ICD-10-CM

## 2025-03-03 DIAGNOSIS — H81.10 BENIGN PAROXYSMAL POSITIONAL VERTIGO, UNSPECIFIED LATERALITY: Primary | ICD-10-CM

## 2025-03-03 PROCEDURE — 97112 NEUROMUSCULAR REEDUCATION: CPT | Performed by: PHYSICAL THERAPIST

## 2025-03-03 PROCEDURE — 97140 MANUAL THERAPY 1/> REGIONS: CPT | Performed by: PHYSICAL THERAPIST

## 2025-03-03 NOTE — PROGRESS NOTES
Daily Note      Today's date: 3/3/2025  Patient name: Melissa Nguyen  : 1945  MRN: [id number]  Referring provider: Zayra Jessica MD  Dx:   Encounter Diagnosis     ICD-10-CM    1. Benign paroxysmal positional vertigo, unspecified laterality  H81.10       2. Cluster headache, not intractable, unspecified chronicity pattern  G44.009               Start Time: 1230  Stop Time: 1305  Total time in clinic (min): 35 minutes    Assessment    Assessment details:   Patient to be followed up with ENT. MRI result no acute findings.  Will keep episode openend for two weeks for possible follow up if symptoms return.                Goals  Goals  Short term goal: 4 weeks  Patient will report no dizziness symptoms in 5 consecutive days MET no vertigo, reporting a baseline of head pressure and tinnitus    Long Term Goals:  Will improved DHI by 30% for improved perception of handicap MET  Will improve FGA for improved dynamic balance by 3 points. MET  Patient will demonstrate compliant with HEP 2 weeks  Follow up in two weeks and re-assess and treat as needed.  Patient will have resolution of dizziness in 7 consecutive days    Plan    Frequency: 1x week (prn)  Duration in weeks: 4  Plan of Care beginning date: 3/3/2025  Plan of Care expiration date: 3/31/2025  Treatment plan discussed with: patient      Subjective: MRI performed. No vertigo symptom reproduction in the last 7 days  Objective   Discussed importance of postural alignment     Precautions: back pain       1/24/25 1/29/25 1/31 2/04 2/12 2/14 2/17 2/19 3/03   Neuro Re-Ed        Re-eval    [place] hallpike  x 2 Initial/  re-ceck Tested x2  Tested x 2 Tested x 1       Roll test x2 Tested x2   Tested x 1       Epley X1  Right X1 left  X1 Right        MCTSIB 115/120           FGA      Overgrond ambulation   ' x 1  ' x 1  Tandem 150' x 2  ' x 1  Backwards 150' x 1   ' x 2  ' x 2  EC/BWD  25' x 5  No dizziness  ' x 2  ' x  2  Tandem  100' x 2   ' x 2  ' x 2    FGA  Biodex  HT  HN  4 min  1.2 + knee pain post MRI positioning      Standing reaching down for cones, following with head diagonally /above head  Min reproduction of pressure feeling  6 min                     Manual therapy  Trigger point release  Upper Trap, levator, rhomboids   Thera gun x 6 min Upper throrac/c/s musculature  4  trigger point release   Upper Trap, levator, rhomboids Trigger point release  Upper Trap, levator, rhomboids 10 min Trigger point release  Upper Trap, levator, rhomboids   Sitting and supine  20 min Trigger point release  Upper Trap, levator, rhomboids   Sitting   15min Trigger point release  Upper Trap, levator, rhomboids   Sitting   10 min  Trigger point release  Upper Trap, levator, rhomboids   Sitting   10 min   VOR x 1   Busy background       Hor  Patrick  :30 x 3  [place]  1:00 x 2 each   VOR Cx       :30 x 3     HT, HN  foam       EO  EC  :30 x 3 eac     Diagonal of foam       EO  EC  :30 x 3 eac     There exercises            C/s rotation  8 x :10 5x :10 verbal cues as for proper body mechanics 5x :10 verbal cues as for proper body mechanics 10 x :10 10 x:10   HEP   Chin tucks     10 x :10 verbal cues for proper body mechanics 10 x ;10   HEP   Lateral flexion  5 x :10 5 x :10 5 x :10 10 x ;10 10 x :10   HEP   rows   2 x 10 :03 Green theraband 2 x 10 :03 Green theraband 2 x 10 hold :05  BTB 2 x:10 :05 BTB 2 x:10 :05 BTB  HEP   Low rows   2 x 10 :03  GT 2 x 10 :03  GT 2 x :10 :05  BTB 2 x:10 :05 BTB 2 x:10 :05 BTB  HEP   flexion   2 x 10 :03  GT 2 x 10 :03  GT  2 x:10 :05 BTB 2 x:10 :05 BTB  HEP          Balance & Mobility Measures 1/24/25 2/19/25   Assistive device used? None    Walking speed 1.0 meters/second 1.2 meters/second   Functional Gait Assessment (see below) 23/30 25/30   MSTSIB 115/120 120/120   Patient-Reported Outcome Measure: Dizziness Handicap Inventory (DHI) 74 18 low perception of disability        Static MCTSIB  Sec     FTEO 30 30   FTEC 30 30   Foam FTEO 30 30   Foam FETC 25 30

## 2025-03-04 ENCOUNTER — TELEPHONE (OUTPATIENT)
Dept: FAMILY MEDICINE CLINIC | Facility: CLINIC | Age: 80
End: 2025-03-04

## 2025-03-04 NOTE — TELEPHONE ENCOUNTER
T.C.    She is finishing vestibular rehab and vertigo is much improved.  She has started driving again.    Discussed findings of  MRI/MRA and unknown significance.  Suggest f/u with Neurosurgeon. Tiny aneurysm of R ICA.  Pt will make appt with Dr. Perdomo for consultation.    She has upcoming appt with Ophtho for blurred cataract, tomorrow.  Dr. Putnam in 2 weeks.  She is also going to have a tooth pulled.

## 2025-03-06 ENCOUNTER — CLINICAL SUPPORT (OUTPATIENT)
Dept: FAMILY MEDICINE CLINIC | Facility: CLINIC | Age: 80
End: 2025-03-06
Payer: COMMERCIAL

## 2025-03-06 DIAGNOSIS — E53.8 VITAMIN B12 DEFICIENCY: Primary | ICD-10-CM

## 2025-03-06 PROCEDURE — 96372 THER/PROPH/DIAG INJ SC/IM: CPT

## 2025-03-06 RX ADMIN — CYANOCOBALAMIN 1000 MCG: 1000 INJECTION, SOLUTION INTRAMUSCULAR; SUBCUTANEOUS at 01:30

## 2025-03-19 DIAGNOSIS — J44.9 CHRONIC OBSTRUCTIVE PULMONARY DISEASE, UNSPECIFIED COPD TYPE (HCC): ICD-10-CM

## 2025-03-19 DIAGNOSIS — K22.70 BARRETT'S ESOPHAGUS WITHOUT DYSPLASIA: ICD-10-CM

## 2025-03-19 DIAGNOSIS — J30.1 ALLERGIC RHINITIS DUE TO POLLEN, UNSPECIFIED SEASONALITY: ICD-10-CM

## 2025-03-20 RX ORDER — BUDESONIDE AND FORMOTEROL FUMARATE DIHYDRATE 160; 4.5 UG/1; UG/1
2 AEROSOL RESPIRATORY (INHALATION) 2 TIMES DAILY
Qty: 30.6 G | Refills: 1 | Status: SHIPPED | OUTPATIENT
Start: 2025-03-20

## 2025-03-20 RX ORDER — MONTELUKAST SODIUM 10 MG/1
10 TABLET ORAL DAILY
Qty: 90 TABLET | Refills: 1 | Status: SHIPPED | OUTPATIENT
Start: 2025-03-20

## 2025-03-20 RX ORDER — LANSOPRAZOLE 30 MG/1
30 CAPSULE, DELAYED RELEASE ORAL DAILY
Qty: 90 CAPSULE | Refills: 0 | Status: CANCELLED | OUTPATIENT
Start: 2025-03-20

## 2025-03-20 NOTE — TELEPHONE ENCOUNTER
Requested Prescriptions     Pending Prescriptions Disp Refills    lansoprazole (PREVACID) 30 mg capsule 90 capsule 0     Sig: Take 1 capsule (30 mg total) by mouth daily 1 tab po BID     Signed Prescriptions Disp Refills    budesonide-formoterol (Symbicort) 160-4.5 mcg/act inhaler 30.6 g 1     Sig: Inhale 2 puffs 2 (two) times a day     Authorizing Provider: GEORGINA KONG     Ordering User: KIMBERLI MEZA    montelukast (SINGULAIR) 10 mg tablet 90 tablet 1     Sig: Take 1 tablet (10 mg total) by mouth daily     Authorizing Provider: GEORGINA KONG     Ordering User: KIMBERLI MEZA

## 2025-03-21 NOTE — TELEPHONE ENCOUNTER
Pt called refill line and stated she sent the Lansopraole to the wrong doctor it is to go to the GI doctor who is with [facility]. Please cancel this script refill.

## 2025-03-27 ENCOUNTER — RA CDI HCC (OUTPATIENT)
Dept: OTHER | Facility: HOSPITAL | Age: 80
End: 2025-03-27

## 2025-04-02 ENCOUNTER — CONSULT (OUTPATIENT)
Dept: NEUROSURGERY | Facility: CLINIC | Age: 80
End: 2025-04-02
Payer: COMMERCIAL

## 2025-04-02 VITALS
HEIGHT: 61 IN | RESPIRATION RATE: 17 BRPM | TEMPERATURE: 97.7 F | BODY MASS INDEX: 31.72 KG/M2 | WEIGHT: 168 LBS | SYSTOLIC BLOOD PRESSURE: 118 MMHG | HEART RATE: 67 BPM | OXYGEN SATURATION: 97 % | DIASTOLIC BLOOD PRESSURE: 70 MMHG

## 2025-04-02 DIAGNOSIS — R42 VERTIGO: ICD-10-CM

## 2025-04-02 DIAGNOSIS — R90.89 ABNORMAL MRA, BRAIN: ICD-10-CM

## 2025-04-02 DIAGNOSIS — I67.1 ANEURYSM OF CAVERNOUS PORTION OF RIGHT INTERNAL CAROTID ARTERY: Primary | ICD-10-CM

## 2025-04-02 DIAGNOSIS — H93.12 TINNITUS, LEFT EAR: ICD-10-CM

## 2025-04-02 PROCEDURE — 99203 OFFICE O/P NEW LOW 30 MIN: CPT | Performed by: NURSE PRACTITIONER

## 2025-04-02 NOTE — PROGRESS NOTES
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   She denies 2 or more first degree relatives diagnosed with known brain aneurysms or have  suddenly of an unknown cause or brain bleed-      Imagining   2025  Possible tiny aneurysm in right ICA distal cavernous segment projecting medially (3:86). Otherwise, normal flow related signal of the distal cervical, petrous and cavernous segments of the internal carotid arteries.  Normal ICA  terminus.Possible tiny aneurysm in right ICA distal cavernous segment projecting medially. Recommend consultation with the [facility], a division of [facility] at [phone].     Plan   Discussed imagining result  with patient   Explained the aneurysm is likely unrelated  to her current complaints.  Extensively discussed natural nature of aneurysms.    Explained as per aneurysm standards of care elective surgery is not appropriate for aneurysm in a patient 74 YO or older therefore ongoing imagining surveillance is not indicated.  Discussed given current size and location risk of rupture is less than 1%/year per UCAS and <1%/5yrs per ISIUA.     Discussed modifiable risk factors including hypertension, hyperlipidemia, and smoking as above.   Discussed genetic component to aneurysms as well as modifiable risk factors such as smoking, HTN and HLD.  Discussed family history as above.    Discussed signs and symptoms of aneurysm rupture  including severe and sudden headache (WHOL), neck pain, nausea and vomiting, and seizure,visual disturbances, such as loss of vision or double vision, pain above or around your eye,numbness or weakness on 1 side of your face,difficulty speaking, loss of balance, difficulty concentrating or problems with short-term memory. neck pain. Reiterated that the symptoms should prompt patient to visit in emergency department immediately.    Advised if he/she has additional questions or concerns to please contact the neurosurgery office.         History of Present Illness     Melissa Nguyen is a 79 y.o. female who presents Initial neurosurgery consult visit for the incidental finding of a possible intracranial aneurysm.    HPI   During workup ordered by PCP for left ear tinnitus and vertigo imaging demonstrated a possible intracranial aneurysm.    Imagining   2/27/2025  MRA Head wo  Possible tiny aneurysm in right ICA distal cavernous segment projecting medially (3:86). Otherwise, normal flow related signal of the distal cervical, petrous and cavernous segments of the internal carotid arteries.  Normal ICA   terminus.      Review of Systems   Constitutional: Negative.    HENT:  Positive for tinnitus (left).    Eyes: Negative.    Respiratory: Negative.     Cardiovascular: Negative.    Gastrointestinal: Negative.    Endocrine: Negative.    Genitourinary: Negative.    Musculoskeletal:  Positive for gait problem (uses cane for stability).   Skin: Negative.    Neurological:  Positive for dizziness (vertigo occ dizzy) and weakness (left leg weakness). Negative for numbness.   Hematological: Negative.    Psychiatric/Behavioral: Negative.       I have personally reviewed the MA's review of systems and made changes as necessary.    Past Medical History   Past Medical History:   Diagnosis Date    Arthritis     Back pain     Diabetes mellitus (HCC)     Emphysema of lung (HCC)     GERD (gastroesophageal reflux disease)     History of  Lyme disease 01/05/2023    We reviewed this today, and whether her symptoms may be related to a long, ongoing active Lyme infection.  Symptoms are somewhat vague for this.    Lyme disease     Osteoporosis     Polymyalgia rheumatica (HCC)     last assessed 2/15/17    Reactive airway disease     last assessed 6/9/16    Sciatica      Past Surgical History:   Procedure Laterality Date    BREAST BIOPSY Left     HYSTERECTOMY      1987, total    ORTHOPEDIC SURGERY       Family History   Problem Relation Age of Onset    Thyroid cancer Mother 53    COPD Father         mild    Hypertension Father     No Known Problems Maternal Grandmother     Colon cancer Maternal Grandfather 90    No Known Problems Paternal Grandmother     No Known Problems Paternal Grandfather     Breast cancer Maternal Aunt 55    No Known Problems Maternal Aunt     No Known Problems Paternal Aunt      she reports that she has been smoking cigarettes. She has never used smokeless tobacco. She reports that she does not drink alcohol and does not use drugs.  Current Outpatient Medications   Medication Instructions    acetaminophen (TYLENOL) 650 mg, Oral, Every 6 hours PRN    albuterol (PROVENTIL HFA,VENTOLIN HFA) 90 mcg/act inhaler 1-2 puffs, Inhalation, As needed    Biotin 25937 MCG TABS Take by mouth    budesonide-formoterol (Symbicort) 160-4.5 mcg/act inhaler 2 puffs, Inhalation, 2 times daily    cholecalciferol (VITAMIN D3) 1,000 units tablet Take 4000u daily    clotrimazole-betamethasone (LOTRISONE) 1-0.05 % cream Topical, 2 times daily, Apply sparingly to affected area BID for 10 days    Digestive Enzyme CAPS Take by mouth    lansoprazole (PREVACID) 30 mg, Oral, Daily, 1 tab po BID    meclizine (ANTIVERT) 25 mg, Oral, 3 times daily PRN    metFORMIN (GLUCOPHAGE) 500 mg, Oral, Daily with breakfast    montelukast (SINGULAIR) 10 mg, Oral, Daily    polyethylene glycol (GLYCOLAX) 17 g, As needed    Probiotic Product (PROBIOTIC-10) CAPS Take by mouth     "rosuvastatin (CRESTOR) 5 mg tablet TAKE 1 TABLET BY MOUTH EVERY OTHER DAY    Spiriva Respimat 2.5 MCG/ACT AERS inhaler 2 puffs, Inhalation, Daily, 3 inhalers     Allergies   Allergen Reactions    Gabapentin Palpitations, Dizziness and Edema    Hydroxychloroquine Edema, Blisters and Facial Swelling    Lisinopril Shortness Of Breath and Palpitations    Tomato - Food Allergy Swelling and Throat Swelling    Asa [Aspirin]     Celebrex [Celecoxib] Other (See Comments)     Flushing      Ibuprofen GI Intolerance    Lactose - Food Allergy GI Intolerance    Nsaids GI Intolerance     To all    Orajel 2x Toothache & Gum [Benzocaine] Other (See Comments)     Weak and dizzy    Orajel Mouth Sores Rinse [Hydrogen Peroxide] Other (See Comments)     Some allergy-like symptoms with shortness of breath, lightheaded, dizzy    Percocet [Oxycodone-Acetaminophen] GI Intolerance    Leflunomide Palpitations     Short of breath and nausea.    Sulfasalazine Palpitations     Pt states SOB and nausea as well.      Objective   /70 (BP Location: Left arm, Patient Position: Sitting, Cuff Size: Standard)   Pulse 67   Temp 97.7 °F (36.5 °C) (Temporal)   Resp 17   Ht 5' 1\" (1.549 m)   Wt 76.2 kg (168 lb)   LMP  (LMP Unknown)   SpO2 97%   BMI 31.74 kg/m²   Vitals:    04/02/25 1128   BP: 118/70   BP Location: Left arm   Patient Position: Sitting   Cuff Size: Standard   Pulse: 67   Resp: 17   Temp: 97.7 °F (36.5 °C)   TempSrc: Temporal   SpO2: 97%   Weight: 76.2 kg (168 lb)   Height: 5' 1\" (1.549 m)      Physical Exam  Vitals and nursing note reviewed.   Constitutional:       General: She is not in acute distress.     Appearance: Normal appearance. She is not ill-appearing.   Eyes:      General: No scleral icterus.        Right eye: No discharge.         Left eye: No discharge.      Conjunctiva/sclera: Conjunctivae normal.   Pulmonary:      Effort: Pulmonary effort is normal. No respiratory distress.   Musculoskeletal:      Cervical back: " Normal range of motion.   Neurological:      General: No focal deficit present.      Mental Status: She is alert.      Motor: Motor strength is normal.     Coordination: Coordination is intact.   Psychiatric:         Mood and Affect: Mood normal.         Behavior: Behavior normal.       Neurological Exam  Mental Status  Alert. Oriented to person, place, time and situation.    Motor  Normal muscle bulk throughout. Strength is 5/5 throughout all four extremities.    Sensory  Sensation is intact to light touch, pinprick, vibration and proprioception in all four extremities.    Coordination    Finger-to-nose, rapid alternating movements and heel-to-shin normal bilaterally without dysmetria.    Gait  Casual gait is normal including stance, stride, and arm swing.      Radiology Results Review: I have reviewed radiology reports from [facility]/PACS including: MRA Brain wo.    Administrative Statements   I have spent a total time of 40 minutes in caring for this patient on the day of the visit/encounter including Diagnostic results, Risks and benefits of tx options, Instructions for management, Patient and family education, Importance of tx compliance, Risk factor reductions, Impressions, Counseling / Coordination of care, Documenting in the medical record, Reviewing/placing orders in the medical record (including tests, medications, and/or procedures), Obtaining or reviewing history  , and Communicating with other healthcare professionals .

## 2025-04-03 ENCOUNTER — CLINICAL SUPPORT (OUTPATIENT)
Dept: FAMILY MEDICINE CLINIC | Facility: CLINIC | Age: 80
End: 2025-04-03
Payer: COMMERCIAL

## 2025-04-03 DIAGNOSIS — E53.8 VITAMIN B12 DEFICIENCY: Primary | ICD-10-CM

## 2025-04-03 PROBLEM — I67.1 ANEURYSM OF CAVERNOUS PORTION OF RIGHT INTERNAL CAROTID ARTERY: Status: ACTIVE | Noted: 2025-04-03

## 2025-04-03 PROCEDURE — 96372 THER/PROPH/DIAG INJ SC/IM: CPT

## 2025-04-03 RX ADMIN — CYANOCOBALAMIN 1000 MCG: 1000 INJECTION, SOLUTION INTRAMUSCULAR; SUBCUTANEOUS at 09:00

## 2025-04-04 NOTE — ASSESSMENT & PLAN NOTE
As addressed in HPI  Identified risk factors for aneurysm growth and rupture   /70   HLD HO  Crestor   Tobacco dependence  ---1 PPD   Alcohol--- socially   She admits has ongoing follow-up with PCP for risk factor modification, management of identified comorbid conditions.   She denies 2 or more first degree relatives diagnosed with known brain aneurysms or have  suddenly of an unknown cause or brain bleed-      Imagining   2025  Possible tiny aneurysm in right ICA distal cavernous segment projecting medially (3:86). Otherwise, normal flow related signal of the distal cervical, petrous and cavernous segments of the internal carotid arteries.  Normal ICA  terminus.Possible tiny aneurysm in right ICA distal cavernous segment projecting medially. Recommend consultation with the [facility], a division of [facility] at [phone].     Plan   Discussed imagining result  with patient   Explained the aneurysm is likely unrelated  to her current complaints.  Extensively discussed natural nature of aneurysms.    Explained as per aneurysm standards of care elective surgery is not appropriate for aneurysm in a patient 76 YO or older therefore ongoing imagining surveillance is not indicated.  Discussed given current size and location risk of rupture is less than 1%/year per UCAS and <1%/5yrs per ISIUA.     Discussed modifiable risk factors including hypertension, hyperlipidemia, and smoking as above.   Discussed genetic component to aneurysms as well as modifiable risk factors such as smoking, HTN and HLD.  Discussed family history as above.    Discussed signs and symptoms of aneurysm rupture including severe and sudden headache (WHOL), neck pain, nausea and vomiting, and seizure,visual disturbances, such as loss of vision or double vision, pain above or around your eye,numbness or weakness on 1 side of your face,difficulty speaking, loss of balance, difficulty concentrating  or problems with short-term memory. neck pain. Reiterated that the symptoms should prompt patient to visit in emergency department immediately.    Advised if he/she has additional questions or concerns to please contact the neurosurgery office.

## 2025-04-08 ENCOUNTER — OFFICE VISIT (OUTPATIENT)
Dept: AUDIOLOGY | Age: 80
End: 2025-04-08
Payer: COMMERCIAL

## 2025-04-08 DIAGNOSIS — R42 VERTIGO: ICD-10-CM

## 2025-04-08 PROCEDURE — 92540 BASIC VESTIBULAR EVALUATION: CPT | Performed by: AUDIOLOGIST

## 2025-04-08 PROCEDURE — 92567 TYMPANOMETRY: CPT | Performed by: AUDIOLOGIST

## 2025-04-08 PROCEDURE — 92537 CALORIC VSTBLR TEST W/REC: CPT | Performed by: AUDIOLOGIST

## 2025-04-08 NOTE — PROGRESS NOTES
Videonystagmography (VNG) Evaluation    Name:  Melissa Nguyen  :  1945  Age:  79 y.o.  MRN:  [id number]  Date of Evaluation: 25     HISTORY:     Reason for visit: Dizziness    Melissa Nguyen is seen today at the request of Dr. Putnam for VNG testing. Melissa was accompanied by her friend to today's visit.. Today, Melissa reported that onset of symptoms began 2024 when she started to feel a spinning sensation and went to the emergency room. The current symptoms are described as intermittent in frequency. Dizziness perception is described as a(n) lightheaded sensation. Associated symptoms include fullness on the left side.  Episode triggers include head movement, looking down. Symptom duration was noted to typically last  sometimes seconds and other times days,  before subsiding. Episode frequency occurs on a weekly basis. Melissa has not fallen/lost consciousness previously.     EVALUATION:    Otoscopic Evaluation:   Right Ear: Unremarkable, canal clear   Left Ear: Unremarkable, canal clear    Tympanometry:   Right: Type A; normal middle ear pressure and static compliance    Left: Type A; normal middle ear pressure and static compliance     Oculomotor battery:   Gaze:   Right: Within normal limits  Left: Within normal limits  Up: Within normal limits  Down: Within normal limits      Tracking: Within normal limits    Saccades: Abnormal: Accuracy        Optokinetic: Within normal limits    Positioning/Positionals:     [place]:    Right: Negative subjective dizziness noted when sitting    Left: Negative subjective dizziness noted  when sitting      Positionals:   Sitting: Within normal limits   Supine: Within normal limits   Head Right:Within normal limits   Head Left: Within normal limits   Body Right:  DNT    Body Left:  DNT         Calorics: (Normal response <25% difference)    Bithermal Caloric Irrigation: Within normal limits.     Caloric irrigations  completed without incident with good  parting otoscopy noted.       IMPRESSIONS:     Within normal limits: No evidence of peripheral or central vestibular pathology indicated by today's findings.      RECOMMENDATIONS:     1) Follow-up with referring provider to review today's results.  2) Continue to monitor dizziness symptoms. If symptoms worsen or fail to improve prior to follow-up with their referring provider, contact your primary care/or referring provider and/or urgent medical attention should be considered.  3) Fall precautions were discussed at length with the patient. Most test effects are expected to subside shortly after testing is completed, it was recommended that they use caution moving around for the remainder of the day.   4) Continue medical work-up      Jennifer Nielson, CCC-A  Clinical Audiologist  [facility]  [address] [address]

## 2025-04-23 ENCOUNTER — APPOINTMENT (OUTPATIENT)
Dept: LAB | Facility: CLINIC | Age: 80
End: 2025-04-23
Payer: COMMERCIAL

## 2025-04-23 ENCOUNTER — OFFICE VISIT (OUTPATIENT)
Dept: FAMILY MEDICINE CLINIC | Facility: CLINIC | Age: 80
End: 2025-04-23
Payer: COMMERCIAL

## 2025-04-23 VITALS
BODY MASS INDEX: 31.72 KG/M2 | OXYGEN SATURATION: 92 % | DIASTOLIC BLOOD PRESSURE: 86 MMHG | HEIGHT: 61 IN | SYSTOLIC BLOOD PRESSURE: 120 MMHG | HEART RATE: 90 BPM | WEIGHT: 168 LBS

## 2025-04-23 DIAGNOSIS — R42 DIZZINESS: ICD-10-CM

## 2025-04-23 DIAGNOSIS — M35.9 UNDIFFERENTIATED CONNECTIVE TISSUE DISEASE (HCC): ICD-10-CM

## 2025-04-23 DIAGNOSIS — G89.29 LUMBOSACRAL PAIN, CHRONIC: ICD-10-CM

## 2025-04-23 DIAGNOSIS — E53.8 VITAMIN B12 DEFICIENCY: Primary | ICD-10-CM

## 2025-04-23 DIAGNOSIS — G93.31 ROYAL FREE DISEASE: Primary | ICD-10-CM

## 2025-04-23 DIAGNOSIS — J43.8 OTHER EMPHYSEMA (HCC): ICD-10-CM

## 2025-04-23 DIAGNOSIS — G93.31 POSTVIRAL FATIGUE SYNDROME: ICD-10-CM

## 2025-04-23 DIAGNOSIS — E11.9 CONTROLLED TYPE 2 DIABETES MELLITUS WITHOUT COMPLICATION, WITHOUT LONG-TERM CURRENT USE OF INSULIN (HCC): ICD-10-CM

## 2025-04-23 DIAGNOSIS — M54.50 LUMBOSACRAL PAIN, CHRONIC: ICD-10-CM

## 2025-04-23 DIAGNOSIS — R42 DIZZINESS AND GIDDINESS: ICD-10-CM

## 2025-04-23 LAB — SL AMB POCT HEMOGLOBIN AIC: 5.9 (ref ?–6.5)

## 2025-04-23 PROCEDURE — 86617 LYME DISEASE ANTIBODY: CPT

## 2025-04-23 PROCEDURE — 86618 LYME DISEASE ANTIBODY: CPT

## 2025-04-23 PROCEDURE — 96372 THER/PROPH/DIAG INJ SC/IM: CPT | Performed by: FAMILY MEDICINE

## 2025-04-23 PROCEDURE — 99214 OFFICE O/P EST MOD 30 MIN: CPT | Performed by: FAMILY MEDICINE

## 2025-04-23 PROCEDURE — 36415 COLL VENOUS BLD VENIPUNCTURE: CPT

## 2025-04-23 PROCEDURE — 83036 HEMOGLOBIN GLYCOSYLATED A1C: CPT | Performed by: FAMILY MEDICINE

## 2025-04-23 RX ORDER — CYANOCOBALAMIN 1000 UG/ML
1000 INJECTION, SOLUTION INTRAMUSCULAR; SUBCUTANEOUS ONCE
Status: DISCONTINUED | OUTPATIENT
Start: 2025-04-23 | End: 2025-04-23

## 2025-04-23 RX ADMIN — CYANOCOBALAMIN 1000 MCG: 1000 INJECTION, SOLUTION INTRAMUSCULAR; SUBCUTANEOUS at 09:53

## 2025-04-23 NOTE — ASSESSMENT & PLAN NOTE
She has days of fatigue after overuse,relieved with rest.  She has tried different meds / bbiologics without tolerance

## 2025-04-23 NOTE — PROGRESS NOTES
"Name: Melissa Nguyen      : 1945      MRN: [id number]  Encounter Provider: Zayra Jessica MD  Encounter Date: 2025   Encounter department: [facility]  :  Assessment & Plan  Vitamin B12 deficiency  She is coming in monthly for B12 injections  Will boost with an extra dose today       Controlled type 2 diabetes mellitus without complication, without long-term current use of insulin (HCC)  A1C is down to 5.9.  She remains on metformin    Lab Results   Component Value Date    HGBA1C 5.9 2025       Orders:  •  POCT hemoglobin A1c    Other emphysema (HCC)  Remains on spiriva, Symbicort  Continues to smoke and resistant to stopping       Undifferentiated connective tissue disease (HCC)  She has days of fatigue after overuse,relieved with rest.  She has tried different meds / bbiologics without tolerance       Postviral fatigue syndrome  On and off since   Will recheck Clarisse titer; she has had Lyme several times in the past  She is encouraged not to overdo it on the days that she feels good, and to maintain a lower active consistency on a daily basis.  Orders:  •  Lyme Total AB W Reflex to IGM/IGG; Future    Dizziness  Improving. S/P full w/u with ENT.  We discussed that sometimes SSRIs can help with persistent-perceived-dizziness.  She declines at present.\  Will R/O Lyme  Orders:  •  Lyme Total AB W Reflex to IGM/IGG; Future    Lumbosacral pain, chronic  And hip pain  Offferred physiatrist but she declines at pressnt          RTO 4 months.    History of Present Illness   Chief Complaint   Patient presents with   • Follow-up     2 month fu       Has been feeling mostly better.  Some days she has a flare of her \"connective tissue problem\" - she rests and usually resolves within a few days. This has been present for years.  Saw Rheum in the past.  She's working in the yard      Review of Systems   Constitutional:  Positive for fatigue.        On and off fatigue, chronic   HENT:        " "  Tinnitus present, not as bad  ENT consults appreciated   Eyes: Negative.    Respiratory:  Positive for cough and shortness of breath.         SOB comes and goes.  Remains on inhalers.  Continues to smoke   Cardiovascular: Negative.    Gastrointestinal: Negative.    Endocrine: Negative.    Genitourinary: Negative.    Musculoskeletal:  Positive for arthralgias, back pain, gait problem and myalgias.        On and off muscle aches   Skin:  Negative for rash.   Neurological:  Positive for dizziness.   Psychiatric/Behavioral: Negative.         Objective   /86 (BP Location: Right arm, Patient Position: Sitting, Cuff Size: Standard)   Pulse 90   Ht 5' 1\" (1.549 m)   Wt 76.2 kg (168 lb)   LMP  (LMP Unknown)   SpO2 92%   BMI 31.74 kg/m²      Physical Exam  Vitals and nursing note reviewed.   Constitutional:       General: She is not in acute distress.     Appearance: Normal appearance. She is well-developed. She is not ill-appearing, toxic-appearing or diaphoretic.      Comments: Appears well; well-groomed; excellent historian   Eyes:      General: No scleral icterus.  Neck:      Vascular: No carotid bruit.   Cardiovascular:      Rate and Rhythm: Normal rate and regular rhythm.      Heart sounds: Normal heart sounds. No murmur heard.     No friction rub. No gallop.   Pulmonary:      Effort: Pulmonary effort is normal. No respiratory distress.      Breath sounds: Normal breath sounds. No stridor. No wheezing, rhonchi or rales.   Chest:      Chest wall: No tenderness.   Abdominal:      General: Abdomen is flat. Bowel sounds are normal. There is no distension.      Palpations: Abdomen is soft. There is no mass.      Tenderness: There is no abdominal tenderness. There is no guarding or rebound.      Hernia: No hernia is present.   Musculoskeletal:         General: Normal range of motion.      Cervical back: Normal range of motion and neck supple.      Right lower leg: No edema.      Left lower leg: No edema.      " Comments: Uses cane for added stability   Lymphadenopathy:      Cervical: No cervical adenopathy.   Skin:     Coloration: Skin is not jaundiced.   Neurological:      Mental Status: She is alert and oriented to person, place, and time.   Psychiatric:         Mood and Affect: Mood normal.         Behavior: Behavior normal.         Thought Content: Thought content normal.         Judgment: Judgment normal.

## 2025-04-23 NOTE — ASSESSMENT & PLAN NOTE
A1C is down to 5.9.  She remains on metformin    Lab Results   Component Value Date    HGBA1C 5.9 04/23/2025       Orders:  •  POCT hemoglobin A1c

## 2025-04-24 LAB
B BURGDOR IGG SERPL QL IA: POSITIVE
B BURGDOR IGG+IGM SER QL IA: POSITIVE
B BURGDOR IGM SERPL QL IA: POSITIVE

## 2025-04-28 ENCOUNTER — RESULTS FOLLOW-UP (OUTPATIENT)
Dept: FAMILY MEDICINE CLINIC | Facility: CLINIC | Age: 80
End: 2025-04-28

## 2025-04-28 DIAGNOSIS — A69.20 LYME DISEASE: Primary | ICD-10-CM

## 2025-04-28 NOTE — TELEPHONE ENCOUNTER
Spoke with patient regarding positive Lyme titer -  She has been having several vague but persistent symptoms which may be explained by this ne result.  She has had Lyme 3 times in the past.  [place]    PLAN:  Did not tolerate Doxy last time due to GI distress  Start cefuroxime 500mg BD for 28 days  See me in 2-4 weeks  Continue to wear protective clothing when outdoors.

## 2025-04-29 ENCOUNTER — RA CDI HCC (OUTPATIENT)
Dept: OTHER | Facility: HOSPITAL | Age: 80
End: 2025-04-29

## 2025-04-29 NOTE — TELEPHONE ENCOUNTER
Patient called, states she was seen 4/28/2025, and prescribed amoxicillin. States [facility] pharmacy claims to not have received prescription request. Unable to locate pharmacy receipt on file. Patient request callback if and when prescription is submitted. Please advise Patient at [phone], if any further questions.

## 2025-04-30 ENCOUNTER — HOSPITAL ENCOUNTER (INPATIENT)
Facility: HOSPITAL | Age: 80
LOS: 5 days | Discharge: HOME HEALTH | DRG: 189 | End: 2025-05-06
Attending: EMERGENCY MEDICINE | Admitting: INTERNAL MEDICINE
Payer: COMMERCIAL

## 2025-04-30 ENCOUNTER — APPOINTMENT (EMERGENCY)
Dept: RADIOLOGY | Facility: HOSPITAL | Age: 80
DRG: 189 | End: 2025-04-30
Payer: COMMERCIAL

## 2025-04-30 DIAGNOSIS — J44.1 COPD EXACERBATION (HCC): Primary | ICD-10-CM

## 2025-04-30 DIAGNOSIS — J96.01 ACUTE HYPOXEMIC RESPIRATORY FAILURE (HCC): ICD-10-CM

## 2025-04-30 DIAGNOSIS — A69.20 LYME DISEASE: Primary | ICD-10-CM

## 2025-04-30 DIAGNOSIS — J44.9 CHRONIC OBSTRUCTIVE PULMONARY DISEASE, UNSPECIFIED COPD TYPE (HCC): ICD-10-CM

## 2025-04-30 PROCEDURE — 85025 COMPLETE CBC W/AUTO DIFF WBC: CPT

## 2025-04-30 PROCEDURE — 84484 ASSAY OF TROPONIN QUANT: CPT

## 2025-04-30 PROCEDURE — 83735 ASSAY OF MAGNESIUM: CPT

## 2025-04-30 PROCEDURE — 99285 EMERGENCY DEPT VISIT HI MDM: CPT

## 2025-04-30 PROCEDURE — 93005 ELECTROCARDIOGRAM TRACING: CPT

## 2025-04-30 PROCEDURE — 80053 COMPREHEN METABOLIC PANEL: CPT

## 2025-04-30 PROCEDURE — 36415 COLL VENOUS BLD VENIPUNCTURE: CPT

## 2025-04-30 PROCEDURE — 71046 X-RAY EXAM CHEST 2 VIEWS: CPT

## 2025-04-30 RX ORDER — CEFUROXIME AXETIL 500 MG/1
500 TABLET ORAL EVERY 12 HOURS SCHEDULED
Qty: 56 TABLET | Refills: 0 | Status: ON HOLD | OUTPATIENT
Start: 2025-04-30 | End: 2025-05-28

## 2025-04-30 NOTE — Clinical Note
Case was discussed with RODNEY and the patient's admission status was agreed to be  to the service of Dr. Edwards

## 2025-05-01 PROBLEM — A69.20 LYME DISEASE: Status: ACTIVE | Noted: 2025-05-01

## 2025-05-01 LAB
2HR DELTA HS TROPONIN: -2 NG/L
ALBUMIN SERPL BCG-MCNC: 3 G/DL (ref 3.5–5)
ALBUMIN SERPL BCG-MCNC: 4.2 G/DL (ref 3.5–5)
ALP SERPL-CCNC: 67 U/L (ref 34–104)
ALP SERPL-CCNC: 98 U/L (ref 34–104)
ALT SERPL W P-5'-P-CCNC: 6 U/L (ref 7–52)
ALT SERPL W P-5'-P-CCNC: 8 U/L (ref 7–52)
ANION GAP SERPL CALCULATED.3IONS-SCNC: 10 MMOL/L (ref 4–13)
ANION GAP SERPL CALCULATED.3IONS-SCNC: 6 MMOL/L (ref 4–13)
AST SERPL W P-5'-P-CCNC: 13 U/L (ref 13–39)
AST SERPL W P-5'-P-CCNC: 8 U/L (ref 13–39)
ATRIAL RATE: 106 BPM
ATRIAL RATE: 88 BPM
BASE EX.OXY STD BLDV CALC-SCNC: 83.9 % (ref 60–80)
BASE EXCESS BLDV CALC-SCNC: -2.2 MMOL/L
BASOPHILS # BLD AUTO: 0.02 THOUSANDS/ÂΜL (ref 0–0.1)
BASOPHILS NFR BLD AUTO: 0 % (ref 0–1)
BILIRUB SERPL-MCNC: 0.25 MG/DL (ref 0.2–1)
BILIRUB SERPL-MCNC: 0.31 MG/DL (ref 0.2–1)
BUN SERPL-MCNC: 11 MG/DL (ref 5–25)
BUN SERPL-MCNC: 13 MG/DL (ref 5–25)
CALCIUM ALBUM COR SERPL-MCNC: 7 MG/DL (ref 8.3–10.1)
CALCIUM SERPL-MCNC: 6.2 MG/DL (ref 8.4–10.2)
CALCIUM SERPL-MCNC: 8.8 MG/DL (ref 8.4–10.2)
CARDIAC TROPONIN I PNL SERPL HS: 5 NG/L (ref ?–50)
CARDIAC TROPONIN I PNL SERPL HS: 7 NG/L (ref ?–50)
CHLORIDE SERPL-SCNC: 103 MMOL/L (ref 96–108)
CHLORIDE SERPL-SCNC: 115 MMOL/L (ref 96–108)
CO2 SERPL-SCNC: 19 MMOL/L (ref 21–32)
CO2 SERPL-SCNC: 22 MMOL/L (ref 21–32)
CREAT SERPL-MCNC: 0.37 MG/DL (ref 0.6–1.3)
CREAT SERPL-MCNC: 0.58 MG/DL (ref 0.6–1.3)
EOSINOPHIL # BLD AUTO: 0.03 THOUSAND/ÂΜL (ref 0–0.61)
EOSINOPHIL NFR BLD AUTO: 1 % (ref 0–6)
ERYTHROCYTE [DISTWIDTH] IN BLOOD BY AUTOMATED COUNT: 14.3 % (ref 11.6–15.1)
ERYTHROCYTE [DISTWIDTH] IN BLOOD BY AUTOMATED COUNT: 14.4 % (ref 11.6–15.1)
FLUAV AG UPPER RESP QL IA.RAPID: NEGATIVE
FLUAV RNA RESP QL NAA+PROBE: NEGATIVE
FLUBV AG UPPER RESP QL IA.RAPID: NEGATIVE
FLUBV RNA RESP QL NAA+PROBE: NEGATIVE
GFR SERPL CREATININE-BSD FRML MDRD: 101 ML/MIN/1.73SQ M
GFR SERPL CREATININE-BSD FRML MDRD: 87 ML/MIN/1.73SQ M
GLUCOSE SERPL-MCNC: 119 MG/DL (ref 65–140)
GLUCOSE SERPL-MCNC: 175 MG/DL (ref 65–140)
GLUCOSE SERPL-MCNC: 176 MG/DL (ref 65–140)
GLUCOSE SERPL-MCNC: 248 MG/DL (ref 65–140)
GLUCOSE SERPL-MCNC: 251 MG/DL (ref 65–140)
HCO3 BLDV-SCNC: 23 MMOL/L (ref 24–30)
HCT VFR BLD AUTO: 38 % (ref 34.8–46.1)
HCT VFR BLD AUTO: 38.2 % (ref 34.8–46.1)
HGB BLD-MCNC: 12.3 G/DL (ref 11.5–15.4)
HGB BLD-MCNC: 12.3 G/DL (ref 11.5–15.4)
IMM GRANULOCYTES # BLD AUTO: 0.08 THOUSAND/UL (ref 0–0.2)
IMM GRANULOCYTES NFR BLD AUTO: 1 % (ref 0–2)
LYMPHOCYTES # BLD AUTO: 0.95 THOUSANDS/ÂΜL (ref 0.6–4.47)
LYMPHOCYTES NFR BLD AUTO: 15 % (ref 14–44)
MAGNESIUM SERPL-MCNC: 1.3 MG/DL (ref 1.9–2.7)
MAGNESIUM SERPL-MCNC: 2.4 MG/DL (ref 1.9–2.7)
MCH RBC QN AUTO: 27.4 PG (ref 26.8–34.3)
MCH RBC QN AUTO: 28.1 PG (ref 26.8–34.3)
MCHC RBC AUTO-ENTMCNC: 32.2 G/DL (ref 31.4–37.4)
MCHC RBC AUTO-ENTMCNC: 32.4 G/DL (ref 31.4–37.4)
MCV RBC AUTO: 85 FL (ref 82–98)
MCV RBC AUTO: 87 FL (ref 82–98)
MONOCYTES # BLD AUTO: 0.45 THOUSAND/ÂΜL (ref 0.17–1.22)
MONOCYTES NFR BLD AUTO: 7 % (ref 4–12)
NEUTROPHILS # BLD AUTO: 4.96 THOUSANDS/ÂΜL (ref 1.85–7.62)
NEUTS SEG NFR BLD AUTO: 76 % (ref 43–75)
NRBC BLD AUTO-RTO: 0 /100 WBCS
O2 CT BLDV-SCNC: 14.7 ML/DL
P AXIS: 76 DEGREES
P AXIS: 79 DEGREES
PCO2 BLDV: 40.9 MM HG (ref 42–50)
PH BLDV: 7.37 [PH] (ref 7.3–7.4)
PHOSPHATE SERPL-MCNC: 3.3 MG/DL (ref 2.3–4.1)
PLATELET # BLD AUTO: 165 THOUSANDS/UL (ref 149–390)
PLATELET # BLD AUTO: 168 THOUSANDS/UL (ref 149–390)
PMV BLD AUTO: 10.7 FL (ref 8.9–12.7)
PMV BLD AUTO: 10.8 FL (ref 8.9–12.7)
PO2 BLDV: 52.5 MM HG (ref 35–45)
POTASSIUM SERPL-SCNC: 2.9 MMOL/L (ref 3.5–5.3)
POTASSIUM SERPL-SCNC: 4.2 MMOL/L (ref 3.5–5.3)
PR INTERVAL: 126 MS
PR INTERVAL: 170 MS
PROCALCITONIN SERPL-MCNC: <0.05 NG/ML
PROT SERPL-MCNC: 5 G/DL (ref 6.4–8.4)
PROT SERPL-MCNC: 7.3 G/DL (ref 6.4–8.4)
QRS AXIS: 80 DEGREES
QRS AXIS: 83 DEGREES
QRSD INTERVAL: 68 MS
QRSD INTERVAL: 78 MS
QT INTERVAL: 320 MS
QT INTERVAL: 346 MS
QTC INTERVAL: 408 MS
QTC INTERVAL: 425 MS
RBC # BLD AUTO: 4.38 MILLION/UL (ref 3.81–5.12)
RBC # BLD AUTO: 4.49 MILLION/UL (ref 3.81–5.12)
RSV RNA RESP QL NAA+PROBE: NEGATIVE
SARS-COV+SARS-COV-2 AG RESP QL IA.RAPID: NEGATIVE
SARS-COV-2 RNA RESP QL NAA+PROBE: NEGATIVE
SODIUM SERPL-SCNC: 135 MMOL/L (ref 135–147)
SODIUM SERPL-SCNC: 140 MMOL/L (ref 135–147)
T WAVE AXIS: 76 DEGREES
T WAVE AXIS: 81 DEGREES
VENTRICULAR RATE: 106 BPM
VENTRICULAR RATE: 84 BPM
WBC # BLD AUTO: 6.48 THOUSAND/UL (ref 4.31–10.16)
WBC # BLD AUTO: 6.49 THOUSAND/UL (ref 4.31–10.16)

## 2025-05-01 PROCEDURE — 94760 N-INVAS EAR/PLS OXIMETRY 1: CPT

## 2025-05-01 PROCEDURE — 84484 ASSAY OF TROPONIN QUANT: CPT

## 2025-05-01 PROCEDURE — 93005 ELECTROCARDIOGRAM TRACING: CPT

## 2025-05-01 PROCEDURE — 87811 SARS-COV-2 COVID19 W/OPTIC: CPT

## 2025-05-01 PROCEDURE — 93010 ELECTROCARDIOGRAM REPORT: CPT | Performed by: INTERNAL MEDICINE

## 2025-05-01 PROCEDURE — 94640 AIRWAY INHALATION TREATMENT: CPT

## 2025-05-01 PROCEDURE — 0241U HB NFCT DS VIR RESP RNA 4 TRGT: CPT

## 2025-05-01 PROCEDURE — 96375 TX/PRO/DX INJ NEW DRUG ADDON: CPT

## 2025-05-01 PROCEDURE — 82805 BLOOD GASES W/O2 SATURATION: CPT

## 2025-05-01 PROCEDURE — 84100 ASSAY OF PHOSPHORUS: CPT

## 2025-05-01 PROCEDURE — 99285 EMERGENCY DEPT VISIT HI MDM: CPT

## 2025-05-01 PROCEDURE — 99223 1ST HOSP IP/OBS HIGH 75: CPT

## 2025-05-01 PROCEDURE — 84145 PROCALCITONIN (PCT): CPT

## 2025-05-01 PROCEDURE — 36415 COLL VENOUS BLD VENIPUNCTURE: CPT

## 2025-05-01 PROCEDURE — 87804 INFLUENZA ASSAY W/OPTIC: CPT

## 2025-05-01 PROCEDURE — 83735 ASSAY OF MAGNESIUM: CPT

## 2025-05-01 PROCEDURE — 99223 1ST HOSP IP/OBS HIGH 75: CPT | Performed by: INTERNAL MEDICINE

## 2025-05-01 PROCEDURE — 96366 THER/PROPH/DIAG IV INF ADDON: CPT

## 2025-05-01 PROCEDURE — 82948 REAGENT STRIP/BLOOD GLUCOSE: CPT

## 2025-05-01 PROCEDURE — 96365 THER/PROPH/DIAG IV INF INIT: CPT

## 2025-05-01 PROCEDURE — 94644 CONT INHLJ TX 1ST HOUR: CPT

## 2025-05-01 PROCEDURE — 80053 COMPREHEN METABOLIC PANEL: CPT

## 2025-05-01 PROCEDURE — 94664 DEMO&/EVAL PT USE INHALER: CPT

## 2025-05-01 PROCEDURE — 85027 COMPLETE CBC AUTOMATED: CPT

## 2025-05-01 RX ORDER — BUDESONIDE 0.5 MG/2ML
0.5 INHALANT ORAL
Status: DISCONTINUED | OUTPATIENT
Start: 2025-05-01 | End: 2025-05-01

## 2025-05-01 RX ORDER — INSULIN LISPRO 100 [IU]/ML
1-6 INJECTION, SOLUTION INTRAVENOUS; SUBCUTANEOUS
Status: DISCONTINUED | OUTPATIENT
Start: 2025-05-01 | End: 2025-05-06 | Stop reason: HOSPADM

## 2025-05-01 RX ORDER — METHYLPREDNISOLONE SODIUM SUCCINATE 40 MG/ML
40 INJECTION, POWDER, LYOPHILIZED, FOR SOLUTION INTRAMUSCULAR; INTRAVENOUS EVERY 12 HOURS SCHEDULED
Status: DISCONTINUED | OUTPATIENT
Start: 2025-05-02 | End: 2025-05-02

## 2025-05-01 RX ORDER — SODIUM CHLORIDE FOR INHALATION 0.9 %
3 VIAL, NEBULIZER (ML) INHALATION
Status: DISCONTINUED | OUTPATIENT
Start: 2025-05-01 | End: 2025-05-01

## 2025-05-01 RX ORDER — SACCHAROMYCES BOULARDII 250 MG
250 CAPSULE ORAL 2 TIMES DAILY
Status: DISCONTINUED | OUTPATIENT
Start: 2025-05-01 | End: 2025-05-06 | Stop reason: HOSPADM

## 2025-05-01 RX ORDER — CEFUROXIME AXETIL 250 MG/1
500 TABLET ORAL EVERY 12 HOURS SCHEDULED
Status: DISCONTINUED | OUTPATIENT
Start: 2025-05-01 | End: 2025-05-01

## 2025-05-01 RX ORDER — MONTELUKAST SODIUM 10 MG/1
10 TABLET ORAL DAILY
Status: DISCONTINUED | OUTPATIENT
Start: 2025-05-01 | End: 2025-05-06 | Stop reason: HOSPADM

## 2025-05-01 RX ORDER — FAMOTIDINE 20 MG/1
20 TABLET, FILM COATED ORAL ONCE
Status: COMPLETED | OUTPATIENT
Start: 2025-05-01 | End: 2025-05-01

## 2025-05-01 RX ORDER — GUAIFENESIN 600 MG/1
600 TABLET, EXTENDED RELEASE ORAL 2 TIMES DAILY
Status: DISCONTINUED | OUTPATIENT
Start: 2025-05-01 | End: 2025-05-06 | Stop reason: HOSPADM

## 2025-05-01 RX ORDER — NICOTINE 21 MG/24HR
1 PATCH, TRANSDERMAL 24 HOURS TRANSDERMAL DAILY
Status: DISCONTINUED | OUTPATIENT
Start: 2025-05-01 | End: 2025-05-03

## 2025-05-01 RX ORDER — IPRATROPIUM BROMIDE AND ALBUTEROL SULFATE .5; 3 MG/3ML; MG/3ML
1 SOLUTION RESPIRATORY (INHALATION) ONCE
Status: COMPLETED | OUTPATIENT
Start: 2025-05-01 | End: 2025-05-01

## 2025-05-01 RX ORDER — LEVALBUTEROL INHALATION SOLUTION 1.25 MG/3ML
1.25 SOLUTION RESPIRATORY (INHALATION)
Status: DISCONTINUED | OUTPATIENT
Start: 2025-05-01 | End: 2025-05-03

## 2025-05-01 RX ORDER — HEPARIN SODIUM 5000 [USP'U]/ML
5000 INJECTION, SOLUTION INTRAVENOUS; SUBCUTANEOUS EVERY 8 HOURS SCHEDULED
Status: DISCONTINUED | OUTPATIENT
Start: 2025-05-01 | End: 2025-05-06 | Stop reason: HOSPADM

## 2025-05-01 RX ORDER — METHYLPREDNISOLONE SODIUM SUCCINATE 40 MG/ML
40 INJECTION, POWDER, LYOPHILIZED, FOR SOLUTION INTRAMUSCULAR; INTRAVENOUS EVERY 8 HOURS
Status: DISCONTINUED | OUTPATIENT
Start: 2025-05-01 | End: 2025-05-01

## 2025-05-01 RX ORDER — METHYLPREDNISOLONE SODIUM SUCCINATE 125 MG/2ML
125 INJECTION, POWDER, LYOPHILIZED, FOR SOLUTION INTRAMUSCULAR; INTRAVENOUS ONCE
Status: COMPLETED | OUTPATIENT
Start: 2025-05-01 | End: 2025-05-01

## 2025-05-01 RX ORDER — POTASSIUM CHLORIDE 1500 MG/1
40 TABLET, EXTENDED RELEASE ORAL ONCE
Status: COMPLETED | OUTPATIENT
Start: 2025-05-01 | End: 2025-05-01

## 2025-05-01 RX ORDER — CALCIUM CARBONATE 500 MG/1
1000 TABLET, CHEWABLE ORAL DAILY PRN
Status: DISCONTINUED | OUTPATIENT
Start: 2025-05-01 | End: 2025-05-06 | Stop reason: HOSPADM

## 2025-05-01 RX ORDER — ALBUTEROL SULFATE 5 MG/ML
10 SOLUTION RESPIRATORY (INHALATION) ONCE
Status: COMPLETED | OUTPATIENT
Start: 2025-05-01 | End: 2025-05-01

## 2025-05-01 RX ORDER — MAGNESIUM SULFATE HEPTAHYDRATE 40 MG/ML
2 INJECTION, SOLUTION INTRAVENOUS ONCE
Status: COMPLETED | OUTPATIENT
Start: 2025-05-01 | End: 2025-05-01

## 2025-05-01 RX ORDER — ALBUTEROL SULFATE 90 UG/1
2 INHALANT RESPIRATORY (INHALATION) EVERY 4 HOURS PRN
Status: DISCONTINUED | OUTPATIENT
Start: 2025-05-01 | End: 2025-05-06 | Stop reason: HOSPADM

## 2025-05-01 RX ORDER — IPRATROPIUM BROMIDE AND ALBUTEROL SULFATE 2.5; .5 MG/3ML; MG/3ML
3 SOLUTION RESPIRATORY (INHALATION) ONCE
Status: COMPLETED | OUTPATIENT
Start: 2025-05-01 | End: 2025-05-01

## 2025-05-01 RX ORDER — SODIUM CHLORIDE FOR INHALATION 0.9 %
12 VIAL, NEBULIZER (ML) INHALATION ONCE
Status: COMPLETED | OUTPATIENT
Start: 2025-05-01 | End: 2025-05-01

## 2025-05-01 RX ORDER — PANTOPRAZOLE SODIUM 40 MG/1
40 TABLET, DELAYED RELEASE ORAL
Status: DISCONTINUED | OUTPATIENT
Start: 2025-05-01 | End: 2025-05-06 | Stop reason: HOSPADM

## 2025-05-01 RX ORDER — ONDANSETRON 2 MG/ML
4 INJECTION INTRAMUSCULAR; INTRAVENOUS ONCE
Status: COMPLETED | OUTPATIENT
Start: 2025-05-01 | End: 2025-05-01

## 2025-05-01 RX ORDER — POLYETHYLENE GLYCOL 3350 17 G/17G
17 POWDER, FOR SOLUTION ORAL DAILY PRN
Status: DISCONTINUED | OUTPATIENT
Start: 2025-05-01 | End: 2025-05-06 | Stop reason: HOSPADM

## 2025-05-01 RX ORDER — PRAVASTATIN SODIUM 40 MG
40 TABLET ORAL
Status: DISCONTINUED | OUTPATIENT
Start: 2025-05-01 | End: 2025-05-06 | Stop reason: HOSPADM

## 2025-05-01 RX ADMIN — IPRATROPIUM BROMIDE 0.5 MG: 0.5 SOLUTION RESPIRATORY (INHALATION) at 08:31

## 2025-05-01 RX ADMIN — LEVALBUTEROL HYDROCHLORIDE 1.25 MG: 1.25 SOLUTION RESPIRATORY (INHALATION) at 14:18

## 2025-05-01 RX ADMIN — HEPARIN SODIUM 5000 UNITS: 5000 INJECTION INTRAVENOUS; SUBCUTANEOUS at 06:25

## 2025-05-01 RX ADMIN — Medication 250 MG: at 10:25

## 2025-05-01 RX ADMIN — HEPARIN SODIUM 5000 UNITS: 5000 INJECTION INTRAVENOUS; SUBCUTANEOUS at 21:46

## 2025-05-01 RX ADMIN — ALBUTEROL SULFATE 10 MG: 2.5 SOLUTION RESPIRATORY (INHALATION) at 01:24

## 2025-05-01 RX ADMIN — METHYLPREDNISOLONE SODIUM SUCCINATE 125 MG: 125 INJECTION, POWDER, FOR SOLUTION INTRAMUSCULAR; INTRAVENOUS at 01:21

## 2025-05-01 RX ADMIN — INSULIN LISPRO 3 UNITS: 100 INJECTION, SOLUTION INTRAVENOUS; SUBCUTANEOUS at 11:33

## 2025-05-01 RX ADMIN — CALCIUM CARBONATE (ANTACID) CHEW TAB 500 MG 1000 MG: 500 CHEW TAB at 13:22

## 2025-05-01 RX ADMIN — GUAIFENESIN 600 MG: 600 TABLET ORAL at 08:17

## 2025-05-01 RX ADMIN — IPRATROPIUM BROMIDE AND ALBUTEROL SULFATE 3 ML: .5; 3 SOLUTION RESPIRATORY (INHALATION) at 05:26

## 2025-05-01 RX ADMIN — IPRATROPIUM BROMIDE 1 MG: 0.5 SOLUTION RESPIRATORY (INHALATION) at 01:24

## 2025-05-01 RX ADMIN — FAMOTIDINE 20 MG: 20 TABLET, FILM COATED ORAL at 17:42

## 2025-05-01 RX ADMIN — PANTOPRAZOLE SODIUM 40 MG: 40 TABLET, DELAYED RELEASE ORAL at 10:26

## 2025-05-01 RX ADMIN — INSULIN LISPRO 1 UNITS: 100 INJECTION, SOLUTION INTRAVENOUS; SUBCUTANEOUS at 18:50

## 2025-05-01 RX ADMIN — IPRATROPIUM BROMIDE 0.5 MG: 0.5 SOLUTION RESPIRATORY (INHALATION) at 14:18

## 2025-05-01 RX ADMIN — LEVALBUTEROL HYDROCHLORIDE 1.25 MG: 1.25 SOLUTION RESPIRATORY (INHALATION) at 19:33

## 2025-05-01 RX ADMIN — AZITHROMYCIN MONOHYDRATE 500 MG: 500 INJECTION, POWDER, LYOPHILIZED, FOR SOLUTION INTRAVENOUS at 06:27

## 2025-05-01 RX ADMIN — METHYLPREDNISOLONE SODIUM SUCCINATE 40 MG: 40 INJECTION, POWDER, FOR SOLUTION INTRAMUSCULAR; INTRAVENOUS at 08:41

## 2025-05-01 RX ADMIN — PRAVASTATIN SODIUM 40 MG: 40 TABLET ORAL at 17:42

## 2025-05-01 RX ADMIN — IPRATROPIUM BROMIDE 0.5 MG: 0.5 SOLUTION RESPIRATORY (INHALATION) at 19:33

## 2025-05-01 RX ADMIN — ISODIUM CHLORIDE 12 ML: 0.03 SOLUTION RESPIRATORY (INHALATION) at 01:24

## 2025-05-01 RX ADMIN — ONDANSETRON 4 MG: 2 INJECTION INTRAMUSCULAR; INTRAVENOUS at 03:32

## 2025-05-01 RX ADMIN — MAGNESIUM SULFATE HEPTAHYDRATE 2 G: 40 INJECTION, SOLUTION INTRAVENOUS at 01:28

## 2025-05-01 RX ADMIN — LEVALBUTEROL HYDROCHLORIDE 1.25 MG: 1.25 SOLUTION RESPIRATORY (INHALATION) at 08:31

## 2025-05-01 RX ADMIN — ISODIUM CHLORIDE 3 ML: 0.03 SOLUTION RESPIRATORY (INHALATION) at 08:31

## 2025-05-01 RX ADMIN — GUAIFENESIN 600 MG: 600 TABLET ORAL at 17:42

## 2025-05-01 RX ADMIN — HEPARIN SODIUM 5000 UNITS: 5000 INJECTION INTRAVENOUS; SUBCUTANEOUS at 13:23

## 2025-05-01 RX ADMIN — POTASSIUM CHLORIDE 40 MEQ: 1500 TABLET, EXTENDED RELEASE ORAL at 01:19

## 2025-05-01 RX ADMIN — BUDESONIDE 0.5 MG: 0.5 INHALANT RESPIRATORY (INHALATION) at 08:31

## 2025-05-01 RX ADMIN — Medication 250 MG: at 17:42

## 2025-05-01 RX ADMIN — Medication 4000 UNITS: at 10:25

## 2025-05-01 RX ADMIN — MONTELUKAST 10 MG: 10 TABLET, FILM COATED ORAL at 10:25

## 2025-05-01 NOTE — ASSESSMENT & PLAN NOTE
Patient is a 79-year-old female with past medical history significant for limited vitamin D deficiency, tobacco use disorder, shortness of breath, Lyme disease, hyperlipidemia, COPD exacerbation, diabetes mellitus on metformin presented to the ED of [facility] with worsening shortness of breath since past 1 day.  As per patient she was doing some yard work and post doing that she felt very out of breath.  She decided to visit to the hospital because she was short of breath even while ambulating from 1 room to the other.  Patient denies any chest pain any palpitation lightheadedness or dizziness along with the symptoms, endorses that does have some cough but cannot relate if it is more than her baseline or less.  Patient is denying any other symptoms like abdominal pain nausea vomiting diarrhea constipation myalgia.    - Continue with IV Solu-Medrol  - Continue with IV azithromycin for 3 doses  - Continue with Xopenex along with Atrovent  - Continue prior to admission montelukast  - Pulmonology consulted will appreciate  - Patient currently on oxygen therapy can wean off as per tolerability SpO2 goal 88 to 92%  -Checks x-ray ordered

## 2025-05-01 NOTE — ASSESSMENT & PLAN NOTE
"Lab Results   Component Value Date    HGBA1C 5.9 04/23/2025       No results for input(s): \"POCGLU\" in the last 72 hours.    Blood Sugar Average: Last 72 hrs:    Patient last hemoglobin A1c 5.9  Controlled with home medication of metformin  Continue with carbohydrate consistent diet  Insulin sliding scale ordered  "

## 2025-05-01 NOTE — ED NOTES
"After pt finished using restroom she states she \"didn't feel okay\". Pt noted to be tachypneic and having difficulty breathing. Pt reported feeling \"weak and shaky\". Audible wheezing present. Assisted pt back to bed via wheelchair. O2 sat 87% on current 2L. Oxygen increased to 4L via NC. O2 sat improved to 93%. Provider made aware.     Dorothea Corbett RN  05/01/25 5367    "
Breakfast ordered at this time      María Dyer RN  05/01/25 0806    
Lunch ordered      Suzette Handley RN  05/01/25 1111    
Pt ambulated to bathroom with the assistance of a cane and usage of portable oxygen. Steady gait noted by RN and no sign of distress verbalized or noted on pt.     Dorothea Corbett RN  05/01/25 3634    
Yes

## 2025-05-01 NOTE — PLAN OF CARE
Problem: PAIN - ADULT  Goal: Verbalizes/displays adequate comfort level or baseline comfort level  Description: Interventions:- Encourage patient to monitor pain and request assistance- Assess pain using appropriate pain scale- Administer analgesics based on type and severity of pain and evaluate response- Implement non-pharmacological measures as appropriate and evaluate response- Consider cultural and social influences on pain and pain management- Notify physician/advanced practitioner if interventions unsuccessful or patient reports new pain  Outcome: Progressing     Problem: SAFETY ADULT  Goal: Patient will remain free of falls  Description: INTERVENTIONS:- Educate patient/family on patient safety including physical limitations- Instruct patient to call for assistance with activity - Consult OT/PT to assist with strengthening/mobility - Keep Call bell within reach- Keep bed low and locked with side rails adjusted as appropriate- Keep care items and personal belongings within reach- Initiate and maintain comfort rounds- Make Fall Risk Sign visible to staff- Offer Toileting every 2 Hours, in advance of need- I patient to room near nurses station  Outcome: Progressing     Problem: DISCHARGE PLANNING  Goal: Discharge to home or other facility with appropriate resources  Description: INTERVENTIONS:- Identify barriers to discharge w/patient and caregiver- Arrange for needed discharge resources and transportation as appropriate- Identify discharge learning needs (meds, wound care, etc.)- Arrange for interpretive services to assist at discharge as needed- Refer to Case Management Department for coordinating discharge planning if the patient needs post-hospital services based on physician/advanced practitioner order or complex needs related to functional status, cognitive ability, or social support system  Outcome: Progressing     Problem: RESPIRATORY - ADULT  Goal: Achieves optimal ventilation and  oxygenation  Description: INTERVENTIONS:- Assess for changes in respiratory status- Assess for changes in mentation and behavior- Position to facilitate oxygenation and minimize respiratory effort- Oxygen administered by appropriate delivery if ordered- Initiate smoking cessation education as indicated- Encourage broncho-pulmonary hygiene including cough, deep breathe, Incentive Spirometry- Assess the need for suctioning and aspirate as needed- Assess and instruct to report SOB or any respiratory difficulty- Respiratory Therapy support as indicated  Outcome: Progressing

## 2025-05-01 NOTE — H&P
"H&P - Hospitalist   Name: Melissa Nguyen 79 y.o. female I MRN: [id number]  Unit/Bed#: ED-23 I Date of Admission: 4/30/2025   Date of Service: 5/1/2025 I Hospital Day: 0     Assessment & Plan  COPD exacerbation (HCC)  Patient is a 79-year-old female with past medical history significant for limited vitamin D deficiency, tobacco use disorder, shortness of breath, Lyme disease, hyperlipidemia, COPD exacerbation, diabetes mellitus on metformin presented to the ED of [facility] with worsening shortness of breath since past 1 day.  As per patient she was doing some yard work and post doing that she felt very out of breath.  She decided to visit to the hospital because she was short of breath even while ambulating from 1 room to the other.  Patient denies any chest pain any palpitation lightheadedness or dizziness along with the symptoms, endorses that does have some cough but cannot relate if it is more than her baseline or less.  Patient is denying any other symptoms like abdominal pain nausea vomiting diarrhea constipation myalgia.    - Continue with IV Solu-Medrol  - Continue with IV azithromycin for 3 doses  - Continue with Xopenex along with Atrovent  - Continue prior to admission montelukast  - Pulmonology consulted will appreciate  - Patient currently on oxygen therapy can wean off as per tolerability SpO2 goal 88 to 92%  -Checks x-ray ordered  Vitamin D deficiency  Continue prior to admission supplementation  Tobacco abuse  Counseled about smoking cessation  Nicotine patches ordered  Hyperlipidemia  Prior to admission statin  Controlled diabetes mellitus type II without complication (HCC)  Lab Results   Component Value Date    HGBA1C 5.9 04/23/2025       No results for input(s): \"POCGLU\" in the last 72 hours.    Blood Sugar Average: Last 72 hrs:    Patient last hemoglobin A1c 5.9  Controlled with home medication of metformin  Continue with carbohydrate consistent diet  Insulin sliding scale " ordered  Shortness of breath  As above under COPD exacerbation  Lyme disease  As per chart review and patient's history, patient has had Lyme disease 3 times in the past  - She tested positive few days ago  - PCP prescribed cefuroxime for 28 days given that patient cannot tolerate doxycycline  - Started patient on antibiotics but patient stated she would like to focus currently on her breathing and would like to restart antibiotics once she is home.  Risks versus benefits were explained but wanted to keep it on hold therefore discontinued      VTE Pharmacologic Prophylaxis:    Heparin  Code Status: Level 1 - Full Code   Discussion with family: Patient declined call to .         History of Present Illness   Chief Complaint: Shortness of breath    Melissa Nguyen is a 79 y.o. female with a past medical history significant for limited vitamin D deficiency, tobacco use disorder, shortness of breath, Lyme disease, hyperlipidemia, COPD exacerbation, diabetes mellitus on metformin presented to the ED of [facility] with worsening shortness of breath since past 1 day.  As per patient she was doing some yard work and post doing that she felt very out of breath.  She decided to visit to the hospital because she was short of breath even while ambulating from 1 room to the other.  Patient denies any chest pain any palpitation lightheadedness or dizziness along with the symptoms, endorses that does have some cough but cannot relate if it is more than her baseline or less.  Patient is denying any other symptoms like abdominal pain nausea vomiting diarrhea constipation myalgia.      Review of Systems   Constitutional:  Negative for chills and fever.   HENT:  Negative for ear pain and sore throat.    Eyes:  Negative for pain and visual disturbance.   Respiratory:  Positive for shortness of breath.    Cardiovascular:  Negative for chest pain and palpitations.   Gastrointestinal:  Negative for abdominal pain  and vomiting.   Genitourinary:  Negative for dysuria and hematuria.   Musculoskeletal:  Negative for arthralgias and back pain.   Skin:  Negative for color change and rash.   Neurological:  Negative for seizures and syncope.   All other systems reviewed and are negative.      Historical Information   Past Medical History:   Diagnosis Date    Allergic     Anemia 1970s    Aneurysm (HCC)     Arthritis     Back pain     Carotid artery occlusion     Mild.    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  Symptoms are somewhat vague for this.    History of transfusion 1980    During hysterectomy & appendectomy.    Hypertension     Recent- being moderated.    Infectious viral hepatitis     A-1969, C- treated 2012/2013    Low back pain 1980s    Mainly in S/I joint area and hips.    Lyme disease     Osteoporosis     Polymyalgia rheumatica (HCC)     last assessed 2/15/17    Reactive airway disease     last assessed 6/9/16    Sciatica      Past Surgical History:   Procedure Laterality Date    APPENDECTOMY  2/8/1980    With hysterectomy, both due to infection from IUD..    BREAST BIOPSY Left     HYSTERECTOMY      1987, total    KNEE SURGERY  1986    ORTHOPEDIC SURGERY      TONSILLECTOMY  ? 1950's     Social History     Tobacco Use    Smoking status: Every Day     Current packs/day: 1.00     Types: Cigarettes    Smokeless tobacco: Never    Tobacco comments:     Little less than a pack    Vaping Use    Vaping status: Never Used   Substance and Sexual Activity    Alcohol use: Never    Drug use: No    Sexual activity: Yes     E-Cigarette/Vaping     E-Cigarette Use Never User      E-Cigarette/Vaping Substances    Nicotine No     THC No     CBD No     Flavoring No     Other No     Unknown No        Social History:  Marital Status: Single       Meds/Allergies     Prior to Admission medications    Medication Sig Start Date End Date Taking? Authorizing Provider   albuterol (PROVENTIL HFA,VENTOLIN HFA) 90 mcg/act inhaler INHALE 1-2 PUFFS AS NEEDED FOR WHEEZING 2/3/25  Yes Alexis Grady,    Biotin 29371 MCG TABS Take by mouth   Yes Historical Provider, MD   budesonide-formoterol (Symbicort) 160-4.5 mcg/act inhaler Inhale 2 puffs 2 (two) times a day 3/20/25  Yes Zayra Jessica MD   cholecalciferol (VITAMIN D3) 1,000 units tablet Take 4000u daily 3/15/23  Yes Zayra Jessica MD   Cholecalciferol (VITAMIN D3) 1,000 units tablet Take 4,000 Units by mouth daily   Yes Historical Provider, MD   Digestive Enzyme CAPS Take by mouth   Yes Historical Provider, MD   lansoprazole (PREVACID) 30 mg capsule Take 1 capsule (30 mg total) by mouth daily 1 tab po BID 10/4/22  Yes Zayra Jessica MD   metFORMIN (GLUCOPHAGE) 500 mg tablet TAKE 1 TABLET BY MOUTH EVERY DAY WITH BREAKFAST 2/28/25  Yes Zayra Jessica MD   montelukast (SINGULAIR) 10 mg tablet Take 1 tablet (10 mg total) by mouth daily 3/20/25  Yes Zayra Jessica MD   polyethylene glycol (GLYCOLAX) 17 GM/SCOOP powder Take 17 g by mouth as needed   Yes Historical Provider, MD   Probiotic Product (PROBIOTIC-10) CAPS Take by mouth 5/10/17  Yes Historical Provider, MD   rosuvastatin (CRESTOR) 5 mg tablet TAKE 1 TABLET BY MOUTH EVERY OTHER DAY 2/11/25  Yes Zayra Jessica MD   Spiriva Respimat 2.5 MCG/ACT AERS inhaler Inhale 2 puffs daily 3 inhalers 10/1/24  Yes Zayra Jessica MD   acetaminophen (TYLENOL) 325 mg tablet Take 2 tablets (650 mg total) by mouth every 6 (six) hours as needed for mild pain 5/6/18   Dalila Odonnell PA-C   cefuroxime (CEFTIN) 500 mg tablet Take 1 tablet (500 mg total) by mouth every 12 (twelve) hours for 28 days  Patient not  taking: Reported on 5/1/2025 4/30/25 5/28/25  Zayra Jessica MD   clotrimazole-betamethasone (LOTRISONE) 1-0.05 % cream Apply topically 2 (two) times a day Apply sparingly to affected area BID for 10 days  Patient not taking: Reported on 5/1/2025 7/19/23   Zayra Jessica MD   meclizine (ANTIVERT) 25 mg tablet Take 1 tablet (25 mg total) by mouth 3 (three) times a day as needed for dizziness  Patient not taking: Reported on 5/1/2025 1/21/25   Zayra Jessica MD     Allergies   Allergen Reactions    Gabapentin Palpitations, Dizziness and Edema    Hydroxychloroquine Edema, Blisters and Facial Swelling    Lisinopril Shortness Of Breath and Palpitations    Tomato - Food Allergy Swelling and Throat Swelling    Asa [Aspirin]     Celebrex [Celecoxib] Other (See Comments)     Flushing      Ibuprofen GI Intolerance    Lactose - Food Allergy GI Intolerance    Nsaids GI Intolerance     To all    Orajel 2x Toothache & Gum [Benzocaine] Other (See Comments)     Weak and dizzy    Orajel Mouth Sores Rinse [Hydrogen Peroxide] Other (See Comments)     Some allergy-like symptoms with shortness of breath, lightheaded, dizzy    Percocet [Oxycodone-Acetaminophen] GI Intolerance    Leflunomide Palpitations     Short of breath and nausea.    Sulfasalazine Palpitations     Pt states SOB and nausea as well.       Objective :  Temp:  [99.2 °F (37.3 °C)] 99.2 °F (37.3 °C)  HR:  [] 88  BP: (115-133)/(55-93) 115/55  Resp:  [20] 20  SpO2:  [89 %-96 %] 96 %  O2 Device: Nasal cannula  Nasal Cannula O2 Flow Rate (L/min):  [2 L/min] 2 L/min    Physical Exam  Vitals and nursing note reviewed.   Constitutional:       General: She is not in acute distress.     Appearance: She is well-developed.   HENT:      Head: Normocephalic and atraumatic.   Eyes:      Conjunctiva/sclera: Conjunctivae normal.   Cardiovascular:      Rate and Rhythm: Normal rate and regular rhythm.      Heart sounds: No murmur heard.  Pulmonary:      Effort: Pulmonary effort is normal. No  respiratory distress.      Breath sounds: Wheezing present.   Abdominal:      General: Abdomen is flat. Bowel sounds are normal. There is no distension.      Palpations: Abdomen is soft.      Tenderness: There is no abdominal tenderness.   Musculoskeletal:         General: No swelling.      Cervical back: Neck supple.   Skin:     General: Skin is warm and dry.      Capillary Refill: Capillary refill takes less than 2 seconds.   Neurological:      Mental Status: She is alert and oriented to person, place, and time.   Psychiatric:         Mood and Affect: Mood normal.          Lines/Drains:            Lab Results: I have reviewed the following results:  Results from last 7 days   Lab Units 05/01/25  0517 04/30/25  2347   WBC Thousand/uL 6.48 6.49   HEMOGLOBIN g/dL 12.3 12.3   HEMATOCRIT % 38.2 38.0   PLATELETS Thousands/uL 165 168   SEGS PCT %  --  76*   LYMPHO PCT %  --  15   MONO PCT %  --  7   EOS PCT %  --  1     Results from last 7 days   Lab Units 05/01/25  0517   SODIUM mmol/L 135   POTASSIUM mmol/L 4.2   CHLORIDE mmol/L 103   CO2 mmol/L 22   BUN mg/dL 13   CREATININE mg/dL 0.58*   ANION GAP mmol/L 10   CALCIUM mg/dL 8.8   ALBUMIN g/dL 4.2   TOTAL BILIRUBIN mg/dL 0.31   ALK PHOS U/L 98   ALT U/L 8   AST U/L 13   GLUCOSE RANDOM mg/dL 251*             Lab Results   Component Value Date    HGBA1C 5.9 04/23/2025    HGBA1C 6.3 01/21/2025    HGBA1C 5.9 05/14/2024     Results from last 7 days   Lab Units 05/01/25  0517   PROCALCITONIN ng/ml <0.05     XR chest 2 views  Result Date: 5/1/2025  Impression: No acute cardiopulmonary disease. Workstation performed: [id number]         Administrative Statements   I have spent a total time of >35 minutes in caring for this patient on the day of the visit/encounter including Diagnostic results, Instructions for management, Patient and family education, Importance of tx compliance, Impressions, Documenting in the medical record, Reviewing/placing orders in the medical record  (including tests, medications, and/or procedures), Obtaining or reviewing history  , and Communicating with other healthcare professionals .    ** Please Note: This note has been constructed using a voice recognition system. **

## 2025-05-01 NOTE — ED PROVIDER NOTES
Time reflects when diagnosis was documented in both MDM as applicable and the Disposition within this note       Time User Action Codes Description Comment    5/1/2025  4:12 AM Prasanth Krishnanssica Add [J44.1] COPD exacerbation (HCC)           ED Disposition       ED Disposition   Admit    Condition   Stable    Date/Time   u May 1, 2025  4:44 AM    Comment   Case was discussed with RODNEY and the patient's admission status was agreed to be inpatient to the service of Dr. Jiménez.               Assessment & Plan       Medical Decision Making  DDX including but not limited to: pneumonia, pleural effusion, CHF, PTX, ACS, MI, asthma exacerbation, COPD exacerbation, COVID 19, anemia, metabolic abnormality, renal failure.    Will obtain EKG, troponin to evaluate for ACS.  Will obtain chest x-ray to evaluate for cardiopulmonary abnormality including pneumonia, pneumothorax.  Will obtain CBC to evaluate for leukocytosis, anemia.  Will obtain CMP to evaluate kidney function, for electrolyte disturbance.  Will give heart neb, Solu-Medrol, magnesium.    Patient with hypokalemia, suspect may be in part related to albuterol, also with hypomagnesemia.  Will replete in ED.  Chest x-ray without obvious infiltrate, however given patient's history of COPD, suspect patient would benefit from azithromycin.  Following treatment, patient does report feeling improved and has improvement in air movement on exam, however remains with hypoxia on room air, dyspnea with exertion.  Will admit    At the time of admission, the patient is in no acute distress. I discussed with the patient the diagnosis, treatment plan, and plan for admission; they were given the opportunity to ask questions and verbalized understanding. They agree with plan.    Problems Addressed:  COPD exacerbation (HCC): acute illness or injury    Amount and/or Complexity of Data Reviewed  External Data Reviewed: labs, radiology, ECG and notes.  Labs: ordered. Decision-making details  documented in ED Course.  Radiology: ordered and independent interpretation performed. Decision-making details documented in ED Course.  ECG/medicine tests: ordered and independent interpretation performed. Decision-making details documented in ED Course.    Risk  Prescription drug management.  Decision regarding hospitalization.      EKG normal sinus rhythm 84 bpm with sinus arrhythmia, normal axis, , QTc 408, no ST elevation or depression as interpreted by me    Repeat EKG sinus tachycardia at 106 bpm, normal axis, , QTc 425, nonspecific ST abnormality in lateral leads, no STEMI as interpreted by me           Medications   calcium carbonate (TUMS) chewable tablet 1,000 mg (has no administration in time range)   nicotine (NICODERM CQ) 21 mg/24 hr TD 24 hr patch 1 patch (has no administration in time range)   heparin (porcine) subcutaneous injection 5,000 Units (5,000 Units Subcutaneous Given 5/1/25 0625)   guaiFENesin (MUCINEX) 12 hr tablet 600 mg (has no administration in time range)   methylPREDNISolone sodium succinate (Solu-MEDROL) injection 40 mg (has no administration in time range)   azithromycin (ZITHROMAX) 500 mg in sodium chloride 0.9 % 250 mL IVPB (500 mg Intravenous New Bag 5/1/25 0627)   budesonide (PULMICORT) inhalation solution 0.5 mg (has no administration in time range)   ipratropium (ATROVENT) 0.02 % inhalation solution 0.5 mg (has no administration in time range)   levalbuterol (XOPENEX) inhalation solution 1.25 mg (has no administration in time range)     And   sodium chloride 0.9 % inhalation solution 3 mL (has no administration in time range)   albuterol inhalation solution 10 mg (10 mg Nebulization Given 5/1/25 0124)   ipratropium (ATROVENT) 0.02 % inhalation solution 1 mg (1 mg Nebulization Given 5/1/25 0124)   sodium chloride 0.9 % inhalation solution 12 mL (12 mL Nebulization Given 5/1/25 0124)   methylPREDNISolone sodium succinate (Solu-MEDROL) injection 125 mg (125 mg  Intravenous Given 5/1/25 0121)   magnesium sulfate 2 g/50 mL IVPB (premix) 2 g (0 g Intravenous Stopped 5/1/25 0408)   potassium chloride (Klor-Con M20) CR tablet 40 mEq (40 mEq Oral Given 5/1/25 0119)   ipratropium-albuterol (FOR EMS ONLY) (DUO-NEB) 0.5-2.5 mg/3 mL inhalation solution 3 mL (0 mL Does not apply Given to EMS 5/1/25 0203)   ondansetron (ZOFRAN) injection 4 mg (4 mg Intravenous Given 5/1/25 0332)   ipratropium-albuterol (DUO-NEB) 0.5-2.5 mg/3 mL inhalation solution 3 mL (3 mL Nebulization Given 5/1/25 7377)       ED Risk Strat Scores                    No data recorded                            History of Present Illness       Chief Complaint   Patient presents with    Shortness of Breath     Pt BIBA from home, reports SOB progressively worsening since Monday which started after working outside in the yard. Hx COPD and Asthma. Recently tested for Lymes. Given Duoneb by EMS. 89% RA       Past Medical History:   Diagnosis Date    Allergic     Anemia 1970s    Aneurysm (HCC)     Arthritis     Back pain     Carotid artery occlusion     Mild.    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  Symptoms are somewhat vague for this.    History of transfusion 1980    During hysterectomy & appendectomy.    Hypertension     Recent- being moderated.    Infectious viral hepatitis     A-1969, C- treated 2012/2013    Low back pain 1980s    Mainly in S/I joint area and hips.    Lyme disease     Osteoporosis     Polymyalgia rheumatica (HCC)     last assessed 2/15/17    Reactive airway disease     last assessed 6/9/16    Sciatica       Past  Surgical History:   Procedure Laterality Date    APPENDECTOMY  2/8/1980    With hysterectomy, both due to infection from IUD..    BREAST BIOPSY Left     HYSTERECTOMY      1987, total    KNEE SURGERY  1986    ORTHOPEDIC SURGERY      TONSILLECTOMY  ? 1950's      Family History   Problem Relation Age of Onset    Thyroid cancer Mother 53    Dementia Mother         Also 3 of her 5 siblings when in their late 80s to early 90s. Mom diagnosed at age 76.    Arthritis Mother         All of my Mother's 5 siblings and some of their children had either osteo and/or RA, very severe.    Cancer Mother         Thyroid -surgery and recovered.    Osteoarthritis Mother     Rashes / Skin problems Mother     COPD Father         Father    Hypertension Father     Stroke Father     Depression Father     No Known Problems Maternal Grandmother     Colon cancer Maternal Grandfather 90    No Known Problems Paternal Grandmother     No Known Problems Paternal Grandfather     Breast cancer Maternal Aunt 55    Cancer Maternal Aunt         Breast cancer - treated and recovered.    No Known Problems Maternal Aunt     No Known Problems Paternal Aunt     Arthritis Brother         RA since 2020.    Rheum arthritis Brother     Rheum arthritis Brother         Brother      Social History     Tobacco Use    Smoking status: Every Day     Current packs/day: 1.00     Types: Cigarettes    Smokeless tobacco: Never    Tobacco comments:     Little less than a pack    Vaping Use    Vaping status: Never Used   Substance Use Topics    Alcohol use: Never    Drug use: No      E-Cigarette/Vaping    E-Cigarette Use Never User       E-Cigarette/Vaping Substances    Nicotine No     THC No     CBD No     Flavoring No     Other No     Unknown No       I have reviewed and agree with the history as documented.     The patient is a 79-year-old female with history of asthma, COPD, hypertension, polymyalgia rheumatica, and recent Lyme diagnosis for which she is taking cefuroxime  who presents to the ED for evaluation of shortness of breath.  She reports that for the past several days, she has had worsening shortness of breath, as well as fevers, chills, dry cough, and congestion.  She has not taken any medication at home for symptoms.  She did an at-home COVID and flu test which were negative.  She otherwise denies chest pain, abdominal pain, nausea, vomiting, diarrhea,  leg swelling, calf pain, recent travel, recent surgery, hemoptysis, history of DVT/PE.        Review of Systems   Constitutional:  Positive for chills and fever.   HENT:  Positive for congestion and rhinorrhea.    Respiratory:  Positive for cough, chest tightness, shortness of breath and wheezing.    Cardiovascular:  Negative for chest pain and leg swelling.   Gastrointestinal:  Negative for abdominal pain, constipation, diarrhea, nausea and vomiting.   Genitourinary:  Negative for dysuria and flank pain.   Musculoskeletal:  Negative for arthralgias and myalgias.   Skin:  Negative for rash and wound.   Neurological:  Negative for dizziness, weakness, numbness and headaches.           Objective       ED Triage Vitals [04/30/25 2341]   Temperature Pulse Blood Pressure Respirations SpO2 Patient Position - Orthostatic VS   99.2 °F (37.3 °C) 90 133/93 20 (S) (!) 89 % Sitting      Temp Source Heart Rate Source BP Location FiO2 (%) Pain Score    Oral Monitor Right arm -- --      Vitals      Date and Time Temp Pulse SpO2 Resp BP Pain Score FACES Pain Rating User   05/01/25 0652 -- 88 96 % 20 -- -- -- BA   05/01/25 0602 -- 84 93 % 20 115/55 -- -- DF   05/01/25 0400 -- 104 92 % 20 125/56 -- -- DF   05/01/25 0100 -- 80 96 % 20 126/87 -- -- DF   04/30/25 2345 -- 86 95 % 20 133/93 -- -- DF   04/30/25 2341 99.2 °F (37.3 °C) 90  89 % 20 133/93 -- -- DF            Physical Exam  Vitals and nursing note reviewed.   Constitutional:       General: She is not in acute distress.     Appearance: She is well-developed. She is ill-appearing. She is  not toxic-appearing.   HENT:      Head: Normocephalic and atraumatic.   Eyes:      Conjunctiva/sclera: Conjunctivae normal.   Cardiovascular:      Rate and Rhythm: Normal rate and regular rhythm.      Heart sounds: No murmur heard.  Pulmonary:      Effort: Pulmonary effort is normal. No respiratory distress.      Breath sounds: Decreased air movement present. Wheezing present.   Abdominal:      Palpations: Abdomen is soft.      Tenderness: There is no abdominal tenderness.   Musculoskeletal:         General: No swelling.      Cervical back: Neck supple.      Right lower leg: No tenderness. No edema.      Left lower leg: No tenderness. No edema.   Skin:     General: Skin is warm and dry.      Capillary Refill: Capillary refill takes less than 2 seconds.      Coloration: Skin is not cyanotic.   Neurological:      Mental Status: She is alert.   Psychiatric:         Mood and Affect: Mood normal.         Results Reviewed       Procedure Component Value Units Date/Time    Procalcitonin [[id number]]  (Normal) Collected: 05/01/25 0517    Lab Status: Final result Specimen: Blood from Arm, Left Updated: 05/01/25 0633     Procalcitonin <0.05 ng/ml     Blood gas, venous [[id number]]  (Abnormal) Collected: 05/01/25 0618    Lab Status: Final result Specimen: Blood from Arm, Left Updated: 05/01/25 0631     pH, Jorge 7.367     pCO2, Jorge 40.9 mm Hg      pO2, Jorge 52.5 mm Hg      HCO3, Jorge 23.0 mmol/L      Base Excess, Jorge -2.2 mmol/L      O2 Content, Jorge 14.7 ml/dL      O2 HGB, VENOUS 83.9 %     FLU/RSV/COVID - if FLU/RSV clinically relevant (2hr TAT) [[id number]] Collected: 05/01/25 0618    Lab Status: In process Specimen: Nares from Nose Updated: 05/01/25 0626    FLU/COVID Rapid Antigen (30 min. TAT) - Preferred screening test in ED [[id number]]  (Normal) Collected: 05/01/25 0517    Lab Status: Final result Specimen: Nares from Nose Updated: 05/01/25 0606     SARS COV Rapid Antigen Negative     Influenza A Rapid Antigen Negative      Influenza B Rapid Antigen Negative    Narrative:      This test has been performed using the [employer] Sara 2 FLU+SARS Antigen test under the Emergency Use Authorization (EUA). This test has been validated by the  and verified by the performing laboratory. The Sara uses lateral flow immunofluorescent sandwich assay to detect SARS-COV, Influenza A and Influenza B Antigen.     The Quidel Sara 2 SARS Antigen test does not differentiate between SARS-CoV and SARS-CoV-2.     Negative results are presumptive and may be confirmed with a molecular assay, if necessary, for patient management. Negative results do not rule out SARS-CoV-2 or influenza infection and should not be used as the sole basis for treatment or patient management decisions. A negative test result may occur if the level of antigen in a sample is below the limit of detection of this test.     Positive results are indicative of the presence of viral antigens, but do not rule out bacterial infection or co-infection with other viruses.     All test results should be used as an adjunct to clinical observations and other information available to the provider.    FOR PEDIATRIC PATIENTS - copy/paste COVID Guidelines URL to browser: [email or web address]    Comprehensive metabolic panel [[id number]]  (Abnormal) Collected: 05/01/25 0517    Lab Status: Final result Specimen: Blood from Arm, Left Updated: 05/01/25 0602     Sodium 135 mmol/L      Potassium 4.2 mmol/L      Chloride 103 mmol/L      CO2 22 mmol/L      ANION GAP 10 mmol/L      BUN 13 mg/dL      Creatinine 0.58 mg/dL      Glucose 251 mg/dL      Calcium 8.8 mg/dL      AST 13 U/L      ALT 8 U/L      Alkaline Phosphatase 98 U/L      Total Protein 7.3 g/dL      Albumin 4.2 g/dL      Total Bilirubin 0.31 mg/dL      eGFR 87 ml/min/1.73sq m     Narrative:      National Kidney Disease Foundation guidelines for Chronic Kidney Disease (CKD):     Stage 1  with normal or high GFR (GFR > 90 mL/min/1.73 square meters)    Stage 2 Mild CKD (GFR = 60-89 mL/min/1.73 square meters)    Stage 3A Moderate CKD (GFR = 45-59 mL/min/1.73 square meters)    Stage 3B Moderate CKD (GFR = 30-44 mL/min/1.73 square meters)    Stage 4 Severe CKD (GFR = 15-29 mL/min/1.73 square meters)    Stage 5 End Stage CKD (GFR <15 mL/min/1.73 square meters)  Note: GFR calculation is accurate only with a steady state creatinine    Magnesium [[id number]]  (Normal) Collected: 05/01/25 0517    Lab Status: Final result Specimen: Blood from Arm, Left Updated: 05/01/25 0602     Magnesium 2.4 mg/dL     Sputum culture and Gram stain [[id number]]     Lab Status: No result Specimen: Sputum     UA (URINE) with reflex to Scope [[id number]]     Lab Status: No result Specimen: Urine     Platelet count [[id number]]     Lab Status: No result Specimen: Blood     CBC and Platelet [[id number]]  (Normal) Collected: 05/01/25 0517    Lab Status: Final result Specimen: Blood from Arm, Left Updated: 05/01/25 0538     WBC 6.48 Thousand/uL      RBC 4.49 Million/uL      Hemoglobin 12.3 g/dL      Hematocrit 38.2 %      MCV 85 fL      MCH 27.4 pg      MCHC 32.2 g/dL      RDW 14.4 %      Platelets 165 Thousands/uL      MPV 10.7 fL     Phosphorus [[id number]]  (Normal) Collected: 05/01/25 0312    Lab Status: Final result Specimen: Blood from Arm, Left Updated: 05/01/25 0523     Phosphorus 3.3 mg/dL     HS Troponin I 2hr [[id number]]  (Normal) Collected: 05/01/25 0312    Lab Status: Final result Specimen: Blood from Arm, Left Updated: 05/01/25 0340     hs TnI 2hr 5 ng/L      Delta 2hr hsTnI -2 ng/L     Magnesium [[id number]]  (Abnormal) Collected: 04/30/25 9873    Lab Status: Final result Specimen: Blood from Arm, Left Updated: 05/01/25 0131     Magnesium 1.3 mg/dL     HS Troponin 0hr (reflex protocol) [[id number]]  (Normal) Collected: 04/30/25 5142    Lab Status: Final result Specimen: Blood from Arm, Left Updated: 05/01/25 0015      TnI  0hr 7 ng/L     CBC and differential [[id number]]  (Abnormal) Collected: 04/30/25 2347    Lab Status: Final result Specimen: Blood from Arm, Left Updated: 05/01/25 0014     WBC 6.49 Thousand/uL      RBC 4.38 Million/uL      Hemoglobin 12.3 g/dL      Hematocrit 38.0 %      MCV 87 fL      MCH 28.1 pg      MCHC 32.4 g/dL      RDW 14.3 %      MPV 10.8 fL      Platelets 168 Thousands/uL      nRBC 0 /100 WBCs      Segmented % 76 %      Immature Grans % 1 %      Lymphocytes % 15 %      Monocytes % 7 %      Eosinophils Relative 1 %      Basophils Relative 0 %      Absolute Neutrophils 4.96 Thousands/µL      Absolute Immature Grans 0.08 Thousand/uL      Absolute Lymphocytes 0.95 Thousands/µL      Absolute Monocytes 0.45 Thousand/µL      Eosinophils Absolute 0.03 Thousand/µL      Basophils Absolute 0.02 Thousands/µL     Comprehensive metabolic panel [[id number]]  (Abnormal) Collected: 04/30/25 2347    Lab Status: Final result Specimen: Blood from Arm, Left Updated: 05/01/25 0007     Sodium 140 mmol/L      Potassium 2.9 mmol/L      Chloride 115 mmol/L      CO2 19 mmol/L      ANION GAP 6 mmol/L      BUN 11 mg/dL      Creatinine 0.37 mg/dL      Glucose 119 mg/dL      Calcium 6.2 mg/dL      Corrected Calcium 7.0 mg/dL      AST 8 U/L      ALT 6 U/L      Alkaline Phosphatase 67 U/L      Total Protein 5.0 g/dL      Albumin 3.0 g/dL      Total Bilirubin 0.25 mg/dL      eGFR 101 ml/min/1.73sq m     Narrative:      National Kidney Disease Foundation guidelines for Chronic Kidney Disease (CKD):     Stage 1 with normal or high GFR (GFR > 90 mL/min/1.73 square meters)    Stage 2 Mild CKD (GFR = 60-89 mL/min/1.73 square meters)    Stage 3A Moderate CKD (GFR = 45-59 mL/min/1.73 square meters)    Stage 3B Moderate CKD (GFR = 30-44 mL/min/1.73 square meters)    Stage 4 Severe CKD (GFR = 15-29 mL/min/1.73 square meters)    Stage 5 End Stage CKD (GFR <15 mL/min/1.73 square meters)  Note: GFR calculation is accurate only with a steady state  creatinine            XR chest 2 views    (Results Pending)       Procedures    ED Medication and Procedure Management   Prior to Admission Medications   Prescriptions Last Dose Informant Patient Reported? Taking?   Biotin 41834 MCG TABS  Self Yes Yes   Sig: Take by mouth   Cholecalciferol (VITAMIN D3) 1,000 units tablet   Yes Yes   Sig: Take 4,000 Units by mouth daily   Digestive Enzyme CAPS  Self Yes Yes   Sig: Take by mouth   Probiotic Product (PROBIOTIC-10) CAPS  Self Yes Yes   Sig: Take by mouth   Spiriva Respimat 2.5 MCG/ACT AERS inhaler  Self No Yes   Sig: Inhale 2 puffs daily 3 inhalers   acetaminophen (TYLENOL) 325 mg tablet  Self No No   Sig: Take 2 tablets (650 mg total) by mouth every 6 (six) hours as needed for mild pain   albuterol (PROVENTIL HFA,VENTOLIN HFA) 90 mcg/act inhaler  Self No Yes   Sig: INHALE 1-2 PUFFS AS NEEDED FOR WHEEZING   budesonide-formoterol (Symbicort) 160-4.5 mcg/act inhaler  Self No Yes   Sig: Inhale 2 puffs 2 (two) times a day   cefuroxime (CEFTIN) 500 mg tablet Not Taking  No No   Sig: Take 1 tablet (500 mg total) by mouth every 12 (twelve) hours for 28 days   Patient not taking: Reported on 5/1/2025   cholecalciferol (VITAMIN D3) 1,000 units tablet  Self No Yes   Sig: Take 4000u daily   clotrimazole-betamethasone (LOTRISONE) 1-0.05 % cream Not Taking Self No No   Sig: Apply topically 2 (two) times a day Apply sparingly to affected area BID for 10 days   Patient not taking: Reported on 5/1/2025   lansoprazole (PREVACID) 30 mg capsule  Self No Yes   Sig: Take 1 capsule (30 mg total) by mouth daily 1 tab po BID   meclizine (ANTIVERT) 25 mg tablet Not Taking Self No No   Sig: Take 1 tablet (25 mg total) by mouth 3 (three) times a day as needed for dizziness   Patient not taking: Reported on 5/1/2025   metFORMIN (GLUCOPHAGE) 500 mg tablet  Self No Yes   Sig: TAKE 1 TABLET BY MOUTH EVERY DAY WITH BREAKFAST   montelukast (SINGULAIR) 10 mg tablet  Self No Yes   Sig: Take 1 tablet (10  mg total) by mouth daily   polyethylene glycol (GLYCOLAX) 17 GM/SCOOP powder  Self Yes Yes   Sig: Take 17 g by mouth as needed   rosuvastatin (CRESTOR) 5 mg tablet  Self No Yes   Sig: TAKE 1 TABLET BY MOUTH EVERY OTHER DAY      Facility-Administered Medications Last Administration Doses Remaining   cyanocobalamin injection 1,000 mcg 4/23/2025  9:53 AM    cyanocobalamin injection 1,000 mcg 1/28/2025  9:18 AM 7        Patient's Medications   Discharge Prescriptions    No medications on file     No discharge procedures on file.  ED SEPSIS DOCUMENTATION   Time reflects when diagnosis was documented in both MDM as applicable and the Disposition within this note       Time User Action Codes Description Comment    5/1/2025  4:12 AM Dilcia Krishnan Add [J44.1] COPD exacerbation (HCC)                  Dilcia Krishnan PA-C  05/01/25 0730

## 2025-05-01 NOTE — PLAN OF CARE
Problem: PAIN - ADULT  Goal: Verbalizes/displays adequate comfort level or baseline comfort level  Description: Interventions:- Encourage patient to monitor pain and request assistance- Assess pain using appropriate pain scale- Administer analgesics based on type and severity of pain and evaluate response- Implement non-pharmacological measures as appropriate and evaluate response- Consider cultural and social influences on pain and pain management- Notify physician/advanced practitioner if interventions unsuccessful or patient reports new pain  Outcome: Progressing     Problem: SAFETY ADULT  Goal: Patient will remain free of falls  Description: INTERVENTIONS:- Educate patient/family on patient safety including physical limitations- Instruct patient to call for assistance with activity - Consult OT/PT to assist with strengthening/mobility - Keep Call bell within reach- Keep bed low and locked with side rails adjusted as appropriate- Keep care items and personal belongings within reach- Initiate and maintain comfort rounds- Make Fall Risk Sign visible to staff  Outcome: Progressing     Problem: DISCHARGE PLANNING  Goal: Discharge to home or other facility with appropriate resources  Description: INTERVENTIONS:- Identify barriers to discharge w/patient and caregiver- Arrange for needed discharge resources and transportation as appropriate- Identify discharge learning needs (meds, wound care, etc.)- Arrange for interpretive services to assist at discharge as needed- Refer to Case Management Department for coordinating discharge planning if the patient needs post-hospital services based on physician/advanced practitioner order or complex needs related to functional status, cognitive ability, or social support system  Outcome: Progressing     Problem: RESPIRATORY - ADULT  Goal: Achieves optimal ventilation and oxygenation  Description: INTERVENTIONS:- Assess for changes in respiratory status- Assess for changes in  mentation and behavior- Position to facilitate oxygenation and minimize respiratory effort- Oxygen administered by appropriate delivery if ordered- Initiate smoking cessation education as indicated- Encourage broncho-pulmonary hygiene including cough, deep breathe, Incentive Spirometry- Assess the need for suctioning and aspirate as needed- Assess and instruct to report SOB or any respiratory difficulty- Respiratory Therapy support as indicated  Outcome: Progressing

## 2025-05-01 NOTE — CONSULTS
Pulmonary Consultation   Melissa Nguyen 79 y.o. female MRN: [id number]   E5 -01 Encounter: [id number]      Reason for consultation:   Chronic obstructive pulmonary disease with acute exacerbation      Requesting physician:   Divina Orozco MD      Impressions:   Chronic obstructive pulmonary disease with mild acute exacerbation.  Acute more likely chronic hypoxemic respiratory failure.  Ongoing tobacco use.  Anxiety.    Recommendations:  Decrease Solu-Medrol to 40 mg IV every 12.  Hopefully she can be transition to p.o. prednisone with a short taper starting tomorrow.  Continue ipratropium/levalbuterol nebulizer treatments 3 times a day.  Smoking cessation.  She needs updated PFTs as outpatient.  The patient will most likely need home oxygen on discharge.        History of Present Illness   HPI:  Melissa Nguyen is a 79 y.o. female who was in her usual state of health until 4 days ago when she was working in the yard.  She felt short of breath.  She stated that every day was getting worse.  She was having occasional cough.  No sputum production.  She recently had the result of the Lyme test positive.  The patient decided to come to the emergency room where she was evaluated and admitted to the hospital.  She is very anxious.  She stated that smoking cigarettes helps her cope with her anxiety.  No fever or chills.  No purulent sputum.  Years ago she was seen by Dr Calderon.  She had no recent follow-ups with the pulmonary group.  She had old PFTs which showed chronic obstructive pulmonary disease.  She mainly follows with her family doctor and she is on Spiriva Respimat 2 inhalations once a day and Symbicort 160/4.5, 2 inhalations twice a day.    Review of systems:  12 point review of systems was completed and was otherwise negative except as listed in HPI.      Historical Information   Past Medical History:   Diagnosis Date    Allergic     Anemia 1970s    Aneurysm (HCC)     Arthritis     Back pain     Carotid  artery occlusion     Mild.    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  Symptoms are somewhat vague for this.    History of transfusion 1980    During hysterectomy & appendectomy.    Hypertension     Recent- being moderated.    Infectious viral hepatitis     A-1969, C- treated 2012/2013    Low back pain 1980s    Mainly in S/I joint area and hips.    Lyme disease     Osteoporosis     Polymyalgia rheumatica (HCC)     last assessed 2/15/17    Reactive airway disease     last assessed 6/9/16    Sciatica      Past Surgical History:   Procedure Laterality Date    APPENDECTOMY  2/8/1980    With hysterectomy, both due to infection from IUD..    BREAST BIOPSY Left     HYSTERECTOMY      1987, total    KNEE SURGERY  1986    ORTHOPEDIC SURGERY      TONSILLECTOMY  ? 1950's     Family History   Problem Relation Age of Onset    Thyroid cancer Mother 53    Dementia Mother         Also 3 of her 5 siblings when in their late 80s to early 90s. Mom diagnosed at age 76.    Arthritis Mother         All of my Mother's 5 siblings and some of their children had either osteo and/or RA, very severe.    Cancer Mother         Thyroid -surgery and recovered.    Osteoarthritis Mother     Rashes / Skin problems Mother     COPD Father         Father    Hypertension Father     Stroke Father     Depression Father     No Known Problems Maternal Grandmother     Colon cancer Maternal Grandfather 90    No Known Problems Paternal Grandmother     No Known Problems Paternal Grandfather     Breast cancer Maternal Aunt 55    Cancer Maternal Aunt         Breast cancer - treated  and recovered.    No Known Problems Maternal Aunt     No Known Problems Paternal Aunt     Arthritis Brother         RA since 2020.    Rheum arthritis Brother     Rheum arthritis Brother         Brother       Family History:  Chronic obstructive pulmonary disease in her father.    Social History:  The patient lives by herself.  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  She has over 60-pack-year smoking history and continues to smoke a pack a day.      Meds/Allergies     Current Facility-Administered Medications:     albuterol (PROVENTIL HFA,VENTOLIN HFA) inhaler 2 puff, Q4H PRN    azithromycin (ZITHROMAX) 500 mg in sodium chloride 0.9 % 250 mL IVPB, Q24H, Last Rate: Stopped (05/01/25 0727)    budesonide (PULMICORT) inhalation solution 0.5 mg, Q12H    calcium carbonate (TUMS) chewable tablet 1,000 mg, Daily PRN    Cholecalciferol (VITAMIN D3) tablet 4,000 Units, Daily    guaiFENesin (MUCINEX) 12 hr tablet 600 mg, BID    heparin (porcine) subcutaneous injection 5,000 Units, Q8H MANUEL **AND** Platelet count, Once    insulin lispro (HumALOG/ADMELOG) 100 units/mL subcutaneous injection 1-6 Units, 4x Daily (AC & HS) **AND** Fingerstick Glucose (POCT), 4x Daily AC and at bedtime    ipratropium (ATROVENT) 0.02 % inhalation solution 0.5 mg, TID    levalbuterol (XOPENEX) inhalation solution 1.25 mg, TID **AND** [DISCONTINUED] sodium chloride 0.9 % inhalation solution 3 mL, TID    methylPREDNISolone sodium succinate (Solu-MEDROL) injection 40 mg, Q8H    montelukast (SINGULAIR) tablet 10 mg, Daily    nicotine (NICODERM CQ) 21 mg/24 hr TD 24 hr patch 1 patch, Daily    pantoprazole (PROTONIX) EC tablet 40 mg, Daily Before Breakfast    polyethylene glycol (MIRALAX) packet 17 g, Daily PRN    pravastatin (PRAVACHOL) tablet 40 mg, Daily With Dinner    saccharomyces boulardii (FLORASTOR) capsule 250 mg, BID    Facility-Administered Medications Prior to Admission:     cyanocobalamin injection 1,000 mcg    cyanocobalamin  injection 1,000 mcg    Medications Prior to Admission:     albuterol (PROVENTIL HFA,VENTOLIN HFA) 90 mcg/act inhaler    Biotin 71398 MCG TABS    budesonide-formoterol (Symbicort) 160-4.5 mcg/act inhaler    cholecalciferol (VITAMIN D3) 1,000 units tablet    Cholecalciferol (VITAMIN D3) 1,000 units tablet    Digestive Enzyme CAPS    lansoprazole (PREVACID) 30 mg capsule    metFORMIN (GLUCOPHAGE) 500 mg tablet    montelukast (SINGULAIR) 10 mg tablet    polyethylene glycol (GLYCOLAX) 17 GM/SCOOP powder    Probiotic Product (PROBIOTIC-10) CAPS    rosuvastatin (CRESTOR) 5 mg tablet    Spiriva Respimat 2.5 MCG/ACT AERS inhaler    acetaminophen (TYLENOL) 325 mg tablet    cefuroxime (CEFTIN) 500 mg tablet    clotrimazole-betamethasone (LOTRISONE) 1-0.05 % cream    meclizine (ANTIVERT) 25 mg tablet  Allergies   Allergen Reactions    Gabapentin Palpitations, Dizziness and Edema    Hydroxychloroquine Edema, Blisters and Facial Swelling    Lisinopril Shortness Of Breath and Palpitations    Tomato - Food Allergy Swelling and Throat Swelling    Asa [Aspirin]     Celebrex [Celecoxib] Other (See Comments)     Flushing      Ibuprofen GI Intolerance    Lactose - Food Allergy GI Intolerance    Nsaids GI Intolerance     To all    Orajel 2x Toothache & Gum [Benzocaine] Other (See Comments)     Weak and dizzy    Orajel Mouth Sores Rinse [Hydrogen Peroxide] Other (See Comments)     Some allergy-like symptoms with shortness of breath, lightheaded, dizzy    Percocet [Oxycodone-Acetaminophen] GI Intolerance    Leflunomide Palpitations     Short of breath and nausea.    Sulfasalazine Palpitations     Pt states SOB and nausea as well.       Vitals: Blood pressure 107/72, pulse 75, temperature 97.8 °F (36.6 °C), resp. rate 17, SpO2 91%, not currently breastfeeding.,      Intake/Output Summary (Last 24 hours) at 5/1/2025 1630  Last data filed at 5/1/2025 0727  Gross per 24 hour   Intake 300 ml   Output --   Net 300 ml       Physical exam:         Head/eyes:    Normocephalic, without obvious abnormality, atraumatic,         PERRL, extraocular muscles intact, no scleral icterus    Nose:   Nares normal, septum midline, mucosa normal, no drainage    or sinus tenderness   Throat:   Moist mucous membranes, no thrush   Neck:   Supple, trachea midline, no adenopathy; no carotid    bruit or JVD   Lungs:   Decreased breath sounds.  No wheezing.  She has occasional rhonchi.        Heart:    Regular rate and rhythm, S1 and S2 normal, no murmur, rub   or gallop   Abdomen:     Soft, non-tender, bowel sounds active all four quadrants,     no masses, no organomegaly   Extremities:   Extremities normal, atraumatic, no cyanosis or edema   Skin:   Warm, dry, turgor normal, no rashes or lesions   Neurologic:   CNII-XII intact, normal strength, non-focal             Labs: CBC:   Lab Results   Component Value Date    WBC 6.48 05/01/2025    HGB 12.3 05/01/2025    HCT 38.2 05/01/2025    MCV 85 05/01/2025     05/01/2025    RBC 4.49 05/01/2025    MCH 27.4 05/01/2025    MCHC 32.2 05/01/2025    RDW 14.4 05/01/2025    MPV 10.7 05/01/2025    NRBC 0 04/30/2025   , CMP:   Lab Results   Component Value Date    SODIUM 135 05/01/2025    K 4.2 05/01/2025     05/01/2025    CO2 22 05/01/2025    BUN 13 05/01/2025    CREATININE 0.58 (L) 05/01/2025    CALCIUM 8.8 05/01/2025    AST 13 05/01/2025    ALT 8 05/01/2025    ALKPHOS 98 05/01/2025    EGFR 87 05/01/2025       Imaging and other studies: Results Review Statement: I personally reviewed the following image studies in PACS and associated radiology reports: chest xray. My interpretation of the radiology images/reports is: Chest x-rays are reviewed on the PACS system.  No acute pulmonary findings.    Complete pulmonary function test from 2017 showed moderate airflow limitation.    Delonte Montez MD

## 2025-05-01 NOTE — ASSESSMENT & PLAN NOTE
As per chart review and patient's history, patient has had Lyme disease 3 times in the past  - She tested positive few days ago  - PCP prescribed cefuroxime for 28 days given that patient cannot tolerate doxycycline  - Started patient on antibiotics but patient stated she would like to focus currently on her breathing and would like to restart antibiotics once she is home.  Risks versus benefits were explained but wanted to keep it on hold therefore discontinued

## 2025-05-02 LAB
ALBUMIN SERPL BCG-MCNC: 3.9 G/DL (ref 3.5–5)
ALP SERPL-CCNC: 82 U/L (ref 34–104)
ALT SERPL W P-5'-P-CCNC: 7 U/L (ref 7–52)
ANION GAP SERPL CALCULATED.3IONS-SCNC: 7 MMOL/L (ref 4–13)
AST SERPL W P-5'-P-CCNC: 10 U/L (ref 13–39)
BASOPHILS # BLD AUTO: 0.02 THOUSANDS/ÂΜL (ref 0–0.1)
BASOPHILS NFR BLD AUTO: 0 % (ref 0–1)
BILIRUB SERPL-MCNC: 0.28 MG/DL (ref 0.2–1)
BILIRUB UR QL STRIP: NEGATIVE
BUN SERPL-MCNC: 20 MG/DL (ref 5–25)
CALCIUM SERPL-MCNC: 9 MG/DL (ref 8.4–10.2)
CHLORIDE SERPL-SCNC: 107 MMOL/L (ref 96–108)
CLARITY UR: CLEAR
CO2 SERPL-SCNC: 25 MMOL/L (ref 21–32)
COLOR UR: NORMAL
CREAT SERPL-MCNC: 0.63 MG/DL (ref 0.6–1.3)
EOSINOPHIL # BLD AUTO: 0 THOUSAND/ÂΜL (ref 0–0.61)
EOSINOPHIL NFR BLD AUTO: 0 % (ref 0–6)
ERYTHROCYTE [DISTWIDTH] IN BLOOD BY AUTOMATED COUNT: 14.5 % (ref 11.6–15.1)
GFR SERPL CREATININE-BSD FRML MDRD: 85 ML/MIN/1.73SQ M
GLUCOSE SERPL-MCNC: 109 MG/DL (ref 65–140)
GLUCOSE SERPL-MCNC: 120 MG/DL (ref 65–140)
GLUCOSE SERPL-MCNC: 166 MG/DL (ref 65–140)
GLUCOSE SERPL-MCNC: 173 MG/DL (ref 65–140)
GLUCOSE SERPL-MCNC: 203 MG/DL (ref 65–140)
GLUCOSE UR STRIP-MCNC: NEGATIVE MG/DL
HCT VFR BLD AUTO: 35.2 % (ref 34.8–46.1)
HGB BLD-MCNC: 11 G/DL (ref 11.5–15.4)
HGB UR QL STRIP.AUTO: NEGATIVE
IMM GRANULOCYTES # BLD AUTO: 0.04 THOUSAND/UL (ref 0–0.2)
IMM GRANULOCYTES NFR BLD AUTO: 0 % (ref 0–2)
KETONES UR STRIP-MCNC: NEGATIVE MG/DL
LEUKOCYTE ESTERASE UR QL STRIP: NEGATIVE
LYMPHOCYTES # BLD AUTO: 1.49 THOUSANDS/ÂΜL (ref 0.6–4.47)
LYMPHOCYTES NFR BLD AUTO: 17 % (ref 14–44)
MAGNESIUM SERPL-MCNC: 2.3 MG/DL (ref 1.9–2.7)
MCH RBC QN AUTO: 27.6 PG (ref 26.8–34.3)
MCHC RBC AUTO-ENTMCNC: 31.3 G/DL (ref 31.4–37.4)
MCV RBC AUTO: 88 FL (ref 82–98)
MONOCYTES # BLD AUTO: 0.57 THOUSAND/ÂΜL (ref 0.17–1.22)
MONOCYTES NFR BLD AUTO: 6 % (ref 4–12)
NEUTROPHILS # BLD AUTO: 6.84 THOUSANDS/ÂΜL (ref 1.85–7.62)
NEUTS SEG NFR BLD AUTO: 77 % (ref 43–75)
NITRITE UR QL STRIP: NEGATIVE
NRBC BLD AUTO-RTO: 0 /100 WBCS
PH UR STRIP.AUTO: 5 [PH]
PHOSPHATE SERPL-MCNC: 4.1 MG/DL (ref 2.3–4.1)
PLATELET # BLD AUTO: 189 THOUSANDS/UL (ref 149–390)
PMV BLD AUTO: 10.7 FL (ref 8.9–12.7)
POTASSIUM SERPL-SCNC: 4.4 MMOL/L (ref 3.5–5.3)
PROCALCITONIN SERPL-MCNC: <0.05 NG/ML
PROT SERPL-MCNC: 6.7 G/DL (ref 6.4–8.4)
PROT UR STRIP-MCNC: NEGATIVE MG/DL
RBC # BLD AUTO: 3.98 MILLION/UL (ref 3.81–5.12)
SODIUM SERPL-SCNC: 139 MMOL/L (ref 135–147)
SP GR UR STRIP.AUTO: 1.01 (ref 1–1.03)
UROBILINOGEN UR STRIP-ACNC: <2 MG/DL
WBC # BLD AUTO: 8.96 THOUSAND/UL (ref 4.31–10.16)

## 2025-05-02 PROCEDURE — 99232 SBSQ HOSP IP/OBS MODERATE 35: CPT

## 2025-05-02 PROCEDURE — 81003 URINALYSIS AUTO W/O SCOPE: CPT

## 2025-05-02 PROCEDURE — 94640 AIRWAY INHALATION TREATMENT: CPT

## 2025-05-02 PROCEDURE — 84100 ASSAY OF PHOSPHORUS: CPT

## 2025-05-02 PROCEDURE — 83735 ASSAY OF MAGNESIUM: CPT

## 2025-05-02 PROCEDURE — 80053 COMPREHEN METABOLIC PANEL: CPT

## 2025-05-02 PROCEDURE — 85025 COMPLETE CBC W/AUTO DIFF WBC: CPT

## 2025-05-02 PROCEDURE — 82948 REAGENT STRIP/BLOOD GLUCOSE: CPT

## 2025-05-02 PROCEDURE — 94760 N-INVAS EAR/PLS OXIMETRY 1: CPT

## 2025-05-02 PROCEDURE — 84145 PROCALCITONIN (PCT): CPT

## 2025-05-02 PROCEDURE — 99232 SBSQ HOSP IP/OBS MODERATE 35: CPT | Performed by: INTERNAL MEDICINE

## 2025-05-02 RX ORDER — METHYLPREDNISOLONE SODIUM SUCCINATE 40 MG/ML
40 INJECTION, POWDER, LYOPHILIZED, FOR SOLUTION INTRAMUSCULAR; INTRAVENOUS EVERY 8 HOURS SCHEDULED
Status: DISCONTINUED | OUTPATIENT
Start: 2025-05-02 | End: 2025-05-03

## 2025-05-02 RX ORDER — CEFUROXIME AXETIL 500 MG/1
500 TABLET ORAL EVERY 12 HOURS SCHEDULED
Qty: 56 TABLET | Refills: 0 | Status: SHIPPED | OUTPATIENT
Start: 2025-05-02 | End: 2025-05-30

## 2025-05-02 RX ORDER — ONDANSETRON 2 MG/ML
4 INJECTION INTRAMUSCULAR; INTRAVENOUS EVERY 6 HOURS PRN
Status: DISCONTINUED | OUTPATIENT
Start: 2025-05-02 | End: 2025-05-06 | Stop reason: HOSPADM

## 2025-05-02 RX ADMIN — HEPARIN SODIUM 5000 UNITS: 5000 INJECTION INTRAVENOUS; SUBCUTANEOUS at 05:13

## 2025-05-02 RX ADMIN — ONDANSETRON 4 MG: 2 INJECTION INTRAMUSCULAR; INTRAVENOUS at 08:05

## 2025-05-02 RX ADMIN — MONTELUKAST 10 MG: 10 TABLET, FILM COATED ORAL at 08:06

## 2025-05-02 RX ADMIN — ONDANSETRON 4 MG: 2 INJECTION INTRAMUSCULAR; INTRAVENOUS at 21:59

## 2025-05-02 RX ADMIN — GUAIFENESIN 600 MG: 600 TABLET ORAL at 08:06

## 2025-05-02 RX ADMIN — LEVALBUTEROL HYDROCHLORIDE 1.25 MG: 1.25 SOLUTION RESPIRATORY (INHALATION) at 07:30

## 2025-05-02 RX ADMIN — Medication 250 MG: at 08:06

## 2025-05-02 RX ADMIN — INSULIN LISPRO 1 UNITS: 100 INJECTION, SOLUTION INTRAVENOUS; SUBCUTANEOUS at 17:27

## 2025-05-02 RX ADMIN — GUAIFENESIN 600 MG: 600 TABLET ORAL at 17:27

## 2025-05-02 RX ADMIN — IPRATROPIUM BROMIDE 0.5 MG: 0.5 SOLUTION RESPIRATORY (INHALATION) at 19:32

## 2025-05-02 RX ADMIN — LEVALBUTEROL HYDROCHLORIDE 1.25 MG: 1.25 SOLUTION RESPIRATORY (INHALATION) at 19:32

## 2025-05-02 RX ADMIN — IPRATROPIUM BROMIDE 0.5 MG: 0.5 SOLUTION RESPIRATORY (INHALATION) at 13:52

## 2025-05-02 RX ADMIN — Medication 250 MG: at 17:27

## 2025-05-02 RX ADMIN — HEPARIN SODIUM 5000 UNITS: 5000 INJECTION INTRAVENOUS; SUBCUTANEOUS at 22:08

## 2025-05-02 RX ADMIN — AZITHROMYCIN MONOHYDRATE 500 MG: 500 INJECTION, POWDER, LYOPHILIZED, FOR SOLUTION INTRAVENOUS at 05:13

## 2025-05-02 RX ADMIN — METHYLPREDNISOLONE SODIUM SUCCINATE 40 MG: 40 INJECTION, POWDER, FOR SOLUTION INTRAMUSCULAR; INTRAVENOUS at 22:01

## 2025-05-02 RX ADMIN — CALCIUM CARBONATE (ANTACID) CHEW TAB 500 MG 1000 MG: 500 CHEW TAB at 05:19

## 2025-05-02 RX ADMIN — INSULIN LISPRO 1 UNITS: 100 INJECTION, SOLUTION INTRAVENOUS; SUBCUTANEOUS at 11:20

## 2025-05-02 RX ADMIN — HEPARIN SODIUM 5000 UNITS: 5000 INJECTION INTRAVENOUS; SUBCUTANEOUS at 14:19

## 2025-05-02 RX ADMIN — LEVALBUTEROL HYDROCHLORIDE 1.25 MG: 1.25 SOLUTION RESPIRATORY (INHALATION) at 13:52

## 2025-05-02 RX ADMIN — PRAVASTATIN SODIUM 40 MG: 40 TABLET ORAL at 17:28

## 2025-05-02 RX ADMIN — POLYETHYLENE GLYCOL 3350 17 G: 17 POWDER, FOR SOLUTION ORAL at 08:29

## 2025-05-02 RX ADMIN — Medication 4000 UNITS: at 08:06

## 2025-05-02 RX ADMIN — METHYLPREDNISOLONE SODIUM SUCCINATE 40 MG: 40 INJECTION, POWDER, FOR SOLUTION INTRAMUSCULAR; INTRAVENOUS at 08:06

## 2025-05-02 RX ADMIN — METHYLPREDNISOLONE SODIUM SUCCINATE 40 MG: 40 INJECTION, POWDER, FOR SOLUTION INTRAMUSCULAR; INTRAVENOUS at 14:19

## 2025-05-02 RX ADMIN — PANTOPRAZOLE SODIUM 40 MG: 40 TABLET, DELAYED RELEASE ORAL at 05:13

## 2025-05-02 RX ADMIN — IPRATROPIUM BROMIDE 0.5 MG: 0.5 SOLUTION RESPIRATORY (INHALATION) at 07:30

## 2025-05-02 RX ADMIN — NICOTINE 1 PATCH: 21 PATCH, EXTENDED RELEASE TRANSDERMAL at 08:06

## 2025-05-02 RX ADMIN — INSULIN LISPRO 2 UNITS: 100 INJECTION, SOLUTION INTRAVENOUS; SUBCUTANEOUS at 22:01

## 2025-05-02 NOTE — UTILIZATION REVIEW
Initial Clinical Review    Patient arrived on 4/30/2025 11:39 PM. Care then began on   4/30  @  2345 when  EKG  done. The patient has already surpassed 1 midnight with active ongoing care.      Admission: Date/Time/Statement:   Admission Orders (From admission, onward)       Ordered        05/01/25 0444  INPATIENT ADMISSION  Once                          Orders Placed This Encounter   Procedures    INPATIENT ADMISSION     Standing Status:   Standing     Number of Occurrences:   1     Level of Care:   Med Surg [16]     Estimated length of stay:   More than 2 Midnights     Certification:   I certify that inpatient services are medically necessary for this patient for a duration of greater than two midnights. See H&P and MD Progress Notes for additional information about the patient's course of treatment.     ED Arrival Information       Expected   -    Arrival   4/30/2025 23:39    Acuity   Urgent              Means of arrival   Ambulance    Escorted by   [facility]    Service   Hospitalist    Admission type   Emergency              Arrival complaint   SOB             Chief Complaint   Patient presents with    Shortness of Breath     Pt BIBA from home, reports SOB progressively worsening since Monday which started after working outside in the yard. Hx COPD and Asthma. Recently tested for Lymes. Given Duoneb by EMS. 89% RA       Initial Presentation: 79 y.o. female presents to ED via  EMS  from home  with worsening shortness of breath for 1 day. Did some yard work and felt  very SOB.  Was  SOB ambulating room 1 room to another.  Very anxious.   On O2  2L  NC  with sats   93  %.   Denies  any other symptoms.  PMH  is  tobacco abuse, Lyme disease, COPD and DM  on metformin.  Admit  Ip with  Exacerbation  COPD  and plan is  O2  to keep sats  88 - 92  %,  IV  s/medrol,  CHRISTINE, nebulizers, pulmonary consult and continue home meds.    Pulmonary consult  Acute. Likely  chronic hypoxemic respiratory failure  Continue  IV   "s/medrol and taper doses.  Continue nebulizers/CHRISTINE.   Needs smoking cessation.May need home  O2  on  d/c.     Date:    5/2  Day 3: Has surpassed a 2nd midnight with active treatments and services.  Still \" really  short of breath.\"  Remains on  IV  s/medrol  and increased to 40 mg   TID.   Continue nebulizers.     On  O2  1L  NC.   Significantly diminished aeration.         ED Treatment-Medication Administration from 04/30/2025 2339 to 05/01/2025 1253         Date/Time Order Dose Route Action     05/01/2025 0124 albuterol inhalation solution 10 mg 10 mg Nebulization Given     05/01/2025 0124 ipratropium (ATROVENT) 0.02 % inhalation solution 1 mg 1 mg Nebulization Given     05/01/2025 0124 sodium chloride 0.9 % inhalation solution 12 mL 12 mL Nebulization Given     05/01/2025 0121 methylPREDNISolone sodium succinate (Solu-MEDROL) injection 125 mg 125 mg Intravenous Given     05/01/2025 0128 magnesium sulfate 2 g/50 mL IVPB (premix) 2 g 2 g Intravenous New Bag     05/01/2025 0119 potassium chloride (Klor-Con M20) CR tablet 40 mEq 40 mEq Oral Given     05/01/2025 0203 ipratropium-albuterol (FOR EMS ONLY) (DUO-NEB) 0.5-2.5 mg/3 mL inhalation solution 3 mL 0 mL Does not apply Given to EMS     05/01/2025 0332 ondansetron (ZOFRAN) injection 4 mg 4 mg Intravenous Given     05/01/2025 0526 ipratropium-albuterol (DUO-NEB) 0.5-2.5 mg/3 mL inhalation solution 3 mL 3 mL Nebulization Given     05/01/2025 0625 heparin (porcine) subcutaneous injection 5,000 Units 5,000 Units Subcutaneous Given     05/01/2025 0817 guaiFENesin (MUCINEX) 12 hr tablet 600 mg 600 mg Oral Given     05/01/2025 0841 methylPREDNISolone sodium succinate (Solu-MEDROL) injection 40 mg 40 mg Intravenous Given     05/01/2025 0627 azithromycin (ZITHROMAX) 500 mg in sodium chloride 0.9 % 250 mL IVPB 500 mg Intravenous New Bag     05/01/2025 0831 budesonide (PULMICORT) inhalation solution 0.5 mg 0.5 mg Nebulization Given     05/01/2025 0831 ipratropium (ATROVENT) " 0.02 % inhalation solution 0.5 mg 0.5 mg Nebulization Given     05/01/2025 0831 levalbuterol (XOPENEX) inhalation solution 1.25 mg 1.25 mg Nebulization Given     05/01/2025 0831 sodium chloride 0.9 % inhalation solution 3 mL 3 mL Nebulization Given     05/01/2025 1025 Cholecalciferol (VITAMIN D3) tablet 4,000 Units 4,000 Units Oral Given     05/01/2025 1026 pantoprazole (PROTONIX) EC tablet 40 mg 40 mg Oral Given     05/01/2025 1025 montelukast (SINGULAIR) tablet 10 mg 10 mg Oral Given     05/01/2025 1025 saccharomyces boulardii (FLORASTOR) capsule 250 mg 250 mg Oral Given     05/01/2025 1133 insulin lispro (HumALOG/ADMELOG) 100 units/mL subcutaneous injection 1-6 Units 3 Units Subcutaneous Given            Scheduled Medications:  azithromycin, 500 mg, Intravenous, Q24H  cholecalciferol, 4,000 Units, Oral, Daily  guaiFENesin, 600 mg, Oral, BID  heparin (porcine), 5,000 Units, Subcutaneous, Q8H MANUEL  insulin lispro, 1-6 Units, Subcutaneous, 4x Daily (AC & HS)  ipratropium, 0.5 mg, Nebulization, TID  levalbuterol, 1.25 mg, Nebulization, TID  methylPREDNISolone sodium succinate, 40 mg, Intravenous, Q12H MANUEL  montelukast, 10 mg, Oral, Daily  nicotine, 1 patch, Transdermal, Daily  pantoprazole, 40 mg, Oral, Daily Before Breakfast  pravastatin, 40 mg, Oral, Daily With Dinner  saccharomyces boulardii, 250 mg, Oral, BID      Continuous IV Infusions:     PRN Meds:  albuterol, 2 puff, Inhalation, Q4H PRN  calcium carbonate, 1,000 mg, Oral, Daily PRN  ondansetron, 4 mg, Intravenous, Q6H PRN  polyethylene glycol, 17 g, Oral, Daily PRN      ED Triage Vitals   Temperature Pulse Respirations Blood Pressure SpO2 Pain Score   04/30/25 2341 04/30/25 2341 04/30/25 2341 04/30/25 2341 04/30/25 2341 05/01/25 1303   99.2 °F (37.3 °C) 90 20 133/93 (S) (!) 89 % No Pain           Vital Signs (last 3 days)       Date/Time Temp Pulse Resp BP MAP (mmHg) SpO2 Calculated FIO2 (%) - Nasal Cannula Nasal Cannula O2 Flow Rate (L/min) O2 Device Patient  Position - Orthostatic VS Pain    05/02/25 0946 -- -- -- -- -- 92 % 24 1 L/min Nasal cannula -- --    05/02/25 0900 -- -- -- -- -- 92 % 28 2 L/min Nasal cannula -- --    05/02/25 0757 -- -- -- -- -- 97 % -- -- -- -- --    05/02/25 07:53:17 97.9 °F (36.6 °C) 70 22 118/53 75 95 % 28 2 L/min Nasal cannula Sitting --    05/02/25 0730 -- -- -- -- -- 94 % 28 2 L/min Nasal cannula -- --    05/02/25 0720 -- -- -- -- -- 94 % 28 2 L/min Nasal cannula -- No Pain    05/01/25 22:01:01 97.9 °F (36.6 °C) 77 -- 105/51 69 91 % -- -- -- -- --    05/01/25 1956 -- -- -- -- -- 97 % 28 2 L/min Nasal cannula -- No Pain    05/01/25 1933 -- -- -- -- -- 93 % 28 2 L/min Nasal cannula -- --    05/01/25 16:06:22 97.8 °F (36.6 °C) 75 17 107/72 84 91 % -- -- -- -- --    05/01/25 1418 -- -- -- -- -- 93 % 28 2 L/min Nasal cannula -- --    05/01/25 1315 -- 93 -- 125/59 81 89 % -- -- -- -- --    05/01/25 1303 -- -- -- -- -- -- -- -- -- -- No Pain    05/01/25 0652 -- 88 20 -- -- 96 % 28 2 L/min Nasal cannula Sitting --    05/01/25 0602 -- 84 20 115/55 79 93 % 28 2 L/min Nasal cannula Lying --    05/01/25 0400 -- 104 20 125/56 80 92 % 28 2 L/min Nasal cannula Lying --    05/01/25 0100 -- 80 20 126/87 100 96 % 28 2 L/min Nasal cannula Sitting --    04/30/25 2345 -- 86 20 133/93 103 95 % 28 2 L/min  Nasal cannula  Sitting --    04/30/25 2341 99.2 °F (37.3 °C) 90 20 133/93 -- 89 %  -- -- None (Room air) Sitting --              Pertinent Labs/Diagnostic Test Results:   Radiology:  XR chest 2 views   ED Interpretation by Dilcia Krishnan PA-C (05/01 0725)   No evidence of infiltrate, pneumothorax, or acute cardiopulmonary disease as interpreted by me      Final Interpretation by Willian Vaca MD (05/01 0825)      No acute cardiopulmonary disease.            Workstation performed: [id number]           Cardiology:          Results from last 7 days   Lab Units 05/01/25  0618   SARS-COV-2  Negative     Results from last 7 days   Lab Units 05/02/25  0534  05/01/25  0517 04/30/25  2347   WBC Thousand/uL 8.96 6.48 6.49   HEMOGLOBIN g/dL 11.0* 12.3 12.3   HEMATOCRIT % 35.2 38.2 38.0   PLATELETS Thousands/uL 189 165 168   TOTAL NEUT ABS Thousands/µL 6.84  --  4.96         Results from last 7 days   Lab Units 05/02/25  0534 05/01/25  0517 05/01/25  0312 04/30/25  2347   SODIUM mmol/L 139 135  --  140   POTASSIUM mmol/L 4.4 4.2  --  2.9*   CHLORIDE mmol/L 107 103  --  115*   CO2 mmol/L 25 22  --  19*   ANION GAP mmol/L 7 10  --  6   BUN mg/dL 20 13  --  11   CREATININE mg/dL 0.63 0.58*  --  0.37*   EGFR ml/min/1.73sq m 85 87  --  101   CALCIUM mg/dL 9.0 8.8  --  6.2*   MAGNESIUM mg/dL 2.3 2.4  --  1.3*   PHOSPHORUS mg/dL 4.1  --  3.3  --      Results from last 7 days   Lab Units 05/02/25  0534 05/01/25  0517 04/30/25  2347   AST U/L 10* 13 8*   ALT U/L 7 8 6*   ALK PHOS U/L 82 98 67   TOTAL PROTEIN g/dL 6.7 7.3 5.0*   ALBUMIN g/dL 3.9 4.2 3.0*   TOTAL BILIRUBIN mg/dL 0.28 0.31 0.25     Results from last 7 days   Lab Units 05/02/25  0759 05/01/25  2041 05/01/25  1628 05/01/25  1109   POC GLUCOSE mg/dl 120 176* 175* 248*     Results from last 7 days   Lab Units 05/02/25  0534 05/01/25  0517 04/30/25  2347   GLUCOSE RANDOM mg/dL 109 251* 119              Results from last 7 days   Lab Units 05/01/25  0618   PH MELLISSA  7.367   PCO2 MELLISSA mm Hg 40.9*   PO2 MELLISSA mm Hg 52.5*   HCO3 MELLISSA mmol/L 23.0*   BASE EXC MELLISSA mmol/L -2.2   O2 CONTENT MELLISSA ml/dL 14.7   O2 HGB, VENOUS % 83.9*             Results from last 7 days   Lab Units 05/01/25  0312 04/30/25  2347   HS TNI 0HR ng/L  --  7   HS TNI 2HR ng/L 5  --    HSTNI D2 ng/L -2  --                  Results from last 7 days   Lab Units 05/02/25  0534 05/01/25  0517   PROCALCITONIN ng/ml <0.05 <0.05                   Results from last 7 days   Lab Units 05/01/25  0618   INFLUENZA A PCR  Negative   INFLUENZA B PCR  Negative   RSV PCR  Negative                           Present on Admission:   Vitamin D deficiency   Tobacco abuse   Shortness of  breath   Hyperlipidemia   Controlled diabetes mellitus type II without complication (HCC)   COPD exacerbation (HCC)      Admitting Diagnosis: SOB (shortness of breath) [R06.02]  COPD exacerbation (HCC) [J44.1]  Age/Sex: 79 y.o. female    Network Utilization Review Department  ATTENTION: Please call with any questions or concerns to [phone] and carefully listen to the prompts so that you are directed to the right person. All voicemails are confidential.   For Discharge needs, contact Care Management DC Support Team at [phone] opt. [phone]  Send all requests for admission clinical reviews, approved or denied determinations and any other requests to dedicated fax number below belonging to the campus where the patient is receiving treatment. List of dedicated fax numbers for the Facilities:  FACILITY NAME UR FAX NUMBER   ADMISSION DENIALS (Administrative/Medical Necessity) [phone]   DISCHARGE SUPPORT TEAM (NETWORK) [phone]   PARENT CHILD HEALTH (Maternity/NICU/Pediatrics) [phone]   [facility] [phone]   [facility] [phone]   [facility] [phone]   [facility] [phone]   [facility] [phone]   [facility] [phone]   [facility] [phone]   [facility] [phone]   [facility] [phone]   [facility] [phone]   [facility] [phone]   [facility] [phone]

## 2025-05-02 NOTE — ASSESSMENT & PLAN NOTE
- Approximately 8 cigarettes/day  -Encourage and educated on tobacco cessation  -NRT per primary team      - Outpatient follow-up per discharge instructions

## 2025-05-02 NOTE — ASSESSMENT & PLAN NOTE
-11/2017-FEV1 69% moderate obstruction  -Inpatient:: Increase IV Solu-Medrol 40 mg 3 times daily, Symbicort twice daily Xopenex/Atrovent 3 times daily  -Home regimen: Symbicort 160-4.5 mcg 1 puff twice daily, Spiriva Respimat 2.5 mcg 2 puff daily, Proventil 2 puffs every 6 as needed, albuterol nebulizer every 6 as needed  -Will need close pulmonary follow-up  -Day # 2/3-azithromycin

## 2025-05-02 NOTE — PROGRESS NOTES
"Progress Note - Pulmonology   Name: Melissa Nguyen 79 y.o. female I MRN: [id number]  Unit/Bed#: E5 -01 I Date of Admission: 4/30/2025   Date of Service: 5/2/2025 I Hospital Day: 1     Assessment & Plan  COPD exacerbation (Formerly [facility])  -11/2017-FEV1 69% moderate obstruction  -Inpatient:: Increase IV Solu-Medrol 40 mg 3 times daily, Symbicort twice daily Xopenex/Atrovent 3 times daily  -Home regimen: Symbicort 160-4.5 mcg 1 puff twice daily, Spiriva Respimat 2.5 mcg 2 puff daily, Proventil 2 puffs every 6 as needed, albuterol nebulizer every 6 as needed  -Will need close pulmonary follow-up  -Day # 2/3-azithromycin  Tobacco abuse  - Approximately 8 cigarettes/day  -Encourage and educated on tobacco cessation  -NRT per primary team      - Outpatient follow-up per discharge instructions    24 Hour Events : na  Subjective : \"I am really short of breath\"    Objective :  Temp:  [97.8 °F (36.6 °C)-97.9 °F (36.6 °C)] 97.9 °F (36.6 °C)  HR:  [70-93] 70  BP: (105-125)/(51-72) 118/53  Resp:  [17-22] 22  SpO2:  [89 %-97 %] 92 %  O2 Device: Nasal cannula  Nasal Cannula O2 Flow Rate (L/min):  [1 L/min-2 L/min] 1 L/min    Physical Exam  Constitutional:       General: She is not in acute distress.     Appearance: Normal appearance. She is normal weight. She is not ill-appearing.   HENT:      Head: Normocephalic and atraumatic.      Nose: Nose normal. No congestion or rhinorrhea.      Mouth/Throat:      Mouth: Mucous membranes are dry.      Pharynx: Oropharynx is clear. No oropharyngeal exudate or posterior oropharyngeal erythema.   Cardiovascular:      Rate and Rhythm: Normal rate and regular rhythm.      Pulses: Normal pulses.      Heart sounds: No murmur heard.     No friction rub. No gallop.   Pulmonary:      Effort: Pulmonary effort is normal. No respiratory distress.      Breath sounds: No stridor. No wheezing, rhonchi or rales.      Comments: Significantly diminished aeration  Chest:      Chest wall: No tenderness.   Abdominal: "      General: Abdomen is flat. Bowel sounds are normal.      Palpations: Abdomen is soft.   Musculoskeletal:         General: No swelling or tenderness. Normal range of motion.      Cervical back: Normal range of motion. No rigidity or tenderness.   Skin:     General: Skin is warm and dry.      Coloration: Skin is not jaundiced or pale.   Neurological:      General: No focal deficit present.      Mental Status: She is alert and oriented to person, place, and time. Mental status is at baseline.   Psychiatric:         Mood and Affect: Mood normal.         Behavior: Behavior normal.           Lab Results: I have reviewed the following results:   .     05/02/25  0534   WBC 8.96   HGB 11.0*   HCT 35.2      SODIUM 139   K 4.4      CO2 25   BUN 20   CREATININE 0.63   GLUC 109   MG 2.3   PHOS 4.1   AST 10*   ALT 7   ALB 3.9   TBILI 0.28   ALKPHOS 82     ABG: No new results in last 24 hours.    Imaging Results Review: I personally reviewed the following image studies/reports in PACS and discussed pertinent findings with Radiology: chest xray. My interpretation of the radiology images/reports is:  .  Other Study Results Review: EKG was reviewed.   PFT Results Reviewed: reviewed

## 2025-05-02 NOTE — PLAN OF CARE
Problem: PAIN - ADULT  Goal: Verbalizes/displays adequate comfort level or baseline comfort level  Description: Interventions:- Encourage patient to monitor pain and request assistance- Assess pain using appropriate pain scale- Administer analgesics based on type and severity of pain and evaluate response- Implement non-pharmacological measures as appropriate and evaluate response- Consider cultural and social influences on pain and pain management- Notify physician/advanced practitioner if interventions unsuccessful or patient reports new pain  Outcome: Progressing     Problem: SAFETY ADULT  Goal: Patient will remain free of falls  Description: INTERVENTIONS:- Educate patient/family on patient safety including physical limitations- Instruct patient to call for assistance with activity - Consult OT/PT to assist with strengthening/mobility - Keep Call bell within reach- Keep bed low and locked with side rails adjusted as appropriate- Keep care items and personal belongings within reach- Initiate and maintain comfort rounds- Make Fall Risk Sign visible to staff- Offer Toileting ng patient to room near nurses station  Outcome: Progressing     Problem: DISCHARGE PLANNING  Goal: Discharge to home or other facility with appropriate resources  Description: INTERVENTIONS:- Identify barriers to discharge w/patient and caregiver- Arrange for needed discharge resources and transportation as appropriate- Identify discharge learning needs (meds, wound care, etc.)- Arrange for interpretive services to assist at discharge as needed- Refer to Case Management Department for coordinating discharge planning if the patient needs post-hospital services based on physician/advanced practitioner order or complex needs related to functional status, cognitive ability, or social support system  Outcome: Progressing     Problem: RESPIRATORY - ADULT  Goal: Achieves optimal ventilation and oxygenation  Description: INTERVENTIONS:- Assess for  changes in respiratory status- Assess for changes in mentation and behavior- Position to facilitate oxygenation and minimize respiratory effort- Oxygen administered by appropriate delivery if ordered- Initiate smoking cessation education as indicated- Encourage broncho-pulmonary hygiene including cough, deep breathe, Incentive Spirometry- Assess the need for suctioning and aspirate as needed- Assess and instruct to report SOB or any respiratory difficulty- Respiratory Therapy support as indicated  Outcome: Progressing

## 2025-05-02 NOTE — PROGRESS NOTES
Progress Note - Hospitalist   Name: Melissa Nguyen 79 y.o. female I MRN: [id number]  Unit/Bed#: E5 -01 I Date of Admission: 4/30/2025   Date of Service: 5/2/2025 I Hospital Day: 1    Assessment & Plan  COPD exacerbation (HCC)  Patient is a 79-year-old female with past medical history significant for limited vitamin D deficiency, tobacco use disorder, shortness of breath, Lyme disease, hyperlipidemia, COPD exacerbation, diabetes mellitus on metformin presented to the ED of [facility] with worsening shortness of breath since past 1 day.  As per patient she was doing some yard work and post doing that she felt very out of breath.  She decided to visit to the hospital because she was short of breath even while ambulating from 1 room to the other.  Patient denies any chest pain any palpitation lightheadedness or dizziness along with the symptoms, endorses that does have some cough but cannot relate if it is more than her baseline or less.  Patient is denying any other symptoms like abdominal pain nausea vomiting diarrhea constipation myalgia.    - Continue with IV Solu-Medrol  - Continue with IV azithromycin for 3 doses  - Continue with Xopenex along with Atrovent  - Continue prior to admission montelukast  - Pulmonology consulted appreciate recs  - Patient currently on oxygen therapy can wean off as per tolerability SpO2 goal 88 to 92%  -Checks x-ray  reviewed  -Might require ambulatory pulse ox prior to discharge.  Vitamin D deficiency  Continue prior to admission supplementation  Tobacco abuse  Counseled about smoking cessation  Nicotine patches ordered  Hyperlipidemia  Prior to admission statin  Controlled diabetes mellitus type II without complication (HCC)  Lab Results   Component Value Date    HGBA1C 5.9 04/23/2025       Recent Labs     05/01/25  1628 05/01/25  2041 05/02/25  0759 05/02/25  1057   POCGLU 175* 176* 120 166*       Blood Sugar Average: Last 72 hrs:  (P) 177  Patient last hemoglobin  A1c 5.9  Controlled with home medication of metformin  Continue with carbohydrate consistent diet  Insulin sliding scale ordered  Shortness of breath  As above under COPD exacerbation  Lyme disease  As per chart review and patient's history, patient has had Lyme disease 3 times in the past  - She tested positive few days ago  - PCP prescribed cefuroxime for 28 days given that patient cannot tolerate doxycycline  - Started patient on antibiotics but patient stated she would like to focus currently on her breathing and would like to restart antibiotics once she is home.  Risks versus benefits were explained but wanted to keep it on hold therefore discontinued    VTE Pharmacologic Prophylaxis:    IV heparin    Mobility:   Basic Mobility Inpatient Raw Score: 24  JH-HLM Goal: 8: Walk 250 feet or more  JH-HLM Achieved: 3: Sit at edge of bed    Education and Discussions with Family / Patient: Patient declined call to .     Current Length of Stay: 1 day(s)  Current Patient Status: Inpatient     Discharge Plan: Anticipate discharge in 24-48 hrs to home.    Code Status: Level 3 - DNAR and DNI    Subjective   Patient seen and examined at bedside.  Stated that she feels much better improved than before.  Still endorses shortness of breath but feels like she is breathing a whole lot better overall.    Objective :  Temp:  [97.8 °F (36.6 °C)-97.9 °F (36.6 °C)] 97.9 °F (36.6 °C)  HR:  [70-77] 70  BP: (105-118)/(51-72) 118/53  Resp:  [17-22] 22  SpO2:  [91 %-97 %] 92 %  O2 Device: Nasal cannula  Nasal Cannula O2 Flow Rate (L/min):  [1 L/min-2 L/min] 1 L/min    There is no height or weight on file to calculate BMI.     Input and Output Summary (last 24 hours):     Intake/Output Summary (Last 24 hours) at 5/2/2025 1450  Last data filed at 5/2/2025 1312  Gross per 24 hour   Intake 480 ml   Output 200 ml   Net 280 ml       Physical Exam  Vitals and nursing note reviewed.   Constitutional:       General: She is not in acute  distress.     Appearance: She is well-developed.   HENT:      Head: Normocephalic and atraumatic.   Eyes:      Conjunctiva/sclera: Conjunctivae normal.   Cardiovascular:      Rate and Rhythm: Normal rate and regular rhythm.      Heart sounds: No murmur heard.  Pulmonary:      Effort: Pulmonary effort is normal. No respiratory distress.      Breath sounds: Wheezing present.   Abdominal:      Palpations: Abdomen is soft.      Tenderness: There is no abdominal tenderness.   Musculoskeletal:         General: No swelling.      Cervical back: Neck supple.   Skin:     General: Skin is warm and dry.      Capillary Refill: Capillary refill takes less than 2 seconds.   Neurological:      Mental Status: She is alert and oriented to person, place, and time.   Psychiatric:         Mood and Affect: Mood normal.           Lines/Drains:              Lab Results: I have reviewed the following results:   Results from last 7 days   Lab Units 05/02/25  0534   WBC Thousand/uL 8.96   HEMOGLOBIN g/dL 11.0*   HEMATOCRIT % 35.2   PLATELETS Thousands/uL 189   SEGS PCT % 77*   LYMPHO PCT % 17   MONO PCT % 6   EOS PCT % 0     Results from last 7 days   Lab Units 05/02/25  0534   SODIUM mmol/L 139   POTASSIUM mmol/L 4.4   CHLORIDE mmol/L 107   CO2 mmol/L 25   BUN mg/dL 20   CREATININE mg/dL 0.63   ANION GAP mmol/L 7   CALCIUM mg/dL 9.0   ALBUMIN g/dL 3.9   TOTAL BILIRUBIN mg/dL 0.28   ALK PHOS U/L 82   ALT U/L 7   AST U/L 10*   GLUCOSE RANDOM mg/dL 109         Results from last 7 days   Lab Units 05/02/25  1057 05/02/25  0759 05/01/25  2041 05/01/25  1628 05/01/25  1109   POC GLUCOSE mg/dl 166* 120 176* 175* 248*         Results from last 7 days   Lab Units 05/02/25  0534 05/01/25  0517   PROCALCITONIN ng/ml <0.05 <0.05       Recent Cultures (last 7 days):       XR chest 2 views  Result Date: 5/1/2025  Impression: No acute cardiopulmonary disease. Workstation performed: [id number]         Last 24 Hours Medication List:     Current  Facility-Administered Medications:     albuterol (PROVENTIL HFA,VENTOLIN HFA) inhaler 2 puff, Q4H PRN    azithromycin (ZITHROMAX) 500 mg in sodium chloride 0.9 % 250 mL IVPB, Q24H, Last Rate: 500 mg (05/02/25 0513)    calcium carbonate (TUMS) chewable tablet 1,000 mg, Daily PRN    Cholecalciferol (VITAMIN D3) tablet 4,000 Units, Daily    guaiFENesin (MUCINEX) 12 hr tablet 600 mg, BID    heparin (porcine) subcutaneous injection 5,000 Units, Q8H MANUEL **AND** Platelet count, Once    insulin lispro (HumALOG/ADMELOG) 100 units/mL subcutaneous injection 1-6 Units, 4x Daily (AC & HS) **AND** Fingerstick Glucose (POCT), 4x Daily AC and at bedtime    ipratropium (ATROVENT) 0.02 % inhalation solution 0.5 mg, TID    levalbuterol (XOPENEX) inhalation solution 1.25 mg, TID **AND** [DISCONTINUED] sodium chloride 0.9 % inhalation solution 3 mL, TID    methylPREDNISolone sodium succinate (Solu-MEDROL) injection 40 mg, Q8H MANUEL    montelukast (SINGULAIR) tablet 10 mg, Daily    nicotine (NICODERM CQ) 21 mg/24 hr TD 24 hr patch 1 patch, Daily    ondansetron (ZOFRAN) injection 4 mg, Q6H PRN    pantoprazole (PROTONIX) EC tablet 40 mg, Daily Before Breakfast    polyethylene glycol (MIRALAX) packet 17 g, Daily PRN    pravastatin (PRAVACHOL) tablet 40 mg, Daily With Dinner    saccharomyces boulardii (FLORASTOR) capsule 250 mg, BID    Administrative Statements   Today, Patient Was Seen By: Divina Orozco MD  I have spent a total time of >35 minutes in caring for this patient on the day of the visit/encounter including Prognosis, Instructions for management, Patient and family education, Risk factor reductions, Impressions, Documenting in the medical record, Reviewing/placing orders in the medical record (including tests, medications, and/or procedures), and Communicating with other healthcare professionals .    **Please Note: This note may have been constructed using a voice recognition system.**

## 2025-05-02 NOTE — ASSESSMENT & PLAN NOTE
Patient is a 79-year-old female with past medical history significant for limited vitamin D deficiency, tobacco use disorder, shortness of breath, Lyme disease, hyperlipidemia, COPD exacerbation, diabetes mellitus on metformin presented to the ED of [facility] with worsening shortness of breath since past 1 day.  As per patient she was doing some yard work and post doing that she felt very out of breath.  She decided to visit to the hospital because she was short of breath even while ambulating from 1 room to the other.  Patient denies any chest pain any palpitation lightheadedness or dizziness along with the symptoms, endorses that does have some cough but cannot relate if it is more than her baseline or less.  Patient is denying any other symptoms like abdominal pain nausea vomiting diarrhea constipation myalgia.    - Continue with IV Solu-Medrol  - Continue with IV azithromycin for 3 doses  - Continue with Xopenex along with Atrovent  - Continue prior to admission montelukast  - Pulmonology consulted appreciate recs  - Patient currently on oxygen therapy can wean off as per tolerability SpO2 goal 88 to 92%  -Checks x-ray  reviewed  -Might require ambulatory pulse ox prior to discharge.

## 2025-05-02 NOTE — CASE MANAGEMENT
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 2025     Shortness of breath 2025     Tinnitus, left ear 2025     Benign paroxysmal positional vertigo 2025     Visual disturbance of one eye 2025     Near syncope 2025     Elevated blood pressure reading 2025     Dependence on cane 2025     Tongue irritation 2024     Tooth fracture 2024     Pain of toe of left foot 2024     Small intestinal bacterial overgrowth 2024     Rheumatoid arthritis, involving unspecified site, unspecified whether rheumatoid factor present (HCC) 2024     Undifferentiated connective tissue disease (HCC) 10/02/2022     CRISTHIAN positive 2022     Chronic pain syndrome 10/08/2021     Lumbar facet arthropathy 10/08/2021     Sacroiliitis (HCC)      Chronic constipation 2020     Osteoarthritis 2020     Paget's bone disease 2020     Vitamin B12 deficiency 2019     Irritable bowel syndrome with both constipation and diarrhea 10/09/2018     Lumbosacral pain, chronic 2017     History of colonic polyps 08/10/2017     Coronary artery calcification 10/11/2016     Osteoporosis 2016     Holly's esophagus 10/26/2015     COPD exacerbation (HCC) 2015     Tobacco abuse 2015     Vitamin D deficiency 2014     Hyperlipidemia 2012     Allergic rhinitis 2012     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 2012     Controlled diabetes mellitus type II without complication (HCC) 2012       LOS (days): 1  Geometric Mean LOS (GMLOS) (days):  2.1  Days to GMLOS:0.8     OBJECTIVE:    Risk of Unplanned Readmission Score: 17.02         Current admission status: Inpatient       Preferred Pharmacy:   [facility]/pharmacy #[id number] - [place] - [address]  [address]  [place] [address]  Phone: [phone] Fax: [phone]    Primary Care Provider: Zayra Jessica MD    Primary Insurance: "[employer]  Secondary Insurance:     ASSESSMENT:  Active Health Care Proxies       Rhonda Nava Health Care Representative - Relative   Primary Phone: [phone] (Home)                           Readmission Root Cause  30 Day Readmission: No    Patient Information  Admitted from:: Home  Mental Status: Alert  During Assessment patient was accompanied by: Not accompanied during assessment  Assessment information provided by:: Patient  Primary Caregiver: Self  Support Systems: Family members  County of Residence: [place]  What city do you live in?: [place]  Home entry access options. Select all that apply.: Stairs  Number of steps to enter home.: One Flight (12 CHRIS)  Type of Current Residence: Other (Comment) [place]  Living Arrangements: Lives Alone  Is patient a ?: No    Activities of Daily Living Prior to Admission  Functional Status: Independent  Completes ADLs independently?: Yes  Ambulates independently?: Yes  Does patient use assisted devices?: Yes  Assisted Devices (DME) used: Straight Cane  Does patient currently own DME?: Yes  What DME does the patient currently own?: Straight Cane  Does patient have a history of Outpatient Therapy (PT/OT)?: No  Does the patient have a history of Short-Term Rehab?: No  Does patient have a history of HHC?: No  Does patient currently have HHC?: No         Patient Information Continued  Income Source: Pension/[employer]  Does patient have prescription coverage?: Yes  Can the patient afford their medications and any related supplies (such as glucometers or test strips)?: Yes (usually can afford but pays $190 for  inhaler and said that is expensive -- uses [facility] on [address])  Does patient receive dialysis treatments?: No  Does patient have a history of substance abuse?: No  Does patient have a history of Mental Health Diagnosis?: No         Means of Transportation  [facility]          DISCHARGE DETAILS:    Discharge planning discussed with:: patient  Freedom of Choice: Yes     CM contacted family/caregiver?: No- see comments (patient alert & oriented)  Were Treatment Team discharge recommendations reviewed with patient/caregiver?: Yes  Did patient/caregiver verbalize understanding of patient care needs?: Yes  Were patient/caregiver advised of the risks associated with not following Treatment Team discharge recommendations?: Yes    Contacts  Patient Contacts: Rhonda Nava, cousin  Relationship to Patient:: Family  Contact Method: Phone  Phone Number: [phone]  Reason/Outcome: Emergency Contact                        Treatment Team Recommendation: Home  Discharge Destination Plan:: Home  Transport at Discharge : Ride Share                             IMM Given (Date):: 05/02/25  IMM Given to:: Patient     Additional Comments: Introduced self and role to patient at bedside.  Patient independent at baseline, lives alone, drives, uses cane.  Discussed possibility of needing home O2 at discharge, patient agreeable, awaiting home O2 evaluation.  Patient may need Lyft at d/c.  Assigned CM to follow.

## 2025-05-02 NOTE — ASSESSMENT & PLAN NOTE
Lab Results   Component Value Date    HGBA1C 5.9 04/23/2025       Recent Labs     05/01/25  1628 05/01/25  2041 05/02/25  0759 05/02/25  1057   POCGLU 175* 176* 120 166*       Blood Sugar Average: Last 72 hrs:  (P) 177  Patient last hemoglobin A1c 5.9  Controlled with home medication of metformin  Continue with carbohydrate consistent diet  Insulin sliding scale ordered

## 2025-05-03 LAB
ANION GAP SERPL CALCULATED.3IONS-SCNC: 8 MMOL/L (ref 4–13)
BUN SERPL-MCNC: 24 MG/DL (ref 5–25)
CALCIUM SERPL-MCNC: 9 MG/DL (ref 8.4–10.2)
CHLORIDE SERPL-SCNC: 104 MMOL/L (ref 96–108)
CO2 SERPL-SCNC: 25 MMOL/L (ref 21–32)
CREAT SERPL-MCNC: 0.63 MG/DL (ref 0.6–1.3)
ERYTHROCYTE [DISTWIDTH] IN BLOOD BY AUTOMATED COUNT: 14.3 % (ref 11.6–15.1)
GFR SERPL CREATININE-BSD FRML MDRD: 85 ML/MIN/1.73SQ M
GLUCOSE SERPL-MCNC: 137 MG/DL (ref 65–140)
GLUCOSE SERPL-MCNC: 149 MG/DL (ref 65–140)
GLUCOSE SERPL-MCNC: 153 MG/DL (ref 65–140)
GLUCOSE SERPL-MCNC: 156 MG/DL (ref 65–140)
GLUCOSE SERPL-MCNC: 157 MG/DL (ref 65–140)
HCT VFR BLD AUTO: 36.3 % (ref 34.8–46.1)
HGB BLD-MCNC: 11.5 G/DL (ref 11.5–15.4)
MAGNESIUM SERPL-MCNC: 2.2 MG/DL (ref 1.9–2.7)
MCH RBC QN AUTO: 27.8 PG (ref 26.8–34.3)
MCHC RBC AUTO-ENTMCNC: 31.7 G/DL (ref 31.4–37.4)
MCV RBC AUTO: 88 FL (ref 82–98)
PLATELET # BLD AUTO: 219 THOUSANDS/UL (ref 149–390)
PMV BLD AUTO: 10.9 FL (ref 8.9–12.7)
POTASSIUM SERPL-SCNC: 4.7 MMOL/L (ref 3.5–5.3)
RBC # BLD AUTO: 4.13 MILLION/UL (ref 3.81–5.12)
SODIUM SERPL-SCNC: 137 MMOL/L (ref 135–147)
WBC # BLD AUTO: 7.84 THOUSAND/UL (ref 4.31–10.16)

## 2025-05-03 PROCEDURE — 82948 REAGENT STRIP/BLOOD GLUCOSE: CPT

## 2025-05-03 PROCEDURE — 94760 N-INVAS EAR/PLS OXIMETRY 1: CPT

## 2025-05-03 PROCEDURE — 85027 COMPLETE CBC AUTOMATED: CPT

## 2025-05-03 PROCEDURE — 99232 SBSQ HOSP IP/OBS MODERATE 35: CPT | Performed by: INTERNAL MEDICINE

## 2025-05-03 PROCEDURE — 94640 AIRWAY INHALATION TREATMENT: CPT

## 2025-05-03 PROCEDURE — 80048 BASIC METABOLIC PNL TOTAL CA: CPT

## 2025-05-03 PROCEDURE — 99232 SBSQ HOSP IP/OBS MODERATE 35: CPT

## 2025-05-03 PROCEDURE — 83735 ASSAY OF MAGNESIUM: CPT

## 2025-05-03 RX ORDER — METHYLPREDNISOLONE SODIUM SUCCINATE 40 MG/ML
40 INJECTION, POWDER, LYOPHILIZED, FOR SOLUTION INTRAMUSCULAR; INTRAVENOUS EVERY 8 HOURS SCHEDULED
Status: DISCONTINUED | OUTPATIENT
Start: 2025-05-03 | End: 2025-05-03

## 2025-05-03 RX ORDER — IPRATROPIUM BROMIDE AND ALBUTEROL SULFATE 2.5; .5 MG/3ML; MG/3ML
3 SOLUTION RESPIRATORY (INHALATION)
Status: DISCONTINUED | OUTPATIENT
Start: 2025-05-03 | End: 2025-05-05

## 2025-05-03 RX ORDER — METHYLPREDNISOLONE SODIUM SUCCINATE 40 MG/ML
40 INJECTION, POWDER, LYOPHILIZED, FOR SOLUTION INTRAMUSCULAR; INTRAVENOUS EVERY 12 HOURS SCHEDULED
Status: DISCONTINUED | OUTPATIENT
Start: 2025-05-03 | End: 2025-05-04

## 2025-05-03 RX ORDER — METHYLPREDNISOLONE SODIUM SUCCINATE 40 MG/ML
40 INJECTION, POWDER, LYOPHILIZED, FOR SOLUTION INTRAMUSCULAR; INTRAVENOUS EVERY 12 HOURS SCHEDULED
Status: DISCONTINUED | OUTPATIENT
Start: 2025-05-03 | End: 2025-05-03

## 2025-05-03 RX ORDER — CEFUROXIME AXETIL 250 MG/1
500 TABLET ORAL EVERY 12 HOURS SCHEDULED
Status: DISCONTINUED | OUTPATIENT
Start: 2025-05-03 | End: 2025-05-06 | Stop reason: HOSPADM

## 2025-05-03 RX ORDER — NICOTINE 21 MG/24HR
21 PATCH, TRANSDERMAL 24 HOURS TRANSDERMAL DAILY
Status: DISCONTINUED | OUTPATIENT
Start: 2025-05-03 | End: 2025-05-06 | Stop reason: HOSPADM

## 2025-05-03 RX ADMIN — PANTOPRAZOLE SODIUM 40 MG: 40 TABLET, DELAYED RELEASE ORAL at 06:52

## 2025-05-03 RX ADMIN — INSULIN LISPRO 1 UNITS: 100 INJECTION, SOLUTION INTRAVENOUS; SUBCUTANEOUS at 17:06

## 2025-05-03 RX ADMIN — NICOTINE 21 MG: 21 PATCH, EXTENDED RELEASE TRANSDERMAL at 14:53

## 2025-05-03 RX ADMIN — METHYLPREDNISOLONE SODIUM SUCCINATE 40 MG: 40 INJECTION, POWDER, FOR SOLUTION INTRAMUSCULAR; INTRAVENOUS at 05:24

## 2025-05-03 RX ADMIN — CALCIUM CARBONATE (ANTACID) CHEW TAB 500 MG 1000 MG: 500 CHEW TAB at 08:51

## 2025-05-03 RX ADMIN — IPRATROPIUM BROMIDE 0.5 MG: 0.5 SOLUTION RESPIRATORY (INHALATION) at 07:43

## 2025-05-03 RX ADMIN — MONTELUKAST 10 MG: 10 TABLET, FILM COATED ORAL at 08:42

## 2025-05-03 RX ADMIN — GUAIFENESIN 600 MG: 600 TABLET ORAL at 08:42

## 2025-05-03 RX ADMIN — CEFUROXIME AXETIL 500 MG: 250 TABLET, FILM COATED ORAL at 21:11

## 2025-05-03 RX ADMIN — Medication 250 MG: at 08:42

## 2025-05-03 RX ADMIN — LEVALBUTEROL HYDROCHLORIDE 1.25 MG: 1.25 SOLUTION RESPIRATORY (INHALATION) at 07:43

## 2025-05-03 RX ADMIN — Medication 250 MG: at 17:06

## 2025-05-03 RX ADMIN — HEPARIN SODIUM 5000 UNITS: 5000 INJECTION INTRAVENOUS; SUBCUTANEOUS at 14:53

## 2025-05-03 RX ADMIN — INSULIN LISPRO 1 UNITS: 100 INJECTION, SOLUTION INTRAVENOUS; SUBCUTANEOUS at 08:42

## 2025-05-03 RX ADMIN — POLYETHYLENE GLYCOL 3350 17 G: 17 POWDER, FOR SOLUTION ORAL at 08:51

## 2025-05-03 RX ADMIN — Medication 4000 UNITS: at 08:42

## 2025-05-03 RX ADMIN — PRAVASTATIN SODIUM 40 MG: 40 TABLET ORAL at 17:06

## 2025-05-03 RX ADMIN — AZITHROMYCIN MONOHYDRATE 500 MG: 500 INJECTION, POWDER, LYOPHILIZED, FOR SOLUTION INTRAVENOUS at 05:24

## 2025-05-03 RX ADMIN — HEPARIN SODIUM 5000 UNITS: 5000 INJECTION INTRAVENOUS; SUBCUTANEOUS at 21:12

## 2025-05-03 RX ADMIN — GUAIFENESIN 600 MG: 600 TABLET ORAL at 17:06

## 2025-05-03 RX ADMIN — HEPARIN SODIUM 5000 UNITS: 5000 INJECTION INTRAVENOUS; SUBCUTANEOUS at 05:28

## 2025-05-03 RX ADMIN — IPRATROPIUM BROMIDE AND ALBUTEROL SULFATE 3 ML: .5; 3 SOLUTION RESPIRATORY (INHALATION) at 19:25

## 2025-05-03 RX ADMIN — METHYLPREDNISOLONE SODIUM SUCCINATE 40 MG: 40 INJECTION, POWDER, FOR SOLUTION INTRAMUSCULAR; INTRAVENOUS at 21:10

## 2025-05-03 RX ADMIN — IPRATROPIUM BROMIDE AND ALBUTEROL SULFATE 3 ML: .5; 3 SOLUTION RESPIRATORY (INHALATION) at 14:30

## 2025-05-03 NOTE — ASSESSMENT & PLAN NOTE
Lab Results   Component Value Date    HGBA1C 5.9 04/23/2025       Recent Labs     05/02/25  2112 05/03/25  0756 05/03/25  1136 05/03/25  1553   POCGLU 203* 157* 149* 156*       Blood Sugar Average: Last 72 hrs:  (P) 172.3  Patient last hemoglobin A1c 5.9  Controlled with home medication of metformin  Continue with carbohydrate consistent diet  Insulin sliding scale ordered

## 2025-05-03 NOTE — PROGRESS NOTES
Progress Note - Hospitalist   Name: Melissa Nguyen 79 y.o. female I MRN: [id number]  Unit/Bed#: E5 -01 I Date of Admission: 4/30/2025   Date of Service: 5/3/2025 I Hospital Day: 2    Assessment & Plan  COPD exacerbation (HCC)  Patient is a 79-year-old female with past medical history significant for limited vitamin D deficiency, tobacco use disorder, shortness of breath, Lyme disease, hyperlipidemia, COPD exacerbation, diabetes mellitus on metformin presented to the ED of [facility] with worsening shortness of breath since past 1 day.  As per patient she was doing some yard work and post doing that she felt very out of breath.  She decided to visit to the hospital because she was short of breath even while ambulating from 1 room to the other.  Patient denies any chest pain any palpitation lightheadedness or dizziness along with the symptoms, endorses that does have some cough but cannot relate if it is more than her baseline or less.  Patient is denying any other symptoms like abdominal pain nausea vomiting diarrhea constipation myalgia.    - Continue with IV Solu-Medrol every 12 hours  - Continue with IV azithromycin for 3 doses  - Continue with Xopenex along with Atrovent  - Continue prior to admission montelukast  - Pulmonology consulted appreciate recs  - Patient currently on oxygen therapy can wean off as per tolerability SpO2 goal 88 to 92%  -Checks x-ray  reviewed  -Might require ambulatory pulse ox prior to discharge.  Vitamin D deficiency  Continue prior to admission supplementation  Tobacco abuse  Counseled about smoking cessation  Nicotine patches ordered  Hyperlipidemia  Prior to admission statin  Controlled diabetes mellitus type II without complication (HCC)  Lab Results   Component Value Date    HGBA1C 5.9 04/23/2025       Recent Labs     05/02/25  2112 05/03/25  0756 05/03/25  1136 05/03/25  1553   POCGLU 203* 157* 149* 156*       Blood Sugar Average: Last 72 hrs:  (P) 172.3  Patient  last hemoglobin A1c 5.9  Controlled with home medication of metformin  Continue with carbohydrate consistent diet  Insulin sliding scale ordered  Shortness of breath  As above under COPD exacerbation  Lyme disease  As per chart review and patient's history, patient has had Lyme disease 3 times in the past  - She tested positive few days ago  - Wanted to start her antibiotics while in hospitalization states that she has changed her mind initially refused but now is okay with restarting it.    VTE Pharmacologic Prophylaxis:    Heparin    Mobility:   Basic Mobility Inpatient Raw Score: 24  JH-HLM Goal: 8: Walk 250 feet or more  JH-HLM Achieved: 7: Walk 25 feet or more    Education and Discussions with Family / Patient:  Friend at bedside.     Current Length of Stay: 2 day(s)  Current Patient Status: Inpatient     Discharge Plan: Anticipate discharge in 24-48 hrs to home.    Code Status: Level 3 - DNAR and DNI    Subjective   Seen examined at bedside no acute concerns stated that she still feels she is short of breath cannot do without oxygen for now wants to try another day to see if the steroid therapy can help her before testing ambulatory    Objective :  Temp:  [97.9 °F (36.6 °C)-98.8 °F (37.1 °C)] 98.8 °F (37.1 °C)  HR:  [74-86] 74  BP: (121-138)/(68-71) 138/68  Resp:  [16] 16  SpO2:  [90 %-94 %] 93 %  O2 Device: Nasal cannula  Nasal Cannula O2 Flow Rate (L/min):  [1 L/min-2 L/min] 2 L/min    There is no height or weight on file to calculate BMI.     Input and Output Summary (last 24 hours):     Intake/Output Summary (Last 24 hours) at 5/3/2025 1657  Last data filed at 5/3/2025 0839  Gross per 24 hour   Intake 180 ml   Output --   Net 180 ml       Physical Exam  Vitals and nursing note reviewed.   Constitutional:       General: She is not in acute distress.     Appearance: She is well-developed.   HENT:      Head: Normocephalic and atraumatic.   Eyes:      Conjunctiva/sclera: Conjunctivae normal.   Cardiovascular:       Rate and Rhythm: Normal rate and regular rhythm.      Heart sounds: No murmur heard.  Pulmonary:      Effort: Pulmonary effort is normal. No respiratory distress.      Breath sounds: Normal breath sounds.   Abdominal:      Palpations: Abdomen is soft.      Tenderness: There is no abdominal tenderness.   Musculoskeletal:         General: No swelling.      Cervical back: Neck supple.   Skin:     General: Skin is warm and dry.      Capillary Refill: Capillary refill takes less than 2 seconds.   Neurological:      Mental Status: She is alert.   Psychiatric:         Mood and Affect: Mood normal.           Lines/Drains:              Lab Results: I have reviewed the following results:   Results from last 7 days   Lab Units 05/03/25  0556 05/02/25  0534   WBC Thousand/uL 7.84 8.96   HEMOGLOBIN g/dL 11.5 11.0*   HEMATOCRIT % 36.3 35.2   PLATELETS Thousands/uL 219 189   SEGS PCT %  --  77*   LYMPHO PCT %  --  17   MONO PCT %  --  6   EOS PCT %  --  0     Results from last 7 days   Lab Units 05/03/25  0556 05/02/25  0534   SODIUM mmol/L 137 139   POTASSIUM mmol/L 4.7 4.4   CHLORIDE mmol/L 104 107   CO2 mmol/L 25 25   BUN mg/dL 24 20   CREATININE mg/dL 0.63 0.63   ANION GAP mmol/L 8 7   CALCIUM mg/dL 9.0 9.0   ALBUMIN g/dL  --  3.9   TOTAL BILIRUBIN mg/dL  --  0.28   ALK PHOS U/L  --  82   ALT U/L  --  7   AST U/L  --  10*   GLUCOSE RANDOM mg/dL 153* 109         Results from last 7 days   Lab Units 05/03/25  1553 05/03/25  1136 05/03/25  0756 05/02/25  2112 05/02/25  1536 05/02/25  1057 05/02/25  0759 05/01/25  2041 05/01/25  1628 05/01/25  1109   POC GLUCOSE mg/dl 156* 149* 157* 203* 173* 166* 120 176* 175* 248*         Results from last 7 days   Lab Units 05/02/25  0534 05/01/25  0517   PROCALCITONIN ng/ml <0.05 <0.05       Recent Cultures (last 7 days):         XR chest 2 views  Result Date: 5/1/2025  Impression: No acute cardiopulmonary disease. Workstation performed: [id number]       Last 24 Hours Medication List:      Current Facility-Administered Medications:     albuterol (PROVENTIL HFA,VENTOLIN HFA) inhaler 2 puff, Q4H PRN    calcium carbonate (TUMS) chewable tablet 1,000 mg, Daily PRN    cefuroxime (CEFTIN) tablet 500 mg, Q12H MANUEL    Cholecalciferol (VITAMIN D3) tablet 4,000 Units, Daily    guaiFENesin (MUCINEX) 12 hr tablet 600 mg, BID    heparin (porcine) subcutaneous injection 5,000 Units, Q8H MANUEL **AND** Platelet count, Once    insulin lispro (HumALOG/ADMELOG) 100 units/mL subcutaneous injection 1-6 Units, 4x Daily (AC & HS) **AND** Fingerstick Glucose (POCT), 4x Daily AC and at bedtime    ipratropium-albuterol (DUO-NEB) 0.5-2.5 mg/3 mL inhalation solution 3 mL, Q6H    methylPREDNISolone sodium succinate (Solu-MEDROL) injection 40 mg, Q12H AMNUEL    montelukast (SINGULAIR) tablet 10 mg, Daily    nicotine (NICODERM CQ) 21 mg/24 hr TD 24 hr patch 21 mg, Daily    ondansetron (ZOFRAN) injection 4 mg, Q6H PRN    pantoprazole (PROTONIX) EC tablet 40 mg, Daily Before Breakfast    polyethylene glycol (MIRALAX) packet 17 g, Daily PRN    pravastatin (PRAVACHOL) tablet 40 mg, Daily With Dinner    saccharomyces boulardii (FLORASTOR) capsule 250 mg, BID    Administrative Statements   Today, Patient Was Seen By: Divina Orozco MD  I have spent a total time of >35 minutes in caring for this patient on the day of the visit/encounter including Instructions for management.    **Please Note: This note may have been constructed using a voice recognition system.**

## 2025-05-03 NOTE — ASSESSMENT & PLAN NOTE
As per chart review and patient's history, patient has had Lyme disease 3 times in the past  - She tested positive few days ago  - Wanted to start her antibiotics while in hospitalization states that she has changed her mind initially refused but now is okay with restarting it.

## 2025-05-03 NOTE — ASSESSMENT & PLAN NOTE
Patient is a 79-year-old female with past medical history significant for limited vitamin D deficiency, tobacco use disorder, shortness of breath, Lyme disease, hyperlipidemia, COPD exacerbation, diabetes mellitus on metformin presented to the ED of [facility] with worsening shortness of breath since past 1 day.  As per patient she was doing some yard work and post doing that she felt very out of breath.  She decided to visit to the hospital because she was short of breath even while ambulating from 1 room to the other.  Patient denies any chest pain any palpitation lightheadedness or dizziness along with the symptoms, endorses that does have some cough but cannot relate if it is more than her baseline or less.  Patient is denying any other symptoms like abdominal pain nausea vomiting diarrhea constipation myalgia.    - Continue with IV Solu-Medrol every 12 hours  - Continue with IV azithromycin for 3 doses  - Continue with Xopenex along with Atrovent  - Continue prior to admission montelukast  - Pulmonology consulted appreciate recs  - Patient currently on oxygen therapy can wean off as per tolerability SpO2 goal 88 to 92%  -Checks x-ray  reviewed  -Might require ambulatory pulse ox prior to discharge.

## 2025-05-03 NOTE — PLAN OF CARE
Problem: PAIN - ADULT  Goal: Verbalizes/displays adequate comfort level or baseline comfort level  Description: Interventions:- Encourage patient to monitor pain and request assistance- Assess pain using appropriate pain scale- Administer analgesics based on type and severity of pain and evaluate response- Implement non-pharmacological measures as appropriate and evaluate response- Consider cultural and social influences on pain and pain management- Notify physician/advanced practitioner if interventions unsuccessful or patient reports new pain  Outcome: Progressing     Problem: SAFETY ADULT  Goal: Patient will remain free of falls  Description: INTERVENTIONS:- Educate patient/family on patient safety including physical limitations- Instruct patient to call for assistance with activity - Consult OT/PT to assist with strengthening/mobility - Keep Call bell within reach- Keep bed low and locked with side rails adjusted as appropriate- Keep care items and personal belongings within reach- Initiate and maintain comfort rounds- Make Fall Risk Sign visible to staff- Offer Toileting every 2 Hours, in advance of need- Initiate/Maintain alarm- Obtain necessary fall risk management equipment: - Apply yellow socks and bracelet for high fall risk patients- Consider moving patient to room near nurses station  Outcome: Progressing     Problem: DISCHARGE PLANNING  Goal: Discharge to home or other facility with appropriate resources  Description: INTERVENTIONS:- Identify barriers to discharge w/patient and caregiver- Arrange for needed discharge resources and transportation as appropriate- Identify discharge learning needs (meds, wound care, etc.)- Arrange for interpretive services to assist at discharge as needed- Refer to Case Management Department for coordinating discharge planning if the patient needs post-hospital services based on physician/advanced practitioner order or complex needs related to functional status,  cognitive ability, or social support system  Outcome: Progressing     Problem: RESPIRATORY - ADULT  Goal: Achieves optimal ventilation and oxygenation  Description: INTERVENTIONS:- Assess for changes in respiratory status- Assess for changes in mentation and behavior- Position to facilitate oxygenation and minimize respiratory effort- Oxygen administered by appropriate delivery if ordered- Initiate smoking cessation education as indicated- Encourage broncho-pulmonary hygiene including cough, deep breathe, Incentive Spirometry- Assess the need for suctioning and aspirate as needed- Assess and instruct to report SOB or any respiratory difficulty- Respiratory Therapy support as indicated  Outcome: Progressing

## 2025-05-03 NOTE — ASSESSMENT & PLAN NOTE
-11/2017-FEV1 69% moderate obstruction  -Inpatient::  Symbicort twice daily Xopenex/Atrovent 3 times daily  - Patient not feeling much improvement today, will maintain Solu-Medrol at the same dose  -Will switch Xopenex Atrovent to DuoNeb and increase to every 6 hours to see if this provides more relief    -Home regimen: Symbicort 160-4.5 mcg 1 puff twice daily, Spiriva Respimat 2.5 mcg 2 puff daily, Proventil 2 puffs every 6 as needed, albuterol nebulizer every 6 as needed  -Will need close pulmonary follow-up  -Day # 3/3-azithromycin

## 2025-05-03 NOTE — PROGRESS NOTES
Progress Note - Pulmonology   Name: Melissa Nguyen 79 y.o. female I MRN: [id number]  Unit/Bed#: E5 -01 I Date of Admission: 4/30/2025   Date of Service: 5/3/2025 I Hospital Day: 2    Assessment & Plan  COPD exacerbation (HCC)  -11/2017-FEV1 69% moderate obstruction  -Inpatient::  Symbicort twice daily Xopenex/Atrovent 3 times daily  - Patient not feeling much improvement today, will maintain Solu-Medrol at the same dose  -Will switch Xopenex Atrovent to DuoNeb and increase to every 6 hours to see if this provides more relief    -Home regimen: Symbicort 160-4.5 mcg 1 puff twice daily, Spiriva Respimat 2.5 mcg 2 puff daily, Proventil 2 puffs every 6 as needed, albuterol nebulizer every 6 as needed  -Will need close pulmonary follow-up  -Day # 3/3-azithromycin  Tobacco abuse  - Approximately 8 cigarettes/day  -Encourage and educated on tobacco cessation  -NRT per primary team    - Outpatient follow-up per discharge instructions    24 Hour Events : No events  Subjective : Patient resting in bed.    Objective :  Temp:  [97.9 °F (36.6 °C)-98.2 °F (36.8 °C)] 97.9 °F (36.6 °C)  HR:  [68-86] 86  BP: (121-122)/(57-71) 121/71  Resp:  [14-16] 16  SpO2:  [90 %-94 %] 90 %  O2 Device: Nasal cannula  Nasal Cannula O2 Flow Rate (L/min):  [1 L/min-2 L/min] 1 L/min    Physical Exam  Vitals reviewed.   Constitutional:       General: She is not in acute distress.     Appearance: Normal appearance. She is well-developed. She is not ill-appearing.   HENT:      Head: Normocephalic and atraumatic.      Mouth/Throat:      Pharynx: Oropharynx is clear.   Eyes:      Pupils: Pupils are equal, round, and reactive to light.   Cardiovascular:      Rate and Rhythm: Normal rate and regular rhythm.   Pulmonary:      Effort: Pulmonary effort is normal. No respiratory distress.      Breath sounds: Wheezing present. No decreased breath sounds, rhonchi or rales.      Comments: Prolonged expiratory phase  Abdominal:      General: Abdomen is flat.  There is no distension.   Musculoskeletal:         General: Normal range of motion.      Cervical back: Normal range of motion.      Right lower leg: No edema.      Left lower leg: No edema.   Skin:     General: Skin is warm and dry.      Findings: No rash.   Neurological:      Mental Status: She is alert and oriented to person, place, and time.   Psychiatric:         Mood and Affect: Mood normal.         Behavior: Behavior normal.           Lab Results: I have reviewed the following results:   .     05/03/25  0556   WBC 7.84   HGB 11.5   HCT 36.3      SODIUM 137   K 4.7      CO2 25   BUN 24   CREATININE 0.63   GLUC 153*   MG 2.2     ABG: No new results in last 24 hours.    Imaging Results Review: I reviewed radiology reports from this admission including: chest xray.  Other Study Results Review: No additional pertinent studies reviewed.  PFT Results Reviewed: reviewed

## 2025-05-03 NOTE — PLAN OF CARE
Problem: PAIN - ADULT  Goal: Verbalizes/displays adequate comfort level or baseline comfort level  Description: Interventions:- Encourage patient to monitor pain and request assistance- Assess pain using appropriate pain scale- Administer analgesics based on type and severity of pain and evaluate response- Implement non-pharmacological measures as appropriate and evaluate response- Consider cultural and social influences on pain and pain management- Notify physician/advanced practitioner if interventions unsuccessful or patient reports new pain  Outcome: Progressing     Problem: SAFETY ADULT  Goal: Patient will remain free of falls  Description: INTERVENTIONS:- Educate patient/family on patient safety including physical limitations- Instruct patient to call for assistance with activity - Consult OT/PT to assist with strengthening/mobility - Keep Call bell within reach- Keep bed low and locked with side rails adjusted as appropriate- Keep care items and personal belongings within reach- Initiate and maintain comfort rounds- Make Fall Risk Sign visible to staff- Apply yellow socks and bracelet for high fall risk patients- Consider moving patient to room near nurses station  Outcome: Progressing     Problem: DISCHARGE PLANNING  Goal: Discharge to home or other facility with appropriate resources  Description: INTERVENTIONS:- Identify barriers to discharge w/patient and caregiver- Arrange for needed discharge resources and transportation as appropriate- Identify discharge learning needs (meds, wound care, etc.)- Arrange for interpretive services to assist at discharge as needed- Refer to Case Management Department for coordinating discharge planning if the patient needs post-hospital services based on physician/advanced practitioner order or complex needs related to functional status, cognitive ability, or social support system  Outcome: Progressing     Problem: RESPIRATORY - ADULT  Goal: Achieves optimal ventilation  and oxygenation  Description: INTERVENTIONS:- Assess for changes in respiratory status- Assess for changes in mentation and behavior- Position to facilitate oxygenation and minimize respiratory effort- Oxygen administered by appropriate delivery if ordered- Initiate smoking cessation education as indicated- Encourage broncho-pulmonary hygiene including cough, deep breathe, Incentive Spirometry- Assess the need for suctioning and aspirate as needed- Assess and instruct to report SOB or any respiratory difficulty- Respiratory Therapy support as indicated  Outcome: Progressing

## 2025-05-04 PROBLEM — J96.01 ACUTE HYPOXEMIC RESPIRATORY FAILURE (HCC): Status: ACTIVE | Noted: 2025-05-04

## 2025-05-04 LAB
ANION GAP SERPL CALCULATED.3IONS-SCNC: 8 MMOL/L (ref 4–13)
BUN SERPL-MCNC: 24 MG/DL (ref 5–25)
CALCIUM SERPL-MCNC: 9 MG/DL (ref 8.4–10.2)
CHLORIDE SERPL-SCNC: 104 MMOL/L (ref 96–108)
CO2 SERPL-SCNC: 24 MMOL/L (ref 21–32)
CREAT SERPL-MCNC: 0.64 MG/DL (ref 0.6–1.3)
ERYTHROCYTE [DISTWIDTH] IN BLOOD BY AUTOMATED COUNT: 14.2 % (ref 11.6–15.1)
GFR SERPL CREATININE-BSD FRML MDRD: 85 ML/MIN/1.73SQ M
GLUCOSE SERPL-MCNC: 124 MG/DL (ref 65–140)
GLUCOSE SERPL-MCNC: 140 MG/DL (ref 65–140)
GLUCOSE SERPL-MCNC: 155 MG/DL (ref 65–140)
GLUCOSE SERPL-MCNC: 157 MG/DL (ref 65–140)
GLUCOSE SERPL-MCNC: 162 MG/DL (ref 65–140)
HCT VFR BLD AUTO: 37.6 % (ref 34.8–46.1)
HGB BLD-MCNC: 11.8 G/DL (ref 11.5–15.4)
MCH RBC QN AUTO: 28 PG (ref 26.8–34.3)
MCHC RBC AUTO-ENTMCNC: 31.4 G/DL (ref 31.4–37.4)
MCV RBC AUTO: 89 FL (ref 82–98)
PLATELET # BLD AUTO: 223 THOUSANDS/UL (ref 149–390)
PMV BLD AUTO: 10.6 FL (ref 8.9–12.7)
POTASSIUM SERPL-SCNC: 4.9 MMOL/L (ref 3.5–5.3)
RBC # BLD AUTO: 4.22 MILLION/UL (ref 3.81–5.12)
SODIUM SERPL-SCNC: 136 MMOL/L (ref 135–147)
WBC # BLD AUTO: 6.66 THOUSAND/UL (ref 4.31–10.16)

## 2025-05-04 PROCEDURE — 85027 COMPLETE CBC AUTOMATED: CPT

## 2025-05-04 PROCEDURE — 80048 BASIC METABOLIC PNL TOTAL CA: CPT

## 2025-05-04 PROCEDURE — 99232 SBSQ HOSP IP/OBS MODERATE 35: CPT

## 2025-05-04 PROCEDURE — 82948 REAGENT STRIP/BLOOD GLUCOSE: CPT

## 2025-05-04 PROCEDURE — 99232 SBSQ HOSP IP/OBS MODERATE 35: CPT | Performed by: INTERNAL MEDICINE

## 2025-05-04 PROCEDURE — 94760 N-INVAS EAR/PLS OXIMETRY 1: CPT

## 2025-05-04 PROCEDURE — 94640 AIRWAY INHALATION TREATMENT: CPT

## 2025-05-04 RX ORDER — BUDESONIDE AND FORMOTEROL FUMARATE DIHYDRATE 160; 4.5 UG/1; UG/1
2 AEROSOL RESPIRATORY (INHALATION) 2 TIMES DAILY
Status: DISCONTINUED | OUTPATIENT
Start: 2025-05-04 | End: 2025-05-06 | Stop reason: HOSPADM

## 2025-05-04 RX ORDER — PREDNISONE 20 MG/1
40 TABLET ORAL DAILY
Status: DISCONTINUED | OUTPATIENT
Start: 2025-05-05 | End: 2025-05-05

## 2025-05-04 RX ADMIN — CEFUROXIME AXETIL 500 MG: 250 TABLET, FILM COATED ORAL at 08:27

## 2025-05-04 RX ADMIN — Medication 4000 UNITS: at 08:27

## 2025-05-04 RX ADMIN — IPRATROPIUM BROMIDE AND ALBUTEROL SULFATE 3 ML: .5; 3 SOLUTION RESPIRATORY (INHALATION) at 19:18

## 2025-05-04 RX ADMIN — POLYETHYLENE GLYCOL 3350 17 G: 17 POWDER, FOR SOLUTION ORAL at 08:27

## 2025-05-04 RX ADMIN — CALCIUM CARBONATE (ANTACID) CHEW TAB 500 MG 1000 MG: 500 CHEW TAB at 08:41

## 2025-05-04 RX ADMIN — INSULIN LISPRO 1 UNITS: 100 INJECTION, SOLUTION INTRAVENOUS; SUBCUTANEOUS at 11:33

## 2025-05-04 RX ADMIN — NICOTINE 21 MG: 21 PATCH, EXTENDED RELEASE TRANSDERMAL at 08:27

## 2025-05-04 RX ADMIN — ONDANSETRON 4 MG: 2 INJECTION INTRAMUSCULAR; INTRAVENOUS at 05:39

## 2025-05-04 RX ADMIN — HEPARIN SODIUM 5000 UNITS: 5000 INJECTION INTRAVENOUS; SUBCUTANEOUS at 05:34

## 2025-05-04 RX ADMIN — PRAVASTATIN SODIUM 40 MG: 40 TABLET ORAL at 17:07

## 2025-05-04 RX ADMIN — CEFUROXIME AXETIL 500 MG: 250 TABLET, FILM COATED ORAL at 21:23

## 2025-05-04 RX ADMIN — Medication 250 MG: at 08:26

## 2025-05-04 RX ADMIN — IPRATROPIUM BROMIDE AND ALBUTEROL SULFATE 3 ML: .5; 3 SOLUTION RESPIRATORY (INHALATION) at 07:20

## 2025-05-04 RX ADMIN — GUAIFENESIN 600 MG: 600 TABLET ORAL at 17:07

## 2025-05-04 RX ADMIN — GUAIFENESIN 600 MG: 600 TABLET ORAL at 08:27

## 2025-05-04 RX ADMIN — BUDESONIDE AND FORMOTEROL FUMARATE DIHYDRATE 2 PUFF: 160; 4.5 AEROSOL RESPIRATORY (INHALATION) at 11:33

## 2025-05-04 RX ADMIN — INSULIN LISPRO 1 UNITS: 100 INJECTION, SOLUTION INTRAVENOUS; SUBCUTANEOUS at 17:04

## 2025-05-04 RX ADMIN — METHYLPREDNISOLONE SODIUM SUCCINATE 40 MG: 40 INJECTION, POWDER, FOR SOLUTION INTRAMUSCULAR; INTRAVENOUS at 08:27

## 2025-05-04 RX ADMIN — PANTOPRAZOLE SODIUM 40 MG: 40 TABLET, DELAYED RELEASE ORAL at 05:34

## 2025-05-04 RX ADMIN — HEPARIN SODIUM 5000 UNITS: 5000 INJECTION INTRAVENOUS; SUBCUTANEOUS at 14:40

## 2025-05-04 RX ADMIN — HEPARIN SODIUM 5000 UNITS: 5000 INJECTION INTRAVENOUS; SUBCUTANEOUS at 21:24

## 2025-05-04 RX ADMIN — Medication 250 MG: at 17:07

## 2025-05-04 RX ADMIN — BUDESONIDE AND FORMOTEROL FUMARATE DIHYDRATE 2 PUFF: 160; 4.5 AEROSOL RESPIRATORY (INHALATION) at 17:05

## 2025-05-04 RX ADMIN — IPRATROPIUM BROMIDE AND ALBUTEROL SULFATE 3 ML: .5; 3 SOLUTION RESPIRATORY (INHALATION) at 14:10

## 2025-05-04 RX ADMIN — MONTELUKAST 10 MG: 10 TABLET, FILM COATED ORAL at 08:27

## 2025-05-04 NOTE — PROGRESS NOTES
Progress Note - Pulmonology   Name: Melissa Nguyen 79 y.o. female I MRN: [id number]  Unit/Bed#: E5 -01 I Date of Admission: 4/30/2025   Date of Service: 5/4/2025 I Hospital Day: 3    Assessment & Plan  COPD exacerbation (HCC)  -11/2017-FEV1 69% moderate obstruction  Follows with PCP for this. Ongoing smoker.   -Home regimen: Symbicort 160-4.5 mcg 1 puff twice daily, Spiriva Respimat 2.5 mcg 2 puff daily, Proventil 2 puffs every 6 as needed, albuterol nebulizer every 6 as needed  -Will need close pulmonary follow-up as outpatient- can be arranged closer to discharge  -Completed azithromycin  Will stop IV solumedrol and start 40 mg prednisone with taper by 10 mg every 3 days  Wean oxygen for SPO2 >88%  Continue duonebs q6   On discharge resume symbicort/spiriva   Tobacco abuse  - Approximately 8 cigarettes/day  -Encourage and educated on tobacco cessation  -NRT  I counseled patient extensively for 12 minutes on this.  Acute hypoxemic respiratory failure (HCC)  Due to COPD exacerbation  Wean oxygen for SPO2 >88%  Oxygen eval before discharge    24 Hour Events : None  Subjective : Feels a little bit better. Still quite dyspneic with exertion.  Still with cough    Objective :  Temp:  [98 °F (36.7 °C)-98.8 °F (37.1 °C)] 98 °F (36.7 °C)  HR:  [64-74] 64  BP: (122-138)/(62-72) 136/72  Resp:  [16] 16  SpO2:  [92 %-95 %] 92 %  O2 Device: Nasal cannula  Nasal Cannula O2 Flow Rate (L/min):  [1 L/min-2 L/min] 2 L/min    Physical Exam  Vitals and nursing note reviewed.   Constitutional:       General: She is not in acute distress.     Appearance: She is well-developed.   HENT:      Head: Normocephalic and atraumatic.   Eyes:      Conjunctiva/sclera: Conjunctivae normal.   Cardiovascular:      Rate and Rhythm: Normal rate and regular rhythm.      Heart sounds: No murmur heard.  Pulmonary:      Effort: Pulmonary effort is normal. No respiratory distress.      Breath sounds: Wheezing present.   Abdominal:      Palpations:  Abdomen is soft.      Tenderness: There is no abdominal tenderness.   Musculoskeletal:         General: No swelling.      Cervical back: Neck supple.      Right lower leg: Edema (trace) present.      Left lower leg: Edema (trace) present.   Skin:     General: Skin is warm and dry.      Capillary Refill: Capillary refill takes less than 2 seconds.   Neurological:      Mental Status: She is alert.   Psychiatric:         Mood and Affect: Mood normal.           Lab Results: I have reviewed the following results:   .     05/04/25  0554   WBC 6.66   HGB 11.8   HCT 37.6      SODIUM 136   K 4.9      CO2 24   BUN 24   CREATININE 0.64   GLUC 155*     ABG: No new results in last 24 hours.    Imaging Results Review: I personally reviewed the following image studies in PACS and associated radiology reports: chest xray. My interpretation of the radiology images/reports is: Clear lungs.  Other Study Results Review: EKG was reviewed.   PFT Results Reviewed: none

## 2025-05-04 NOTE — PROGRESS NOTES
Progress Note - Hospitalist   Name: Melissa Nguyen 79 y.o. female I MRN: [id number]  Unit/Bed#: E5 -01 I Date of Admission: 4/30/2025   Date of Service: 5/4/2025 I Hospital Day: 3    Assessment & Plan  COPD exacerbation (HCC)  Patient is a 79-year-old female with past medical history significant for limited vitamin D deficiency, tobacco use disorder, shortness of breath, Lyme disease, hyperlipidemia, COPD exacerbation, diabetes mellitus on metformin presented to the ED of [facility] with worsening shortness of breath since past 1 day.  As per patient she was doing some yard work and post doing that she felt very out of breath.  She decided to visit to the hospital because she was short of breath even while ambulating from 1 room to the other.  Patient denies any chest pain any palpitation lightheadedness or dizziness along with the symptoms, endorses that does have some cough but cannot relate if it is more than her baseline or less.  Patient is denying any other symptoms like abdominal pain nausea vomiting diarrhea constipation myalgia.    -Transition from IV Solu-Medrol to prednisone  - Azithromycin doses completed  - Patient initiated on Symbicort  - Continue with DuoNebs  - Continue prior to admission montelukast  - Pulmonology consulted appreciate recs  - Patient currently on oxygen therapy can wean off as per tolerability SpO2 goal 88 to 92%  -Checks x-ray  reviewed  - Ambulatory pulse oximetry  Vitamin D deficiency  Continue prior to admission supplementation  Tobacco abuse  Counseled about smoking cessation  Nicotine patches ordered  Hyperlipidemia  Prior to admission statin  Controlled diabetes mellitus type II without complication (HCC)  Lab Results   Component Value Date    HGBA1C 5.9 04/23/2025       Recent Labs     05/03/25  1553 05/03/25  2041 05/04/25  0734 05/04/25  1116   POCGLU 156* 137 140 157*       Blood Sugar Average: Last 72 hrs:  (P) 165.9119151005554475  Patient last  hemoglobin A1c 5.9  Controlled with home medication of metformin  Continue with carbohydrate consistent diet  Insulin sliding scale ordered  Shortness of breath  As above under COPD exacerbation  Lyme disease  As per chart review and patient's history, patient has had Lyme disease 3 times in the past  - She tested positive few days ago  - Wanted to start her antibiotics while in hospitalization states that she has changed her mind initially refused but now is okay with restarting it.  Acute hypoxemic respiratory failure (HCC)  COPD exacerbation    VTE Pharmacologic Prophylaxis:    Heparin    Mobility:   Basic Mobility Inpatient Raw Score: 24  -HL Goal: 8: Walk 250 feet or more  JH-HLM Achieved: 8: Walk 250 feet ot more    Education and Discussions with Family / Patient: Patient declined call to .     Current Length of Stay: 3 day(s)  Current Patient Status: Inpatient     Discharge Plan: Anticipate discharge tomorrow to home.    Code Status: Level 3 - DNAR and DNI    Subjective   Patient seen examined at bedside.  Stated that breathing feels better but but she does not feel generalized weakness and feels like she cannot go home today.  Denies any chest pain palpitations but does endorse some chronic dizziness.  No abdominal pain nausea vomiting or    Objective :  Temp:  [98 °F (36.7 °C)-98.1 °F (36.7 °C)] 98.1 °F (36.7 °C)  HR:  [63-70] 70  BP: (122-161)/(62-77) 154/63  Resp:  [16] 16  SpO2:  [92 %-95 %] 92 %  O2 Device: Nasal cannula  Nasal Cannula O2 Flow Rate (L/min):  [1 L/min-2 L/min] 2 L/min    There is no height or weight on file to calculate BMI.     Input and Output Summary (last 24 hours):     Intake/Output Summary (Last 24 hours) at 5/4/2025 1551  Last data filed at 5/3/2025 1727  Gross per 24 hour   Intake 240 ml   Output --   Net 240 ml       Physical Exam  Vitals and nursing note reviewed.   Constitutional:       General: She is not in acute distress.     Appearance: She is  well-developed.   HENT:      Head: Normocephalic and atraumatic.   Eyes:      Conjunctiva/sclera: Conjunctivae normal.   Cardiovascular:      Rate and Rhythm: Normal rate and regular rhythm.      Heart sounds: No murmur heard.  Pulmonary:      Effort: Pulmonary effort is normal. No respiratory distress.      Breath sounds: Normal breath sounds.   Abdominal:      Palpations: Abdomen is soft.      Tenderness: There is no abdominal tenderness.   Musculoskeletal:         General: No swelling.      Cervical back: Neck supple.   Skin:     General: Skin is warm and dry.      Capillary Refill: Capillary refill takes less than 2 seconds.   Neurological:      Mental Status: She is alert and oriented to person, place, and time.   Psychiatric:         Mood and Affect: Mood normal.           Lines/Drains:              Lab Results: I have reviewed the following results:   Results from last 7 days   Lab Units 05/04/25  0554 05/03/25  0556 05/02/25  0534   WBC Thousand/uL 6.66   < > 8.96   HEMOGLOBIN g/dL 11.8   < > 11.0*   HEMATOCRIT % 37.6   < > 35.2   PLATELETS Thousands/uL 223   < > 189   SEGS PCT %  --   --  77*   LYMPHO PCT %  --   --  17   MONO PCT %  --   --  6   EOS PCT %  --   --  0    < > = values in this interval not displayed.     Results from last 7 days   Lab Units 05/04/25  0554 05/03/25  0556 05/02/25  0534   SODIUM mmol/L 136   < > 139   POTASSIUM mmol/L 4.9   < > 4.4   CHLORIDE mmol/L 104   < > 107   CO2 mmol/L 24   < > 25   BUN mg/dL 24   < > 20   CREATININE mg/dL 0.64   < > 0.63   ANION GAP mmol/L 8   < > 7   CALCIUM mg/dL 9.0   < > 9.0   ALBUMIN g/dL  --   --  3.9   TOTAL BILIRUBIN mg/dL  --   --  0.28   ALK PHOS U/L  --   --  82   ALT U/L  --   --  7   AST U/L  --   --  10*   GLUCOSE RANDOM mg/dL 155*   < > 109    < > = values in this interval not displayed.         Results from last 7 days   Lab Units 05/04/25  1116 05/04/25  0734 05/03/25  2041 05/03/25  1553 05/03/25  1136 05/03/25  0756 05/02/25  2758  05/02/25  1536 05/02/25  1057 05/02/25  0759 05/01/25  2041 05/01/25  1628   POC GLUCOSE mg/dl 157* 140 137 156* 149* 157* 203* 173* 166* 120 176* 175*         Results from last 7 days   Lab Units 05/02/25  0534 05/01/25  0517   PROCALCITONIN ng/ml <0.05 <0.05       Recent Cultures (last 7 days):         XR chest 2 views  Result Date: 5/1/2025  Impression: No acute cardiopulmonary disease. Workstation performed: [id number]         Last 24 Hours Medication List:     Current Facility-Administered Medications:     albuterol (PROVENTIL HFA,VENTOLIN HFA) inhaler 2 puff, Q4H PRN    budesonide-formoterol (SYMBICORT) 160-4.5 mcg/act inhaler 2 puff, BID    calcium carbonate (TUMS) chewable tablet 1,000 mg, Daily PRN    cefuroxime (CEFTIN) tablet 500 mg, Q12H MANUEL    Cholecalciferol (VITAMIN D3) tablet 4,000 Units, Daily    guaiFENesin (MUCINEX) 12 hr tablet 600 mg, BID    heparin (porcine) subcutaneous injection 5,000 Units, Q8H MANUEL **AND** Platelet count, Once    insulin lispro (HumALOG/ADMELOG) 100 units/mL subcutaneous injection 1-6 Units, 4x Daily (AC & HS) **AND** Fingerstick Glucose (POCT), 4x Daily AC and at bedtime    ipratropium-albuterol (DUO-NEB) 0.5-2.5 mg/3 mL inhalation solution 3 mL, Q6H    montelukast (SINGULAIR) tablet 10 mg, Daily    nicotine (NICODERM CQ) 21 mg/24 hr TD 24 hr patch 21 mg, Daily    ondansetron (ZOFRAN) injection 4 mg, Q6H PRN    pantoprazole (PROTONIX) EC tablet 40 mg, Daily Before Breakfast    polyethylene glycol (MIRALAX) packet 17 g, Daily PRN    pravastatin (PRAVACHOL) tablet 40 mg, Daily With Dinner    [START ON 5/5/2025] predniSONE tablet 40 mg, Daily    saccharomyces boulardii (FLORASTOR) capsule 250 mg, BID    Administrative Statements   Today, Patient Was Seen By: Divina Orozco MD  I have spent a total time of >35 minutes in caring for this patient on the day of the visit/encounter including Diagnostic results, Risks and benefits of tx options, Instructions for management,  Importance of tx compliance, Risk factor reductions, Documenting in the medical record, Reviewing/placing orders in the medical record (including tests, medications, and/or procedures), and  .    **Please Note: This note may have been constructed using a voice recognition system.**

## 2025-05-04 NOTE — ASSESSMENT & PLAN NOTE
Lab Results   Component Value Date    HGBA1C 5.9 04/23/2025       Recent Labs     05/03/25  1553 05/03/25  2041 05/04/25  0734 05/04/25  1116   POCGLU 156* 137 140 157*       Blood Sugar Average: Last 72 hrs:  (P) 165.9353871727231090  Patient last hemoglobin A1c 5.9  Controlled with home medication of metformin  Continue with carbohydrate consistent diet  Insulin sliding scale ordered

## 2025-05-04 NOTE — ASSESSMENT & PLAN NOTE
Patient is a 79-year-old female with past medical history significant for limited vitamin D deficiency, tobacco use disorder, shortness of breath, Lyme disease, hyperlipidemia, COPD exacerbation, diabetes mellitus on metformin presented to the ED of [facility] with worsening shortness of breath since past 1 day.  As per patient she was doing some yard work and post doing that she felt very out of breath.  She decided to visit to the hospital because she was short of breath even while ambulating from 1 room to the other.  Patient denies any chest pain any palpitation lightheadedness or dizziness along with the symptoms, endorses that does have some cough but cannot relate if it is more than her baseline or less.  Patient is denying any other symptoms like abdominal pain nausea vomiting diarrhea constipation myalgia.    -Transition from IV Solu-Medrol to prednisone  - Azithromycin doses completed  - Patient initiated on Symbicort  - Continue with DuoNebs  - Continue prior to admission montelukast  - Pulmonology consulted appreciate recs  - Patient currently on oxygen therapy can wean off as per tolerability SpO2 goal 88 to 92%  -Checks x-ray  reviewed  - Ambulatory pulse oximetry

## 2025-05-04 NOTE — ASSESSMENT & PLAN NOTE
- Approximately 8 cigarettes/day  -Encourage and educated on tobacco cessation  -NRT  I counseled patient extensively for 12 minutes on this.

## 2025-05-04 NOTE — ASSESSMENT & PLAN NOTE
-11/2017-FEV1 69% moderate obstruction  Follows with PCP for this. Ongoing smoker.   -Home regimen: Symbicort 160-4.5 mcg 1 puff twice daily, Spiriva Respimat 2.5 mcg 2 puff daily, Proventil 2 puffs every 6 as needed, albuterol nebulizer every 6 as needed  -Will need close pulmonary follow-up as outpatient- can be arranged closer to discharge  -Completed azithromycin  Will stop IV solumedrol and start 40 mg prednisone with taper by 10 mg every 3 days  Wean oxygen for SPO2 >88%  Continue duonebs q6   On discharge resume symbicort/spiriva

## 2025-05-05 LAB
ANION GAP SERPL CALCULATED.3IONS-SCNC: 6 MMOL/L (ref 4–13)
BUN SERPL-MCNC: 26 MG/DL (ref 5–25)
CALCIUM SERPL-MCNC: 8.7 MG/DL (ref 8.4–10.2)
CHLORIDE SERPL-SCNC: 105 MMOL/L (ref 96–108)
CO2 SERPL-SCNC: 29 MMOL/L (ref 21–32)
CREAT SERPL-MCNC: 0.7 MG/DL (ref 0.6–1.3)
ERYTHROCYTE [DISTWIDTH] IN BLOOD BY AUTOMATED COUNT: 13.8 % (ref 11.6–15.1)
GFR SERPL CREATININE-BSD FRML MDRD: 82 ML/MIN/1.73SQ M
GLUCOSE SERPL-MCNC: 102 MG/DL (ref 65–140)
GLUCOSE SERPL-MCNC: 109 MG/DL (ref 65–140)
GLUCOSE SERPL-MCNC: 134 MG/DL (ref 65–140)
GLUCOSE SERPL-MCNC: 189 MG/DL (ref 65–140)
GLUCOSE SERPL-MCNC: 190 MG/DL (ref 65–140)
HCT VFR BLD AUTO: 36.1 % (ref 34.8–46.1)
HGB BLD-MCNC: 11.2 G/DL (ref 11.5–15.4)
MCH RBC QN AUTO: 27.5 PG (ref 26.8–34.3)
MCHC RBC AUTO-ENTMCNC: 31 G/DL (ref 31.4–37.4)
MCV RBC AUTO: 89 FL (ref 82–98)
PLATELET # BLD AUTO: 211 THOUSANDS/UL (ref 149–390)
PMV BLD AUTO: 10.9 FL (ref 8.9–12.7)
POTASSIUM SERPL-SCNC: 4.3 MMOL/L (ref 3.5–5.3)
RBC # BLD AUTO: 4.08 MILLION/UL (ref 3.81–5.12)
SODIUM SERPL-SCNC: 140 MMOL/L (ref 135–147)
WBC # BLD AUTO: 6.79 THOUSAND/UL (ref 4.31–10.16)

## 2025-05-05 PROCEDURE — 99232 SBSQ HOSP IP/OBS MODERATE 35: CPT | Performed by: INTERNAL MEDICINE

## 2025-05-05 PROCEDURE — 82948 REAGENT STRIP/BLOOD GLUCOSE: CPT

## 2025-05-05 PROCEDURE — 94640 AIRWAY INHALATION TREATMENT: CPT

## 2025-05-05 PROCEDURE — 97166 OT EVAL MOD COMPLEX 45 MIN: CPT

## 2025-05-05 PROCEDURE — 94760 N-INVAS EAR/PLS OXIMETRY 1: CPT

## 2025-05-05 PROCEDURE — 97530 THERAPEUTIC ACTIVITIES: CPT

## 2025-05-05 PROCEDURE — 85027 COMPLETE CBC AUTOMATED: CPT

## 2025-05-05 PROCEDURE — 80048 BASIC METABOLIC PNL TOTAL CA: CPT

## 2025-05-05 RX ORDER — IPRATROPIUM BROMIDE AND ALBUTEROL SULFATE 2.5; .5 MG/3ML; MG/3ML
3 SOLUTION RESPIRATORY (INHALATION)
Status: DISCONTINUED | OUTPATIENT
Start: 2025-05-05 | End: 2025-05-06 | Stop reason: HOSPADM

## 2025-05-05 RX ORDER — PREDNISONE 10 MG/1
10 TABLET ORAL DAILY
Status: DISCONTINUED | OUTPATIENT
Start: 2025-05-14 | End: 2025-05-06 | Stop reason: HOSPADM

## 2025-05-05 RX ORDER — PREDNISONE 20 MG/1
40 TABLET ORAL DAILY
Status: DISCONTINUED | OUTPATIENT
Start: 2025-05-06 | End: 2025-05-06 | Stop reason: HOSPADM

## 2025-05-05 RX ORDER — PREDNISONE 20 MG/1
20 TABLET ORAL DAILY
Status: DISCONTINUED | OUTPATIENT
Start: 2025-05-11 | End: 2025-05-06 | Stop reason: HOSPADM

## 2025-05-05 RX ADMIN — POLYETHYLENE GLYCOL 3350 17 G: 17 POWDER, FOR SOLUTION ORAL at 10:12

## 2025-05-05 RX ADMIN — GUAIFENESIN 600 MG: 600 TABLET ORAL at 17:06

## 2025-05-05 RX ADMIN — HEPARIN SODIUM 5000 UNITS: 5000 INJECTION INTRAVENOUS; SUBCUTANEOUS at 05:39

## 2025-05-05 RX ADMIN — Medication 250 MG: at 17:06

## 2025-05-05 RX ADMIN — IPRATROPIUM BROMIDE AND ALBUTEROL SULFATE 3 ML: 2.5; .5 SOLUTION RESPIRATORY (INHALATION) at 08:08

## 2025-05-05 RX ADMIN — IPRATROPIUM BROMIDE AND ALBUTEROL SULFATE 3 ML: 2.5; .5 SOLUTION RESPIRATORY (INHALATION) at 19:18

## 2025-05-05 RX ADMIN — NICOTINE 21 MG: 21 PATCH, EXTENDED RELEASE TRANSDERMAL at 08:28

## 2025-05-05 RX ADMIN — CEFUROXIME AXETIL 500 MG: 250 TABLET, FILM COATED ORAL at 08:27

## 2025-05-05 RX ADMIN — Medication 4000 UNITS: at 08:27

## 2025-05-05 RX ADMIN — IPRATROPIUM BROMIDE AND ALBUTEROL SULFATE 3 ML: 2.5; .5 SOLUTION RESPIRATORY (INHALATION) at 14:02

## 2025-05-05 RX ADMIN — ONDANSETRON 4 MG: 2 INJECTION INTRAMUSCULAR; INTRAVENOUS at 05:39

## 2025-05-05 RX ADMIN — INSULIN LISPRO 2 UNITS: 100 INJECTION, SOLUTION INTRAVENOUS; SUBCUTANEOUS at 17:06

## 2025-05-05 RX ADMIN — PRAVASTATIN SODIUM 40 MG: 40 TABLET ORAL at 17:06

## 2025-05-05 RX ADMIN — BUDESONIDE AND FORMOTEROL FUMARATE DIHYDRATE 2 PUFF: 160; 4.5 AEROSOL RESPIRATORY (INHALATION) at 08:28

## 2025-05-05 RX ADMIN — HEPARIN SODIUM 5000 UNITS: 5000 INJECTION INTRAVENOUS; SUBCUTANEOUS at 17:05

## 2025-05-05 RX ADMIN — PANTOPRAZOLE SODIUM 40 MG: 40 TABLET, DELAYED RELEASE ORAL at 05:39

## 2025-05-05 RX ADMIN — CEFUROXIME AXETIL 500 MG: 250 TABLET, FILM COATED ORAL at 21:59

## 2025-05-05 RX ADMIN — INSULIN LISPRO 1 UNITS: 100 INJECTION, SOLUTION INTRAVENOUS; SUBCUTANEOUS at 21:59

## 2025-05-05 RX ADMIN — GUAIFENESIN 600 MG: 600 TABLET ORAL at 08:28

## 2025-05-05 RX ADMIN — PREDNISONE 40 MG: 20 TABLET ORAL at 08:27

## 2025-05-05 RX ADMIN — MONTELUKAST 10 MG: 10 TABLET, FILM COATED ORAL at 08:27

## 2025-05-05 RX ADMIN — BUDESONIDE AND FORMOTEROL FUMARATE DIHYDRATE 2 PUFF: 160; 4.5 AEROSOL RESPIRATORY (INHALATION) at 17:06

## 2025-05-05 RX ADMIN — Medication 250 MG: at 08:27

## 2025-05-05 RX ADMIN — HEPARIN SODIUM 5000 UNITS: 5000 INJECTION INTRAVENOUS; SUBCUTANEOUS at 21:59

## 2025-05-05 NOTE — CASE MANAGEMENT
Case Management Discharge Planning Note    Patient name Melissa Nguyen  Location [facility] /E5 -* MRN [id number]  : 1945 Date 2025       Current Admission Date: 2025  Current Admission Diagnosis:COPD exacerbation (HCC)   Patient Active Problem List    Diagnosis Date Noted Date Diagnosed    Acute hypoxemic respiratory failure (HCC) 2025     Lyme disease 2025     Aneurysm of cavernous portion of right internal carotid artery 2025     Shortness of breath 2025     Tinnitus, left ear 2025     Benign paroxysmal positional vertigo 2025     Visual disturbance of one eye 2025     Near syncope 2025     Elevated blood pressure reading 2025     Dependence on cane 2025     Tongue irritation 2024     Tooth fracture 2024     Pain of toe of left foot 2024     Small intestinal bacterial overgrowth 2024     Rheumatoid arthritis, involving unspecified site, unspecified whether rheumatoid factor present (HCC) 2024     Undifferentiated connective tissue disease (HCC) 10/02/2022     CRISTHIAN positive 2022     Chronic pain syndrome 10/08/2021     Lumbar facet arthropathy 10/08/2021     Sacroiliitis ([facility])      Chronic constipation 2020     Osteoarthritis 2020     Paget's bone disease 2020     Vitamin B12 deficiency 2019     Irritable bowel syndrome with both constipation and diarrhea 10/09/2018     Lumbosacral pain, chronic 2017     History of colonic polyps 08/10/2017     Coronary artery calcification 10/11/2016     Osteoporosis 2016     Holly's esophagus 10/26/2015     COPD exacerbation (HCC) 2015     Tobacco abuse 2015     Vitamin D deficiency 2014     Hyperlipidemia 2012     Allergic rhinitis 2012     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 2012     Controlled diabetes mellitus type II without complication (HCC) 2012       LOS (days):  4  Geometric Mean LOS (GMLOS) (days): 3.5  Days to GMLOS:-0.9     OBJECTIVE:  Risk of Unplanned Readmission Score: 18.15      Current admission status: Inpatient   Preferred Pharmacy:   [facility]/pharm[facility] #[id number] - [place] - [address]  [address]  [place] [address]  Phone: [phone] Fax: [phone]    Primary Care Provider: Zayra Jessica MD    Primary Insurance: [facility]  Secondary Insurance:     DISCHARGE DETAILS:    Discharge planning discussed with:: pt  Freedom of Choice: Yes     CM contacted family/caregiver?: No- see comments (pt is alert and oriented)  Were Treatment Team discharge recommendations reviewed with patient/caregiver?: Yes  Did patient/caregiver verbalize understanding of patient care needs?: Yes  Were patient/caregiver advised of the risks associated with not following Treatment Team discharge recommendations?: Yes    Requested Home Health Care         Is the patient interested in HHC at discharge?: Yes  Home Health Discipline requested:: Nursing, Occupational Therapy, Physical Therapy  Home Health Follow-Up Provider:: PCP  Home Health Services Needed:: Evaluate Functional Status and Safety, Strengthening/Theraputic Exercises to Improve Function, Gait/ADL Training, Oxygen Via Nasal Cannula  Oxygen LPM Ordered (if applicable based on home health services needed):: 2 LPM  Homebound Criteria Met:: Requires the Assistance of Another Person for Safe Ambulation or to Leave the Home, Uses an Assist Device (i.e. cane, walker, etc)  Supporting Clincal Findings:: Limited Endurance, Requires Oxygen, Fatigues Easliy in Short Distances, Dyspnea with Exertion    DME Referral Provided  Referral made for DME?:  (Waiting for home O2 study to be completed by RT)    Other Referral/Resources/Interventions Provided:  Interventions: HHC, DME    Treatment Team Recommendation: Home with Home Health Care  Discharge Destination Plan:: Home with Home Health Care     IMM Given (Date):: 05/05/25  IMM Given  to:: Patient  Family notified:: Reviewed IMM with pt.  Additional Comments: Cm spoke with pt and reminded her of IMM rights to appeal discharge if in disagreement at that time. Occupational therapy saw pt and recommends home health. Cm sent blanket referral on Aidin. Cm is waiting results of home O2 evaluation to place O2 order. Pt is interested in walker. Cm will place both orders at the same time.

## 2025-05-05 NOTE — ASSESSMENT & PLAN NOTE
Lab Results   Component Value Date    HGBA1C 5.9 04/23/2025       Recent Labs     05/04/25  2120 05/05/25  0721 05/05/25  1108 05/05/25  1622   POCGLU 124 109 134 190*       Blood Sugar Average: Last 72 hrs:  (P) 151.8  Patient last hemoglobin A1c 5.9  Controlled with home medication of metformin  Continue with carbohydrate consistent diet  Insulin sliding scale ordered

## 2025-05-05 NOTE — ASSESSMENT & PLAN NOTE
- Currently on 2 L 94%, does not wear home O2  - Continue sats greater than 88%  - Pulmonary toileting: Deep breathing cough out of bed as tolerated incentive spirometry  - Will need ambulatory pulse ox prior to discharge      - Outpatient follow-up per discharge instructions  - Pulmonary will sign off

## 2025-05-05 NOTE — PLAN OF CARE
Problem: PAIN - ADULT  Goal: Verbalizes/displays adequate comfort level or baseline comfort level  Description: Interventions:- Encourage patient to monitor pain and request assistance- Assess pain using appropriate pain scale- Administer analgesics based on type and severity of pain and evaluate response- Implement non-pharmacological measures as appropriate and evaluate response- Consider cultural and social influences on pain and pain management- Notify physician/advanced practitioner if interventions unsuccessful or patient reports new pain  Outcome: Progressing     Problem: SAFETY ADULT  Goal: Patient will remain free of falls  Description: INTERVENTIONS:- Educate patient/family on patient safety including physical limitations- Instruct patient to call for assistance with activity - Consult OT/PT to assist with strengthening/mobility - Keep Call bell within reach- Keep bed low and locked with side rails adjusted as appropriate- Keep care items and personal belongings within reach- Initiate and maintain comfort rounds- Make Fall Risk Sign visible to staff- Offer Toileting every 2 Hours, in advance of need- Initiate/Maintain alarm- Obtain necessary fall risk management equipment: - Apply yellow socks and bracelet for high fall risk patients- Consider moving patient to room near nurses station  Outcome: Progressing     Problem: DISCHARGE PLANNING  Goal: Discharge to home or other facility with appropriate resources  Description: INTERVENTIONS:- Identify barriers to discharge w/patient and caregiver- Arrange for needed discharge resources and transportation as appropriate- Identify discharge learning needs (meds, wound care, etc.)- Arrange for interpretive services to assist at discharge as needed- Refer to Case Management Department for coordinating discharge planning if the patient needs post-hospital services based on physician/advanced practitioner order or complex needs related to functional status,  cognitive ability, or social support system  Outcome: Progressing     Problem: RESPIRATORY - ADULT  Goal: Achieves optimal ventilation and oxygenation  Description: INTERVENTIONS:- Assess for changes in respiratory status- Assess for changes in mentation and behavior- Position to facilitate oxygenation and minimize respiratory effort- Oxygen administered by appropriate delivery if ordered- Initiate smoking cessation education as indicated- Encourage broncho-pulmonary hygiene including cough, deep breathe, Incentive Spirometry- Assess the need for suctioning and aspirate as needed- Assess and instruct to report SOB or any respiratory difficulty- Respiratory Therapy support as indicated  Outcome: Progressing     Problem: PAIN - ADULT  Goal: Verbalizes/displays adequate comfort level or baseline comfort level  Description: Interventions:- Encourage patient to monitor pain and request assistance- Assess pain using appropriate pain scale- Administer analgesics based on type and severity of pain and evaluate response- Implement non-pharmacological measures as appropriate and evaluate response- Consider cultural and social influences on pain and pain management- Notify physician/advanced practitioner if interventions unsuccessful or patient reports new pain  Outcome: Progressing     Problem: SAFETY ADULT  Goal: Patient will remain free of falls  Description: INTERVENTIONS:- Educate patient/family on patient safety including physical limitations- Instruct patient to call for assistance with activity - Consult OT/PT to assist with strengthening/mobility - Keep Call bell within reach- Keep bed low and locked with side rails adjusted as appropriate- Keep care items and personal belongings within reach- Initiate and maintain comfort rounds- Make Fall Risk Sign visible to staff- Offer Toileting every 2 Hours, in advance of need- Initiate/Maintain alarm- Obtain necessary fall risk management equipment: - Apply yellow socks and  bracelet for high fall risk patients- Consider moving patient to room near nurses station  Outcome: Progressing     Problem: DISCHARGE PLANNING  Goal: Discharge to home or other facility with appropriate resources  Description: INTERVENTIONS:- Identify barriers to discharge w/patient and caregiver- Arrange for needed discharge resources and transportation as appropriate- Identify discharge learning needs (meds, wound care, etc.)- Arrange for interpretive services to assist at discharge as needed- Refer to Case Management Department for coordinating discharge planning if the patient needs post-hospital services based on physician/advanced practitioner order or complex needs related to functional status, cognitive ability, or social support system  Outcome: Progressing     Problem: RESPIRATORY - ADULT  Goal: Achieves optimal ventilation and oxygenation  Description: INTERVENTIONS:- Assess for changes in respiratory status- Assess for changes in mentation and behavior- Position to facilitate oxygenation and minimize respiratory effort- Oxygen administered by appropriate delivery if ordered- Initiate smoking cessation education as indicated- Encourage broncho-pulmonary hygiene including cough, deep breathe, Incentive Spirometry- Assess the need for suctioning and aspirate as needed- Assess and instruct to report SOB or any respiratory difficulty- Respiratory Therapy support as indicated  Outcome: Progressing

## 2025-05-05 NOTE — ASSESSMENT & PLAN NOTE
Patient is a 79-year-old female with past medical history significant for limited vitamin D deficiency, tobacco use disorder, shortness of breath, Lyme disease, hyperlipidemia, COPD exacerbation, diabetes mellitus on metformin presented to the ED of [facility] with worsening shortness of breath since past 1 day.  As per patient she was doing some yard work and post doing that she felt very out of breath.  She decided to visit to the hospital because she was short of breath even while ambulating from 1 room to the other.  Patient denies any chest pain any palpitation lightheadedness or dizziness along with the symptoms, endorses that does have some cough but cannot relate if it is more than her baseline or less.  Patient is denying any other symptoms like abdominal pain nausea vomiting diarrhea constipation myalgia.    -Transition from IV Solu-Medrol to prednisone taper  - Azithromycin doses completed  - Patient initiated on Symbicort  - Continue with DuoNebs  - Continue prior to admission montelukast  - Pulmonology consulted appreciate recs  - Patient currently on oxygen therapy can wean off as per tolerability SpO2 goal 88 to 92%  -Checks x-ray  reviewed  - Ambulatory pulse oximetry    5/5-obtained: Pulmonology signed off, recommend prednisone taper 10 mg every 3 days; additionally will continue Symbicort and Spiriva on discharge.  To follow-up with pulmonology in the outpatient setting

## 2025-05-05 NOTE — OCCUPATIONAL THERAPY NOTE
Occupational Therapy Evaluation     Patient Name: Melissa Nguyen  Today's Date: 5/5/2025  Problem List  Principal Problem:    COPD exacerbation (HCC)  Active Problems:    Vitamin D deficiency    Tobacco abuse    Hyperlipidemia    Controlled diabetes mellitus type II without complication (HCC)    Shortness of breath    Lyme disease    Past Medical History  Past Medical History:   Diagnosis Date    Allergic     Anemia 1970s    Aneurysm (HCC)     Arthritis     Back pain     Carotid artery occlusion     Mild.    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  Symptoms are somewhat vague for this.    History of transfusion 1980    During hysterectomy & appendectomy.    Hypertension     Recent- being moderated.    Infectious viral hepatitis     A-1969, C- treated 2012/2013    Low back pain 1980s    Mainly in S/I joint area and hips.    Lyme disease     Osteoporosis     Polymyalgia rheumatica (HCC)     last assessed 2/15/17    Reactive airway disease     last assessed 6/9/16    Sciatica      Past Surgical History  Past Surgical History:   Procedure Laterality Date    APPENDECTOMY  2/8/1980    With hysterectomy, both due to infection from IUD..    BREAST BIOPSY Left     HYSTERECTOMY      1987, total    KNEE SURGERY  1986    ORTHOPEDIC SURGERY      TONSILLECTOMY  ? 1950's        05/05/25 1508   OT Last Visit   OT Visit Date 05/05/25   Note Type   Note type Evaluation  (+ treatment)   Pain Assessment   Pain Score No Pain   Restrictions/Precautions   Weight Bearing Precautions Per Order No   Other Precautions Multiple lines;O2;Fall Risk  (2L O2)  "  Home Living   Type of Home House  ([place])   Home Layout Performs ADLs on one level;Stairs to enter with rails;Stairs to enter without rails  (4 (without HR) + 8 CHRIS (with HR))   Bathroom Shower/Tub Tub/shower unit   Bathroom Toilet Standard   Home Equipment Cane   Prior Function   Level of [place] Independent with ADLs;Independent with functional mobility;Independent with IADLS   Lives With Alone   IADLs Independent with driving;Independent with meal prep;Independent with medication management   Falls in the last 6 months 0   Vocational Retired   Lifestyle   Autonomy PTA, was (I) with ADLs and was (I) with IADLs. Patient [place], alone, with 4 + 8 CHRIS. Performs ADLs on one level. Tub/shower and standard toilet. Cane use at baseline. (-) falls. (+) .   Reciprocal Relationships friends   Service to Others retired   Intrinsic Gratification gardening   General   Additional Pertinent History Comorbidities affecting pt’s functional performance include a significant PMH of: tobacco use, DM2, lyme disease, chronic lumbosacral pain, OA, RA, vertigo.  Patient with active OT orders and activity orders for Activity as tolerated.   Family/Caregiver Present No   Subjective   Subjective \"I'm not ready to go home\"   ADL   Where Assessed Edge of bed   Eating Assistance 7  Independent   Grooming Assistance 6  Modified Independent   UB Bathing Assistance 6  Modified Independent   LB Bathing Assistance 5  Supervision/Setup   UB Dressing Assistance 6  Modified independent   LB Dressing Assistance 5  Supervision/Setup   Toileting Assistance  5  Supervision/Setup   Bed Mobility   Additional Comments Sitting EOB at begining of session, remains seated EOB at end of session.   Transfers   Sit to Stand 6  Modified independent   Additional items Bedrails   Stand to Sit 6  Modified independent   Additional items Bedrails   Functional Mobility   Additional Comments With the SPC, pt requiring SBA, 20 ft x 30 ft " x 20 ft with standing rest breaks due to fatigue. One instance of LOB to R requiring Campos to correct. Did trial Rollator-refer to tx note.   Balance   Static Sitting Normal   Dynamic Sitting Normal   Static Standing Fair   Dynamic Standing Fair -   Ambulatory Fair -  (occasional Poor+ w cane)   Activity Tolerance   Activity Tolerance Patient limited by fatigue;Other (Comment)  (anxiety; O2 >91% on 2 L t/o entire session.)   Medical Staff Made Aware PT, CM   Nurse Made Aware Yes   RUE Assessment   RUE Assessment WFL   LUE Assessment   LUE Assessment WFL   Hand Function   Gross Motor Coordination Functional   Fine Motor Coordination Functional   Sensation   Light Touch Partial deficits in the RLE;Partial deficits in the LLE   Proprioception   Proprioception No apparent deficits   Vision - Complex Assessment   Ocular Range of Motion Intact   Psychosocial   Patient Behaviors/Mood Cooperative;Pleasant;Anxious   Perception   Inattention/Neglect Appears intact   Cognition   Overall Cognitive Status WFL   Arousal/Participation Alert;Responsive;Cooperative   Attention Within functional limits   Orientation Level Oriented X4   Memory Within functional limits   Following Commands Follows all commands and directions without difficulty   Assessment   Limitation Decreased ADL status;Decreased endurance;Decreased self-care trans;Decreased high-level ADLs;Decreased UE strength;Decreased Safe judgement during ADL   Prognosis Good   Assessment Patient is a 79 y.o. year old female seen for OT eval s/p admit to [facility] on 4/30/2025 with COPD exacerbation, acute hypoxemic respiratory failure.  OT consulted to assess ADLs/IADLs/functional mobility and assist w/ D/C planning. Patient demonstrates the following deficits impacting occupational performance: decreased strength , decreased balance, decreased activity tolerance, decreased safety awareness, dizziness, SOB, impaired coordination, and decreased cardiovascular endurance. These  impairments, as well at pt’s CHRIS home environment, limited home support, difficulty performing ADLs, difficulty performing IADLs, difficulty performing transfers/mobility, limited insight into deficits, compliance, fall risk , functional decline , new O2 requirements, and new use of AD for functional transfers/mobility, limit pt’s ability to safely engage in all baseline areas of occupation. Pt's CLOF as follows: eating/grooming: Leslee, UB ADLs: Leslee, LB ADLs: supervision , toileting: supervision , bed mobility: DNT, functional transfers: Leslee, functional mobility: S-Campos during evaluation, sitting/standing tolerance: ~5 min. Pt would benefit from continued skilled OT while in acute setting to address deficits as defined above and to maximize (I) w/ ADLs/functional mobility. Occupational performance areas to address include: grooming, bathing/shower, toilet hygiene, dressing, medication management, health maintenance, functional mobility, community mobility, clothing management, and cleaning. Based on the aforementioned evaluation, functional performance deficits, and assessments, pt has been identified as a moderate complexity evaluation. At this time, recommendation for pt to receive post-acute rehabilitation services at a Level III (minimum resource intensity) due to above deficits and CLOF. OT will continue to follow pt 2-3x/wk to address the goals listed below to  w/in 10-14 days.   Goals   Patient Goals to stay another day, to walk more   LTG Time Frame 10-14   Plan   Treatment Interventions ADL retraining;Functional transfer training;UE strengthening/ROM;Endurance training;Cognitive reorientation;Patient/family training;Equipment evaluation/education;Neuromuscular reeducation;Compensatory technique education;Continued evaluation;Energy conservation;Activityengagement   Goal Expiration Date 25   OT Treatment Day 0   OT Frequency 2-3x/wk   Discharge Recommendation   Rehab Resource Intensity Level, OT  III (Minimum Resource Intensity)   Equipment Recommended Shower/Tub chair with back ($);Other (comment)  (Rollator)   AM-PAC Daily Activity Inpatient   Lower Body Dressing 3   Bathing 3   Toileting 3   Upper Body Dressing 4   Grooming 4   Eating 4   Daily Activity Raw Score 21   Daily Activity Standardized Score (Calc for Raw Score >=11) 44.27   AM-PAC Applied Cognition Inpatient   Following a Speech/Presentation 4   Understanding Ordinary Conversation 4   Taking Medications 4   Remembering Where Things Are Placed or Put Away 4   Remembering List of 4-5 Errands 4   Taking Care of Complicated Tasks 4   Applied Cognition Raw Score 24   Applied Cognition Standardized Score 62.21   Additional Treatment Session   Start Time 1458   End Time 1508   Treatment Assessment Pt seen following I.E. for follow-up treatment session for energy conservation and compensatory strategy education, to trial different AD to falicitate improved moblity and to educ on proper transfer techniques. Pt progressed from S/Campos with SPC to distant S for rollator use for 60 ft x 60 ft with 1 standing rest break with rollator. Educ pt on locking brakes prior to sitting for seated rest break. Improved distances / balance noted with rollator. Pt able to ascend / descend 5 steps with HR and SPC use with Nadia however SOB after (O2 92%) thus required seated rest break. Pt does endorse anxiety related to being discharged today - did encourage pt to tell her provder this information. Educ pt on seated rest breaks t/o functional tasks when feeling SOB, shower chair for shower when performing bathing for energy conservation, and making sure shower temperature isn't too hate as the steam may increase difficulty breathing; she was amendable to all education that was provided. Would rec RW vs Rollator at time of d/c (notified CM for rollator request vs educ pt on alternative purchasing options for DME). Pt left seated EOB, needs met and call bell within reach.  Given limited distances the pt is able to tolerate and multiple steps to enter home, would recommend home health at time of discharge. Will continue to follow pt for OT services.   Additional Treatment Day 1     Occupational Therapy goals: In 7-14 days:     1- Patient will verbalize and demonstrate use of energy conservation/deep breathing technique and work simplification skills during functional activity with no verbal cues.   2- Patient will verbalize and demonstrate good body mechanics and joint protection techniques during ADLs/IADLs with no verbal cues   3- Pt will complete bed mobility at a Mod I level w/ G balance/safety demonstrated to decrease caregiver assistance required   4- Patient will increase OOB/ sitting tolerance to 2-4 hours per day for increased participation in self care and leisure tasks with no s/s of exertion.   5-Patient will increase standing tolerance time to 5 minutes with unilateral UE support to complete sink level ADLs@ mod I level    6- Pt will improve functional transfers to Mod I on/off all surfaces using DME as needed w/ G balance/safety   7- Patient will complete UB ADLs with Nadia utilizing appropriate DME/AE PRN   8- Patient will complete LB ADLs with Nadia utilizing appropriate DME/AE PRN   9- Patient will complete toileting tasks with Nadia with G hygiene/thoroughness utilizing appropriate DME/AE PRN   10- Pt will improve functional mobility during ADL/IADL/leisure tasks to Mod I using DME as needed w/ G balance/safety    11- Pt will be attentive 100% of the time during ongoing cognitive assessment w/ G participation to assist w/ safe d/c planning/recommendations   12- Pt will participate in simulated IADL management task to increase independence to Mod I w/ G safety and endurance   13- Pt will increase BUE strength by 1MM grade via AROM/AAROM/PROM exercises to increase independence in ADLs and transfers       Deepti Coelho OTR/L

## 2025-05-05 NOTE — ASSESSMENT & PLAN NOTE
-11/2017-FEV1 69% moderate obstruction  -Inpatient: Day # 1/3-prednisone 40 mg decrease by 10 mg every 3 days  -Home regimen: Symbicort 160-4.5 mcg 1 puff twice daily, Spiriva Respimat 2.5 mcg 2 puff daily, Proventil 2 puffs every 6 as needed, albuterol nebulizer every 6 as needed  -Will need close pulmonary follow-up as outpatient- can be arranged closer to discharge  -Completed azithromycin

## 2025-05-05 NOTE — PLAN OF CARE
Problem: PAIN - ADULT  Goal: Verbalizes/displays adequate comfort level or baseline comfort level  Description: Interventions:- Encourage patient to monitor pain and request assistance- Assess pain using appropriate pain scale- Administer analgesics based on type and severity of pain and evaluate response- Implement non-pharmacological measures as appropriate and evaluate response- Consider cultural and social influences on pain and pain management- Notify physician/advanced practitioner if interventions unsuccessful or patient reports new pain  Outcome: Progressing     Problem: SAFETY ADULT  Goal: Patient will remain free of falls  Description: INTERVENTIONS:- Educate patient/family on patient safety including physical limitations- Instruct patient to call for assistance with activity - Consult OT/PT to assist with strengthening/mobility - Keep Call bell within reach- Keep bed low and locked with side rails adjusted as appropriate- Keep care items and personal belongings within reach- Initiate and maintain comfort rounds- Make Fall Risk Sign visible to staff- Offer Toileting every  Hours, in advance of need- Initiate/Maintain alarm- Obtain necessary fall risk management equipment- Apply yellow socks and bracelet for high fall risk patients- Consider moving patient to room near nurses station  Outcome: Progressing     Problem: DISCHARGE PLANNING  Goal: Discharge to home or other facility with appropriate resources  Description: INTERVENTIONS:- Identify barriers to discharge w/patient and caregiver- Arrange for needed discharge resources and transportation as appropriate- Identify discharge learning needs (meds, wound care, etc.)- Arrange for interpretive services to assist at discharge as needed- Refer to Case Management Department for coordinating discharge planning if the patient needs post-hospital services based on physician/advanced practitioner order or complex needs related to functional status, cognitive  ability, or social support system  Outcome: Progressing     Problem: RESPIRATORY - ADULT  Goal: Achieves optimal ventilation and oxygenation  Description: INTERVENTIONS:- Assess for changes in respiratory status- Assess for changes in mentation and behavior- Position to facilitate oxygenation and minimize respiratory effort- Oxygen administered by appropriate delivery if ordered- Initiate smoking cessation education as indicated- Encourage broncho-pulmonary hygiene including cough, deep breathe, Incentive Spirometry- Assess the need for suctioning and aspirate as needed- Assess and instruct to report SOB or any respiratory difficulty- Respiratory Therapy support as indicated  Outcome: Progressing

## 2025-05-05 NOTE — PLAN OF CARE
Problem: OCCUPATIONAL THERAPY ADULT  Goal: Performs self-care activities at highest level of function for planned discharge setting.  See evaluation for individualized goals.  Description: Treatment Interventions: ADL retraining, Functional transfer training, UE strengthening/ROM, Endurance training, Cognitive reorientation, Patient/family training, Equipment evaluation/education, Neuromuscular reeducation, Compensatory technique education, Continued evaluation, Energy conservation, Activityengagement  Equipment Recommended: Shower/Tub chair with back ($), Other (comment) (Rollator)       See flowsheet documentation for full assessment, interventions and recommendations.   Note: Limitation: Decreased ADL status, Decreased endurance, Decreased self-care trans, Decreased high-level ADLs, Decreased UE strength, Decreased Safe judgement during ADL  Prognosis: Good  Assessment: Patient is a 79 y.o. year old female seen for OT eval s/p admit to [facility] on 4/30/2025 with COPD exacerbation, acute hypoxemic respiratory failure.  OT consulted to assess ADLs/IADLs/functional mobility and assist w/ D/C planning. Patient demonstrates the following deficits impacting occupational performance: decreased strength , decreased balance, decreased activity tolerance, decreased safety awareness, dizziness, SOB, impaired coordination, and decreased cardiovascular endurance. These impairments, as well at pt’s CHRIS home environment, limited home support, difficulty performing ADLs, difficulty performing IADLs, difficulty performing transfers/mobility, limited insight into deficits, compliance, fall risk , functional decline , new O2 requirements, and new use of AD for functional transfers/mobility, limit pt’s ability to safely engage in all baseline areas of occupation. Pt's CLOF as follows: eating/grooming: Leslee, UB ADLs: Leslee, LB ADLs: supervision , toileting: supervision , bed mobility: DNT, functional transfers: Leslee, functional mobility:  S-Campos during evaluation, sitting/standing tolerance: ~5 min. Pt would benefit from continued skilled OT while in acute setting to address deficits as defined above and to maximize (I) w/ ADLs/functional mobility. Occupational performance areas to address include: grooming, bathing/shower, toilet hygiene, dressing, medication management, health maintenance, functional mobility, community mobility, clothing management, and cleaning. Based on the aforementioned evaluation, functional performance deficits, and assessments, pt has been identified as a moderate complexity evaluation. At this time, recommendation for pt to receive post-acute rehabilitation services at a Level III (minimum resource intensity) due to above deficits and CLOF. OT will continue to follow pt 2-3x/wk to address the goals listed below to  w/in 10-14 days.     Rehab Resource Intensity Level, OT: III (Minimum Resource Intensity)

## 2025-05-05 NOTE — PROGRESS NOTES
"Progress Note - Pulmonology   Name: Melissa Nguyen 79 y.o. female I MRN: [id number]  Unit/Bed#: E5 -01 I Date of Admission: 4/30/2025   Date of Service: 5/5/2025 I Hospital Day: 4     Assessment & Plan  COPD exacerbation (HCC)  -11/2017-FEV1 69% moderate obstruction  -Inpatient: Day # 1/3-prednisone 40 mg decrease by 10 mg every 3 days  -Home regimen: Symbicort 160-4.5 mcg 1 puff twice daily, Spiriva Respimat 2.5 mcg 2 puff daily, Proventil 2 puffs every 6 as needed, albuterol nebulizer every 6 as needed  -Will need close pulmonary follow-up as outpatient- can be arranged closer to discharge  -Completed azithromycin    Tobacco abuse  - Approximately 8 cigarettes/day  -Encourage and educated on tobacco cessation  -NRT  .  Acute hypoxemic respiratory failure (HCC)  - Currently on 2 L 94%, does not wear home O2  - Continue sats greater than 88%  - Pulmonary toileting: Deep breathing cough out of bed as tolerated incentive spirometry  - Will need ambulatory pulse ox prior to discharge      - Outpatient follow-up per discharge instructions  - Pulmonary will sign off    24 Hour Events : na  Subjective : \"I am feeling better\"    Objective :  Temp:  [97.7 °F (36.5 °C)-98.1 °F (36.7 °C)] 97.9 °F (36.6 °C)  HR:  [61-70] 70  BP: (103-161)/(55-82) 146/82  Resp:  [16-18] 16  SpO2:  [92 %-94 %] 94 %  O2 Device: Nasal cannula  Nasal Cannula O2 Flow Rate (L/min):  [2 L/min] 2 L/min    Physical Exam  Constitutional:       General: She is not in acute distress.     Appearance: Normal appearance. She is normal weight. She is not ill-appearing.   HENT:      Head: Normocephalic and atraumatic.      Nose: Nose normal. No congestion or rhinorrhea.      Mouth/Throat:      Mouth: Mucous membranes are dry.      Pharynx: Oropharynx is clear. No oropharyngeal exudate or posterior oropharyngeal erythema.   Cardiovascular:      Rate and Rhythm: Normal rate and regular rhythm.      Pulses: Normal pulses.      Heart sounds: No murmur " heard.     No friction rub. No gallop.   Pulmonary:      Effort: Pulmonary effort is normal. No respiratory distress.      Breath sounds: No stridor. No wheezing, rhonchi or rales.      Comments: Diminished aeration throughout lung fields  Chest:      Chest wall: No tenderness.   Abdominal:      General: Abdomen is flat. Bowel sounds are normal.      Palpations: Abdomen is soft.   Musculoskeletal:         General: No swelling or tenderness. Normal range of motion.      Cervical back: Normal range of motion. No rigidity or tenderness.   Skin:     General: Skin is warm and dry.      Coloration: Skin is not jaundiced or pale.   Neurological:      General: No focal deficit present.      Mental Status: She is alert and oriented to person, place, and time. Mental status is at baseline.   Psychiatric:         Mood and Affect: Mood normal.         Behavior: Behavior normal.           Lab Results: I have reviewed the following results:   .     05/05/25  0523   WBC 6.79   HGB 11.2*   HCT 36.1      SODIUM 140   K 4.3      CO2 29   BUN 26*   CREATININE 0.70   GLUC 102     ABG: No new results in last 24 hours.    Imaging Results Review: I personally reviewed the following image studies/reports in PACS and discussed pertinent findings with Radiology: chest xray. My interpretation of the radiology images/reports is:  .  Other Study Results Review: EKG was reviewed.   PFT Results Reviewed: reviewed

## 2025-05-06 ENCOUNTER — TRANSITIONAL CARE MANAGEMENT (OUTPATIENT)
Dept: FAMILY MEDICINE CLINIC | Facility: CLINIC | Age: 80
End: 2025-05-06

## 2025-05-06 ENCOUNTER — HOME HEALTH ADMISSION (OUTPATIENT)
Dept: HOME HEALTH SERVICES | Facility: HOME HEALTHCARE | Age: 80
End: 2025-05-06
Payer: COMMERCIAL

## 2025-05-06 VITALS
RESPIRATION RATE: 18 BRPM | SYSTOLIC BLOOD PRESSURE: 87 MMHG | HEART RATE: 83 BPM | BODY MASS INDEX: 31.72 KG/M2 | DIASTOLIC BLOOD PRESSURE: 63 MMHG | TEMPERATURE: 98.1 F | HEIGHT: 61 IN | OXYGEN SATURATION: 94 % | WEIGHT: 167.99 LBS

## 2025-05-06 LAB
DME PARACHUTE DELIVERY DATE ACTUAL: NORMAL
DME PARACHUTE DELIVERY DATE EXPECTED: NORMAL
DME PARACHUTE DELIVERY DATE EXPECTED: NORMAL
DME PARACHUTE DELIVERY DATE REQUESTED: NORMAL
DME PARACHUTE DELIVERY DATE REQUESTED: NORMAL
DME PARACHUTE DELIVERY NOTE: NORMAL
DME PARACHUTE ITEM DESCRIPTION: NORMAL
DME PARACHUTE ORDER STATUS: NORMAL
DME PARACHUTE ORDER STATUS: NORMAL
DME PARACHUTE SUPPLIER NAME: NORMAL
DME PARACHUTE SUPPLIER NAME: NORMAL
DME PARACHUTE SUPPLIER PHONE: NORMAL
DME PARACHUTE SUPPLIER PHONE: NORMAL
GLUCOSE SERPL-MCNC: 111 MG/DL (ref 65–140)
GLUCOSE SERPL-MCNC: 135 MG/DL (ref 65–140)

## 2025-05-06 PROCEDURE — 82948 REAGENT STRIP/BLOOD GLUCOSE: CPT

## 2025-05-06 PROCEDURE — 94760 N-INVAS EAR/PLS OXIMETRY 1: CPT

## 2025-05-06 PROCEDURE — 99239 HOSP IP/OBS DSCHRG MGMT >30: CPT | Performed by: INTERNAL MEDICINE

## 2025-05-06 PROCEDURE — 94640 AIRWAY INHALATION TREATMENT: CPT

## 2025-05-06 PROCEDURE — 94761 N-INVAS EAR/PLS OXIMETRY MLT: CPT

## 2025-05-06 PROCEDURE — 97530 THERAPEUTIC ACTIVITIES: CPT

## 2025-05-06 PROCEDURE — 97163 PT EVAL HIGH COMPLEX 45 MIN: CPT

## 2025-05-06 RX ORDER — TIOTROPIUM BROMIDE INHALATION SPRAY 3.12 UG/1
2 SPRAY, METERED RESPIRATORY (INHALATION) DAILY
Qty: 48 G | Refills: 0 | Status: SHIPPED | OUTPATIENT
Start: 2025-05-06

## 2025-05-06 RX ORDER — PREDNISONE 10 MG/1
TABLET ORAL
Qty: 22 TABLET | Refills: 0 | Status: SHIPPED | OUTPATIENT
Start: 2025-05-07 | End: 2025-05-16

## 2025-05-06 RX ORDER — BUDESONIDE AND FORMOTEROL FUMARATE DIHYDRATE 160; 4.5 UG/1; UG/1
2 AEROSOL RESPIRATORY (INHALATION) 2 TIMES DAILY
Qty: 30.6 G | Refills: 0 | Status: SHIPPED | OUTPATIENT
Start: 2025-05-06

## 2025-05-06 RX ADMIN — NICOTINE 21 MG: 21 PATCH, EXTENDED RELEASE TRANSDERMAL at 08:26

## 2025-05-06 RX ADMIN — Medication 250 MG: at 08:26

## 2025-05-06 RX ADMIN — Medication 4000 UNITS: at 08:26

## 2025-05-06 RX ADMIN — MONTELUKAST 10 MG: 10 TABLET, FILM COATED ORAL at 08:26

## 2025-05-06 RX ADMIN — CEFUROXIME AXETIL 500 MG: 250 TABLET, FILM COATED ORAL at 08:26

## 2025-05-06 RX ADMIN — GUAIFENESIN 600 MG: 600 TABLET ORAL at 08:26

## 2025-05-06 RX ADMIN — BUDESONIDE AND FORMOTEROL FUMARATE DIHYDRATE 2 PUFF: 160; 4.5 AEROSOL RESPIRATORY (INHALATION) at 08:26

## 2025-05-06 RX ADMIN — PREDNISONE 40 MG: 20 TABLET ORAL at 08:26

## 2025-05-06 RX ADMIN — HEPARIN SODIUM 5000 UNITS: 5000 INJECTION INTRAVENOUS; SUBCUTANEOUS at 04:53

## 2025-05-06 RX ADMIN — IPRATROPIUM BROMIDE AND ALBUTEROL SULFATE 3 ML: 2.5; .5 SOLUTION RESPIRATORY (INHALATION) at 07:07

## 2025-05-06 RX ADMIN — PANTOPRAZOLE SODIUM 40 MG: 40 TABLET, DELAYED RELEASE ORAL at 04:53

## 2025-05-06 RX ADMIN — IPRATROPIUM BROMIDE AND ALBUTEROL SULFATE 3 ML: 2.5; .5 SOLUTION RESPIRATORY (INHALATION) at 13:28

## 2025-05-06 NOTE — PLAN OF CARE
Problem: PAIN - ADULT  Goal: Verbalizes/displays adequate comfort level or baseline comfort level  Description: Interventions:- Encourage patient to monitor pain and request assistance- Assess pain using appropriate pain scale- Administer analgesics based on type and severity of pain and evaluate response- Implement non-pharmacological measures as appropriate and evaluate response- Consider cultural and social influences on pain and pain management- Notify physician/advanced practitioner if interventions unsuccessful or patient reports new pain  Outcome: Adequate for Discharge     Problem: SAFETY ADULT  Goal: Patient will remain free of falls  Description: INTERVENTIONS:- Educate patient/family on patient safety including physical limitations- Instruct patient to call for assistance with activity - Consult OT/PT to assist with strengthening/mobility - Keep Call bell within reach- Keep bed low and locked with side rails adjusted as appropriate- Keep care items and personal belongings within reach- Initiate and maintain comfort rounds- Make Fall Risk Sign visible to staff- Offer Toileting every Hours, in advance of need- Initiate/Maintainn any alarm- Obtain necessary fall risk management equipment:- Apply yellow socks and bracelet for high fall risk patients- Consider moving patient to room near nurses station  Outcome: Adequate for Discharge     Problem: DISCHARGE PLANNING  Goal: Discharge to home or other facility with appropriate resources  Description: INTERVENTIONS:- Identify barriers to discharge w/patient and caregiver- Arrange for needed discharge resources and transportation as appropriate- Identify discharge learning needs (meds, wound care, etc.)- Arrange for interpretive services to assist at discharge as needed- Refer to Case Management Department for coordinating discharge planning if the patient needs post-hospital services based on physician/advanced practitioner order or complex needs related to  functional status, cognitive ability, or social support system  Outcome: Adequate for Discharge     Problem: RESPIRATORY - ADULT  Goal: Achieves optimal ventilation and oxygenation  Description: INTERVENTIONS:- Assess for changes in respiratory status- Assess for changes in mentation and behavior- Position to facilitate oxygenation and minimize respiratory effort- Oxygen administered by appropriate delivery if ordered- Initiate smoking cessation education as indicated- Encourage broncho-pulmonary hygiene including cough, deep breathe, Incentive Spirometry- Assess the need for suctioning and aspirate as needed- Assess and instruct to report SOB or any respiratory difficulty- Respiratory Therapy support as indicated  Outcome: Adequate for Discharge

## 2025-05-06 NOTE — PROGRESS NOTES
Progress Note - Hospitalist   Name: Melissa Nguyen 79 y.o. female I MRN: [id number]  Unit/Bed#: E5 -01 I Date of Admission: 4/30/2025   Date of Service: 5/5/2025 I Hospital Day: 4    Assessment & Plan  COPD exacerbation (HCC)  Patient is a 79-year-old female with past medical history significant for limited vitamin D deficiency, tobacco use disorder, shortness of breath, Lyme disease, hyperlipidemia, COPD exacerbation, diabetes mellitus on metformin presented to the ED of [facility] with worsening shortness of breath since past 1 day.  As per patient she was doing some yard work and post doing that she felt very out of breath.  She decided to visit to the hospital because she was short of breath even while ambulating from 1 room to the other.  Patient denies any chest pain any palpitation lightheadedness or dizziness along with the symptoms, endorses that does have some cough but cannot relate if it is more than her baseline or less.  Patient is denying any other symptoms like abdominal pain nausea vomiting diarrhea constipation myalgia.    -Transition from IV Solu-Medrol to prednisone taper  - Azithromycin doses completed  - Patient initiated on Symbicort  - Continue with DuoNebs  - Continue prior to admission montelukast  - Pulmonology consulted appreciate recs  - Patient currently on oxygen therapy can wean off as per tolerability SpO2 goal 88 to 92%  -Checks x-ray  reviewed  - Ambulatory pulse oximetry    5/5-obtained: Pulmonology signed off, recommend prednisone taper 10 mg every 3 days; additionally will continue Symbicort and Spiriva on discharge.  To follow-up with pulmonology in the outpatient setting  Vitamin D deficiency  Continue prior to admission supplementation  Tobacco abuse  Counseled about smoking cessation  Nicotine patches ordered  Hyperlipidemia  Prior to admission statin  Controlled diabetes mellitus type II without complication (HCC)  Lab Results   Component Value Date     HGBA1C 5.9 04/23/2025       Recent Labs     05/04/25  2120 05/05/25  0721 05/05/25  1108 05/05/25  1622   POCGLU 124 109 134 190*       Blood Sugar Average: Last 72 hrs:  (P) 151.8  Patient last hemoglobin A1c 5.9  Controlled with home medication of metformin  Continue with carbohydrate consistent diet  Insulin sliding scale ordered  Shortness of breath  As above under COPD exacerbation  Lyme disease  As per chart review and patient's history, patient has had Lyme disease 3 times in the past  - She tested positive few days ago  - Wanted to start her antibiotics while in hospitalization states that she has changed her mind initially refused but now is okay with restarting it.    VTE Pharmacologic Prophylaxis:   Moderate Risk (Score 3-4) - Pharmacological DVT Prophylaxis Ordered: heparin.    Mobility:   Basic Mobility Inpatient Raw Score: 24  JH-HLM Goal: 8: Walk 250 feet or more  JH-HLM Achieved: 8: Walk 250 feet ot more  JH-HLM Goal achieved. Continue to encourage appropriate mobility.    Patient Centered Rounds: I performed bedside rounds with nursing staff today.   Discussions with Specialists or Other Care Team Provider:     Education and Discussions with Family / Patient: Care plan discussed with patient who voiced understanding and agrees with recommendations.      Current Length of Stay: 4 day(s)  Current Patient Status: Inpatient   Certification Statement: The patient will continue to require additional inpatient hospital stay due to treatment of COPD exacerbation  Discharge Plan: Anticipate discharge in 48-72 hrs to discharge location to be determined pending rehab evaluations.    Code Status: Level 3 - DNAR and DNI    Subjective   Patient seen and examined at bedside, still reports cough and generalized malaise but states she is somewhat better than yesterday.  Labs and vital stable, will request PT OT evaluate patient.  Pulm signed off and will do prednisone taper on discharge with Symbicort, Spiriva, and  cefuroxime for Lyme.  Likely stable for discharge in 24 to 48 hours.    Objective :  Temp:  [97.7 °F (36.5 °C)-98.2 °F (36.8 °C)] 98.2 °F (36.8 °C)  HR:  [61-76] 76  BP: (103-146)/(55-82) 126/74  Resp:  [16-18] 16  SpO2:  [93 %-95 %] 95 %  O2 Device: Nasal cannula  Nasal Cannula O2 Flow Rate (L/min):  [2 L/min] 2 L/min    There is no height or weight on file to calculate BMI.     Input and Output Summary (last 24 hours):     Intake/Output Summary (Last 24 hours) at 5/5/2025 2058  Last data filed at 5/5/2025 1739  Gross per 24 hour   Intake 660 ml   Output --   Net 660 ml       Physical Exam  Vitals and nursing note reviewed.   Constitutional:       General: She is not in acute distress.     Appearance: She is well-developed.   HENT:      Head: Normocephalic and atraumatic.   Eyes:      Conjunctiva/sclera: Conjunctivae normal.   Cardiovascular:      Rate and Rhythm: Normal rate.   Pulmonary:      Effort: Pulmonary effort is normal. No respiratory distress.   Abdominal:      Palpations: Abdomen is soft.      Tenderness: There is no abdominal tenderness.   Musculoskeletal:         General: No swelling.      Cervical back: Neck supple.   Skin:     General: Skin is warm and dry.   Neurological:      Mental Status: She is alert.   Psychiatric:         Mood and Affect: Mood normal.           Lines/Drains:              Lab Results: I have reviewed the following results:   Results from last 7 days   Lab Units 05/05/25 0523 05/03/25  0556 05/02/25  0534   WBC Thousand/uL 6.79   < > 8.96   HEMOGLOBIN g/dL 11.2*   < > 11.0*   HEMATOCRIT % 36.1   < > 35.2   PLATELETS Thousands/uL 211   < > 189   SEGS PCT %  --   --  77*   LYMPHO PCT %  --   --  17   MONO PCT %  --   --  6   EOS PCT %  --   --  0    < > = values in this interval not displayed.     Results from last 7 days   Lab Units 05/05/25 0523 05/03/25  0556 05/02/25  0534   SODIUM mmol/L 140   < > 139   POTASSIUM mmol/L 4.3   < > 4.4   CHLORIDE mmol/L 105   < > 107   CO2  mmol/L 29   < > 25   BUN mg/dL 26*   < > 20   CREATININE mg/dL 0.70   < > 0.63   ANION GAP mmol/L 6   < > 7   CALCIUM mg/dL 8.7   < > 9.0   ALBUMIN g/dL  --   --  3.9   TOTAL BILIRUBIN mg/dL  --   --  0.28   ALK PHOS U/L  --   --  82   ALT U/L  --   --  7   AST U/L  --   --  10*   GLUCOSE RANDOM mg/dL 102   < > 109    < > = values in this interval not displayed.         Results from last 7 days   Lab Units 05/05/25  1622 05/05/25  1108 05/05/25  0721 05/04/25  2120 05/04/25  1612 05/04/25  1116 05/04/25  0734 05/03/25  2041 05/03/25  1553 05/03/25  1136 05/03/25  0756 05/02/25  2112   POC GLUCOSE mg/dl 190* 134 109 124 162* 157* 140 137 156* 149* 157* 203*         Results from last 7 days   Lab Units 05/02/25  0534 05/01/25  0517   PROCALCITONIN ng/ml <0.05 <0.05       Recent Cultures (last 7 days):               Last 24 Hours Medication List:     Current Facility-Administered Medications:     albuterol (PROVENTIL HFA,VENTOLIN HFA) inhaler 2 puff, Q4H PRN    budesonide-formoterol (SYMBICORT) 160-4.5 mcg/act inhaler 2 puff, BID    calcium carbonate (TUMS) chewable tablet 1,000 mg, Daily PRN    cefuroxime (CEFTIN) tablet 500 mg, Q12H MANUEL    Cholecalciferol (VITAMIN D3) tablet 4,000 Units, Daily    guaiFENesin (MUCINEX) 12 hr tablet 600 mg, BID    heparin (porcine) subcutaneous injection 5,000 Units, Q8H [facility] **AND** Platelet count, Once    insulin lispro (HumALOG/ADMELOG) 100 units/mL subcutaneous injection 1-6 Units, 4x Daily (AC & HS) **AND** Fingerstick Glucose (POCT), 4x Daily AC and at bedtime    ipratropium-albuterol (DUO-NEB) 0.5-2.5 mg/3 mL inhalation solution 3 mL, TID    montelukast (SINGULAIR) tablet 10 mg, Daily    nicotine (NICODERM CQ) 21 mg/24 hr TD 24 hr patch 21 mg, Daily    ondansetron (ZOFRAN) injection 4 mg, Q6H PRN    pantoprazole (PROTONIX) EC tablet 40 mg, Daily Before Breakfast    polyethylene glycol (MIRALAX) packet 17 g, Daily PRN    pravastatin (PRAVACHOL) tablet 40 mg, Daily With Dinner     [START ON 5/6/2025] predniSONE tablet 40 mg, Daily **FOLLOWED BY** [START ON 5/8/2025] predniSONE tablet 30 mg, Daily **FOLLOWED BY** [START ON 5/11/2025] predniSONE tablet 20 mg, Daily **FOLLOWED BY** [START ON 5/14/2025] predniSONE tablet 10 mg, Daily    saccharomyces boulardii (FLORASTOR) capsule 250 mg, BID    Administrative Statements   Today, Patient Was Seen By: Shimon Haas MD      **Please Note: This note may have been constructed using a voice recognition system.**

## 2025-05-06 NOTE — RESPIRATORY THERAPY NOTE
Home Oxygen Qualifying Test     Patient name: eMlissa Nguyen        : 1945   Date of Test:  May 6, 2025  Diagnosis:    Home Oxygen Test:    **Medicare Guidelines require item(s) 1-5 on all ambulatory patients or 1 and 2 on non-ambulatory patients.    1. Baseline SPO2 on Room Air at rest 87%   If <= 88% on Room Air add O2 via NC to obtain SpO2 >=88%. If LPM needed, document LPM 2 needed to reach =>88%    SPO2 during exertion on Room Air N/A  %  During exertion monitor SPO2. If SPO2 increases >=89%, do not add supplemental oxygen    SPO2 on Oxygen at Rest 92 % at 2 LPM    SPO2 during exertion on Oxygen 92 % at 3 LPM    Test performed during exertion activity. Home oxygen test     [x]  Supplemental Home Oxygen is indicated.    []  Client does not qualify for home oxygen.    Respiratory Additional Notes- Patient ambulated with assistance of cane for approx 6 minutes.  Patient required 2 Lpm during rest and 3 Lpm during ambulation    Rachel Toth, RT

## 2025-05-06 NOTE — NURSING NOTE
Discharge home with family, O2 tank hooked up. Patient verbalized understanding of discharge instructions including prednisone teaching and follow-up appointments.

## 2025-05-06 NOTE — ASSESSMENT & PLAN NOTE
Lab Results   Component Value Date    HGBA1C 5.9 04/23/2025       Recent Labs     05/05/25  1622 05/05/25  2113 05/06/25  0748 05/06/25  1117   POCGLU 190* 189* 111 135       Blood Sugar Average: Last 72 hrs:  (P) 146.5440329294952280  Patient last hemoglobin A1c 5.9  Controlled with home medication of metformin  Continue with carbohydrate consistent diet  Insulin sliding scale ordered

## 2025-05-06 NOTE — PROGRESS NOTES
Patient:    MRN:  [id number]    Aidin Request ID:  [id number]    Level of care reserved:  Home Health Agency    Partner Reserved:  [facility], [place] [address] [phone]    Clinical needs requested:    Geography searched:  [phone]    Start of Service:    Request sent:  3:07pm EDT on 5/5/2025 by Jane Barnett    Partner reserved:  10:57am EDT on 5/6/2025 by Allison Durán    Choice list shared:  9:15am EDT on 5/6/2025 by Allison Durán

## 2025-05-06 NOTE — CASE MANAGEMENT
Case Management Discharge Planning Note    Patient name Melissa Nguyen  Location [facility] /E5 -* MRN [id number]  : 1945 Date 2025       Current Admission Date: 2025  Current Admission Diagnosis:COPD exacerbation (HCC)   Patient Active Problem List    Diagnosis Date Noted Date Diagnosed    Acute hypoxemic respiratory failure (HCC) 2025     Lyme disease 2025     Aneurysm of cavernous portion of right internal carotid artery 2025     Shortness of breath 2025     Tinnitus, left ear 2025     Benign paroxysmal positional vertigo 2025     Visual disturbance of one eye 2025     Near syncope 2025     Elevated blood pressure reading 2025     Dependence on cane 2025     Tongue irritation 2024     Tooth fracture 2024     Pain of toe of left foot 2024     Small intestinal bacterial overgrowth 2024     Rheumatoid arthritis, involving unspecified site, unspecified whether rheumatoid factor present (HCC) 2024     Undifferentiated connective tissue disease (HCC) 10/02/2022     CRISTHIAN positive 2022     Chronic pain syndrome 10/08/2021     Lumbar facet arthropathy 10/08/2021     Sacroiliitis ([facility])      Chronic constipation 2020     Osteoarthritis 2020     Paget's bone disease 2020     Vitamin B12 deficiency 2019     Irritable bowel syndrome with both constipation and diarrhea 10/09/2018     Lumbosacral pain, chronic 2017     History of colonic polyps 08/10/2017     Coronary artery calcification 10/11/2016     Osteoporosis 2016     Holly's esophagus 10/26/2015     COPD exacerbation (HCC) 2015     Tobacco abuse 2015     Vitamin D deficiency 2014     Hyperlipidemia 2012     Allergic rhinitis 2012     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 2012     Controlled diabetes mellitus type II without complication (HCC) 2012       LOS (days):  5  Geometric Mean LOS (GMLOS) (days): 3.5  Days to GMLOS:-1.9     OBJECTIVE:  Risk of Unplanned Readmission Score: 18.98         Current admission status: Inpatient   Preferred Pharmacy:   [facility]/pharmacy #[id number] - [place] - [address]  [address]  [place] [address]  Phone: [phone] Fax: [phone]    Primary Care Provider: Zayra Jessica MD    Primary Insurance: [facility]  Secondary Insurance:     DISCHARGE DETAILS:                                                          Transport at Discharge : Family                                      Additional Comments: Patient cleared for d/c, home with home health, reviewed HHC list, patient selected [facility].  Home O2 set-up ordered from [employer], along with rollator, portable to be delivered to the hospital room prior to discharge.  Patient has friends able to transport home.

## 2025-05-06 NOTE — DISCHARGE SUMMARY
Discharge Summary - Hospitalist   Name: Melissa Nguyen 79 y.o. female I MRN: [id number]  Unit/Bed#: E5 -01 I Date of Admission: 4/30/2025   Date of Service: 5/6/2025 I Hospital Day: 5     Assessment & Plan  COPD exacerbation (HCC)  Patient is a 79-year-old female with past medical history significant for limited vitamin D deficiency, tobacco use disorder, shortness of breath, Lyme disease, hyperlipidemia, COPD exacerbation, diabetes mellitus on metformin presented to the ED of [facility] with worsening shortness of breath since past 1 day.  As per patient she was doing some yard work and post doing that she felt very out of breath.  She decided to visit to the hospital because she was short of breath even while ambulating from 1 room to the other.  Patient denies any chest pain any palpitation lightheadedness or dizziness along with the symptoms, endorses that does have some cough but cannot relate if it is more than her baseline or less.  Patient is denying any other symptoms like abdominal pain nausea vomiting diarrhea constipation myalgia.    -Transition from IV Solu-Medrol to prednisone taper  - Azithromycin completed  - Patient initiated on Symbicort, continue on discharge  - Continue with DuoNebs while inpatient  - Continue prior to admission montelukast  - Pulmonology consulted appreciate recs  - Patient currently on oxygen therapy can wean off as per tolerability SpO2 goal 88 to 92%  -Checks x-ray  reviewed  - Ambulatory pulse oximetry    5/5-obtained: Pulmonology signed off, recommend prednisone taper 10 mg every 3 days; additionally will continue Symbicort and Spiriva on discharge.  To follow-up with pulmonology in the outpatient setting  Vitamin D deficiency  Continue prior to admission supplementation  Tobacco abuse  Counseled about smoking cessation  Nicotine patches ordered  Hyperlipidemia  Prior to admission statin  Controlled diabetes mellitus type II without complication (HCC)  Lab  "Results   Component Value Date    HGBA1C 5.9 04/23/2025       Recent Labs     05/05/25  1622 05/05/25  2113 05/06/25  0748 05/06/25  1117   POCGLU 190* 189* 111 135       Blood Sugar Average: Last 72 hrs:  (P) 146.6441459545606074  Patient last hemoglobin A1c 5.9  Controlled with home medication of metformin  Continue with carbohydrate consistent diet  Insulin sliding scale ordered  Shortness of breath  As above under COPD exacerbation  Lyme disease  As per chart review and patient's history, patient has had Lyme disease 3 times in the past  - She tested positive few days ago  - Wanted to start her antibiotics while in hospitalization states that she has changed her mind initially refused but now is okay with restarting it.     Medical Problems       Resolved Problems  Date Reviewed: 1/24/2025   None       Discharging Physician / Practitioner: Shimon Haas MD  PCP: Zayra Jessica MD  Admission Date:   Admission Orders (From admission, onward)       Ordered        05/01/25 0444  INPATIENT ADMISSION  Once                          Discharge Date: 05/06/25    Consultations During Hospital Stay:  Pulmonology    Procedures Performed:   Chest x-ray    Significant Findings / Test Results:   COPD exacerbation    Incidental Findings:       Test Results Pending at Discharge (will require follow up):   None     Outpatient Tests Requested:  CBC/CMP    Complications: None    Reason for Admission: Shortness of breath    Hospital Course:   As per HPI:    \"Melissa Nguyen is a 79 y.o. female with a past medical history significant for limited vitamin D deficiency, tobacco use disorder, shortness of breath, Lyme disease, hyperlipidemia, COPD exacerbation, diabetes mellitus on metformin presented to the ED of [facility] with worsening shortness of breath since past 1 day.  As per patient she was doing some yard work and post doing that she felt very out of breath.  She decided to visit to the hospital because she was short of " "breath even while ambulating from 1 room to the other.  Patient denies any chest pain any palpitation lightheadedness or dizziness along with the symptoms, endorses that does have some cough but cannot relate if it is more than her baseline or less.  Patient is denying any other symptoms like abdominal pain nausea vomiting diarrhea constipation myalgia.\"        Please see COPD exacerbation above for more details on hospitalization care plan.    Condition at Discharge: stable    Discharge Day Visit / Exam:   * Please refer to separate progress note for these details *    Discussion with Family: Care plan discussed with patient who voiced understanding and agrees with recommendations.      Discharge instructions/Information to patient and family:   See after visit summary for information provided to patient and family.      Provisions for Follow-Up Care:  See after visit summary for information related to follow-up care and any pertinent home health orders.      Mobility at time of Discharge:   Basic Mobility Inpatient Raw Score: 22  JH-HLM Goal: 7: Walk 25 feet or more  JH-HLM Achieved: 7: Walk 25 feet or more  HLM Goal achieved. Continue to encourage appropriate mobility.     Disposition:   Home with [facility]NA Services (Reminder: Complete face to face encounter)    Planned Readmission: None    Discharge Medications:  See after visit summary for reconciled discharge medications provided to patient and/or family.      Administrative Statements   Discharge Statement:  I have spent a total time of 45 minutes in caring for this patient on the day of the visit/encounter. .    **Please Note: This note may have been constructed using a voice recognition system**  "

## 2025-05-06 NOTE — ASSESSMENT & PLAN NOTE
Patient is a 79-year-old female with past medical history significant for limited vitamin D deficiency, tobacco use disorder, shortness of breath, Lyme disease, hyperlipidemia, COPD exacerbation, diabetes mellitus on metformin presented to the ED of [facility] with worsening shortness of breath since past 1 day.  As per patient she was doing some yard work and post doing that she felt very out of breath.  She decided to visit to the hospital because she was short of breath even while ambulating from 1 room to the other.  Patient denies any chest pain any palpitation lightheadedness or dizziness along with the symptoms, endorses that does have some cough but cannot relate if it is more than her baseline or less.  Patient is denying any other symptoms like abdominal pain nausea vomiting diarrhea constipation myalgia.    -Transition from IV Solu-Medrol to prednisone taper  - Azithromycin completed  - Patient initiated on Symbicort, continue on discharge  - Continue with DuoNebs while inpatient  - Continue prior to admission montelukast  - Pulmonology consulted appreciate recs  - Patient currently on oxygen therapy can wean off as per tolerability SpO2 goal 88 to 92%  -Checks x-ray  reviewed  - Ambulatory pulse oximetry    5/5-obtained: Pulmonology signed off, recommend prednisone taper 10 mg every 3 days; additionally will continue Symbicort and Spiriva on discharge.  To follow-up with pulmonology in the outpatient setting

## 2025-05-06 NOTE — PHYSICAL THERAPY NOTE
PT EVALUATION    Pt. Name: Melissa Nguyen  Pt. Age: 79 y.o.  MRN: [id number]  LENGTH OF STAY: 5      Admitting Diagnoses:   SOB (shortness of breath) [R06.02]  COPD exacerbation (HCC) [J44.1]    Past Medical History:   Diagnosis Date    Allergic     Anemia 1970s    Aneurysm (HCC)     Arthritis     Back pain     Carotid artery occlusion     Mild.    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  Symptoms are somewhat vague for this.    History of transfusion 1980    During hysterectomy & appendectomy.    Hypertension     Recent- being moderated.    Infectious viral hepatitis     A-1969, C- treated 2012/2013    Low back pain 1980s    Mainly in S/I joint area and hips.    Lyme disease     Osteoporosis     Polymyalgia rheumatica (HCC)     last assessed 2/15/17    Reactive airway disease     last assessed 6/9/16    Sciatica        Past Surgical History:   Procedure Laterality Date    APPENDECTOMY  2/8/1980    With hysterectomy, both due to infection from IUD..    BREAST BIOPSY Left     HYSTERECTOMY      1987, total    KNEE SURGERY  1986    ORTHOPEDIC SURGERY      TONSILLECTOMY  ? 1950's       Imaging Studies:  XR chest 2 views   ED Interpretation by Dilcia Krishnan PA-C (05/01 0725)   No evidence of infiltrate, pneumothorax, or acute cardiopulmonary disease as interpreted by me      Final Result by Willian Vaca MD (05/01 0825)      No acute cardiopulmonary disease.            Workstation performed: [id number]               05/06/25 0930   PT Last Visit   PT Visit Date 05/06/25   Note Type   Note type Evaluation   Pain  Assessment   Pain Score No Pain   Restrictions/Precautions   Weight Bearing Precautions Per Order No   Other Precautions O2  (2L O2 NC)   Home Living   Type of Home House   Home Layout One level;Stairs to enter with rails;Other (Comment);Stairs to enter without rails  (4STE w/o HR + 8STE w/ HR)   Bathroom Shower/Tub Tub/shower unit   Bathroom Toilet Standard   Home Equipment Cane;Walker   Additional Comments pt not on home O2 baseline   Prior Function   Level of [place] Independent with functional mobility;Independent with ADLs;Independent with IADLS  (ambulates w/o AD)   Lives With Alone   Receives Help From Friend(s)   Falls in the last 6 months 0   Vocational Retired   Comments (+) ;   General   Family/Caregiver Present No   Cognition   Overall Cognitive Status WFL   Arousal/Participation Alert   Attention Within functional limits   Orientation Level Oriented X4   Following Commands Follows all commands and directions without difficulty   Comments pleasant & cooperative   Subjective   Subjective Pt agreeable to PT/OT evals.   RUE Assessment   RUE Assessment WFL   LUE Assessment   LUE Assessment WFL   RLE Assessment   RLE Assessment WFL   LLE Assessment   LLE Assessment WFL   Coordination   Movements are Fluid and Coordinated 1   Sensation WFL   Bed Mobility   Supine to Sit Unable to assess   Sit to Supine Unable to assess   Additional Comments pt seated EOB pre & post-session   Transfers   Sit to Stand 6  Modified independent   Additional items Bedrails   Stand to Sit 6  Modified independent   Additional items Bedrails   Toilet transfer 6  Modified independent   Additional items Verbal cues;Standard toilet   Ambulation/Elevation   Gait pattern Wide SHELBY;Decreased foot clearance;Excessively slow   Gait Assistance   (independent short distances w/o AD; S for longer distances w/ SPC vs RW)   Additional items Verbal cues   Assistive Device Straight cane;Rolling walker;None   Distance 15'x2 w/o AD; 80'x1 w/  SPC; 80' x1 w/ RW   Stair Management Assistance 5  Supervision   Additional items Verbal cues   Stair Management Technique One rail R;Alternating pattern;Foreward   Number of Stairs 5  ( stairs)   Ambulation/Elevation Additional Comments no gross LOB noted; improved amb tolerance w/ AD; SpO2 during amb 88-96% RA   Balance   Static Sitting Normal   Dynamic Sitting Normal   Static Standing Good   Dynamic Standing Fair +   Ambulatory Fair +   Endurance Deficit   Endurance Deficit Yes   Endurance Deficit Description SOB   Activity Tolerance   Activity Tolerance Treatment limited secondary to medical complications (Comment)   Medical Staff Made Aware CM   Nurse Made Aware yes   Assessment   Prognosis Good   Problem List Decreased endurance   Assessment Pt 79 y.o. female admitted for COPD exacerbation (HCC). Pt referred to PT for mobility assessment & D/C planning. Please see flowsheet above re: home set-up, PLOF & objective findings during PT assessment. PTA, pt reports being I w/o AD. Pt not on home O2 at baseline. On eval, pt demonstrates no significant decline in function to warrant skilled PT at this time. Pt modified I w/ transfers & short distance amb w/o AD however require S for longer distance w/ SPC vs RW as well as S for stair management. Improved amb tolerance w/ use of AD. (+) SOB during activity but relieved w/ rest. Resting SpO2 95-99% w/ 2L O2 NC. Trial off O2 given pt not on home O2 at baseline. SpO2 88-96% RA w/ activity. Pt instructed on general energy conservation techniques, activity pacing, breathing techniques & easing back to normal activity w/ good understanding.The patient's AM-PAC Basic Mobility Inpatient Short Form Raw Score is 22. A Raw score of greater than 16 suggests the patient may benefit from discharge to home. Please also refer to the recommendation of the Physical Therapist for safe discharge planning. From PT standpoint, pt may return home when medically cleared. Will D/C PT.  Will recommend level III rehab resources upon D/C. Pt w/ multiple questions re: D/C rec. Extensive discussion w/ regards to levels of rehab. Please Additional tx below for details. Nsg notified. Co-eval was necessary to complete this PT eval for the pt's best interest given pt's medical acuity & complexity.   Goals   Patient Goals to get better   PT Treatment Day 0   Plan   Treatment/Interventions Spoke to nursing;Spoke to case management  (PT eval only)   PT Frequency Other (Comment)  (D/C PT)   Discharge Recommendation   Rehab Resource Intensity Level, PT III (Minimum Resource Intensity)   Additional Comments restorative for daily amb   AM-PAC Basic Mobility Inpatient   Turning in Flat Bed Without Bedrails 4   Lying on Back to Sitting on Edge of Flat Bed Without Bedrails 4   Moving Bed to Chair 4   Standing Up From Chair Using Arms 4   Walk in Room 3   Climb 3-5 Stairs With Railing 3   Basic Mobility Inpatient Raw Score 22   Basic Mobility Standardized Score 47.4   [facility] Highest Level Of Mobility   JH-HLM Goal 7: Walk 25 feet or more   JH-HLM Achieved 7: Walk 25 feet or more   Additional Treatment Session   Start Time 0905   End Time 0930   Treatment Assessment After IE, pt w/ multiple questions re: levels of rehab. Explained levels of rehab & explained to pt why level III rehab resources would be more beneficial for her w/ good understanding. Discussed level III rehab resources as well w/ w/ good understanding. Ultimately, pt agreed to home OPPT after at length discussion. CM arrived towards end of discussion & was able to provide more info especially re: O2 needs & VNA. Pt pending home O2 eval from RT. Answered all other questions from PT standpoint. Will D/C PT. Will maintain on restorative for daily amb to prevent decline in function.   Additional Treatment Day 1   End of Consult   Patient Position at End of Consult Seated edge of bed;All needs within reach   End of Consult Comments Pt in stable  condition. All needs in reach.   Hx/personal factors: co-morbidities, inaccessible home, home alone, advanced age, use of AD, and O2  Examination: dec endurance, assessed body system, balance, endurance, amb, D/C disposition & fall risk  Clinical: unpredictable (ongoing medical status)  Complexity: high    Marco Simon

## 2025-05-07 ENCOUNTER — PATIENT OUTREACH (OUTPATIENT)
Dept: CASE MANAGEMENT | Facility: OTHER | Age: 80
End: 2025-05-07

## 2025-05-07 ENCOUNTER — HOME CARE VISIT (OUTPATIENT)
Dept: HOME HEALTH SERVICES | Facility: HOME HEALTHCARE | Age: 80
End: 2025-05-07
Attending: INTERNAL MEDICINE
Payer: COMMERCIAL

## 2025-05-07 VITALS
SYSTOLIC BLOOD PRESSURE: 124 MMHG | DIASTOLIC BLOOD PRESSURE: 60 MMHG | OXYGEN SATURATION: 98 % | TEMPERATURE: 98 F | HEART RATE: 78 BPM | RESPIRATION RATE: 16 BRPM

## 2025-05-07 PROCEDURE — 400013 VN SOC

## 2025-05-07 PROCEDURE — G0299 HHS/HOSPICE OF RN EA 15 MIN: HCPCS

## 2025-05-07 NOTE — UTILIZATION REVIEW
NOTIFICATION OF ADMISSION DISCHARGE   This is a Notification of Discharge from [facility]. Please be advised that this patient has been discharge from our facility. Below you will find the admission and discharge date and time including the patient’s disposition.   UTILIZATION REVIEW CONTACT:  Utilization Review Assistants  Network Utilization Review Department  Phone: [phone] x carefully listen to the prompts. All voicemails are confidential.  Email: [email or web address]     ADMISSION INFORMATION  PRESENTATION DATE: 4/30/2025 11:39 PM  OBERVATION ADMISSION DATE: N/A  INPATIENT ADMISSION DATE: 5/1/25  4:44 AM   DISCHARGE DATE: 5/6/2025  3:13 PM   DISPOSITION:Home with Home Health Care    [facility] Utilization Review Department  ATTENTION: Please call with any questions or concerns to [phone] and carefully listen to the prompts so that you are directed to the right person. All voicemails are confidential.   For Discharge needs, contact Care Management DC Support Team at [phone] opt. 2  Send all requests for admission clinical reviews, approved or denied determinations and any other requests to dedicated fax number below belonging to the campus where the patient is receiving treatment. List of dedicated fax numbers for the Facilities:  FACILITY NAME UR FAX NUMBER   ADMISSION DENIALS (Administrative/Medical Necessity) [phone]   DISCHARGE SUPPORT TEAM ([facility]) [phone]   PARENT CHILD HEALTH (Maternity/NICU/Pediatrics) [phone]   [facility] [phone]   [facility] [phone]   [facility] [phone]   [facility] [phone]   [facility] [phone]   [facility] [phone]   [facility] [phone]   [facility] [phone]   [facility]  [facility] [phone]   [facility] [phone]   [facility] [phone]   [facility] [phone]

## 2025-05-07 NOTE — PROGRESS NOTES
Contacted patient for f/u post hospitalization for copd exacerbation. She is receiving home health services of SN, PT/OT, MSW.  Patient is currently on oxygen at 3 L during the day and 2 L overnight.  Her SpO2 is in the range of 91-95%.  Patient endorses sob, wheezing and productive cough for yellow sputum.  She reports feeling very tired.   She has a diminished appetite but is eating and staying hydrated.  She monitors BS and FBS today was 105.  Informed her to expect her sugars to be higher while she is on prednisone taper.  Patient is aware of steroid taper dosage but reports she gets sick when being on prednisone and the doctors are aware.  Reviewed medications and she is taking all as prescribed.  Using inhalers.  Her f/u appointments are scheduled.  She lives alone but has friends who are supportive.  She is independent in activities and denies needing additional assistance.   Arranged for f/u call in 1-2 weeks.

## 2025-05-08 NOTE — CASE COMMUNICATION
Medication discrepancies or Major drug interactions none  Abnormal clinical findings limited endurance sob with min exertion  This report is informational only, no response is needed  [facility] has Admitted your patient to Home Health service with the following disciplines: SN, PT, OT and MSW  Patient stated goals of care get stronger and get to not need oxygen  Potential barriers to goal achievement sob with min exertion and at re st  Primary focus of home health care:Respiratory  Anticipated visit pattern and next visit date 5/9 2w3  Thank you for allowing us to participate in the care of your patient.      Deepti Culp RN [facility]

## 2025-05-09 ENCOUNTER — HOME CARE VISIT (OUTPATIENT)
Dept: HOME HEALTH SERVICES | Facility: HOME HEALTHCARE | Age: 80
End: 2025-05-09
Payer: COMMERCIAL

## 2025-05-09 ENCOUNTER — TELEPHONE (OUTPATIENT)
Age: 80
End: 2025-05-09

## 2025-05-09 VITALS — SYSTOLIC BLOOD PRESSURE: 128 MMHG | DIASTOLIC BLOOD PRESSURE: 62 MMHG | OXYGEN SATURATION: 96 % | HEART RATE: 81 BPM

## 2025-05-09 VITALS — HEART RATE: 92 BPM | OXYGEN SATURATION: 96 %

## 2025-05-09 PROCEDURE — G0151 HHCP-SERV OF PT,EA 15 MIN: HCPCS

## 2025-05-09 PROCEDURE — G0299 HHS/HOSPICE OF RN EA 15 MIN: HCPCS

## 2025-05-09 PROCEDURE — G0152 HHCP-SERV OF OT,EA 15 MIN: HCPCS

## 2025-05-09 NOTE — CASE COMMUNICATION
OT eval completed on 5/9/25.  OT to see pt 1 week 2. 2 week 1 for ADL and IADL training, UB HEP instruction, energy conservation and bathroom safety.  Pt in agreement with OT POC.

## 2025-05-09 NOTE — TELEPHONE ENCOUNTER
Patient called, she was discharged from the hospital 2 days ago, has Transitional Care Management appointment with Primary Care Provider 5/14 @ 9:15am.    Patient is having a lot of nausea since she's been home. They were giving her ZOFRAN in the hospital, which seemed to help.    Is asking if Primary Care Provider  Can send a script for ZOFRAN to the pharmacy on file:  [facility]    In addition, she was advised to quit smoking, but is struggling with this. Is asking if Primary Care Provider can send a script to the pharmacy to help with this.    Please advise.  Patients call back# [phone]

## 2025-05-10 VITALS
BODY MASS INDEX: 29.83 KG/M2 | RESPIRATION RATE: 18 BRPM | WEIGHT: 158 LBS | OXYGEN SATURATION: 96 % | SYSTOLIC BLOOD PRESSURE: 128 MMHG | HEART RATE: 81 BPM | DIASTOLIC BLOOD PRESSURE: 62 MMHG | HEIGHT: 61 IN | TEMPERATURE: 96.7 F

## 2025-05-12 ENCOUNTER — HOME CARE VISIT (OUTPATIENT)
Dept: HOME HEALTH SERVICES | Facility: HOME HEALTHCARE | Age: 80
End: 2025-05-12
Payer: COMMERCIAL

## 2025-05-12 VITALS — HEART RATE: 80 BPM | OXYGEN SATURATION: 97 %

## 2025-05-12 VITALS
SYSTOLIC BLOOD PRESSURE: 128 MMHG | BODY MASS INDEX: 29.85 KG/M2 | HEART RATE: 79 BPM | WEIGHT: 158 LBS | OXYGEN SATURATION: 97 % | RESPIRATION RATE: 18 BRPM | DIASTOLIC BLOOD PRESSURE: 64 MMHG | TEMPERATURE: 97.1 F

## 2025-05-12 DIAGNOSIS — R11.0 NAUSEA: ICD-10-CM

## 2025-05-12 DIAGNOSIS — Z72.0 TOBACCO ABUSE: Primary | ICD-10-CM

## 2025-05-12 PROCEDURE — G0151 HHCP-SERV OF PT,EA 15 MIN: HCPCS

## 2025-05-12 PROCEDURE — G0299 HHS/HOSPICE OF RN EA 15 MIN: HCPCS

## 2025-05-12 RX ORDER — ONDANSETRON 4 MG/1
4 TABLET, FILM COATED ORAL EVERY 12 HOURS PRN
Qty: 20 TABLET | Refills: 0 | Status: SHIPPED | OUTPATIENT
Start: 2025-05-12

## 2025-05-12 RX ORDER — BUPROPION HYDROCHLORIDE 75 MG/1
75 TABLET ORAL DAILY
Qty: 30 TABLET | Refills: 2 | Status: SHIPPED | OUTPATIENT
Start: 2025-05-12

## 2025-05-13 ENCOUNTER — OFFICE VISIT (OUTPATIENT)
Dept: PULMONOLOGY | Facility: CLINIC | Age: 80
End: 2025-05-13
Payer: COMMERCIAL

## 2025-05-13 ENCOUNTER — DOCUMENTATION (OUTPATIENT)
Dept: PULMONOLOGY | Facility: CLINIC | Age: 80
End: 2025-05-13

## 2025-05-13 VITALS
SYSTOLIC BLOOD PRESSURE: 148 MMHG | DIASTOLIC BLOOD PRESSURE: 76 MMHG | TEMPERATURE: 98.1 F | OXYGEN SATURATION: 97 % | HEART RATE: 81 BPM | BODY MASS INDEX: 31.91 KG/M2 | WEIGHT: 169 LBS | HEIGHT: 61 IN

## 2025-05-13 DIAGNOSIS — K22.70 BARRETT'S ESOPHAGUS WITHOUT DYSPLASIA: ICD-10-CM

## 2025-05-13 DIAGNOSIS — J44.1 COPD EXACERBATION (HCC): Primary | ICD-10-CM

## 2025-05-13 DIAGNOSIS — R91.1 LUNG NODULE: ICD-10-CM

## 2025-05-13 DIAGNOSIS — J30.9 ALLERGIC RHINITIS, UNSPECIFIED SEASONALITY, UNSPECIFIED TRIGGER: ICD-10-CM

## 2025-05-13 DIAGNOSIS — M06.9 RHEUMATOID ARTHRITIS, INVOLVING UNSPECIFIED SITE, UNSPECIFIED WHETHER RHEUMATOID FACTOR PRESENT (HCC): ICD-10-CM

## 2025-05-13 DIAGNOSIS — J96.01 ACUTE HYPOXEMIC RESPIRATORY FAILURE (HCC): ICD-10-CM

## 2025-05-13 DIAGNOSIS — F17.210 CIGARETTE NICOTINE DEPENDENCE WITHOUT COMPLICATION: ICD-10-CM

## 2025-05-13 PROBLEM — Z12.2 ENCOUNTER FOR SCREENING FOR MALIGNANT NEOPLASM OF LUNG IN CURRENT SMOKER WITH 30 PACK YEAR HISTORY OR GREATER: Status: ACTIVE | Noted: 2025-05-13

## 2025-05-13 PROBLEM — Z12.2 ENCOUNTER FOR SCREENING FOR MALIGNANT NEOPLASM OF LUNG IN CURRENT SMOKER WITH 30 PACK YEAR HISTORY OR GREATER: Status: RESOLVED | Noted: 2025-05-13 | Resolved: 2025-05-13

## 2025-05-13 PROBLEM — F17.200 ENCOUNTER FOR SCREENING FOR MALIGNANT NEOPLASM OF LUNG IN CURRENT SMOKER WITH 30 PACK YEAR HISTORY OR GREATER: Status: RESOLVED | Noted: 2025-05-13 | Resolved: 2025-05-13

## 2025-05-13 PROBLEM — F17.200 ENCOUNTER FOR SCREENING FOR MALIGNANT NEOPLASM OF LUNG IN CURRENT SMOKER WITH 30 PACK YEAR HISTORY OR GREATER: Status: ACTIVE | Noted: 2025-05-13

## 2025-05-13 PROBLEM — A69.20 LYME DISEASE: Status: RESOLVED | Noted: 2025-05-01 | Resolved: 2025-05-13

## 2025-05-13 PROBLEM — R11.0 NAUSEA: Status: ACTIVE | Noted: 2025-05-13

## 2025-05-13 PROCEDURE — 94618 PULMONARY STRESS TESTING: CPT | Performed by: INTERNAL MEDICINE

## 2025-05-13 PROCEDURE — 99214 OFFICE O/P EST MOD 30 MIN: CPT | Performed by: INTERNAL MEDICINE

## 2025-05-13 PROCEDURE — G2211 COMPLEX E/M VISIT ADD ON: HCPCS | Performed by: INTERNAL MEDICINE

## 2025-05-13 PROCEDURE — 99406 BEHAV CHNG SMOKING 3-10 MIN: CPT | Performed by: INTERNAL MEDICINE

## 2025-05-13 RX ORDER — FLUTICASONE FUROATE, UMECLIDINIUM BROMIDE AND VILANTEROL TRIFENATATE 200; 62.5; 25 UG/1; UG/1; UG/1
1 POWDER RESPIRATORY (INHALATION) DAILY
Qty: 60 BLISTER | Refills: 0 | Status: SHIPPED | OUTPATIENT
Start: 2025-05-13 | End: 2025-06-12

## 2025-05-13 NOTE — ASSESSMENT & PLAN NOTE
"Patient has at least is 66-year-old pack-year history.  She is currently on NicoDerm 21 mcg/h.  Wellbutrin was prescribed but not yet started.  I have explained that once she starts Wellbutrin, she may be able to discontinue the NicoDerm.  That is the plan for now.  She remarks how difficult it is for her to \"give up her best friend of 66 years.\"."

## 2025-05-13 NOTE — PROGRESS NOTES
Central scheduling reaching out to pt- unable to schedule PFT w/6MW and [place] and [place] location. Verifying with manager for pt

## 2025-05-13 NOTE — ASSESSMENT & PLAN NOTE
Remains on O2 at night  Remains on pred taper  SL homecare visits twice a week  Respiratory care also home visit  F/U with Pulmonology on 5/28 as per Epic.    Discharge instructions from hospital:  -Transition from IV Solu-Medrol to prednisone taper  - Azithromycin completed  - Patient initiated on Symbicort, continue on discharge  - Continue with DuoNebs while inpatient  - Continue prior to admission montelukast PULMONOLOGY STOPPED MONTELUKAST YESTERDAY.  - Pulmonology consulted appreciate recs  - Patient currently on oxygen therapy can wean off as per tolerability SpO2 goal 88 to 92%  -Checks x-ray  reviewed  - Ambulatory pulse oximetry     5/5-obtained: Pulmonology signed off, recommend prednisone taper 10 mg every 3 days; additionally will continue Symbicort and Spiriva on discharge.  To follow-up with pulmonology in the outpatient setting    Remains on spiriva and budesonide formoterol - per pulm  Try stopping montelukast  Trial of trelegy in the near future if insurance pays

## 2025-05-13 NOTE — ASSESSMENT & PLAN NOTE
Improving clinically, will continue prednisone tapering course for now.  She is on triple inhaler therapy with Symbicort and Spiriva Respimat which is expensive for her.  I will order Trelegy 200 to check the co-pay in the pharmacy but she has enough inhalers for 3 months, when she is done and if Trelegy is affordable she will switch to Trelegy once daily.  I encouraged her to continue with smoking cessation.  In the future with physical therapy and if she still having symptoms I will send her for pulmonary rehab  Will check PFTs with 6-minute walk test

## 2025-05-13 NOTE — ASSESSMENT & PLAN NOTE
We did 6-minute walk test today and she did not have desaturation but her pulse ox remained borderline 90-92%, anticipate improvement probably in the future, will repeat 6-minute walk test with PFTs.  For now I will keep the oxygen with the patient and she will monitor her pulse ox and use oxygen as needed.  She verbalized understanding.

## 2025-05-13 NOTE — ASSESSMENT & PLAN NOTE
Counseled patient about smoking cessation for about 4-5 minutes, she is doing okay but started to feel the urge to smoke again.  She has nicotine patch.  She has Wellbutrin at home but has not started so I encouraged her to start using.  I asked her to get rid of all the cigarettes at home and in her car.

## 2025-05-13 NOTE — ASSESSMENT & PLAN NOTE
A1C was down to 5.9.  She may expect an increase due to recent high doses of prednisone.  She remains on metformin 500mg daily for now.    Lab Results   Component Value Date    HGBA1C 5.9 04/23/2025

## 2025-05-13 NOTE — ASSESSMENT & PLAN NOTE
She has days of fatigue after overuse,relieved with rest.  She has tried different meds / biologics without tolerance  F/U with Dr. Reynolds if needed.

## 2025-05-13 NOTE — PROGRESS NOTES
Progress note - Pulmonary Medicine   Melissa Nguyen 79 y.o. female MRN: [id number]       Impression & Plan:     COPD exacerbation (HCC)  Improving clinically, will continue prednisone tapering course for now.  She is on triple inhaler therapy with Symbicort and Spiriva Respimat which is expensive for her.  I will order Trelegy 200 to check the co-pay in the pharmacy but she has enough inhalers for 3 months, when she is done and if Trelegy is affordable she will switch to Trelegy once daily.  I encouraged her to continue with smoking cessation.  In the future with physical therapy and if she still having symptoms I will send her for pulmonary rehab  Will check PFTs with 6-minute walk test    Acute hypoxemic respiratory failure (HCC)  We did 6-minute walk test today and she did not have desaturation but her pulse ox remained borderline 90-92%, anticipate improvement probably in the future, will repeat 6-minute walk test with PFTs.  For now I will keep the oxygen with the patient and she will monitor her pulse ox and use oxygen as needed.  She verbalized understanding.    Lung nodule  Will follow CT scan to confirm stability    Holly's esophagus  Currently stable on lansoprazole, continue and follow-up with GI    Rheumatoid arthritis, involving unspecified site, unspecified whether rheumatoid factor present (HCC)  Minimally symptomatic with her hands, not on a specific treatment.    Nicotine dependence  Counseled patient about smoking cessation for about 4-5 minutes, she is doing okay but started to feel the urge to smoke again.  She has nicotine patch.  She has Wellbutrin at home but has not started so I encouraged her to start using.  I asked her to get rid of all the cigarettes at home and in her car.      Return in about 3 months (around 8/13/2025).    Diagnoses and all orders for this visit:    COPD exacerbation (HCC)  -     POCT 6 minute walk  -     fluticasone-umeclidinium-vilanterol (Trelegy Ellipta)  200-62.5-25 mcg/actuation AEPB inhaler; Inhale 1 puff daily Rinse mouth after use.  -     Complete PFT with post Bronchodilator and Six Minute walk; Future    Acute hypoxemic respiratory failure (HCC)  -     POCT 6 minute walk    Holly's esophagus without dysplasia    Rheumatoid arthritis, involving unspecified site, unspecified whether rheumatoid factor present (HCC)    Cigarette nicotine dependence without complication    Allergic rhinitis, unspecified seasonality, unspecified trigger    Lung nodule  -     CT chest without contrast; Future      ______________________________________________________________________    HPI:    Melissa Nguyen presents today for follow-up of recent hospitalization with COPD exacerbation.    Patient was hospitalized recently and treated with steroids, she was discharged on oxygen 3 L continuously.  She is still on the prednisone taper and today started the 20 mg daily for 3 days.  She is on Symbicort and Spiriva Respimat.  She feels much better since hospitalization and discharged but still not back to her baseline.  She continues to have dyspnea on exertion and fatigue.  Denies significant cough or sputum production, denies wheezing or chest pain or tightness.  Denies fever or chills or night sweats.  She is still getting physical therapy at home.    Patient has rheumatoid arthritis but not on specific treatment, she denies any recurrent symptoms but mainly has chronic back pain and sometimes bilateral hands pain.  In general she is doing fine.    She has GERD but currently controlled with lansoprazole and she has Holly's esophagus.  Denies dysphagia or aspiration.  She denies significant allergic rhinitis symptoms and rarely needs antihistamine.      Denies postnasal drip.  She is on Singulair for years but she was given that with due to wheezing as she recalls.  Otherwise patient is a heavy smoker who smoked more than 70-pack-year but quit since hospitalization, currently using  nicotine patch and has a prescription for Wellbutrin, she started to feel the urge to smoke again.    Current Medications:    Current Outpatient Medications:     acetaminophen (TYLENOL) 325 mg tablet, Take 2 tablets (650 mg total) by mouth every 6 (six) hours as needed for mild pain, Disp: 30 tablet, Rfl: 0    albuterol (PROVENTIL HFA,VENTOLIN HFA) 90 mcg/act inhaler, INHALE 1-2 PUFFS AS NEEDED FOR WHEEZING, Disp: 18 g, Rfl: 1    Bacillus Coagulans-Inulin (PROBIOTIC FORMULA PO), Take 1 Capful by mouth in the morning., Disp: , Rfl:     Biotin 68526 MCG TABS, Take 1 tablet by mouth daily, Disp: , Rfl:     budesonide-formoterol (Symbicort) 160-4.5 mcg/act inhaler, Inhale 2 puffs 2 (two) times a day, Disp: 30.6 g, Rfl: 0    buPROPion (WELLBUTRIN) 75 mg tablet, Take 1 tablet (75 mg total) by mouth in the morning, Disp: 30 tablet, Rfl: 2    cefuroxime (CEFTIN) 500 mg tablet, Take 1 tablet (500 mg total) by mouth every 12 (twelve) hours for 28 days, Disp: 56 tablet, Rfl: 0    Cholecalciferol (VITAMIN D3) 1,000 units tablet, Take 4,000 Units by mouth daily, Disp: , Rfl:     Digestive Enzyme CAPS, Take 1 capsule by mouth in the morning, Disp: , Rfl:     lansoprazole (PREVACID) 30 mg capsule, Take 1 capsule (30 mg total) by mouth daily 1 tab po BID, Disp: 90 capsule, Rfl: 3    metFORMIN (GLUCOPHAGE) 500 mg tablet, TAKE 1 TABLET BY MOUTH EVERY DAY WITH BREAKFAST, Disp: 90 tablet, Rfl: 1    montelukast (SINGULAIR) 10 mg tablet, Take 1 tablet (10 mg total) by mouth daily, Disp: 90 tablet, Rfl: 1    ondansetron (ZOFRAN) 4 mg tablet, Take 1 tablet (4 mg total) by mouth every 12 (twelve) hours as needed for nausea or vomiting, Disp: 20 tablet, Rfl: 0    oxygen gas, Inhale 3 L/min continuous. nasal cannula, Disp: , Rfl:     polyethylene glycol (GLYCOLAX) 17 GM/SCOOP powder, Take 17 g by mouth as needed (CONSTIPATION) DISSOLVE IN 8OZ FLUID OF CHOICE , Disp: , Rfl:     predniSONE 10 mg tablet, Take 4 tablets (40 mg total) by mouth  "daily for 1 day, THEN 3 tablets (30 mg total) daily for 3 days, THEN 2 tablets (20 mg total) daily for 3 days, THEN 1 tablet (10 mg total) daily for 3 days., Disp: 22 tablet, Rfl: 0    Probiotic Product (PROBIOTIC-10) CAPS, Take by mouth, Disp: , Rfl:     rosuvastatin (CRESTOR) 5 mg tablet, TAKE 1 TABLET BY MOUTH EVERY OTHER DAY, Disp: 45 tablet, Rfl: 0    Spiriva Respimat 2.5 MCG/ACT AERS inhaler, Inhale 2 puffs daily 3 inhalers, Disp: 48 g, Rfl: 0    meclizine (ANTIVERT) 25 mg tablet, Take 1 tablet (25 mg total) by mouth 3 (three) times a day as needed for dizziness (Patient not taking: Reported on 2025), Disp: 30 tablet, Rfl: 0    Current Facility-Administered Medications:     cyanocobalamin injection 1,000 mcg, 1,000 mcg, Intramuscular, Q30 Days, , 1,000 mcg at 25 0918    Review of Systems:  Review of Systems   Constitutional:  Positive for fatigue.   HENT: Negative.     Eyes: Negative.    Respiratory:  Positive for shortness of breath.    Cardiovascular: Negative.    Gastrointestinal: Negative.    Endocrine: Negative.    Genitourinary: Negative.    Musculoskeletal: Negative.    Skin: Negative.    Allergic/Immunologic: Negative.    Neurological: Negative.    Hematological: Negative.    Psychiatric/Behavioral: Negative.       Aside from what is mentioned in the HPI, the review of systems is otherwise negative    Past medical history, surgical history, and family history were reviewed and updated as appropriate    Social history updates:  Social History     Tobacco Use   Smoking Status Former    Current packs/day: 0.00    Types: Cigarettes    Quit date: 2025    Years since quittin.0   Smokeless Tobacco Never   Tobacco Comments    Little less than a pack        PhysicalExamination:  Vitals:   /76 (BP Location: Right arm, Patient Position: Sitting, Cuff Size: Large)   Pulse 81   Temp 98.1 °F (36.7 °C) (Tympanic)   Ht 5' 1\" (1.549 m)   Wt 76.7 kg (169 lb)   LMP  (LMP Unknown)   SpO2 97% "   BMI 31.93 kg/m²     Gen:  Comfortable on room air.  No conversational dyspnea  HEENT:  Conjugate gaze.  sclerae anicteric.  Oropharynx moist  Neck: Trachea is midline. No JVD. No adenopathy  Chest: Equal breath sounds and clear to auscultation bilaterally with good air movement, no wheezing or crackles  Cardiac: S1-S2 regular. no murmur  Abdomen:  benign  Extremities: No edema  Neuro:  Normal speech and mentation    Diagnostic Data:  Labs:  I personally reviewed the most recent laboratory data pertinent to today's visit    Lab Results   Component Value Date    WBC 6.79 05/05/2025    HGB 11.2 (L) 05/05/2025    HCT 36.1 05/05/2025    MCV 89 05/05/2025     05/05/2025     Lab Results   Component Value Date    SODIUM 140 05/05/2025    K 4.3 05/05/2025    CO2 29 05/05/2025     05/05/2025    BUN 26 (H) 05/05/2025    CREATININE 0.70 05/05/2025    CALCIUM 8.7 05/05/2025       PFT results:  The most recent pulmonary function tests were reviewed.  2017:  Moderate obstructive airflow defect with normal vital capacity     Improved with administration of bronchodilator per ATS Standards     Increased lung volumes indicative of airtrapping     Moderately Reduced Diffusion     Scooped expiratory flow volume loops    Imaging:  I personally reviewed the images on the PAC system pertinent to today's visit  Chest x-ray reviewed in PACS: Clear lungs    Chest CT scan from February 2023 reviewed on PACS: Scattered moderate emphysema, no suspicious lesions or nodules, stable 11 mm left lower lobe nodule      6-minute walk test:  Patient started with pulse ox 93% on room air and heart rate 73/min at rest.  She was able to walk total 6 minutes/to 10 m without desaturation on room air, her pulse ox remained 90-92%, heart rate increased to maximal 102/min.      Boyd Madrigal MD

## 2025-05-14 ENCOUNTER — HOME CARE VISIT (OUTPATIENT)
Dept: HOME HEALTH SERVICES | Facility: HOME HEALTHCARE | Age: 80
End: 2025-05-14
Payer: COMMERCIAL

## 2025-05-14 ENCOUNTER — OFFICE VISIT (OUTPATIENT)
Dept: FAMILY MEDICINE CLINIC | Facility: CLINIC | Age: 80
End: 2025-05-14
Payer: COMMERCIAL

## 2025-05-14 VITALS
HEART RATE: 72 BPM | BODY MASS INDEX: 31.64 KG/M2 | DIASTOLIC BLOOD PRESSURE: 72 MMHG | HEIGHT: 61 IN | SYSTOLIC BLOOD PRESSURE: 138 MMHG | WEIGHT: 167.6 LBS | OXYGEN SATURATION: 95 %

## 2025-05-14 DIAGNOSIS — E11.9 CONTROLLED TYPE 2 DIABETES MELLITUS WITHOUT COMPLICATION, WITHOUT LONG-TERM CURRENT USE OF INSULIN (HCC): ICD-10-CM

## 2025-05-14 DIAGNOSIS — R11.0 NAUSEA: ICD-10-CM

## 2025-05-14 DIAGNOSIS — M35.9 UNDIFFERENTIATED CONNECTIVE TISSUE DISEASE (HCC): ICD-10-CM

## 2025-05-14 DIAGNOSIS — Z78.9 TRANSITION OF CARE: Primary | ICD-10-CM

## 2025-05-14 DIAGNOSIS — J44.1 COPD EXACERBATION (HCC): ICD-10-CM

## 2025-05-14 DIAGNOSIS — Z72.0 TOBACCO ABUSE: ICD-10-CM

## 2025-05-14 DIAGNOSIS — A69.20 LYME DISEASE, ACUTE: ICD-10-CM

## 2025-05-14 DIAGNOSIS — R42 VERTIGO: ICD-10-CM

## 2025-05-14 PROCEDURE — 99495 TRANSJ CARE MGMT MOD F2F 14D: CPT | Performed by: FAMILY MEDICINE

## 2025-05-14 PROCEDURE — G0155 HHCP-SVS OF CSW,EA 15 MIN: HCPCS

## 2025-05-14 NOTE — PROGRESS NOTES
Transition of Care Visit:  Name: Melissa Nguyen      : 1945      MRN: [id number]  Encounter Provider: Zayra Jessica MD  Encounter Date: 2025   Encounter department: [facility]    Mrs. Nguyen is a pleasant 79-year-old female who presents today for transition of care appointment.  She was hospitalized at [facility] from  through May 6 for increasing shortness of breath and COPD exacerbation, in the setting of a new diagnosis of Lyme disease.    Patient reports that she is feeling better today, then she did upon admission.  She complains of continued fatigue and shortness of breath with usual tasks.  She is now wearing oxygen at 3 L nasal cannula 24-7.  She has been smoking for 66 years, of at least a pack a day and is trying now to refrain from smoking.  She has a NicoDerm patch and Wellbutrin was recently prescribed but not yet started.  She reports that this is a hard habit to break both physically and emotionally for her.    She had a follow-up appointment with outpatient pulmonary yesterday.  Medications remain unchanged for now although Trelegy Ellipta was suggested to perhaps start in the future.  Also, she has visiting nurse, PT and OT several times a week.    Prior to her hospitalization she was diagnosed with acute Lyme disease.  She is back on Ceftin 500 mg twice daily.  She also reports that her vertigo has returned.      Assessment & Plan  Transition of care  See above.    Saw Pulmonary yesterday.  She has been instructed to try to wean off O2.  She will continue to monitor pulse ox which is currently set at 3 L.  She can  try weaning O2 form 3 to 2 L, and back down on the O2 if her pulse ox remains above 91-92% on RA.       COPD exacerbation (HCC)  Remains on O2 at night  Remains on pred taper  SL homecare visits twice a week  Respiratory care also home visit  F/U with Pulmonology on  as per Epic.    Discharge instructions from  "hospital:  -Transition from IV Solu-Medrol to prednisone taper  - Azithromycin completed  - Patient initiated on Symbicort, continue on discharge  - Continue with DuoNebs while inpatient  - Continue prior to admission montelukast PULMONOLOGY STOPPED MONTELUKAST YESTERDAY.  - Pulmonology consulted appreciate recs  - Patient currently on oxygen therapy can wean off as per tolerability SpO2 goal 88 to 92%  -Checks x-ray  reviewed  - Ambulatory pulse oximetry     5/5-obtained: Pulmonology signed off, recommend prednisone taper 10 mg every 3 days; additionally will continue Symbicort and Spiriva on discharge.  To follow-up with pulmonology in the outpatient setting    Remains on spiriva and budesonide formoterol - per pulm  Try stopping montelukast  Trial of trelegy in the near future if insurance pays       Tobacco abuse  Patient has at least is 66-year-old pack-year history.  She is currently on NicoDerm 21 mcg/h.  Wellbutrin was prescribed but not yet started.  I have explained that once she starts Wellbutrin, she may be able to discontinue the NicoDerm.  That is the plan for now.  She remarks how difficult it is for her to \"give up her best friend of 66 years.\".  Lyme disease, acute  Was started on cefuroxime 500mg BID for 28 days.  Continue to monitor.       Undifferentiated connective tissue disease (HCC)  She has days of fatigue after overuse,relieved with rest.  She has tried different meds / biologics without tolerance  F/U with Dr. Reynolds if needed.       Controlled type 2 diabetes mellitus without complication, without long-term current use of insulin ([facility])  A1C was down to 5.9.  She may expect an increase due to recent high doses of prednisone.  She remains on metformin 500mg daily for now.    Lab Results   Component Value Date    HGBA1C 5.9 04/23/2025          Nausea  Started in hospital.  Cont zofran prn       Vertigo  Meclizine did not really help - will d/c off med list  Home PT is helping with " exercises          Hospitalization reports, labs, imaging reviewed at length.  See me in 2 weeks with close monitoring.  She continues to have home visits with nursing and PT, OT.  We discussed initiating a home aide to help out in the next 1 to 2 weeks and I gave her a referral to an agency.  I also encouraged her to invite her friends for intermittent short visits.  She has a strong support group who have been calling to check-in and the distraction will be good for her.      History of Present Illness     Chief Complaint   Patient presents with   • Transition of Care Management     TCM - COPD, feeling a little better, but still weak. Tongue feels sore not sure if its from medication, started three days ago. Noticed after she started prednisone and lyme disease medication. Discuss B12 injecion skipped last month from being admitted       Transitional Care Management Review:   Melissa Nguyen is a 79 y.o. female here for TCM follow up.     During the TCM phone call patient stated:  TCM Call (since 5/1/2025)     Date and time call was made  5/6/2025  5:54 PM    Hospital care reviewed  Records not available    Patient was hospitialized at  [facility]    Date of Admission  04/30/25    Date of discharge  05/06/25    Diagnosis  COPD exacerbation (HCC)    Disposition  Home    Current Symptoms  None      TCM Call (since 5/1/2025)     Post hospital issues  None    Scheduled for follow up?  No    I have advised the patient to call PCP with any new or worsening symptoms  Claudia DELGADO MA- called and left mess to call back to setup appt        Mrs. Nguyen is a pleasant 79-year-old female who presents today for transition of care appointment.  She was hospitalized at [facility] from April 30 through May 6 for increasing shortness of breath and COPD exacerbation, in the setting of a new diagnosis of Lyme disease.    Patient reports that she is feeling better today, then she did upon admission.  She complains  "of continued fatigue and shortness of breath with usual tasks.  She is now wearing oxygen at 3 L nasal cannula 24-7.  She has been smoking for 66 years, of at least a pack a day and is trying now to refrain from smoking.  She has a NicoDerm patch and Wellbutrin was recently prescribed but not yet started.  She reports that this is a hard habit to break both physically and emotionally for her.    She had a follow-up appointment with outpatient pulmonary yesterday.  Medications remain unchanged for now although Trelegy Ellipta was suggested to perhaps start in the future.  Also, she has visiting nurse, PT and OT several times a week.    Prior to her hospitalization she was diagnosed with acute Lyme disease.  She is back on Ceftin 500 mg twice daily.  She also reports that her vertigo has returned.          Review of Systems   Constitutional:  Positive for activity change and fatigue. Negative for chills and fever.   HENT:  Positive for tinnitus.    Eyes: Negative.    Respiratory:  Positive for cough and shortness of breath.    Cardiovascular:  Negative for chest pain, palpitations and leg swelling.   Gastrointestinal:  Positive for nausea. Negative for abdominal distention, abdominal pain, anal bleeding, constipation, diarrhea and vomiting.   Endocrine: Negative.    Genitourinary:  Negative for difficulty urinating, frequency, pelvic pain, urgency and vaginal bleeding.   Musculoskeletal:  Positive for arthralgias, back pain and myalgias.   Skin:  Negative for rash.   Neurological:  Positive for dizziness. Negative for seizures, numbness and headaches.   Psychiatric/Behavioral:  The patient is nervous/anxious.         Patient voices new feelings of concern and upset about having to be on oxygen for the rest of her life and being able to engage in her usual pastimes and hobbies.     Objective   /72 (BP Location: Right arm, Patient Position: Sitting, Cuff Size: Standard)   Pulse 72   Ht 5' 1\" (1.549 m)   Wt 76 " kg (167 lb 9.6 oz)   LMP  (LMP Unknown)   SpO2 95%   BMI 31.67 kg/m²     Physical Exam  Vitals and nursing note reviewed.   Constitutional:       General: She is not in acute distress.     Appearance: Normal appearance. She is well-developed. She is not ill-appearing, toxic-appearing or diaphoretic.      Comments: Patient encountered seated in exam room chair.  She is nasal O2 in place and is in no apparent distress.  She is speaking in full sentences without difficulty.  She has good recollection understanding of her hospital stay and is well-groomed and an excellent historian.     Eyes:      General: No scleral icterus.    Neck:      Vascular: No carotid bruit.     Cardiovascular:      Rate and Rhythm: Normal rate and regular rhythm.      Pulses: no weak pulses.           Dorsalis pedis pulses are 2+ on the right side and 2+ on the left side.        Posterior tibial pulses are 2+ on the right side and 2+ on the left side.      Heart sounds: Normal heart sounds. No murmur heard.     No friction rub. No gallop.   Pulmonary:      Effort: Pulmonary effort is normal. No respiratory distress.      Breath sounds: Normal breath sounds. No stridor. No wheezing, rhonchi or rales.      Comments: Nasal O2 in place.  Chest:      Chest wall: No tenderness.     Musculoskeletal:      Cervical back: Normal range of motion and neck supple.      Right lower leg: No edema.      Left lower leg: No edema.      Comments: Ambulating with the assistance of a walker.   Feet:      Right foot:      Skin integrity: No ulcer, skin breakdown, erythema, warmth, callus or dry skin.      Left foot:      Skin integrity: No ulcer, skin breakdown, erythema, warmth, callus or dry skin.   Lymphadenopathy:      Cervical: No cervical adenopathy.     Skin:     Coloration: Skin is not jaundiced.     Neurological:      Mental Status: She is alert and oriented to person, place, and time.     Psychiatric:         Mood and Affect: Mood normal.          Behavior: Behavior normal.         Thought Content: Thought content normal.         Judgment: Judgment normal.      Comments: She is upset and concerned about having to wear oxygen for the rest of her life.       Medications have been reviewed by provider in current encounter        Patient's shoes and socks removed.    Right Foot/Ankle   Right Foot Inspection  Skin Exam: skin normal and skin intact. No dry skin, no warmth, no callus, no erythema, no maceration, no abnormal color, no pre-ulcer, no ulcer and no callus.     Toe Exam: ROM and strength within normal limits. No swelling, no tenderness, erythema and  no right toe deformity    Sensory   Proprioception: intact  Monofilament testing: intact    Vascular  Capillary refills: < 3 seconds  The right DP pulse is 2+. The right PT pulse is 2+.     Left Foot/Ankle  Left Foot Inspection  Skin Exam: skin normal and skin intact. No dry skin, no warmth, no erythema, no maceration, normal color, no pre-ulcer, no ulcer and no callus.     Toe Exam: No swelling, no tenderness, no erythema and no left toe deformity.     Sensory   Proprioception: intact  Monofilament testing: intact    Vascular  Capillary refills: < 3 seconds  The left DP pulse is 2+. The left PT pulse is 2+.     Assign Risk Category  No deformity present  No loss of protective sensation  No weak pulses  Risk: 0

## 2025-05-15 ENCOUNTER — HOME CARE VISIT (OUTPATIENT)
Dept: HOME HEALTH SERVICES | Facility: HOME HEALTHCARE | Age: 80
End: 2025-05-15
Payer: COMMERCIAL

## 2025-05-15 VITALS — OXYGEN SATURATION: 98 % | HEART RATE: 80 BPM

## 2025-05-15 PROCEDURE — G0151 HHCP-SERV OF PT,EA 15 MIN: HCPCS

## 2025-05-15 PROCEDURE — G0152 HHCP-SERV OF OT,EA 15 MIN: HCPCS

## 2025-05-16 ENCOUNTER — HOME CARE VISIT (OUTPATIENT)
Dept: HOME HEALTH SERVICES | Facility: HOME HEALTHCARE | Age: 80
End: 2025-05-16
Payer: COMMERCIAL

## 2025-05-16 VITALS — OXYGEN SATURATION: 96 % | HEART RATE: 72 BPM

## 2025-05-16 PROCEDURE — G0299 HHS/HOSPICE OF RN EA 15 MIN: HCPCS

## 2025-05-18 VITALS
OXYGEN SATURATION: 97 % | WEIGHT: 160 LBS | HEIGHT: 61 IN | TEMPERATURE: 96.7 F | SYSTOLIC BLOOD PRESSURE: 126 MMHG | HEART RATE: 88 BPM | DIASTOLIC BLOOD PRESSURE: 68 MMHG | RESPIRATION RATE: 18 BRPM | BODY MASS INDEX: 30.21 KG/M2

## 2025-05-19 ENCOUNTER — HOME CARE VISIT (OUTPATIENT)
Dept: HOME HEALTH SERVICES | Facility: HOME HEALTHCARE | Age: 80
End: 2025-05-19
Payer: COMMERCIAL

## 2025-05-19 ENCOUNTER — TELEMEDICINE (OUTPATIENT)
Dept: PULMONOLOGY | Facility: CLINIC | Age: 80
End: 2025-05-19

## 2025-05-19 VITALS
BODY MASS INDEX: 32.59 KG/M2 | WEIGHT: 166 LBS | DIASTOLIC BLOOD PRESSURE: 62 MMHG | HEIGHT: 60 IN | OXYGEN SATURATION: 96 % | HEART RATE: 84 BPM | TEMPERATURE: 96 F | SYSTOLIC BLOOD PRESSURE: 126 MMHG | RESPIRATION RATE: 18 BRPM

## 2025-05-19 VITALS — OXYGEN SATURATION: 96 % | HEART RATE: 65 BPM

## 2025-05-19 DIAGNOSIS — T65.291A TOXIC EFFECT OF TOBACCO, ACCIDENTAL OR UNINTENTIONAL, INITIAL ENCOUNTER: ICD-10-CM

## 2025-05-19 DIAGNOSIS — F17.210 CIGARETTE NICOTINE DEPENDENCE WITHOUT COMPLICATION: Primary | ICD-10-CM

## 2025-05-19 PROCEDURE — SMOKECESS PR SMOKING CESSATION

## 2025-05-19 PROCEDURE — G0299 HHS/HOSPICE OF RN EA 15 MIN: HCPCS

## 2025-05-19 PROCEDURE — G0151 HHCP-SERV OF PT,EA 15 MIN: HCPCS

## 2025-05-19 NOTE — PROGRESS NOTES
"Virtual Visit with Tobacco Cessation:  Assessment & Plan        Plan   Tobacco Use/Cessation.  I assessed Melissa Nguyen to be in a contemplative stage with respect to tobacco use.  I advised patient to quit, and offered support.  Discussed current use pattern.  Asked patient to inform me when they set a quit date.  Bupropion: denies h/o seizures or eating disorders.  Follow up in 1 month or as needed.  I spent approximately 30 minutes counseling the patient.  Patient Attended smoking cessation initial visit virtually.  The patient is smoking 1 pack/day.  Patient has for cigarette within 5 minutes of waking signaling severe dependence on nicotine.  Patient started smoking at 13 years old.  Her biggest barriers is that she enjoys smoking and it is out of habit.  Patient quit while she was in the hospital recently but picked up smoking when she left.  Patient did quit for 2 months in her past with hypnosis.  Patient would like to quit due to her recent COPD exacerbation Lyme disease acute hospitalization and general health reasons.  Patient is highly motivated but only mutually confident that she will quit smoking long-term.  She is in the contemplative/action stage of change.  We did discuss also FDA approved medications to assist with quitting smoking including patches, lozenges nasal spray Chantix and bupropion.  Patient has received a prescription for Wellbutrin from her primary care physician.  Primary care physician stated that she did not want patient to use combination with Wellbutrin including patches gum or lozenges.  Will reach out to primary care physician.  Patient is very anxious about trying new medications due to \"allergies\" to new medications.  Patient is worried about breathing issues due to new medications.  Provided education about how some medications work including smoking cessation aids.  Will follow-up in 1 month..    As a result of this visit, I have referred the patient for further " respiratory evaluation. No    History of Present Illness     Reason for visit is nicotine dependence    Recent Visits  Date Type Provider Dept   05/14/25 Office Visit Zayra Jessica MD [facility]   Showing recent visits within past 7 days and meeting all other requirements  Today's Visits  Date Type Provider Dept   05/19/25 Telemedicine Troy Soriano RN [facility]   Showing today's visits and meeting all other requirements  Future Appointments  No visits were found meeting these conditions.  Showing future appointments within next 150 days and meeting all other requirements       Subjective HPI  Melissa Nguyen is a 79 y.o. female here for a discussion regarding smoking cessation. She began smoking several years ago. She currently smokes 1 packs per day. She has attempted to quit smoking in the past, most recently several years ago. Best success quitting using willpower. Barriers to quitting include: fear of failing again and under a lot of stress now. She denies chest pain, dyspnea on exertion, dyspnea while laying down, hemoptysis, non productive cough, productive cough, shortness of breath, and wheezing.    Review of Systems    Medical History Reviewed by provider this encounter:     .  Tobacco Use History[1]  E-Cigarette/Vaping    E-Cigarette Use Never User      E-Cigarette/Vaping Substances    Nicotine No     THC No     CBD No     Flavoring No     Other No     Unknown No        Objective   LMP  (LMP Unknown)     Video Exam  Physical Exam     Administrative Statements   Encounter provider Troy Soriano RN    The Patient is located at Home and in the following state in which I hold an active license [place].    The patient was identified by name and date of birth. Melissa Nguyen was informed that this is a telemedicine visit and that the visit is being conducted through the Epic Embedded platform. She agrees to proceed..  My office door was closed. No one else was in the room.  She  acknowledged consent and understanding of privacy and security of the video platform. The patient has agreed to participate and understands they can discontinue the visit at any time.    I have spent a total time of 30 minutes in caring for this patient on the day of the visit/encounter including Patient and family education, Counseling / Coordination of care, Documenting in the medical record, Reviewing/placing orders in the medical record (including tests, medications, and/or procedures), Obtaining or reviewing history  , Communicating with other healthcare professionals , and smoking cessation education motivational interviewing and behavioral modification, not including the time spent for establishing the audio/video connection.  I spent 30 minutes with patient today in which >10 minutes of the time was spent in counseling/coordination of care regarding tobacco cessation           [1]   Social History  Tobacco Use   Smoking Status Former    Current packs/day: 0.00    Types: Cigarettes    Quit date: 2025    Years since quittin.0   Smokeless Tobacco Never   Tobacco Comments    Little less than a pack

## 2025-05-20 ENCOUNTER — HOME CARE VISIT (OUTPATIENT)
Dept: HOME HEALTH SERVICES | Facility: HOME HEALTHCARE | Age: 80
End: 2025-05-20
Payer: COMMERCIAL

## 2025-05-20 VITALS — HEART RATE: 78 BPM | OXYGEN SATURATION: 96 %

## 2025-05-20 PROCEDURE — G0152 HHCP-SERV OF OT,EA 15 MIN: HCPCS

## 2025-05-21 ENCOUNTER — OFFICE VISIT (OUTPATIENT)
Dept: FAMILY MEDICINE CLINIC | Facility: CLINIC | Age: 80
End: 2025-05-21

## 2025-05-21 VITALS
BODY MASS INDEX: 31.6 KG/M2 | WEIGHT: 167.4 LBS | OXYGEN SATURATION: 95 % | DIASTOLIC BLOOD PRESSURE: 70 MMHG | SYSTOLIC BLOOD PRESSURE: 126 MMHG | HEIGHT: 61 IN | HEART RATE: 85 BPM

## 2025-05-21 DIAGNOSIS — R11.0 NAUSEA: ICD-10-CM

## 2025-05-21 DIAGNOSIS — J44.1 COPD EXACERBATION (HCC): Primary | ICD-10-CM

## 2025-05-21 DIAGNOSIS — A69.20 LYME DISEASE: ICD-10-CM

## 2025-05-21 DIAGNOSIS — E53.8 VITAMIN B12 DEFICIENCY: ICD-10-CM

## 2025-05-21 DIAGNOSIS — R42 VERTIGO: ICD-10-CM

## 2025-05-21 DIAGNOSIS — Z72.0 TOBACCO ABUSE: ICD-10-CM

## 2025-05-21 RX ADMIN — CYANOCOBALAMIN 1000 MCG: 1000 INJECTION, SOLUTION INTRAMUSCULAR; SUBCUTANEOUS at 11:36

## 2025-05-21 NOTE — PROGRESS NOTES
"Name: Melissa Nguyen      : 1945      MRN: [id number]  Encounter Provider: Zayra Jessica MD  Encounter Date: 2025   Encounter department: [facility]  :  Mrs. Nguyen presents today for close follow-up after recent hospitalization for COPD exacerbation.  She has seen some friends. She notices feeling that it really helps to distract her.  She will be discharged from home VNA and OT tomorrow.  Pt will continue for a few more weeks.  Has not started wellbutrin bc 5 days ago she began feeling weakness of arms and legs in the am and evening - these are her \"autoimmune\" sx. (Flare after stopping steroids) Getting better.  Notes improved exercise tolerance; is trying some time off of nasal O2  Assessment & Plan  COPD exacerbation (HCC)  Still on O2 3L - she has been off it a lot at home  On O2 - 97%  Off - 90-93%  Continue to monitor at home and try to wean as tolerated.  Since I last saw her, she has completed her prednisone taper, and upon ending, she did note a flare of autoimmune symptoms including fatigue and myalgias.  We discussed this at length today which she is in agreement of.  We will add a spacer for her to use with her inhalers.    Orders:  •  Spacer Device for Inhaler    Tobacco abuse  Had a televisit with a nurse for smoking cessation - Angus Soriano  Discussed Wellbutrin  She is still smoking, and she stopped the nicoderm patch when she felt awful last week.         Lyme disease  Continues on antibiotics       Nausea  Better.  Hasn't needed zofran recently       Vertigo  Epley maneaubver from PT -she has learned how to do it for herself at home and notes success.       Vitamin B12 deficiency  Vitamin B12 injection today.  Continue with monthly injections.         F/U 2 weeks.     History of Present Illness   Chief Complaint   Patient presents with   • Follow-up     1 week fu - \"slow recovery\"       Mrs. Nguyen presents today for close follow-up after recent hospitalization " "for COPD exacerbation.  She has seen some friends. She notices feeling that it really helps to distract her.  She will be discharged from home VNA and OT tomorrow.  Pt will continue for a few more weeks.  Has not started wellbutrin bc 5 days ago she began feeling weakness of arms and legs in the am and evening - these are her \"autoimmune\" sx. (Flare after stopping steroids) Getting better.  Notes improved exercise tolerance; is trying some time off of nasal O2        Review of Systems   Constitutional:  Positive for fatigue.        See HPI   HENT:  Negative for congestion, ear pain, mouth sores, sinus pressure and trouble swallowing.    Eyes:  Negative for discharge, redness and itching.   Respiratory:  Positive for shortness of breath. Negative for apnea, cough, chest tightness, wheezing and stridor.         See HPI  Notes that on 3 L O2 her sats are around 97%.  On room air they ranged between 90 to 93%.   Cardiovascular:  Negative for chest pain, palpitations and leg swelling.   Gastrointestinal:  Negative for abdominal distention, abdominal pain, blood in stool, constipation, diarrhea, nausea and vomiting.   Endocrine: Negative for cold intolerance and heat intolerance.   Genitourinary:  Negative for difficulty urinating, dysuria, flank pain and urgency.   Musculoskeletal:  Positive for arthralgias and myalgias.   Skin:  Negative for rash.   Neurological:  Negative for dizziness, seizures, syncope, speech difficulty, weakness, light-headedness, numbness and headaches.   Hematological:  Negative for adenopathy.   Psychiatric/Behavioral:  Negative for agitation, behavioral problems, confusion and sleep disturbance. The patient is not nervous/anxious.        Objective   /70 (BP Location: Left arm, Patient Position: Sitting, Cuff Size: Standard)   Pulse 85   Ht 5' 1\" (1.549 m)   Wt 75.9 kg (167 lb 6.4 oz)   LMP  (LMP Unknown)   SpO2 95%   BMI 31.63 kg/m²      Physical Exam  Vitals and nursing note " reviewed.   Constitutional:       General: She is not in acute distress.     Appearance: Normal appearance. She is well-developed. She is not ill-appearing, toxic-appearing or diaphoretic.      Comments: Well-groomed, well appearing, in no distress.  Nasal O2 is in place.  She is an excellent historian and seems brighter and more energetic today.     Eyes:      General: No scleral icterus.    Neck:      Vascular: No carotid bruit.     Cardiovascular:      Rate and Rhythm: Normal rate and regular rhythm.      Heart sounds: Normal heart sounds. No murmur heard.     No friction rub. No gallop.   Pulmonary:      Effort: Pulmonary effort is normal. No respiratory distress.      Breath sounds: Normal breath sounds. No stridor. No wheezing, rhonchi or rales.      Comments: O2 sats between 95 to 97% on 3 L O2 at present appointment.  Chest:      Chest wall: No tenderness.     Musculoskeletal:      Cervical back: Normal range of motion and neck supple.      Right lower leg: No edema.      Left lower leg: No edema.      Comments: Continues to ambulate with walker.   Lymphadenopathy:      Cervical: No cervical adenopathy.     Skin:     Coloration: Skin is not jaundiced.     Neurological:      Mental Status: She is alert.     Psychiatric:         Mood and Affect: Mood normal.         Behavior: Behavior normal.         Thought Content: Thought content normal.         Judgment: Judgment normal.          O-Z Flap Text: The defect edges were debeveled with a #15 scalpel blade.  Given the location of the defect, shape of the defect and the proximity to free margins an O-Z flap was deemed most appropriate.  Using a sterile surgical marker, an appropriate transposition flap was drawn incorporating the defect and placing the expected incisions within the relaxed skin tension lines where possible. The area thus outlined was incised deep to adipose tissue with a #15 scalpel blade.  The skin margins were undermined to an appropriate distance in all directions utilizing iris scissors.

## 2025-05-21 NOTE — ASSESSMENT & PLAN NOTE
Still on O2 3L - she has been off it a lot at home  On O2 - 97%  Off - 90-93%  Continue to monitor at home and try to wean as tolerated.  Since I last saw her, she has completed her prednisone taper, and upon ending, she did note a flare of autoimmune symptoms including fatigue and myalgias.  We discussed this at length today which she is in agreement of.  We will add a spacer for her to use with her inhalers.    Orders:  •  Spacer Device for Inhaler

## 2025-05-22 ENCOUNTER — HOME CARE VISIT (OUTPATIENT)
Dept: HOME HEALTH SERVICES | Facility: HOME HEALTHCARE | Age: 80
End: 2025-05-22
Payer: COMMERCIAL

## 2025-05-22 ENCOUNTER — PATIENT OUTREACH (OUTPATIENT)
Dept: CASE MANAGEMENT | Facility: OTHER | Age: 80
End: 2025-05-22

## 2025-05-22 VITALS
HEIGHT: 61 IN | WEIGHT: 166 LBS | HEART RATE: 76 BPM | OXYGEN SATURATION: 96 % | DIASTOLIC BLOOD PRESSURE: 66 MMHG | TEMPERATURE: 97.6 F | BODY MASS INDEX: 31.34 KG/M2 | SYSTOLIC BLOOD PRESSURE: 126 MMHG | RESPIRATION RATE: 18 BRPM

## 2025-05-22 PROCEDURE — G0152 HHCP-SERV OF OT,EA 15 MIN: HCPCS

## 2025-05-22 PROCEDURE — G0299 HHS/HOSPICE OF RN EA 15 MIN: HCPCS

## 2025-05-22 RX ORDER — ALBUTEROL SULFATE 0.83 MG/ML
2.5 SOLUTION RESPIRATORY (INHALATION) ONCE
Status: COMPLETED | OUTPATIENT
Start: 2025-05-22 | End: 2025-05-28

## 2025-05-22 NOTE — PROGRESS NOTES
Contacted patient for f/u.  She is receiving home health services but will be discharged soon.  She is managing well at home and denies needing additional assistance.  She is on oxygen at 3 L, reports she can go without oxygen for periods of time during the day.  She monitors pulse ox and it usually is 94-95%.  Once it goes down below 89% she will reapply oxygen.  She notes seeing her PO drop when she is more active.  She denies wheezing and has occasional cough.  She endorses dyspnea on exertion.  Patient is a 1 pack per day smoker and started wellbutrin today. She reports she started smoking when she was 13 and has attempted to quit in the past but  has been unsuccessful. Support and encouragement offered.  Her appetite has improved.  BS stable, FBS today 105.  Last pm reading 164.  She takes all medications as prescribed.  Using inhalers.  Follow up appointments scheduled.  She is using uber or lyft  for transportation and has friends who can also take her if needed.  She is agreeable to continued outreach.

## 2025-05-23 ENCOUNTER — HOME CARE VISIT (OUTPATIENT)
Dept: HOME HEALTH SERVICES | Facility: HOME HEALTHCARE | Age: 80
End: 2025-05-23
Payer: COMMERCIAL

## 2025-05-23 VITALS — HEART RATE: 105 BPM | OXYGEN SATURATION: 99 %

## 2025-05-23 PROCEDURE — G0151 HHCP-SERV OF PT,EA 15 MIN: HCPCS

## 2025-05-27 ENCOUNTER — HOME CARE VISIT (OUTPATIENT)
Dept: HOME HEALTH SERVICES | Facility: HOME HEALTHCARE | Age: 80
End: 2025-05-27
Payer: COMMERCIAL

## 2025-05-27 VITALS — OXYGEN SATURATION: 97 % | HEART RATE: 77 BPM

## 2025-05-27 PROCEDURE — G0151 HHCP-SERV OF PT,EA 15 MIN: HCPCS

## 2025-05-28 ENCOUNTER — TELEPHONE (OUTPATIENT)
Dept: FAMILY MEDICINE CLINIC | Facility: CLINIC | Age: 80
End: 2025-05-28

## 2025-05-28 ENCOUNTER — HOSPITAL ENCOUNTER (OUTPATIENT)
Dept: PULMONOLOGY | Facility: HOSPITAL | Age: 80
Discharge: HOME | End: 2025-05-28
Attending: INTERNAL MEDICINE
Payer: COMMERCIAL

## 2025-05-28 ENCOUNTER — HOSPITAL ENCOUNTER (OUTPATIENT)
Dept: CT IMAGING | Facility: HOSPITAL | Age: 80
Discharge: HOME | End: 2025-05-28
Attending: INTERNAL MEDICINE
Payer: COMMERCIAL

## 2025-05-28 DIAGNOSIS — R91.1 LUNG NODULE: ICD-10-CM

## 2025-05-28 DIAGNOSIS — J44.1 COPD EXACERBATION (HCC): ICD-10-CM

## 2025-05-28 PROCEDURE — 94618 PULMONARY STRESS TESTING: CPT | Performed by: INTERNAL MEDICINE

## 2025-05-28 PROCEDURE — 94618 PULMONARY STRESS TESTING: CPT

## 2025-05-28 PROCEDURE — 94729 DIFFUSING CAPACITY: CPT | Performed by: INTERNAL MEDICINE

## 2025-05-28 PROCEDURE — 94726 PLETHYSMOGRAPHY LUNG VOLUMES: CPT

## 2025-05-28 PROCEDURE — 94060 EVALUATION OF WHEEZING: CPT

## 2025-05-28 PROCEDURE — 94726 PLETHYSMOGRAPHY LUNG VOLUMES: CPT | Performed by: INTERNAL MEDICINE

## 2025-05-28 PROCEDURE — 94729 DIFFUSING CAPACITY: CPT

## 2025-05-28 PROCEDURE — 94060 EVALUATION OF WHEEZING: CPT | Performed by: INTERNAL MEDICINE

## 2025-05-28 PROCEDURE — 71250 CT THORAX DX C-: CPT

## 2025-05-28 RX ADMIN — ALBUTEROL SULFATE 2.5 MG: 2.5 SOLUTION RESPIRATORY (INHALATION) at 13:53

## 2025-05-28 NOTE — TELEPHONE ENCOUNTER
Melissa called in because she recvd a text from [facility] for another refill on Cefuroxime which [facility] said was called in on 5/23/25. I looked over her chart and told her Dr. Jessica called that in on 5/2/25 and nothing was called in on 5/23/25 so she can disregard

## 2025-06-01 ENCOUNTER — RA CDI HCC (OUTPATIENT)
Dept: OTHER | Facility: HOSPITAL | Age: 80
End: 2025-06-01

## 2025-06-03 ENCOUNTER — TELEPHONE (OUTPATIENT)
Dept: HOME HEALTH SERVICES | Facility: HOME HEALTHCARE | Age: 80
End: 2025-06-03

## 2025-06-04 ENCOUNTER — HOME CARE VISIT (OUTPATIENT)
Dept: HOME HEALTH SERVICES | Facility: HOME HEALTHCARE | Age: 80
End: 2025-06-04
Payer: COMMERCIAL

## 2025-06-05 ENCOUNTER — NURSE TRIAGE (OUTPATIENT)
Age: 80
End: 2025-06-05

## 2025-06-05 ENCOUNTER — HOSPITAL ENCOUNTER (EMERGENCY)
Facility: HOSPITAL | Age: 80
Discharge: HOME | End: 2025-06-05
Attending: EMERGENCY MEDICINE
Payer: COMMERCIAL

## 2025-06-05 ENCOUNTER — RESULTS FOLLOW-UP (OUTPATIENT)
Dept: PULMONOLOGY | Facility: CLINIC | Age: 80
End: 2025-06-05

## 2025-06-05 ENCOUNTER — APPOINTMENT (EMERGENCY)
Dept: RADIOLOGY | Facility: HOSPITAL | Age: 80
End: 2025-06-05
Payer: COMMERCIAL

## 2025-06-05 VITALS
SYSTOLIC BLOOD PRESSURE: 168 MMHG | DIASTOLIC BLOOD PRESSURE: 67 MMHG | HEART RATE: 68 BPM | RESPIRATION RATE: 17 BRPM | OXYGEN SATURATION: 93 % | TEMPERATURE: 97.8 F

## 2025-06-05 DIAGNOSIS — J44.1 COPD WITH ACUTE EXACERBATION (HCC): Primary | ICD-10-CM

## 2025-06-05 DIAGNOSIS — Z72.0 TOBACCO ABUSE: ICD-10-CM

## 2025-06-05 LAB
2HR DELTA HS TROPONIN: -1 NG/L
ANION GAP SERPL CALCULATED.3IONS-SCNC: 11 MMOL/L (ref 4–13)
ATRIAL RATE: 74 BPM
ATRIAL RATE: 76 BPM
BASOPHILS # BLD AUTO: 0.03 THOUSANDS/ÂΜL (ref 0–0.1)
BASOPHILS NFR BLD AUTO: 1 % (ref 0–1)
BUN SERPL-MCNC: 18 MG/DL (ref 5–25)
CALCIUM SERPL-MCNC: 9.4 MG/DL (ref 8.4–10.2)
CARDIAC TROPONIN I PNL SERPL HS: 3 NG/L (ref ?–50)
CARDIAC TROPONIN I PNL SERPL HS: 4 NG/L (ref ?–50)
CHLORIDE SERPL-SCNC: 106 MMOL/L (ref 96–108)
CO2 SERPL-SCNC: 23 MMOL/L (ref 21–32)
CREAT SERPL-MCNC: 0.6 MG/DL (ref 0.6–1.3)
EOSINOPHIL # BLD AUTO: 0.12 THOUSAND/ÂΜL (ref 0–0.61)
EOSINOPHIL NFR BLD AUTO: 2 % (ref 0–6)
ERYTHROCYTE [DISTWIDTH] IN BLOOD BY AUTOMATED COUNT: 15.6 % (ref 11.6–15.1)
GFR SERPL CREATININE-BSD FRML MDRD: 86 ML/MIN/1.73SQ M
GLUCOSE SERPL-MCNC: 111 MG/DL (ref 65–140)
HCT VFR BLD AUTO: 38.7 % (ref 34.8–46.1)
HGB BLD-MCNC: 12.3 G/DL (ref 11.5–15.4)
IMM GRANULOCYTES # BLD AUTO: 0.03 THOUSAND/UL (ref 0–0.2)
IMM GRANULOCYTES NFR BLD AUTO: 1 % (ref 0–2)
LYMPHOCYTES # BLD AUTO: 1.5 THOUSANDS/ÂΜL (ref 0.6–4.47)
LYMPHOCYTES NFR BLD AUTO: 24 % (ref 14–44)
MCH RBC QN AUTO: 27.8 PG (ref 26.8–34.3)
MCHC RBC AUTO-ENTMCNC: 31.8 G/DL (ref 31.4–37.4)
MCV RBC AUTO: 88 FL (ref 82–98)
MONOCYTES # BLD AUTO: 0.39 THOUSAND/ÂΜL (ref 0.17–1.22)
MONOCYTES NFR BLD AUTO: 6 % (ref 4–12)
NEUTROPHILS # BLD AUTO: 4.23 THOUSANDS/ÂΜL (ref 1.85–7.62)
NEUTS SEG NFR BLD AUTO: 66 % (ref 43–75)
NRBC BLD AUTO-RTO: 0 /100 WBCS
P AXIS: 61 DEGREES
P AXIS: 64 DEGREES
PLATELET # BLD AUTO: 248 THOUSANDS/UL (ref 149–390)
PMV BLD AUTO: 10.3 FL (ref 8.9–12.7)
POTASSIUM SERPL-SCNC: 4 MMOL/L (ref 3.5–5.3)
PR INTERVAL: 126 MS
PR INTERVAL: 158 MS
QRS AXIS: 64 DEGREES
QRS AXIS: 74 DEGREES
QRSD INTERVAL: 78 MS
QRSD INTERVAL: 80 MS
QT INTERVAL: 378 MS
QT INTERVAL: 386 MS
QTC INTERVAL: 419 MS
QTC INTERVAL: 431 MS
RBC # BLD AUTO: 4.42 MILLION/UL (ref 3.81–5.12)
SODIUM SERPL-SCNC: 140 MMOL/L (ref 135–147)
T WAVE AXIS: 51 DEGREES
T WAVE AXIS: 65 DEGREES
VENTRICULAR RATE: 74 BPM
VENTRICULAR RATE: 75 BPM
WBC # BLD AUTO: 6.3 THOUSAND/UL (ref 4.31–10.16)

## 2025-06-05 PROCEDURE — 99285 EMERGENCY DEPT VISIT HI MDM: CPT | Performed by: EMERGENCY MEDICINE

## 2025-06-05 PROCEDURE — 99285 EMERGENCY DEPT VISIT HI MDM: CPT

## 2025-06-05 PROCEDURE — 85025 COMPLETE CBC W/AUTO DIFF WBC: CPT | Performed by: EMERGENCY MEDICINE

## 2025-06-05 PROCEDURE — 96375 TX/PRO/DX INJ NEW DRUG ADDON: CPT

## 2025-06-05 PROCEDURE — 93005 ELECTROCARDIOGRAM TRACING: CPT

## 2025-06-05 PROCEDURE — 96374 THER/PROPH/DIAG INJ IV PUSH: CPT

## 2025-06-05 PROCEDURE — 36415 COLL VENOUS BLD VENIPUNCTURE: CPT | Performed by: EMERGENCY MEDICINE

## 2025-06-05 PROCEDURE — 94640 AIRWAY INHALATION TREATMENT: CPT

## 2025-06-05 PROCEDURE — 80048 BASIC METABOLIC PNL TOTAL CA: CPT | Performed by: EMERGENCY MEDICINE

## 2025-06-05 PROCEDURE — 93010 ELECTROCARDIOGRAM REPORT: CPT

## 2025-06-05 PROCEDURE — 84484 ASSAY OF TROPONIN QUANT: CPT | Performed by: EMERGENCY MEDICINE

## 2025-06-05 PROCEDURE — 71045 X-RAY EXAM CHEST 1 VIEW: CPT

## 2025-06-05 RX ORDER — ALBUTEROL SULFATE 0.83 MG/ML
2.5 SOLUTION RESPIRATORY (INHALATION) EVERY 6 HOURS PRN
Qty: 75 ML | Refills: 0 | Status: SHIPPED | OUTPATIENT
Start: 2025-06-05 | End: 2025-06-10

## 2025-06-05 RX ORDER — ALBUTEROL SULFATE 0.83 MG/ML
5 SOLUTION RESPIRATORY (INHALATION) ONCE
Status: COMPLETED | OUTPATIENT
Start: 2025-06-05 | End: 2025-06-05

## 2025-06-05 RX ORDER — PREDNISONE 20 MG/1
TABLET ORAL
Qty: 15 TABLET | Refills: 0 | Status: SHIPPED | OUTPATIENT
Start: 2025-06-05

## 2025-06-05 RX ORDER — ONDANSETRON 2 MG/ML
4 INJECTION INTRAMUSCULAR; INTRAVENOUS ONCE
Status: COMPLETED | OUTPATIENT
Start: 2025-06-05 | End: 2025-06-05

## 2025-06-05 RX ORDER — METHYLPREDNISOLONE SODIUM SUCCINATE 125 MG/2ML
80 INJECTION, POWDER, LYOPHILIZED, FOR SOLUTION INTRAMUSCULAR; INTRAVENOUS ONCE
Status: COMPLETED | OUTPATIENT
Start: 2025-06-05 | End: 2025-06-05

## 2025-06-05 RX ADMIN — IPRATROPIUM BROMIDE 0.5 MG: 0.5 SOLUTION RESPIRATORY (INHALATION) at 11:49

## 2025-06-05 RX ADMIN — METHYLPREDNISOLONE SODIUM SUCCINATE 80 MG: 125 INJECTION, POWDER, FOR SOLUTION INTRAMUSCULAR; INTRAVENOUS at 11:49

## 2025-06-05 RX ADMIN — ALBUTEROL SULFATE 5 MG: 2.5 SOLUTION RESPIRATORY (INHALATION) at 11:49

## 2025-06-05 RX ADMIN — ONDANSETRON 4 MG: 2 INJECTION INTRAMUSCULAR; INTRAVENOUS at 11:56

## 2025-06-05 NOTE — ED PROVIDER NOTES
Time reflects when diagnosis was documented in both MDM as applicable and the Disposition within this note       Time User Action Codes Description Comment    6/5/2025  2:15 PM Lucie Hebert Add [J44.1] COPD with acute exacerbation (HCC)           ED Disposition       ED Disposition   Discharge    Condition   Good    Date/Time   Thu Jun 5, 2025  2:15 PM    Comment   Melissa Nguyen discharge to home/self care.                   Assessment & Plan       Medical Decision Making  79-year-old female with history of COPD chronically on 3 L of O2 presents to the ED with worsening shortness of breath for the last 2 days.  She initially thought it was secondary to the Wellbutrin which she stopped, however the shortness of breath became worse.  No chest pain or URI symptoms.  She has not had increase her oxygen.  No lower extremity edema.  No known history of CAD or CHF.  On exam she looks well she is in no distress with no conversational dyspnea.  She does have some scattered diffuse wheezing on exam but no respiratory distress.  Likely COPD exacerbation but will do cardiac workup rule out CHF/pneumonia on chest x-ray.  Will give DuoNeb and Solu-Medrol.  Will reassess    Amount and/or Complexity of Data Reviewed  Labs: ordered. Decision-making details documented in ED Course.  Radiology: ordered and independent interpretation performed.    Risk  Prescription drug management.        ED Course as of 06/05/25 1417   Thu Jun 05, 2025   1137 Blood Pressure: 156/70   1137 Temperature: 97.8 °F (36.6 °C)   1137 Pulse: 78   1137 Respirations: 19   1137 SpO2: 94 %   1206 WBC: 6.30   1206 Hemoglobin: 12.3   1314 hs TnI 0hr: 4   1314 Sodium: 140   1314 Potassium: 4.0   1314 Creatinine: 0.60   1314 GLUCOSE: 111   1358 Patient feeling much better.  Lungs clear   1414 hs TnI 2hr: 3   1414 Delta 2hr hsTnI: -1       Medications   albuterol inhalation solution 5 mg (5 mg Nebulization Given 6/5/25 1149)   ipratropium (ATROVENT) 0.02 %  inhalation solution 0.5 mg (0.5 mg Nebulization Given 6/5/25 1149)   methylPREDNISolone sodium succinate (Solu-MEDROL) injection 80 mg (80 mg Intravenous Given 6/5/25 1149)   ondansetron (ZOFRAN) injection 4 mg (4 mg Intravenous Given 6/5/25 1156)       ED Risk Strat Scores   HEART Risk Score      Flowsheet Row Most Recent Value   Heart Score Risk Calculator    History 0 Filed at: 06/05/2025 1414   ECG 0 Filed at: 06/05/2025 1414   Age 2 Filed at: 06/05/2025 1414   Risk Factors 1 Filed at: 06/05/2025 1414   Troponin 0 Filed at: 06/05/2025 1414   HEART Score 3 Filed at: 06/05/2025 1414          HEART Risk Score      Flowsheet Row Most Recent Value   Heart Score Risk Calculator    History 0 Filed at: 06/05/2025 1414   ECG 0 Filed at: 06/05/2025 1414   Age 2 Filed at: 06/05/2025 1414   Risk Factors 1 Filed at: 06/05/2025 1414   Troponin 0 Filed at: 06/05/2025 1414   HEART Score 3 Filed at: 06/05/2025 1414                      No data recorded        SBIRT 22yo+      Flowsheet Row Most Recent Value   Initial Alcohol Screen: US AUDIT-C     1. How often do you have a drink containing alcohol? 0 Filed at: 06/05/2025 1112   2. How many drinks containing alcohol do you have on a typical day you are drinking?  0 Filed at: 06/05/2025 1112   3b. FEMALE Any Age, or MALE 65+: How often do you have 4 or more drinks on one occassion? 0 Filed at: 06/05/2025 1112   Audit-C Score 0 Filed at: 06/05/2025 1112   CLEVE: How many times in the past year have you...    Used an illegal drug or used a prescription medication for non-medical reasons? Never Filed at: 06/05/2025 1112                            History of Present Illness       Chief Complaint   Patient presents with    Shortness of Breath     Sob & shakiness (also palpitations & dizziness) starting soon after taking wellbutrin on 5/22, stopped the meds and felt better, then re-occurred so called pcp and told to come in. Hx of copd. Chronically wears 3L at night and was told by pulp  to experiment w not wearing it all the time during the day        Past Medical History[1]   Past Surgical History[2]   Family History[3]   Social History[4]   E-Cigarette/Vaping    E-Cigarette Use Never User       E-Cigarette/Vaping Substances    Nicotine No     THC No     CBD No     Flavoring No     Other No     Unknown No       I have reviewed and agree with the history as documented.     79-year-old female with history of hypertension, diabetes, COPD chronically on 3 L of O2, recently diagnosed Lyme disease presents to the ED complaining of a 2-day history of increasing shortness of breath over baseline.  Patient was started on Wellbutrin in 5/22 for cessation of smoking.  When she started with the shortness of breath she stopped the Wellbutrin and felt like she was getting better, however last night the shortness of breath became worse.  She has not had to increase her oxygen and her sats have been stable.  She feels it is worse with exertion.  No chest pain or URI symptoms.  No lower extremity edema.  Patient was hospitalized at the end of April for COPD exacerbation.  She states that was her first hospitalization      History provided by:  Patient   used: No    Shortness of Breath  Onset quality:  Gradual  Duration:  2 days  Timing:  Constant  Progression:  Worsening  Chronicity:  Recurrent  Context: activity    Context: not URI    Relieved by:  Nothing  Worsened by:  Activity  Ineffective treatments:  Inhaler  Associated symptoms: wheezing    Associated symptoms: no abdominal pain, no chest pain, no claudication, no cough, no diaphoresis, no ear pain, no fever, no headaches, no hemoptysis, no PND, no rash, no sore throat, no sputum production, no syncope, no swollen glands and no vomiting    Risk factors: tobacco use    Risk factors: no hx of cancer, no hx of PE/DVT, no obesity, no prolonged immobilization and no recent surgery        Review of Systems   Constitutional: Negative.  Negative  for activity change, appetite change, chills, diaphoresis, fatigue and fever.   HENT: Negative.  Negative for congestion, ear pain, rhinorrhea and sore throat.    Eyes: Negative.  Negative for discharge, redness and itching.   Respiratory:  Positive for shortness of breath and wheezing. Negative for apnea, cough, hemoptysis, sputum production, chest tightness and stridor.    Cardiovascular:  Negative for chest pain, palpitations, claudication, leg swelling, syncope and PND.   Gastrointestinal: Negative.  Negative for abdominal pain and vomiting.   Endocrine: Negative.    Genitourinary: Negative.  Negative for flank pain, frequency and urgency.   Musculoskeletal: Negative.  Negative for back pain.   Skin: Negative.  Negative for rash.   Allergic/Immunologic: Negative.    Neurological: Negative.  Negative for dizziness, syncope, weakness, light-headedness, numbness and headaches.   Hematological: Negative.    All other systems reviewed and are negative.          Objective       ED Triage Vitals [06/05/25 1112]   Temperature Pulse Blood Pressure Respirations SpO2 Patient Position - Orthostatic VS   97.8 °F (36.6 °C) 78 156/70 19 94 % Sitting      Temp Source Heart Rate Source BP Location FiO2 (%) Pain Score    Oral Monitor Right arm -- --      Vitals      Date and Time Temp Pulse SpO2 Resp BP Pain Score FACES Pain Rating User   06/05/25 1330 -- 68 93 % 17 168/67 -- -- SHANDRA   06/05/25 1112 97.8 °F (36.6 °C) 78 94 % 19 156/70 -- -- SHANDRA            Physical Exam  Vitals and nursing note reviewed.   Constitutional:       General: She is awake. She is not in acute distress.     Appearance: Normal appearance. She is well-developed and overweight. She is not ill-appearing, toxic-appearing or diaphoretic.   HENT:      Head: Normocephalic and atraumatic.      Right Ear: External ear normal.      Left Ear: External ear normal.      Nose: Nose normal.     Eyes:      General: No scleral icterus.        Right eye: No discharge.          Left eye: No discharge.      Conjunctiva/sclera: Conjunctivae normal.     Neck:      Thyroid: No thyromegaly.      Vascular: No JVD.      Trachea: No tracheal deviation.     Cardiovascular:      Rate and Rhythm: Normal rate and regular rhythm.      Heart sounds: Normal heart sounds. No murmur heard.     No friction rub. No gallop.   Pulmonary:      Effort: Pulmonary effort is normal. No tachypnea, accessory muscle usage or respiratory distress.      Breath sounds: No stridor. Examination of the right-upper field reveals wheezing. Examination of the left-upper field reveals wheezing. Examination of the right-middle field reveals wheezing. Examination of the left-middle field reveals wheezing. Examination of the right-lower field reveals wheezing. Examination of the left-lower field reveals wheezing. Wheezing present. No rhonchi or rales.      Comments: No conversational dyspnea  Chest:      Chest wall: No tenderness.   Abdominal:      General: Bowel sounds are normal. There is no distension.      Palpations: Abdomen is soft. There is no mass.      Tenderness: There is no abdominal tenderness.      Hernia: No hernia is present.     Musculoskeletal:         General: No tenderness or deformity.      Right lower leg: No edema.      Left lower leg: No edema.     Skin:     General: Skin is warm and dry.      Coloration: Skin is not jaundiced or pale.      Findings: No bruising, erythema, lesion or rash.     Neurological:      General: No focal deficit present.      Mental Status: She is alert and oriented to person, place, and time.      Motor: No weakness or abnormal muscle tone.      Deep Tendon Reflexes: Reflexes are normal and symmetric.     Psychiatric:         Mood and Affect: Mood normal.         Behavior: Behavior is cooperative.         Results Reviewed       Procedure Component Value Units Date/Time    HS Troponin I 2hr [[id number]]  (Normal) Collected: 06/05/25 1329    Lab Status: Final result Specimen: Blood  from Arm, Right Updated: 06/05/25 1359     hs TnI 2hr 3 ng/L      Delta 2hr hsTnI -1 ng/L     HS Troponin 0hr (reflex protocol) [[id number]]  (Normal) Collected: 06/05/25 1149    Lab Status: Final result Specimen: Blood from Arm, Right Updated: 06/05/25 1223     hs TnI 0hr 4 ng/L     HS Troponin I 4hr [[id number]]     Lab Status: No result Specimen: Blood     Basic metabolic panel [[id number]] Collected: 06/05/25 1149    Lab Status: Final result Specimen: Blood from Arm, Right Updated: 06/05/25 1221     Sodium 140 mmol/L      Potassium 4.0 mmol/L      Chloride 106 mmol/L      CO2 23 mmol/L      ANION GAP 11 mmol/L      BUN 18 mg/dL      Creatinine 0.60 mg/dL      Glucose 111 mg/dL      Calcium 9.4 mg/dL      eGFR 86 ml/min/1.73sq m     Narrative:      National Kid[employer] Foundation guidelines for Chronic Kidney Disease (CKD):     Stage 1 with normal or high GFR (GFR > 90 mL/min/1.73 square meters)    Stage 2 Mild CKD (GFR = 60-89 mL/min/1.73 square meters)    Stage 3A Moderate CKD (GFR = 45-59 mL/min/1.73 square meters)    Stage 3B Moderate CKD (GFR = 30-44 mL/min/1.73 square meters)    Stage 4 Severe CKD (GFR = 15-29 mL/min/1.73 square meters)    Stage 5 End Stage CKD (GFR <15 mL/min/1.73 square meters)  Note: GFR calculation is accurate only with a steady state creatinine    CBC and differential [[id number]]  (Abnormal) Collected: 06/05/25 1149    Lab Status: Final result Specimen: Blood from Arm, Right Updated: 06/05/25 1200     WBC 6.30 Thousand/uL      RBC 4.42 Million/uL      Hemoglobin 12.3 g/dL      Hematocrit 38.7 %      MCV 88 fL      MCH 27.8 pg      MCHC 31.8 g/dL      RDW 15.6 %      MPV 10.3 fL      Platelets 248 Thousands/uL      nRBC 0 /100 WBCs      Segmented % 66 %      Immature Grans % 1 %      Lymphocytes % 24 %      Monocytes % 6 %      Eosinophils Relative 2 %      Basophils Relative 1 %      Absolute Neutrophils 4.23 Thousands/µL      Absolute Immature Grans 0.03 Thousand/uL      Absolute  Lymphocytes 1.50 Thousands/µL      Absolute Monocytes 0.39 Thousand/µL      Eosinophils Absolute 0.12 Thousand/µL      Basophils Absolute 0.03 Thousands/µL             XR chest 1 view portable   ED Interpretation by Lucie Hebert DO (06/05 1324)   No infiltrate          ECG 12 Lead Documentation Only    Date/Time: 6/5/2025 12:05 PM    Performed by: Lucie Hebert DO  Authorized by: Lucie Hebert DO    Indications / Diagnosis:  Shortness of breath  ECG reviewed by me, the ED Provider: yes    Patient location:  ED  Interpretation:     Interpretation: normal    Rate:     ECG rate:  74    ECG rate assessment: normal    Rhythm:     Rhythm: sinus rhythm    Ectopy:     Ectopy: none    Conduction:     Conduction: normal    ST segments:     ST segments:  Normal  T waves:     T waves: normal        ED Medication and Procedure Management   Prior to Admission Medications   Prescriptions Last Dose Informant Patient Reported? Taking?   Bacillus Coagulans-Inulin (PROBIOTIC FORMULA PO)  Self Yes No   Sig: Take 1 Capful by mouth in the morning   Biotin 74886 MCG TABS  Self Yes No   Sig: Take 1 tablet by mouth in the morning.   Cholecalciferol (VITAMIN D3) 1,000 units tablet  Self Yes No   Sig: Take 4,000 Units by mouth in the morning.   Digestive Enzyme CAPS  Self Yes No   Sig: Take 1 capsule by mouth in the morning   Probiotic Product (PROBIOTIC-10) CAPS  Self Yes No   Sig: Take by mouth   Spiriva Respimat 2.5 MCG/ACT AERS inhaler  Self No No   Sig: Inhale 2 puffs daily 3 inhalers   acetaminophen (TYLENOL) 325 mg tablet  Self No No   Sig: Take 2 tablets (650 mg total) by mouth every 6 (six) hours as needed for mild pain   albuterol (PROVENTIL HFA,VENTOLIN HFA) 90 mcg/act inhaler  Self No No   Sig: INHALE 1-2 PUFFS AS NEEDED FOR WHEEZING   buPROPion (WELLBUTRIN) 75 mg tablet  Self No No   Sig: Take 1 tablet (75 mg total) by mouth in the morning   budesonide-formoterol (Symbicort) 160-4.5 mcg/act inhaler  Self  No No   Sig: Inhale 2 puffs 2 (two) times a day   fluticasone-umeclidinium-vilanterol (Trelegy Ellipta) 200-62.5-25 mcg/actuation AEPB inhaler  Self No No   Sig: Inhale 1 puff daily Rinse mouth after use.   lansoprazole (PREVACID) 30 mg capsule  Self No No   Sig: Take 1 capsule (30 mg total) by mouth daily 1 tab po BID   metFORMIN (GLUCOPHAGE) 500 mg tablet  Self No No   Sig: TAKE 1 TABLET BY MOUTH EVERY DAY WITH BREAKFAST   ondansetron (ZOFRAN) 4 mg tablet  Self No No   Sig: Take 1 tablet (4 mg total) by mouth every 12 (twelve) hours as needed for nausea or vomiting   oxygen gas  Self Yes No   Sig: Inhale 3 L/min continuous nasal cannula   polyethylene glycol (GLYCOLAX) 17 GM/SCOOP powder  Self Yes No   Sig: Take 17 g by mouth as needed (CONSTIPATION) DISSOLVE IN 8OZ FLUID OF CHOICE   rosuvastatin (CRESTOR) 5 mg tablet  Self No No   Sig: TAKE 1 TABLET BY MOUTH EVERY OTHER DAY      Facility-Administered Medications Last Administration Doses Remaining   cyanocobalamin injection 1,000 mcg 5/21/2025 11:36 AM 6        Patient's Medications   Discharge Prescriptions    ALBUTEROL (2.5 MG/3 ML) 0.083 % NEBULIZER SOLUTION    Take 3 mL (2.5 mg total) by nebulization every 6 (six) hours as needed for wheezing or shortness of breath       Start Date: 6/5/2025  End Date: --       Order Dose: 2.5 mg       Quantity: 75 mL    Refills: 0    PREDNISONE 20 MG TABLET    Take 3 tabs daily       Start Date: 6/5/2025  End Date: --       Order Dose: --       Quantity: 15 tablet    Refills: 0     Outpatient Discharge Orders   Nebulizer     ED SEPSIS DOCUMENTATION   Time reflects when diagnosis was documented in both MDM as applicable and the Disposition within this note       Time User Action Codes Description Comment    6/5/2025  2:15 PM Lucie Hebert Add [J44.1] COPD with acute exacerbation (HCC)                    Lucie Hebert DO  06/05/25 1206       [1]   Past Medical History:  Diagnosis Date    Allergic     Anemia 1970s     Aneurysm (HCC)     Arthritis     Back pain     Carotid artery occlusion     Mild.    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  Symptoms are somewhat vague for this.    History of transfusion 1980    During hysterectomy & appendectomy.    Hypertension     Recent- being moderated.    Infectious viral hepatitis     A-1969, C- treated 2012/2013    Low back pain 1980s    Mainly in S/I joint area and hips.    Lyme disease     Osteoporosis     Polymyalgia rheumatica (HCC)     last assessed 2/15/17    Reactive airway disease     last assessed 6/9/16    Sciatica    [2]   Past Surgical History:  Procedure Laterality Date    APPENDECTOMY  2/8/1980    With hysterectomy, both due to infection from IUD..    BREAST BIOPSY Left     HYSTERECTOMY      1987, total    KNEE SURGERY  1986    ORTHOPEDIC SURGERY      TONSILLECTOMY  ? 1950's   [3]   Family History  Problem Relation Name Age of Onset    Thyroid cancer Mother Meagan 53    Dementia Mother Meagan         Also 3 of her 5 siblings when in their late 80s to early 90s. Mom diagnosed at age 76.    Arthritis Mother Meagan         All of my Mother's 5 siblings and some of their children had either osteo and/or RA, very severe.    Cancer Mother Meagan         Thyroid -surgery and recovered.    Osteoarthritis Mother Meagan     Rashes / Skin problems Mother Meagan     COPD Father Adonia         Father    Hypertension Father Adonia     Stroke Father Adonia     Depression Father Adonia     No Known Problems Maternal Grandmother      Colon cancer Maternal Grandfather  90    No Known Problems Paternal Grandmother      No  Known Problems Paternal Grandfather      Breast cancer Maternal Aunt Celia 55    Cancer Maternal Aunt Celia         Breast cancer - treated and recovered.    No Known Problems Maternal Aunt      No Known Problems Paternal Aunt      Arthritis Brother Esvin         RA since .    Rheum arthritis Brother Esvin     Rheum arthritis Brother Esvin         Brother   [4]   Social History  Tobacco Use    Smoking status: Former     Current packs/day: 0.00     Types: Cigarettes     Quit date: 2025     Years since quittin.0    Smokeless tobacco: Never    Tobacco comments:     Little less than a pack    Vaping Use    Vaping status: Never Used   Substance Use Topics    Alcohol use: Never    Drug use: No        Lucie Hebert, DO  [phone]

## 2025-06-05 NOTE — TELEPHONE ENCOUNTER
Requested Prescriptions     Pending Prescriptions Disp Refills    buPROPion (WELLBUTRIN) 75 mg tablet [Pharmacy Med Name: BUPROPION HCL 75 MG TABLET] 90 tablet 1     Sig: TAKE 1 TABLET (75 MG TOTAL) BY MOUTH IN THE MORNING

## 2025-06-05 NOTE — TELEPHONE ENCOUNTER
Lmom for patient to see how she is doing and to contact our office if needed after discharge. If not she is scheduled to come in on 6/10/25

## 2025-06-05 NOTE — TELEPHONE ENCOUNTER
Patient called and stated that the wellbutrin may have cause her  to be  Short of breath last night and nauseous.  She was warm transferred to [facility].

## 2025-06-05 NOTE — TELEPHONE ENCOUNTER
"REASON FOR CONVERSATION: Shortness of Breath    SYMPTOMS: Moderate to severe SOB started 2 days ago. Comes and goes.  States that \"Feel like I can't catch my breath and have to really work at getting a full breath\". Also reports nausea and dizziness    OTHER HEALTH INFORMATION: Recently started on Wellbutrin 5/22.  Thought that this was a side effect of medication so she stopped taking Wellbutrin on 6/3.  History of COPD, recently hospitalized for COPD exacerbation was discharged home 5/6.    PROTOCOL DISPOSITION: Go to ED Now    CARE ADVICE PROVIDED: Instructed pateint to go to ER for evaluation.  She states that she will call for ambulance transport.    PRACTICE FOLLOW-UP: None needed at this time.  Patient instructed to call office for follow up visit once home from ER.     Reason for Disposition   MODERATE difficulty breathing (e.g., speaks in phrases, SOB even at rest, pulse 100-120) of new-onset or worse than normal    Answer Assessment - Initial Assessment Questions  1. RESPIRATORY STATUS: \"Describe your breathing?\" (e.g., wheezing, shortness of breath, unable to speak, severe coughing)       \"Feel like I can't catch my breath and have to really work at getting a full breath\"  2. ONSET: \"When did this breathing problem begin?\"       2 days ago  3. PATTERN \"Does the difficult breathing come and go, or has it been constant since it started?\"       Comes and goes.    4. SEVERITY: \"How bad is your breathing?\" (e.g., mild, moderate, severe)       Currently moderate to mild, during the night moderate to severe  5. RECURRENT SYMPTOM: \"Have you had difficulty breathing before?\" If Yes, ask: \"When was the last time?\" and \"What happened that time?\"       Yes, has history of COPD with recent hospitalization for exacerbation  6. CARDIAC HISTORY: \"Do you have any history of heart disease?\" (e.g., heart attack, angina, bypass surgery, angioplasty)       Coronary artery calcification  7. LUNG HISTORY: \"Do you have any " "history of lung disease?\"  (e.g., pulmonary embolus, asthma, emphysema)      COPD, Hypoxemic resp failure  8. CAUSE: \"What do you think is causing the breathing problem?\"       Thinks that it is a side effect from Wellbutrin but isn't sure  9. OTHER SYMPTOMS: \"Do you have any other symptoms?\" (e.g., chest pain, cough, dizziness, fever, runny nose)      Nausea, dizziness on and off but has history of of vertigo  10. O2 SATURATION MONITOR:  \"Do you use an oxygen saturation monitor (pulse oximeter) at home?\" If Yes, ask: \"What is your reading (oxygen level) today?\" \"What is your usual oxygen saturation reading?\" (e.g., 95%)        94% during call    Protocols used: Breathing Difficulty-Adult-[place]    "

## 2025-06-05 NOTE — ED NOTES
Patient initially 94% on room air. Has now dropped to 88% on room air, placed on 3L nasal cannula which she normally wears at night and intermittently through the day and oxygen saturation has increased to 94%.      Mauri Calero RN  06/05/25 1388

## 2025-06-06 ENCOUNTER — HOME CARE VISIT (OUTPATIENT)
Dept: HOME HEALTH SERVICES | Facility: HOME HEALTHCARE | Age: 80
End: 2025-06-06

## 2025-06-06 ENCOUNTER — PATIENT OUTREACH (OUTPATIENT)
Dept: CASE MANAGEMENT | Facility: OTHER | Age: 80
End: 2025-06-06

## 2025-06-06 NOTE — Clinical Note
I spoke with her again today. She was confused because she had an ED visit after our conversation so wasn't sure if she needed more PT. All is well and all her questions were answered.      ----- Message -----  From: Vy Clancy PT  Sent: 6/9/2025   3:26 PM EDT  To: Kaylin Jacques PT      Kaylin, can you give this patient a call to see if she has questions about the DC or needs an in home visit.  Thanks  ----- Message -----  From: Vy Milton PT  Sent: 6/9/2025   8:08 AM EDT  To: Supriya Brasher RN; Vy Clancy PT; Petr*      ----- Message -----  From: Supriya Brasher RN  Sent: 6/6/2025   6:48 PM EDT  To: Supriya Brasher RN; Kaylin Jacques, PT; Sheryl*    1838 ... Received a call from pt who has questions about phone d/c vs in home vs.  Requesting she receive call on Monday 6/9/25 regarding the same.

## 2025-06-06 NOTE — CASE COMMUNICATION
1838 ... Received a call from pt who has questions about phone d/c vs in home vs.  Requesting she receive call on Monday 6/9/25 regarding the same.

## 2025-06-06 NOTE — TELEPHONE ENCOUNTER
Dr Jessica.  Patient was triaged by a nurse and was given advice to go to Emergency Room. The appropriate workflow was followed. You got Triage documentation as an FYI      CARE ADVICE PROVIDED: Instructed pateint to go to ER for evaluation.  She states that she will call for ambulance transport.

## 2025-06-06 NOTE — PROGRESS NOTES
Received ADT alert patient presented to ED yesterday with copd exacerbation.  Patient was taking wellbutrin and recently stopped it due to shortness of breath.  That improved and then a day ago she started again with sob and went to the ED.  She is on oxygen at 3L, SpO2 94-95%.  Patient denies any breathing difficulties, sob, wheezing or cough.  She endorses nausea for the last few days.  She is taking zofran every 12 hours.  She is tolerating a light diet, tea and toast and able to take medications.  BS are in the 130 range.  She was given prednisone 20 mg 3 times a day for 5 days but is concerned she won't be able to tolerate that dose.  She explained she reacts to medications, has a weak stomach.  She is tapering the amount of cigarettes she smokes by week and is down to 8 cigarettes a day.  Support and encouragement offered.  She is using nebulizer treatments and taking inhalers and medications.  She has PCP f/u on Tuesday. She reports having a good support system around her and denies needing help at home.  Agreeable to continued outreach.

## 2025-06-08 RX ORDER — BUPROPION HYDROCHLORIDE 75 MG/1
75 TABLET ORAL DAILY
Qty: 90 TABLET | Refills: 1 | Status: SHIPPED | OUTPATIENT
Start: 2025-06-08 | End: 2025-06-10

## 2025-06-09 NOTE — ASSESSMENT & PLAN NOTE
Down to 9 cig per day  Wellbutrin discontinued - seems to not tolerate side effects  She is not on any NicoDerm patch at present, but will order 1 at 14 mg per 24 hours.  Orders:  •  nicotine (NICODERM CQ) 14 mg/24hr TD 24 hr patch; Place 1 patch on the skin every 24 hours over 24 hours

## 2025-06-09 NOTE — ASSESSMENT & PLAN NOTE
Pt was referred to the ER on 6/5 when she called complaining of worsening SOB.  Xray showed no acute changes.  CT from 5/28 was stable.  Pulmonary  - Dr. Madrigal.  She was given IV solumedrol, alb / atrovent inhaler and zofran and discharged in improved condition. Troponins negative.  She remains on 3L nasal O2 -using at home as needed and mostly with physical activity.  I have asked her to wean down to 2 L.  Discharged from the ER on Pred 60mg daily for 5 days - never started it so we will remain off for now, as she is doing well.  She is encouraged to use her albuterol nebulizer -she complains of tachycardia with use and headache.  I have given her prescription for Xopenex to use instead.  Orders:  •  levalbuterol (Xopenex) 0.31 mg/3 mL nebulizer solution; Take 3 mL (0.31 mg total) by nebulization every 4 (four) hours as needed for wheezing

## 2025-06-09 NOTE — ASSESSMENT & PLAN NOTE
Remains on metformin 500mg daily  A1C is stable.  Lab Results   Component Value Date    HGBA1C 5.9 04/23/2025

## 2025-06-09 NOTE — ASSESSMENT & PLAN NOTE
Nausea has been secondary to oral prednisone and she is using zofran as needed  She remains on PPI which is helping with GI prophylaxis.  She will taper the Zofran as needed.  Orders:  •  ondansetron (ZOFRAN) 4 mg tablet; Take 1 tablet (4 mg total) by mouth every 12 (twelve) hours as needed for nausea or vomiting

## 2025-06-09 NOTE — ASSESSMENT & PLAN NOTE
She has days of fatigue after overuse,relieved with rest. Perhaps this is due more to underlying COPD?  She has tried different meds / biologics without tolerance  F/U with Dr. Reynolds if needed.

## 2025-06-10 ENCOUNTER — OFFICE VISIT (OUTPATIENT)
Dept: FAMILY MEDICINE CLINIC | Facility: CLINIC | Age: 80
End: 2025-06-10
Payer: COMMERCIAL

## 2025-06-10 VITALS
WEIGHT: 170 LBS | DIASTOLIC BLOOD PRESSURE: 72 MMHG | BODY MASS INDEX: 32.1 KG/M2 | HEART RATE: 74 BPM | HEIGHT: 61 IN | SYSTOLIC BLOOD PRESSURE: 148 MMHG | OXYGEN SATURATION: 96 %

## 2025-06-10 DIAGNOSIS — E11.9 CONTROLLED TYPE 2 DIABETES MELLITUS WITHOUT COMPLICATION, WITHOUT LONG-TERM CURRENT USE OF INSULIN (HCC): ICD-10-CM

## 2025-06-10 DIAGNOSIS — H81.10 BENIGN PAROXYSMAL POSITIONAL VERTIGO, UNSPECIFIED LATERALITY: ICD-10-CM

## 2025-06-10 DIAGNOSIS — R11.0 NAUSEA: ICD-10-CM

## 2025-06-10 DIAGNOSIS — M35.9 UNDIFFERENTIATED CONNECTIVE TISSUE DISEASE (HCC): ICD-10-CM

## 2025-06-10 DIAGNOSIS — J44.1 COPD EXACERBATION (HCC): Primary | ICD-10-CM

## 2025-06-10 DIAGNOSIS — F17.200 NICOTINE DEPENDENCE, UNCOMPLICATED, UNSPECIFIED NICOTINE PRODUCT TYPE: ICD-10-CM

## 2025-06-10 PROCEDURE — 99214 OFFICE O/P EST MOD 30 MIN: CPT | Performed by: FAMILY MEDICINE

## 2025-06-10 PROCEDURE — G2211 COMPLEX E/M VISIT ADD ON: HCPCS | Performed by: FAMILY MEDICINE

## 2025-06-10 RX ORDER — ONDANSETRON 4 MG/1
4 TABLET, FILM COATED ORAL EVERY 12 HOURS PRN
Qty: 20 TABLET | Refills: 0 | Status: SHIPPED | OUTPATIENT
Start: 2025-06-10

## 2025-06-10 RX ORDER — LEVALBUTEROL INHALATION SOLUTION 0.31 MG/3ML
0.31 SOLUTION RESPIRATORY (INHALATION) EVERY 4 HOURS PRN
Qty: 75 ML | Refills: 1 | Status: SHIPPED | OUTPATIENT
Start: 2025-06-10

## 2025-06-10 RX ORDER — NICOTINE 21 MG/24HR
1 PATCH, TRANSDERMAL 24 HOURS TRANSDERMAL EVERY 24 HOURS
Qty: 28 PATCH | Refills: 0 | Status: SHIPPED | OUTPATIENT
Start: 2025-06-10

## 2025-06-10 NOTE — PROGRESS NOTES
Name: Melissa Nguyen      : 1945      MRN: [id number]  Encounter Provider: Zayra Jessica MD  Encounter Date: 6/10/2025   Encounter department: [facility]  :    Mrs. Nguyen is a pleasant 79-year-old female who was admitted to the hospital several weeks ago with a COPD exacerbation.  She has been on oxygen since, but is weaning slowly at home.  More recently, she was seen in the ER last week when she presented with shortness of breath.  She was given IV Solu-Medrol and a DuoNeb and subsequently felt better and was sent home.  She had started Wellbutrin and was not sure if the symptoms were related in any way to that.  She states that she is very sensitive to new medications.  She had associated headache and nausea.    She states that she is using oxygen less often while at home and mostly only with physical activity.    Today she is feeling pretty good.  She is no longer using her rollator and is back to her cane.        Assessment & Plan  COPD exacerbation (HCC)  Pt was referred to the ER on  when she called complaining of worsening SOB.  Xray showed no acute changes.  CT from  was stable.  Pulmonary  - Dr. Madrigal.  She was given IV solumedrol, alb / atrovent inhaler and zofran and discharged in improved condition. Troponins negative.  She remains on 3L nasal O2 -using at home as needed and mostly with physical activity.  I have asked her to wean down to 2 L.  Discharged from the ER on Pred 60mg daily for 5 days - never started it so we will remain off for now, as she is doing well.  She is encouraged to use her albuterol nebulizer -she complains of tachycardia with use and headache.  I have given her prescription for Xopenex to use instead.  Orders:  •  levalbuterol (Xopenex) 0.31 mg/3 mL nebulizer solution; Take 3 mL (0.31 mg total) by nebulization every 4 (four) hours as needed for wheezing    Nausea  Nausea has been secondary to oral prednisone and she is using zofran as  needed  She remains on PPI which is helping with GI prophylaxis.  She will taper the Zofran as needed.  Orders:  •  ondansetron (ZOFRAN) 4 mg tablet; Take 1 tablet (4 mg total) by mouth every 12 (twelve) hours as needed for nausea or vomiting    Undifferentiated connective tissue disease (HCC)  She has days of fatigue after overuse,relieved with rest. Perhaps this is due more to underlying COPD?  She has tried different meds / biologics without tolerance  F/U with Dr. Reynolds if needed.       Controlled type 2 diabetes mellitus without complication, without long-term current use of insulin (HCC)  Remains on metformin 500mg daily  A1C is stable.  Lab Results   Component Value Date    HGBA1C 5.9 04/23/2025          Nicotine dependence, uncomplicated, unspecified nicotine product type  Down to 9 cig per day  Wellbutrin discontinued - seems to not tolerate side effects  She is not on any NicoDerm patch at present, but will order 1 at 14 mg per 24 hours.  Orders:  •  nicotine (NICODERM CQ) 14 mg/24hr TD 24 hr patch; Place 1 patch on the skin every 24 hours over 24 hours    Benign paroxysmal positional vertigo, unspecified laterality  Does home Epley         Return to office in 4 weeks.     History of Present Illness   Chief Complaint   Patient presents with   • Follow-up     Pt present for his 2 week follow up. Pt mentioned she was seen at the ED last week due to COPD with acute exacerbation. Pt was prescribed perdnisone, but did not start taking it until she talked to Dr Jessica.       Mrs. Nguyen is a pleasant 79-year-old female who was admitted to the hospital several weeks ago with a COPD exacerbation.  She has been on oxygen since, but is weaning slowly at home.  More recently, she was seen in the ER last week when she presented with shortness of breath.  She was given IV Solu-Medrol and a DuoNeb and subsequently felt better and was sent home.  She had started Wellbutrin and was not sure if the symptoms were related in  "any way to that.  She states that she is very sensitive to new medications.  She had associated headache and nausea.    She states that she is using oxygen less often while at home and mostly only with physical activity.    Today she is feeling pretty good.  She is no longer using her rollator and is back to her cane.        Review of Systems   Constitutional:         See HPI  Feels she is doing better today.   HENT:  Negative for congestion, ear pain, mouth sores, sinus pressure and trouble swallowing.    Eyes:  Negative for discharge, redness and itching.   Respiratory:  Positive for shortness of breath. Negative for apnea, cough, chest tightness, wheezing and stridor.         Shortness of breath on exertion or with increased physical activity.  See HPI.   Cardiovascular:  Negative for chest pain, palpitations and leg swelling.   Gastrointestinal:  Positive for nausea. Negative for abdominal distention, abdominal pain, blood in stool, constipation, diarrhea and vomiting.        Intermittent nausea -using Zofran as needed.   Endocrine: Negative for cold intolerance and heat intolerance.   Genitourinary:  Negative for difficulty urinating, dysuria, flank pain and urgency.   Musculoskeletal:  Negative for arthralgias and myalgias.        She is feeling stronger with improved balance and has advanced her rollator to a cane.   Skin:  Negative for rash.   Neurological:  Positive for dizziness. Negative for seizures, syncope, speech difficulty, weakness, light-headedness, numbness and headaches.        Intermittent dizziness -doing the Epley maneuver at home helps.   Hematological:  Negative for adenopathy.   Psychiatric/Behavioral:  Negative for agitation, behavioral problems, confusion and sleep disturbance. The patient is not nervous/anxious.        Objective   /72 (BP Location: Left arm, Patient Position: Sitting, Cuff Size: Large)   Pulse 74   Ht 5' 1\" (1.549 m)   Wt 77.1 kg (170 lb)   LMP  (LMP Unknown)  "  SpO2 96%   BMI 32.12 kg/m²      Physical Exam  Vitals and nursing note reviewed.   Constitutional:       General: She is not in acute distress.     Appearance: Normal appearance. She is well-developed. She is not ill-appearing, toxic-appearing or diaphoretic.      Comments: Patient is well-groomed and an excellent historian.  She is in good spirits, talkative and engaged and appears more like her old self today.  She has been seated in the exam room without oxygen and her pulse ox is 96% on room air.     Eyes:      General: No scleral icterus.    Neck:      Vascular: No carotid bruit.     Cardiovascular:      Rate and Rhythm: Normal rate and regular rhythm.      Heart sounds: Normal heart sounds. No murmur heard.     No friction rub. No gallop.   Pulmonary:      Effort: Pulmonary effort is normal. No respiratory distress.      Breath sounds: Normal breath sounds. No stridor. No wheezing, rhonchi or rales.      Comments: Good air movement, with good effort.  Pulse ox is 96% on room air, with rest.  Chest:      Chest wall: No tenderness.     Musculoskeletal:         General: Normal range of motion.      Cervical back: Normal range of motion and neck supple.      Right lower leg: No edema.      Left lower leg: No edema.      Comments: Using a cane today, for added stability.   Lymphadenopathy:      Cervical: No cervical adenopathy.     Skin:     Coloration: Skin is not jaundiced.     Neurological:      Mental Status: She is alert and oriented to person, place, and time.     Psychiatric:         Mood and Affect: Mood normal.         Behavior: Behavior normal.         Thought Content: Thought content normal.         Judgment: Judgment normal.

## 2025-06-18 ENCOUNTER — TELEMEDICINE (OUTPATIENT)
Dept: PULMONOLOGY | Facility: CLINIC | Age: 80
End: 2025-06-18

## 2025-06-18 DIAGNOSIS — F17.210 CIGARETTE NICOTINE DEPENDENCE WITHOUT COMPLICATION: Primary | ICD-10-CM

## 2025-06-18 DIAGNOSIS — T65.291A TOXIC EFFECT OF TOBACCO, ACCIDENTAL OR UNINTENTIONAL, INITIAL ENCOUNTER: ICD-10-CM

## 2025-06-18 PROCEDURE — SMOKECESS PR SMOKING CESSATION

## 2025-06-18 RX ORDER — POLYETHYLENE GLYCOL 3350 17 G
2 POWDER IN PACKET (EA) ORAL AS NEEDED
Qty: 100 EACH | Refills: 0 | Status: SHIPPED | OUTPATIENT
Start: 2025-06-18

## 2025-06-18 NOTE — PROGRESS NOTES
Virtual Visit with Tobacco Cessation:  Assessment & Plan        Plan   Tobacco Use/Cessation.  I assessed Melissa Nguyen to be in an action stage with respect to tobacco use.  I advised patient to quit, and offered support.  Discussed current use pattern.  Nicotine gum. 2mg  Nicotine Lozenge 2mg  Follow up in 1 month or as needed.  I spent approximately 30 minutes counseling the patient.  Pat attendedient smoking cessation follow-up visit virtually.  Patient was taking Wellbutrin but had an adverse reaction and went to the ER.  Patient explained that she was nauseous headaches and heart palpitations.  Patient has since stopped taking Wellbutrin and has been using the 14 mg patch.  Patient was able to cut down from 1 pack/day to 8 cigarettes/day but feels stuck here.  Gave patient education on combination use of patches, and lozenges.  Patient was agreeable and will try patches gum and lozenges for the next month.  Provided referral to quit line in case patient is not covered by insurance for gum and lozenges.  Patient to follow-up in 1 month to assess progress and tolerance to medications..    As a result of this visit, I have referred the patient for further respiratory evaluation. No    History of Present Illness     Reason for visit is nicotine dependence    Recent Visits  No visits were found meeting these conditions.  Showing recent visits within past 7 days and meeting all other requirements  Today's Visits  Date Type Provider Dept   06/18/25 Telemedicine Troy Soriano RN [facility]   Showing today's visits and meeting all other requirements  Future Appointments  No visits were found meeting these conditions.  Showing future appointments within next 150 days and meeting all other requirements       Subjective HPI  Melissa Nguyen is a 79 y.o. female here for a discussion regarding smoking cessation. She began smoking several years ago. She currently smokes 0.5 packs per day. She has  attempted to quit smoking in the past, most recently 1 month ago. Best success quitting using nicotine patch. Barriers to quitting include: under a lot of stress now and habit. She denies chest pain, dyspnea on exertion, dyspnea while laying down, hemoptysis, non productive cough, productive cough, shortness of breath, and wheezing.    Review of Systems    Medical History Reviewed by provider this encounter:     .  Tobacco Use History[1]  E-Cigarette/Vaping    E-Cigarette Use Never User      E-Cigarette/Vaping Substances    Nicotine No     THC No     CBD No     Flavoring No     Other No     Unknown No        Objective   LMP  (LMP Unknown)     Video Exam  Physical Exam     Administrative Statements   Encounter provider Troy Soriano RN    The Patient is located at Home and in the following state in which I hold an active license [place].    The patient was identified by name and date of birth. Melissa Nguyen was informed that this is a telemedicine visit and that the visit is being conducted through the Epic Embedded platform. She agrees to proceed..  My office door was closed. No one else was in the room.  She acknowledged consent and understanding of privacy and security of the video platform. The patient has agreed to participate and understands they can discontinue the visit at any time.    I have spent a total time of 30 minutes in caring for this patient on the day of the visit/encounter including Patient and family education, Counseling / Coordination of care, Documenting in the medical record, Reviewing/placing orders in the medical record (including tests, medications, and/or procedures), Obtaining or reviewing history  , Communicating with other healthcare professionals , and smoking cessation education motivational interviewing behavioral modification, not including the time spent for establishing the audio/video connection.  I spent 30 minutes with patient today in which >10 minutes of the time was spent  in counseling/coordination of care regarding tobacco cessation           [1]   Social History  Tobacco Use   Smoking Status Former    Current packs/day: 0.00    Types: Cigarettes    Quit date: 2025    Years since quittin.1   Smokeless Tobacco Never   Tobacco Comments    Little less than a pack

## 2025-06-20 ENCOUNTER — PATIENT OUTREACH (OUTPATIENT)
Dept: CASE MANAGEMENT | Facility: OTHER | Age: 80
End: 2025-06-20

## 2025-06-20 NOTE — PROGRESS NOTES
Patient returned my call.  She reports she has periods of sob and weakness.  She also experiences nausea which she relates to sensitivity to medications.  Her respiratory status has improved.  She has not required her oxygen at 3 L.  SpO2 is 92-94% without oxygen.  She notes she used her oxygen the other day when she was feeling sob. Her PO at that time was 84%.  She is aware if her PO drops below 89% she should wear oxygen.  She is using inhalers and has used nebulizer treatments but is not currently. She is working on smoking cessation.  She was referred to [place] 1-800 quit and will be receiving weekly support calls.  She is wearing nicotine patch and will be starting gum and lozenges.  She cut back to 8-9 cigarettes a day but has hit a plateau.  She reports her appetite is good.  She has not monitored BS this week.  She is taking all medications as prescribed. Follow up appointments scheduled.  She has a good support system of friends who help as needed.  Will f/u in a few weeks.

## 2025-06-24 ENCOUNTER — CLINICAL SUPPORT (OUTPATIENT)
Dept: FAMILY MEDICINE CLINIC | Facility: CLINIC | Age: 80
End: 2025-06-24
Payer: COMMERCIAL

## 2025-06-24 DIAGNOSIS — E53.8 VITAMIN B12 DEFICIENCY: Primary | ICD-10-CM

## 2025-06-24 PROCEDURE — 96372 THER/PROPH/DIAG INJ SC/IM: CPT

## 2025-06-24 RX ADMIN — CYANOCOBALAMIN 1000 MCG: 1000 INJECTION, SOLUTION INTRAMUSCULAR; SUBCUTANEOUS at 16:04

## 2025-06-24 NOTE — PROGRESS NOTES
B12 injection given in right deltoid.  Patient tolerated injection well and left office in no distress.

## 2025-06-28 DIAGNOSIS — J45.20 MILD INTERMITTENT REACTIVE AIRWAY DISEASE WITHOUT COMPLICATION: ICD-10-CM

## 2025-07-01 RX ORDER — ALBUTEROL SULFATE 90 UG/1
1-2 INHALANT RESPIRATORY (INHALATION) AS NEEDED
Qty: 18 G | Refills: 0 | Status: SHIPPED | OUTPATIENT
Start: 2025-07-01

## 2025-07-08 ENCOUNTER — PATIENT OUTREACH (OUTPATIENT)
Dept: CASE MANAGEMENT | Facility: OTHER | Age: 80
End: 2025-07-08

## 2025-07-09 ENCOUNTER — RA CDI HCC (OUTPATIENT)
Dept: OTHER | Facility: HOSPITAL | Age: 80
End: 2025-07-09

## 2025-07-09 ENCOUNTER — OFFICE VISIT (OUTPATIENT)
Dept: RHEUMATOLOGY | Facility: CLINIC | Age: 80
End: 2025-07-09
Payer: COMMERCIAL

## 2025-07-15 ENCOUNTER — PATIENT OUTREACH (OUTPATIENT)
Dept: CASE MANAGEMENT | Facility: OTHER | Age: 80
End: 2025-07-15

## 2025-07-16 ENCOUNTER — TELEMEDICINE (OUTPATIENT)
Dept: PULMONOLOGY | Facility: CLINIC | Age: 80
End: 2025-07-16

## 2025-07-16 ENCOUNTER — OFFICE VISIT (OUTPATIENT)
Dept: FAMILY MEDICINE CLINIC | Facility: CLINIC | Age: 80
End: 2025-07-16
Payer: COMMERCIAL

## 2025-07-16 VITALS
HEIGHT: 61 IN | BODY MASS INDEX: 32.32 KG/M2 | SYSTOLIC BLOOD PRESSURE: 138 MMHG | WEIGHT: 171.2 LBS | DIASTOLIC BLOOD PRESSURE: 72 MMHG | HEART RATE: 76 BPM | OXYGEN SATURATION: 94 %

## 2025-07-16 DIAGNOSIS — R11.0 NAUSEA: ICD-10-CM

## 2025-07-16 DIAGNOSIS — M35.9 UNDIFFERENTIATED CONNECTIVE TISSUE DISEASE (HCC): ICD-10-CM

## 2025-07-16 DIAGNOSIS — H81.10 BENIGN PAROXYSMAL POSITIONAL VERTIGO, UNSPECIFIED LATERALITY: ICD-10-CM

## 2025-07-16 DIAGNOSIS — F17.210 CIGARETTE NICOTINE DEPENDENCE WITHOUT COMPLICATION: Primary | ICD-10-CM

## 2025-07-16 DIAGNOSIS — H93.12 TINNITUS, LEFT EAR: ICD-10-CM

## 2025-07-16 DIAGNOSIS — T65.291A TOXIC EFFECT OF TOBACCO, ACCIDENTAL OR UNINTENTIONAL, INITIAL ENCOUNTER: ICD-10-CM

## 2025-07-16 DIAGNOSIS — M88.9 PAGET'S BONE DISEASE: ICD-10-CM

## 2025-07-16 DIAGNOSIS — E53.8 VITAMIN B12 DEFICIENCY: ICD-10-CM

## 2025-07-16 DIAGNOSIS — J44.1 COPD EXACERBATION (HCC): Primary | ICD-10-CM

## 2025-07-16 PROCEDURE — 99214 OFFICE O/P EST MOD 30 MIN: CPT | Performed by: FAMILY MEDICINE

## 2025-07-16 PROCEDURE — SMOKECESS PR SMOKING CESSATION

## 2025-07-16 PROCEDURE — 96372 THER/PROPH/DIAG INJ SC/IM: CPT | Performed by: FAMILY MEDICINE

## 2025-07-16 RX ORDER — CYANOCOBALAMIN 1000 UG/ML
1000 INJECTION, SOLUTION INTRAMUSCULAR; SUBCUTANEOUS ONCE
Status: COMPLETED | OUTPATIENT
Start: 2025-07-16 | End: 2025-07-16

## 2025-07-16 RX ADMIN — CYANOCOBALAMIN 1000 MCG: 1000 INJECTION, SOLUTION INTRAMUSCULAR; SUBCUTANEOUS at 15:44

## 2025-07-16 NOTE — ASSESSMENT & PLAN NOTE
Seems to be feeling much better.  Using O2 rarely  Pulse ox is usually above 93%  Does not wear at night.  I suggested she use nasal O2 at 1 or 2L when she is doing an activity, and perhaps during the night, as these are the time she becomes symptomatic.  She should also retry using the xopenex neb, but may start with 1/2 dose as she felt SOB with the full dose.  Continues to smoke - down to 11-12 cig / day.

## 2025-07-16 NOTE — PROGRESS NOTES
Name: Melissa Nguyen      : 1945      MRN: [id number]  Encounter Provider: Zayra Jessica MD  Encounter Date: 2025   Encounter department: [facility]  :  Mrs. Nguyen is a pleasant 79-year-old female who is well-known to me.  She presents today to follow-up on COPD, nausea, undifferentiated connective tissue disease, tinnitus and benign positional vertigo.  She checks her pulse ox at home and it is consistently 93-96% on RA. Rarely using O2 anymore. She does continue with SOB on exertion and at times upon awakening in the morning.    She tried nicotine gum and felt more SOB so she stopped it.  Same with the patch.  Has not been using xopenex nebs bc she feels SOB when she uses it.  Assessment & Plan  COPD exacerbation (HCC)  Seems to be feeling much better.  Using O2 rarely  Pulse ox is usually above 93%  Does not wear at night.  I suggested she use nasal O2 at 1 or 2L when she is doing an activity, and perhaps during the night, as these are the time she becomes symptomatic.  She should also retry using the xopenex neb, but may start with 1/2 dose as she felt SOB with the full dose.  Continues to smoke - down to 11-12 cig / day.       Nausea  Resolved; off zofran       Undifferentiated connective tissue disease (HCC)  Saw Dr. Reynolds last week - wants her to take Reclast - pt declines  She is afraid to take any new meds since she is so sensitive to them and just starting to feel better.  She is not on any biologics at present.       Tinnitus, left ear  Present but getting used to it         Benign paroxysmal positional vertigo, unspecified laterality  Just about resolved and uses the Epley when needed.       Vitamin B12 deficiency  IM shot today  Orders:  •  cyanocobalamin injection 1,000 mcg    Paget's bone disease  Seeing Dr. Flanagan  Also saw Dr. Craig  Scheduled to have Reclast but she would like to hold off for now. She feels weary to start a new medication at present as she is  just starting to feel better, and she is quite sensitive to medications. We discussed the need for Reclast in order to preserve the integrity of her bones in the setting of Pagets, and she voices understanding.  Will revisit in 4 months.       F/U 2 months.         History of Present Illness   Chief Complaint   Patient presents with   • Follow-up     4 month fu. Shortness of breath for couple months. Was hospitalized back in June. Noticed when she added nicotine gum to her patch. SOB comes and goes.        Mrs. Nguyen is a pleasant 79-year-old female who is well-known to me.  She presents today to follow-up on COPD, nausea, undifferentiated connective tissue disease, tinnitus and benign positional vertigo.  She checks her pulse ox at home and it is consistently 93-96% on RA. Rarely using O2 anymore. She does continue with SOB on exertion and at times upon awakening in the morning.    She tried nicotine gum and felt more SOB so she stopped it.  Same with the patch.  Has not been using xopenex nebs bc she feels SOB when she uses it.        Review of Systems   Constitutional:         See HPI   HENT:  Positive for tinnitus. Negative for congestion, ear pain, mouth sores, sinus pressure and trouble swallowing.    Eyes:  Negative for discharge, redness and itching.   Respiratory:  Positive for shortness of breath. Negative for apnea, cough, chest tightness, wheezing and stridor.    Cardiovascular:  Negative for chest pain, palpitations and leg swelling.   Gastrointestinal:  Negative for abdominal distention, abdominal pain, blood in stool, constipation, diarrhea, nausea and vomiting.   Endocrine: Negative for cold intolerance and heat intolerance.   Genitourinary:  Negative for difficulty urinating, dysuria, flank pain and urgency.   Musculoskeletal:  Negative for arthralgias and myalgias.   Skin:  Negative for rash.   Neurological:  Positive for dizziness. Negative for seizures, syncope, speech difficulty, weakness,  "light-headedness, numbness and headaches.   Hematological:  Negative for adenopathy.   Psychiatric/Behavioral:  Negative for agitation, behavioral problems, confusion and sleep disturbance. The patient is not nervous/anxious.        Objective   /72 (BP Location: Right arm, Patient Position: Sitting, Cuff Size: Standard)   Pulse 76   Ht 5' 1\" (1.549 m)   Wt 77.7 kg (171 lb 3.2 oz)   LMP  (LMP Unknown)   SpO2 94%   BMI 32.35 kg/m²      Physical Exam  Vitals and nursing note reviewed.   Constitutional:       General: She is not in acute distress.     Appearance: Normal appearance. She is well-developed. She is not ill-appearing, toxic-appearing or diaphoretic.      Comments: Pt is well groomed, has hair and make-up done and is looking like her old self. She is not wearing O2 and has no difficulty speaking in full sentences and walking to the exam room without SOB.Pulse ox 93% on RA.  She is smiling and happy; interactive     Eyes:      General: No scleral icterus.    Neck:      Vascular: No carotid bruit.     Cardiovascular:      Rate and Rhythm: Normal rate and regular rhythm.      Heart sounds: Normal heart sounds. No murmur heard.     No friction rub. No gallop.   Pulmonary:      Effort: Pulmonary effort is normal. No respiratory distress.      Breath sounds: Normal breath sounds. No stridor. No wheezing, rhonchi or rales.      Comments: Good air movement; no wheezes or rales  Chest:      Chest wall: No tenderness.     Musculoskeletal:         General: Normal range of motion.      Cervical back: Normal range of motion and neck supple.      Right lower leg: No edema.      Left lower leg: No edema.   Lymphadenopathy:      Cervical: No cervical adenopathy.     Skin:     Coloration: Skin is not jaundiced.     Neurological:      Mental Status: She is alert and oriented to person, place, and time.     Psychiatric:         Mood and Affect: Mood normal.         Behavior: Behavior normal.         Thought Content: " Thought content normal.         Judgment: Judgment normal.

## 2025-07-16 NOTE — ASSESSMENT & PLAN NOTE
Seeing Dr. Flanagan  Also saw Dr. Craig  Scheduled to have Reclast but she would like to hold off for now. She feels weary to start a new medication at present as she is just starting to feel better, and she is quite sensitive to medications. We discussed the need for Reclast in order to preserve the integrity of her bones in the setting of Pagets, and she voices understanding.  Will revisit in 4 months.       F/U 2 months.

## 2025-07-16 NOTE — ASSESSMENT & PLAN NOTE
Saw Dr. Reynolds last week - wants her to take Reclast - pt declines  She is afraid to take any new meds since she is so sensitive to them and just starting to feel better.  She is not on any biologics at present.

## 2025-07-16 NOTE — PROGRESS NOTES
Virtual Visit with Tobacco Cessation:  Assessment & Plan        Plan   Tobacco Use/Cessation.  I assessed Melissa Nguyen to be in an action stage with respect to tobacco use.  I advised patient to quit, and offered support.  Discussed current use pattern.  Nicotine patches beginning at 7 mg.  Nicotine gum. 2mg  Follow up in 1 month or as needed.  I spent approximately 20 minutes counseling the patient.  Patient attended follow-up smoking cessation visit virtually.  Patient states that she stopped using the 14 mg patch and 2 mg gum because she was short of breath.  Patient attributed to nicotine replacement use.  Discussed alternative options for nicotine replacement and suggested using 7 mg patch and 2 mg gum to reduce dosages.  Patient was agreeable and will see how she feels taking lower dosages.  Patient is smoking 12 cigarettes/day instead of 8 cigarettes since last visit.  Patient attributes increasing cigarettes due to not using nicotine replacement anymore.  Patient was asking about additional options to assist with her quit attempt.  Discussed virtual classes and patient was agreeable.  Referral placed for virtual consult.  Patient would like to follow-up in 1 month to assess progress..    As a result of this visit, I have referred the patient for further respiratory evaluation. No    History of Present Illness     Reason for visit is nicotine dependence    Recent Visits  No visits were found meeting these conditions.  Showing recent visits within past 7 days and meeting all other requirements  Today's Visits  Date Type Provider Dept   07/16/25 Telemedicine Troy Soriano RN [facility]   07/16/25 Office Visit Zayra Jessica MD [facility]   Showing today's visits and meeting all other requirements  Future Appointments  No visits were found meeting these conditions.  Showing future appointments within next 150 days and meeting all other requirements       Subjective HPI  Melissa DELGADO  Wendy is a 79 y.o. female here for a discussion regarding smoking cessation.   Review of Systems    Medical History Reviewed by provider this encounter:     .  Tobacco Use History[1]  E-Cigarette/Vaping    E-Cigarette Use Never User      E-Cigarette/Vaping Substances    Nicotine No     THC No     CBD No     Flavoring No     Other No     Unknown No        Objective   LMP  (LMP Unknown)     Video Exam  Physical Exam     Administrative Statements   Encounter provider Troy Soriano RN    The Patient is located at Home and in the following state in which I hold an active license [place].    The patient was identified by name and date of birth. Melissa Nguyen was informed that this is a telemedicine visit and that the visit is being conducted through the Epic Embedded platform. She agrees to proceed..  My office door was closed. No one else was in the room.  She acknowledged consent and understanding of privacy and security of the video platform. The patient has agreed to participate and understands they can discontinue the visit at any time.    I have spent a total time of 20 minutes in caring for this patient on the day of the visit/encounter including Patient and family education, Counseling / Coordination of care, Documenting in the medical record, Reviewing/placing orders in the medical record (including tests, medications, and/or procedures), Obtaining or reviewing history  , Communicating with other healthcare professionals , and smoking cessation education motivational interviewing behavior modification, not including the time spent for establishing the audio/video connection.  I spent 20 minutes with patient today in which >10 minutes of the time was spent in counseling/coordination of care regarding tobacco cessation           [1]   Social History  Tobacco Use   Smoking Status Former    Current packs/day: 0.00    Types: Cigarettes    Quit date: 2025    Years since quittin.2   Smokeless Tobacco Never    Tobacco Comments    Pt states they are in the process of quitting

## 2025-07-17 ENCOUNTER — PATIENT OUTREACH (OUTPATIENT)
Dept: CASE MANAGEMENT | Facility: OTHER | Age: 80
End: 2025-07-17

## 2025-07-17 NOTE — PROGRESS NOTES
Contacted patient for f/u.  She continues to experience sob and weakness.  Her PO is in the 93-96% range.   She saw PCP yesterday and was recommended to start using oxygen at 1-1.5 L when needed during the day and at night.  She was also instructed to start using xopenex nebulizer at half the dose to see if that helps her sob. She has not started either yet.  She was using nicotine patches and gum but discontinued it as she felt it was causing sob.  She follows with nurse through smoking cessation program and was recommended to start the patches at lower dose.  She reports since she stopped the patches she is smoking more, 12-13 cigarettes per day.  Her plan is to start one thing at a time so she will begin with using oxygen and then will trial the nebulizer treatments before working on the reduced dose of the nicotine patches.  She reports gaining weight as she has been unable to exercise.  She has a good appetite.  She takes her medications as prescribed.  Follow up appointments scheduled.  She denies any needs at this time.  Will f/u in 2 weeks.

## 2025-07-31 ENCOUNTER — PATIENT OUTREACH (OUTPATIENT)
Dept: CASE MANAGEMENT | Facility: OTHER | Age: 80
End: 2025-07-31

## 2025-08-05 DIAGNOSIS — J45.20 MILD INTERMITTENT REACTIVE AIRWAY DISEASE WITHOUT COMPLICATION: ICD-10-CM

## 2025-08-06 DIAGNOSIS — J45.20 MILD INTERMITTENT REACTIVE AIRWAY DISEASE WITHOUT COMPLICATION: ICD-10-CM

## 2025-08-06 RX ORDER — ALBUTEROL SULFATE 90 UG/1
1-2 INHALANT RESPIRATORY (INHALATION) AS NEEDED
Qty: 18 G | Refills: 1 | Status: SHIPPED | OUTPATIENT
Start: 2025-08-06

## 2025-08-08 ENCOUNTER — APPOINTMENT (EMERGENCY)
Dept: RADIOLOGY | Facility: HOSPITAL | Age: 80
End: 2025-08-08
Payer: COMMERCIAL

## 2025-08-08 ENCOUNTER — HOSPITAL ENCOUNTER (INPATIENT)
Facility: HOSPITAL | Age: 80
LOS: 1 days | Discharge: HOME | End: 2025-08-10
Attending: EMERGENCY MEDICINE | Admitting: INTERNAL MEDICINE
Payer: COMMERCIAL

## 2025-08-08 RX ORDER — ALBUTEROL SULFATE 90 UG/1
1-2 INHALANT RESPIRATORY (INHALATION) AS NEEDED
OUTPATIENT
Start: 2025-08-08

## 2025-08-09 PROBLEM — J96.11 CHRONIC HYPOXIC RESPIRATORY FAILURE (HCC): Status: ACTIVE | Noted: 2025-08-09

## 2025-08-11 ENCOUNTER — TRANSITIONAL CARE MANAGEMENT (OUTPATIENT)
Dept: FAMILY MEDICINE CLINIC | Facility: CLINIC | Age: 80
End: 2025-08-11

## 2025-08-11 ENCOUNTER — PATIENT OUTREACH (OUTPATIENT)
Dept: CASE MANAGEMENT | Facility: HOSPITAL | Age: 80
End: 2025-08-11

## 2025-08-12 PROBLEM — Z72.0 TOBACCO ABUSE: Status: ACTIVE | Noted: 2025-08-12

## 2025-08-13 ENCOUNTER — OFFICE VISIT (OUTPATIENT)
Dept: FAMILY MEDICINE CLINIC | Facility: CLINIC | Age: 80
End: 2025-08-13
Payer: COMMERCIAL

## 2025-08-13 ENCOUNTER — OFFICE VISIT (OUTPATIENT)
Dept: PULMONOLOGY | Facility: CLINIC | Age: 80
End: 2025-08-13
Payer: COMMERCIAL

## 2025-08-13 PROBLEM — J96.01 ACUTE HYPOXEMIC RESPIRATORY FAILURE (HCC): Status: RESOLVED | Noted: 2025-05-04 | Resolved: 2025-08-13

## 2025-08-14 ENCOUNTER — TELEPHONE (OUTPATIENT)
Age: 80
End: 2025-08-14

## 2025-08-18 ENCOUNTER — PATIENT OUTREACH (OUTPATIENT)
Dept: CASE MANAGEMENT | Facility: OTHER | Age: 80
End: 2025-08-18

## 2025-08-18 ENCOUNTER — TELEMEDICINE (OUTPATIENT)
Dept: PULMONOLOGY | Facility: CLINIC | Age: 80
End: 2025-08-18

## 2025-08-18 DIAGNOSIS — F17.210 CIGARETTE NICOTINE DEPENDENCE WITHOUT COMPLICATION: Primary | ICD-10-CM

## 2025-08-18 DIAGNOSIS — T65.291A TOXIC EFFECT OF TOBACCO, ACCIDENTAL OR UNINTENTIONAL, INITIAL ENCOUNTER: ICD-10-CM

## 2025-08-18 PROCEDURE — SMOKECESS PR SMOKING CESSATION
